# Patient Record
Sex: MALE | Race: WHITE | Employment: UNEMPLOYED | URBAN - METROPOLITAN AREA
[De-identification: names, ages, dates, MRNs, and addresses within clinical notes are randomized per-mention and may not be internally consistent; named-entity substitution may affect disease eponyms.]

---

## 2017-03-13 ENCOUNTER — OFFICE VISIT (OUTPATIENT)
Dept: INTERNAL MEDICINE CLINIC | Age: 62
End: 2017-03-13

## 2017-03-13 DIAGNOSIS — I10 BENIGN ESSENTIAL HYPERTENSION: Primary | ICD-10-CM

## 2017-03-13 DIAGNOSIS — F41.9 ANXIETY: ICD-10-CM

## 2017-03-13 RX ORDER — LORAZEPAM 0.5 MG/1
TABLET ORAL
Qty: 30 TAB | Refills: 0 | Status: SHIPPED | OUTPATIENT
Start: 2017-03-13

## 2017-03-13 NOTE — PROGRESS NOTES
HPI:   Needs DMV form completed  Hx notable for HTN, mild anxiety  w/o chest pain/abd. discomfort; no dyspnea, cough or pedal edema; denies constitutional complaints of fever, night sweats or wt loss; no evidence of GI/ hemorrhage; no polyuria/polydipsia. Activity is age appropriate. ROS is otherwise negative. Past Medical History:   Diagnosis Date    Anxiety     Benign essential hypertension     IGT (impaired glucose tolerance)        No past surgical history on file. Social History     Social History    Marital status: SINGLE     Spouse name: N/A    Number of children: N/A    Years of education: N/A     Occupational History    Not on file. Social History Main Topics    Smoking status: Former Smoker     Quit date: 1/1/1980    Smokeless tobacco: Not on file    Alcohol use Yes      Comment: heavy    Drug use: No    Sexual activity: Not on file     Other Topics Concern    Not on file     Social History Narrative       No Known Allergies    Family History   Problem Relation Age of Onset    Arthritis-osteo Father     No Known Problems Mother        Current Outpatient Prescriptions   Medication Sig Dispense Refill    atenolol-chlorthalidone (TENORETIC) 100-25 mg per tablet 1 qam 90 Tab 3    amLODIPine (NORVASC) 5 mg tablet 1 qd 90 Tab 3    LORazepam (ATIVAN) 0.5 mg tablet Take  by mouth two (2) times daily as needed. There were no vitals taken for this visit. PE  Well nourished in NAD  HEENT: PERRLA, EOMI;  OP: clear. Neck: supple w/o mass or bruits. Chest: clear. CV: RRR w/o m,r,g; pulses intact. Abd: soft, NT, w/o HSM or mass. Ext: w/o edema. Neuro: NF. Assessment and Plan    Encounter Diagnoses   Name Primary?  Benign essential hypertension Yes    Anxiety    DMV form completed  HTN - controlled (aggravated by white coat effect)  Anxiety - mild; uses ativan sparingly  I have reviewed/discussed the above normal BMI with the patient.   I have recommended the following interventions: dietary management education, guidance, and counseling . The plan is as follows: I have counseled this patient on diet and exercise regimens.  .    No change in rx  Declines lab work d/t expense  OV 6 mos or prn  I have explained plan to patient and the patient verbalizes understanding

## 2017-03-13 NOTE — PATIENT INSTRUCTIONS

## 2017-03-14 ENCOUNTER — OFFICE VISIT (OUTPATIENT)
Dept: INTERNAL MEDICINE CLINIC | Age: 62
End: 2017-03-14

## 2017-03-14 VITALS
DIASTOLIC BLOOD PRESSURE: 80 MMHG | HEART RATE: 56 BPM | HEIGHT: 69 IN | RESPIRATION RATE: 16 BRPM | WEIGHT: 182 LBS | BODY MASS INDEX: 26.96 KG/M2 | OXYGEN SATURATION: 99 % | TEMPERATURE: 98.3 F | SYSTOLIC BLOOD PRESSURE: 148 MMHG

## 2017-03-14 DIAGNOSIS — I10 BENIGN ESSENTIAL HYPERTENSION: Primary | ICD-10-CM

## 2017-03-14 DIAGNOSIS — F41.9 ANXIETY: ICD-10-CM

## 2017-03-14 NOTE — PROGRESS NOTES
HPI:   Needs DMV form completed for license renewal (no MVAs/seizures)  Hx notable for HTN, mild anxiety  w/o chest pain/abd. discomfort; no dyspnea, cough or pedal edema; denies constitutional complaints of fever, night sweats or wt loss; no evidence of GI/ hemorrhage; no polyuria/polydipsia. Activity is age appropriate. ROS is otherwise negative. Past Medical History:   Diagnosis Date    Anxiety     Benign essential hypertension     IGT (impaired glucose tolerance)        History reviewed. No pertinent surgical history. Social History     Social History    Marital status: SINGLE     Spouse name: N/A    Number of children: N/A    Years of education: N/A     Occupational History    Not on file. Social History Main Topics    Smoking status: Former Smoker     Quit date: 1/1/1980    Smokeless tobacco: Not on file    Alcohol use Yes      Comment: heavy    Drug use: No    Sexual activity: Not on file     Other Topics Concern    Not on file     Social History Narrative       No Known Allergies    Family History   Problem Relation Age of Onset    Arthritis-osteo Father     No Known Problems Mother        Current Outpatient Prescriptions   Medication Sig Dispense Refill    LORazepam (ATIVAN) 0.5 mg tablet 1 bid prn anxiety 30 Tab 0    atenolol-chlorthalidone (TENORETIC) 100-25 mg per tablet 1 qam 90 Tab 3    amLODIPine (NORVASC) 5 mg tablet 1 qd 90 Tab 3           Visit Vitals    /80 (BP 1 Location: Left arm, BP Patient Position: Sitting)    Pulse (!) 56    Temp 98.3 °F (36.8 °C) (Tympanic)    Resp 16    Ht 5' 9\" (1.753 m)    Wt 182 lb (82.6 kg)    SpO2 99%    BMI 26.88 kg/m2       PE  Well nourished in NAD  HEENT: PERRLA, EOMI;  OP: clear. Neck: supple w/o mass or bruits. Chest: clear. CV: RRR w/o m,r,g; pulses intact. Abd: soft, NT, w/o HSM or mass. Ext: w/o edema. Neuro: NF. Assessment and Plan    Encounter Diagnoses   Name Primary?     Benign essential hypertension Yes    Anxiety        HTN - controlled (aggravated by white coat effect)  Anxiety - mild - uses infrequent ativan  I have reviewed/discussed the above normal BMI with the patient. I have recommended the following interventions: dietary management education, guidance, and counseling . The plan is as follows: I have counseled this patient on diet and exercise regimens.  .    OV 6 - 12 mos or prn  Declines labs/colo d/t expense

## 2017-03-14 NOTE — PROGRESS NOTES
1. Have you been to the ER, urgent care clinic since your last visit? Hospitalized since your last visit? No    2. Have you seen or consulted any other health care providers outside of the [address] [place] [address] since your last visit? Include any pap smears or colon screening.  No

## 2017-03-14 NOTE — PATIENT INSTRUCTIONS

## 2019-12-11 ENCOUNTER — TELEPHONE (OUTPATIENT)
Dept: FAMILY MEDICINE CLINIC | Age: 64
End: 2019-12-11

## 2019-12-11 NOTE — TELEPHONE ENCOUNTER
Patient came in, previous patient of Dr. Angeles Wolf but has not been seen and only has under a week of blood pressure medication left. Attempting to establish as a new patient, has appt scheduled with Dr. Carl Jamison on 12/24 and wants to know if Angeles Wolf can prescribe enough meds (Tenoretic) to cover until that appointment.     Please advise patient [phone]

## 2019-12-13 DIAGNOSIS — R73.09 ABNORMAL GLUCOSE: ICD-10-CM

## 2019-12-13 DIAGNOSIS — I10 BENIGN ESSENTIAL HYPERTENSION: Primary | ICD-10-CM

## 2019-12-13 RX ORDER — ATENOLOL AND CHLORTHALIDONE TABLET 100; 25 MG/1; MG/1
TABLET ORAL
Qty: 90 TAB | Refills: 0 | OUTPATIENT
Start: 2019-12-13

## 2019-12-13 NOTE — PROGRESS NOTES
HPI:   Last seen 3/2017  Has been out of antihypertensives for >12 mos (has significant white coat effect)  Hx also notable for IGT/anxiety  Currently w/o chest pain/abd. discomfort; no dyspnea, cough or pedal edema; denies constitutional complaints of fever, night sweats or wt loss; no evidence of GI/ hemorrhage    ROS is otherwise negative. Past Medical History:   Diagnosis Date    Anxiety     Benign essential hypertension     IGT (impaired glucose tolerance)        No past surgical history on file.     Social History     Socioeconomic History    Marital status: SINGLE     Spouse name: Not on file    Number of children: Not on file    Years of education: Not on file    Highest education level: Not on file   Occupational History    Not on file   Social Needs    Financial resource strain: Not on file    Food insecurity:     Worry: Not on file     Inability: Not on file    Transportation needs:     Medical: Not on file     Non-medical: Not on file   Tobacco Use    Smoking status: Former Smoker     Last attempt to quit: 1980     Years since quittin.9   Substance and Sexual Activity    Alcohol use: Yes     Comment: heavy    Drug use: No    Sexual activity: Not on file   Lifestyle    Physical activity:     Days per week: Not on file     Minutes per session: Not on file    Stress: Not on file   Relationships    Social connections:     Talks on phone: Not on file     Gets together: Not on file     Attends Mu-ism service: Not on file     Active member of club or organization: Not on file     Attends meetings of clubs or organizations: Not on file     Relationship status: Not on file    Intimate partner violence:     Fear of current or ex partner: Not on file     Emotionally abused: Not on file     Physically abused: Not on file     Forced sexual activity: Not on file   Other Topics Concern    Not on file   Social History Narrative    Not on file       No Known Allergies    Family History   Problem Relation Age of Onset    Arthritis-osteo Father     No Known Problems Mother        Current Outpatient Medications   Medication Sig Dispense Refill    LORazepam (ATIVAN) 0.5 mg tablet 1 bid prn anxiety 30 Tab 0    atenolol-chlorthalidone (TENORETIC) 100-25 mg per tablet 1 qam 90 Tab 3    amLODIPine (NORVASC) 5 mg tablet 1 qd 90 Tab 3           Visit Vitals  BP (!) 165/95   Pulse 90   Temp 98.1 °F (36.7 °C)   Resp 15   Ht 5' 9\" (1.753 m)   Wt 191 lb (86.6 kg)   BMI 28.21 kg/m²       PE  Well nourished in NAD  HEENT:  OP: clear. Neck: supple w/o mass or bruits. Chest: clear. CV: RRR w/o m,r,g; pulses intact. Abd: soft, NT, w/o HSM or mass. Ext: w/o edema. Neuro: NF. Assessment and Plan    Encounter Diagnoses   Name Primary?     Benign essential hypertension Yes    Anxiety     IGT (impaired glucose tolerance)        HTN -uncontrolled d/t noncompliance; reinstitute norvasc 5 mg every day and tenoretic 100/25 qd  IGT - continue dietary/exercise efforts; labs soon to assess  Anxiety - discussed nonpharmacologic management  Declines colo; flu/pneumovax advised (he declines)  OV 3 mos or prn  I have explained plan to patient and the patient verbalizes understanding

## 2019-12-13 NOTE — TELEPHONE ENCOUNTER
I spoke with Aric Parada at [facility], [facility] and last fill was 90 tabs on 08/17/2018

## 2019-12-17 NOTE — TELEPHONE ENCOUNTER
Dr Juan Gilliam will not prescribe any refills on prescription with this patient. He will need to start fresh with Dr. Binta Temple.

## 2019-12-18 ENCOUNTER — OFFICE VISIT (OUTPATIENT)
Dept: FAMILY MEDICINE CLINIC | Age: 64
End: 2019-12-18

## 2019-12-18 VITALS
DIASTOLIC BLOOD PRESSURE: 95 MMHG | BODY MASS INDEX: 28.29 KG/M2 | RESPIRATION RATE: 15 BRPM | WEIGHT: 191 LBS | HEIGHT: 69 IN | TEMPERATURE: 98.1 F | HEART RATE: 90 BPM | SYSTOLIC BLOOD PRESSURE: 165 MMHG

## 2019-12-18 DIAGNOSIS — F41.9 ANXIETY: ICD-10-CM

## 2019-12-18 DIAGNOSIS — R73.02 IGT (IMPAIRED GLUCOSE TOLERANCE): ICD-10-CM

## 2019-12-18 DIAGNOSIS — I10 BENIGN ESSENTIAL HYPERTENSION: Primary | ICD-10-CM

## 2019-12-18 RX ORDER — AMLODIPINE BESYLATE 5 MG/1
TABLET ORAL
Qty: 90 TAB | Refills: 0 | Status: SHIPPED | OUTPATIENT
Start: 2019-12-18 | End: 2020-04-01 | Stop reason: SDUPTHER

## 2019-12-18 RX ORDER — ATENOLOL AND CHLORTHALIDONE TABLET 100; 25 MG/1; MG/1
TABLET ORAL
Qty: 90 TAB | Refills: 0 | Status: SHIPPED | OUTPATIENT
Start: 2019-12-18 | End: 2020-04-09

## 2019-12-18 NOTE — PATIENT INSTRUCTIONS
DASH Diet: Care Instructions  Your Care Instructions    The DASH diet is an eating plan that can help lower your blood pressure. DASH stands for Dietary Approaches to Stop Hypertension. Hypertension is high blood pressure. The DASH diet focuses on eating foods that are high in calcium, potassium, and magnesium. These nutrients can lower blood pressure. The foods that are highest in these nutrients are fruits, vegetables, low-fat dairy products, nuts, seeds, and legumes. But taking calcium, potassium, and magnesium supplements instead of eating foods that are high in those nutrients does not have the same effect. The DASH diet also includes whole grains, fish, and poultry. The DASH diet is one of several lifestyle changes your doctor may recommend to lower your high blood pressure. Your doctor may also want you to decrease the amount of sodium in your diet. Lowering sodium while following the DASH diet can lower blood pressure even further than just the DASH diet alone. Follow-up care is a key part of your treatment and safety. Be sure to make and go to all appointments, and call your doctor if you are having problems. It's also a good idea to know your test results and keep a list of the medicines you take. How can you care for yourself at home? Following the DASH diet  · Eat 4 to 5 servings of fruit each day. A serving is 1 medium-sized piece of fruit, ½ cup chopped or canned fruit, 1/4 cup dried fruit, or 4 ounces (½ cup) of fruit juice. Choose fruit more often than fruit juice. · Eat 4 to 5 servings of vegetables each day. A serving is 1 cup of lettuce or raw leafy vegetables, ½ cup of chopped or cooked vegetables, or 4 ounces (½ cup) of vegetable juice. Choose vegetables more often than vegetable juice. · Get 2 to 3 servings of low-fat and fat-free dairy each day. A serving is 8 ounces of milk, 1 cup of yogurt, or 1 ½ ounces of cheese. · Eat 6 to 8 servings of grains each day.  A serving is 1 slice of bread, 1 ounce of dry cereal, or ½ cup of cooked rice, pasta, or cooked cereal. Try to choose whole-grain products as much as possible. · Limit lean meat, poultry, and fish to 2 servings each day. A serving is 3 ounces, about the size of a deck of cards. · Eat 4 to 5 servings of nuts, seeds, and legumes (cooked dried beans, lentils, and split peas) each week. A serving is 1/3 cup of nuts, 2 tablespoons of seeds, or ½ cup of cooked beans or peas. · Limit fats and oils to 2 to 3 servings each day. A serving is 1 teaspoon of vegetable oil or 2 tablespoons of salad dressing. · Limit sweets and added sugars to 5 servings or less a week. A serving is 1 tablespoon jelly or jam, ½ cup sorbet, or 1 cup of lemonade. · Eat less than 2,300 milligrams (mg) of sodium a day. If you limit your sodium to 1,500 mg a day, you can lower your blood pressure even more. Tips for success  · Start small. Do not try to make dramatic changes to your diet all at once. You might feel that you are missing out on your favorite foods and then be more likely to not follow the plan. Make small changes, and stick with them. Once those changes become habit, add a few more changes. · Try some of the following:  ? Make it a goal to eat a fruit or vegetable at every meal and at snacks. This will make it easy to get the recommended amount of fruits and vegetables each day. ? Try yogurt topped with fruit and nuts for a snack or healthy dessert. ? Add lettuce, tomato, cucumber, and onion to sandwiches. ? Combine a ready-made pizza crust with low-fat mozzarella cheese and lots of vegetable toppings. Try using tomatoes, squash, spinach, broccoli, carrots, cauliflower, and onions. ? Have a variety of cut-up vegetables with a low-fat dip as an appetizer instead of chips and dip. ? Sprinkle sunflower seeds or chopped almonds over salads. Or try adding chopped walnuts or almonds to cooked vegetables.   ? Try some vegetarian meals using beans and peas. Add garbanzo or kidney beans to salads. Make burritos and tacos with mashed good beans or black beans. Where can you learn more? Go to http://katty-emily.info/. Enter D242 in the search box to learn more about \"DASH Diet: Care Instructions. \"  Current as of: April 9, 2019  Content Version: 12.2  © 0595-6612 [employer]. Care instructions adapted under license by [employer] (which disclaims liability or warranty for this information). If you have questions about a medical condition or this instruction, always ask your healthcare professional. [address] any warranty or liability for your use of this information.

## 2020-01-04 LAB
ALBUMIN SERPL-MCNC: 4.1 G/DL (ref 3.6–4.8)
ALBUMIN/GLOB SERPL: 1.2 {RATIO} (ref 1.2–2.2)
ALP SERPL-CCNC: 62 IU/L (ref 39–117)
ALT SERPL-CCNC: 16 IU/L (ref 0–44)
AST SERPL-CCNC: 17 IU/L (ref 0–40)
BASOPHILS # BLD AUTO: 0.1 X10E3/UL (ref 0–0.2)
BASOPHILS NFR BLD AUTO: 1 %
BILIRUB SERPL-MCNC: 0.9 MG/DL (ref 0–1.2)
BUN SERPL-MCNC: 9 MG/DL (ref 8–27)
BUN/CREAT SERPL: 8 (ref 10–24)
CALCIUM SERPL-MCNC: 9.3 MG/DL (ref 8.6–10.2)
CHLORIDE SERPL-SCNC: 94 MMOL/L (ref 96–106)
CHOLEST SERPL-MCNC: 153 MG/DL (ref 100–199)
CO2 SERPL-SCNC: 28 MMOL/L (ref 20–29)
CREAT SERPL-MCNC: 1.11 MG/DL (ref 0.76–1.27)
EOSINOPHIL # BLD AUTO: 0.1 X10E3/UL (ref 0–0.4)
EOSINOPHIL NFR BLD AUTO: 2 %
ERYTHROCYTE [DISTWIDTH] IN BLOOD BY AUTOMATED COUNT: 12.4 % (ref 12.3–15.4)
EST. AVERAGE GLUCOSE BLD GHB EST-MCNC: 143 MG/DL
GLOBULIN SER CALC-MCNC: 3.3 G/DL (ref 1.5–4.5)
GLUCOSE SERPL-MCNC: 149 MG/DL (ref 65–99)
HBA1C MFR BLD: 6.6 % (ref 4.8–5.6)
HCT VFR BLD AUTO: 49.6 % (ref 37.5–51)
HDLC SERPL-MCNC: 41 MG/DL
HGB BLD-MCNC: 17.8 G/DL (ref 13–17.7)
IMM GRANULOCYTES # BLD AUTO: 0 X10E3/UL (ref 0–0.1)
IMM GRANULOCYTES NFR BLD AUTO: 0 %
LDLC SERPL CALC-MCNC: 81 MG/DL (ref 0–99)
LYMPHOCYTES # BLD AUTO: 1.7 X10E3/UL (ref 0.7–3.1)
LYMPHOCYTES NFR BLD AUTO: 25 %
MCH RBC QN AUTO: 33 PG (ref 26.6–33)
MCHC RBC AUTO-ENTMCNC: 35.9 G/DL (ref 31.5–35.7)
MCV RBC AUTO: 92 FL (ref 79–97)
MONOCYTES # BLD AUTO: 1.1 X10E3/UL (ref 0.1–0.9)
MONOCYTES NFR BLD AUTO: 16 %
NEUTROPHILS # BLD AUTO: 4 X10E3/UL (ref 1.4–7)
NEUTROPHILS NFR BLD AUTO: 56 %
PLATELET # BLD AUTO: 233 X10E3/UL (ref 150–450)
POTASSIUM SERPL-SCNC: 3.3 MMOL/L (ref 3.5–5.2)
PROT SERPL-MCNC: 7.4 G/DL (ref 6–8.5)
RBC # BLD AUTO: 5.39 X10E6/UL (ref 4.14–5.8)
SODIUM SERPL-SCNC: 140 MMOL/L (ref 134–144)
TRIGL SERPL-MCNC: 157 MG/DL (ref 0–149)
VLDLC SERPL CALC-MCNC: 31 MG/DL (ref 5–40)
WBC # BLD AUTO: 7.1 X10E3/UL (ref 3.4–10.8)

## 2020-04-01 RX ORDER — AMLODIPINE BESYLATE 5 MG/1
TABLET ORAL
Qty: 90 TAB | Refills: 0 | Status: SHIPPED | OUTPATIENT
Start: 2020-04-01 | End: 2022-07-06 | Stop reason: SDUPTHER

## 2020-04-01 NOTE — TELEPHONE ENCOUNTER
Requested Prescriptions     Pending Prescriptions Disp Refills    amLODIPine (NORVASC) 5 mg tablet 90 Tab 0     Si qd

## 2020-04-09 RX ORDER — ATENOLOL AND CHLORTHALIDONE TABLET 100; 25 MG/1; MG/1
TABLET ORAL
Qty: 90 TAB | Refills: 0 | Status: SHIPPED | OUTPATIENT
Start: 2020-04-09 | End: 2020-07-24

## 2020-05-14 ENCOUNTER — TELEPHONE (OUTPATIENT)
Dept: FAMILY MEDICINE CLINIC | Age: 65
End: 2020-05-14

## 2020-05-14 NOTE — TELEPHONE ENCOUNTER
Left message for patient to call the office to schedule medicare wellness appointment with Dr. Maegan Tamayo.

## 2020-05-14 NOTE — PROGRESS NOTES
This is an Initial Medicare Annual Wellness Exam (AWV) (Performed 12 months after IPPE or effective date of Medicare Part B enrollment, Once in a lifetime)    Consent: Sascha Schaefer, who was seen by synchronous (real-time) audio only technology, and/or his healthcare decision maker, is aware that this patient-initiated, Telehealth encounter on 2020 is a billable service. While AWVs are fully covered by Medicare, any services rendered on this date that are not included in an AWV are subject to additional billing, with coverage as determined by his insurance carrier. He is aware that he may receive a bill for any such additional services and has provided verbal consent to proceed: Yes. I have reviewed the patient's medical history in detail and updated the computerized patient record. History     Patient Active Problem List   Diagnosis Code    Anxiety F41.9    Type II diabetes mellitus ([facility] [address] E11.9    Benign essential hypertension I10     Past Medical History:   Diagnosis Date    Anxiety     Benign essential hypertension     IGT (impaired glucose tolerance)       No past surgical history on file.   Current Outpatient Medications   Medication Sig Dispense Refill    atenoloL-chlorthalidone (TENORETIC) 100-25 mg per tablet take 1 tablet by mouth every morning 90 Tab 0    amLODIPine (NORVASC) 5 mg tablet 1 qd 90 Tab 0    LORazepam (ATIVAN) 0.5 mg tablet 1 bid prn anxiety 30 Tab 0     No Known Allergies    Family History   Problem Relation Age of Onset    Arthritis-osteo Father     No Known Problems Mother      Social History     Tobacco Use    Smoking status: Former Smoker     Last attempt to quit: 1980     Years since quittin.4   Substance Use Topics    Alcohol use: Yes     Comment: heavy       Depression Risk Factor Screening:     3 most recent PHQ Screens 2020   Little interest or pleasure in doing things Several days   Feeling down, depressed, irritable, or hopeless Several days Total Score PHQ 2 2       Alcohol Risk Factor Screening (MALE > 65): Do you average more 1 drink per night or more than 7 drinks a week: yes    In the past three months have you have had more than 4 drinks containing alcohol on one occasion: yes      Functional Ability and Level of Safety:   Hearing: Hearing is good. Activities of Daily Living: The home contains: no safety equipment. Patient does total self care     Ambulation: with no difficulty    Fall Risk:  Fall Risk Assessment, last 12 mths 5/20/2020   Able to walk? Yes   Fall in past 12 months? No       Abuse Screen:  Patient is not abused    Cognitive Screening   Has your family/caregiver stated any concerns about your memory: no  A&O x 3; answers questions appropriately      Patient Care Team   Patient Care Team:  Yelena Garcia MD as PCP - General (Internal Medicine)  Yelena Garcia MD as PCP - [facility] Empaneled Provider    Assessment/Plan   Education and counseling provided:  Are appropriate based on today's review and evaluation    Diagnoses and all orders for this visit:    1.  Routine general medical examination at a health care facility         Health Maintenance Due   Topic Date Due    Hepatitis C Screening  1955    Foot Exam Q1  02/16/1964    MICROALBUMIN Q1  02/16/1964    Eye Exam Retinal or Dilated  02/16/1964    DTaP/Tdap/Td series (1 - Tdap) 02/16/1975    Shingrix Vaccine Age 50> (1 of 2) 02/16/2004    FOBT Q1Y Age 50-75  02/16/2004    GLAUCOMA SCREENING Q2Y  02/16/2019    Pneumococcal 65+ years (1 of 1 - PPSV23) 02/16/2019    Medicare Yearly Exam  12/18/2019     Medicare wellness performed  Labs done 1/2020 reviewed (pt checks BSs at home and <140)  Continue dietary/exercise efforts; reduce ETOH  [place] advised  Vaccines: declined  Advance directive discussed  OV 6 mos or prn  I have explained plan to patient and the patient verbalizes understanding            Nhan Estrada is a 76 y.o. male who was evaluated by an audio only encounter for concerns as above. Patient identification was verified prior to start of the visit. A caregiver was present when appropriate. Due to this being a TeleHealth encounter (During IFM-85 public health emergency), evaluation of the following organ systems was limited: Vitals/Constitutional/EENT/Resp/CV/GI//MS/Neuro/Skin/Heme-Lymph-Imm. Pursuant to the emergency declaration under the [address] [place] [address], [facility] waiver authority and the [employer] Act, this Virtual Visit was conducted, with patient's (and/or legal guardian's) consent, to reduce the patient's risk of exposure to COVID-19 and provide necessary medical care. Services were provided through a synchronous discussion virtually to substitute for in-person clinic visit. I was at home. The patient was at home.         Delmi Nino MD

## 2020-05-20 ENCOUNTER — VIRTUAL VISIT (OUTPATIENT)
Dept: FAMILY MEDICINE CLINIC | Age: 65
End: 2020-05-20

## 2020-05-20 DIAGNOSIS — Z00.00 ROUTINE GENERAL MEDICAL EXAMINATION AT A HEALTH CARE FACILITY: Primary | ICD-10-CM

## 2020-07-24 RX ORDER — ATENOLOL AND CHLORTHALIDONE TABLET 100; 25 MG/1; MG/1
TABLET ORAL
Qty: 90 TAB | Refills: 0 | Status: SHIPPED | OUTPATIENT
Start: 2020-07-24 | End: 2020-11-08 | Stop reason: SDUPTHER

## 2020-11-08 RX ORDER — ATENOLOL AND CHLORTHALIDONE TABLET 100; 25 MG/1; MG/1
TABLET ORAL
Qty: 90 TAB | Refills: 0 | Status: SHIPPED | OUTPATIENT
Start: 2020-11-08 | End: 2021-03-15

## 2020-12-09 DIAGNOSIS — Z11.59 NEED FOR HEPATITIS C SCREENING TEST: ICD-10-CM

## 2020-12-09 DIAGNOSIS — F41.9 ANXIETY: ICD-10-CM

## 2020-12-09 DIAGNOSIS — E11.9 TYPE 2 DIABETES MELLITUS WITHOUT COMPLICATION, WITHOUT LONG-TERM CURRENT USE OF INSULIN (HCC): Primary | ICD-10-CM

## 2020-12-31 NOTE — PROGRESS NOTES
Wilber Romeo is a 76 y.o. male, evaluated via audio-only technology on 1/6/2021 for Hypertension  . Assessment & Plan:   Diagnoses and all orders for this visit:    1. Type 2 diabetes mellitus without complication, without long-term current use of insulin ([facility] [address]    2. Benign essential hypertension      HTN/T2DM - continue dietary/exercise efforts  Add KCl 20 meq every day in light of K of 3.1  Anxiety - rare use of ativan  Declines colo  Otherwise, no change in rx  OV 6 mos or prn  I have explained plan to patient and the patient verbalizes understanding    12  Subjective:   F/u visit for routine evaluation of HTN, T2DM, anxiety  A1C/chol 6.6/153 Jan 2020; recent A1C 6.1  No acute issues  Doing well w/o chest/abdominal pain, dyspnea, cough, fever, evidence of GI/ hemorrhage, constitutional complaints; physically active      Prior to Admission medications    Medication Sig Start Date End Date Taking? Authorizing Provider   atenoloL-chlorthalidone (TENORETIC) 100-25 mg per tablet take 1 tablet by mouth every morning 11/8/20   Papo Maldonado MD   amLODIPine (NORVASC) 5 mg tablet 1 qd 4/1/20   Papo Maldonado MD   LORazepam (ATIVAN) 0.5 mg tablet 1 bid prn anxiety 3/13/17   Papo Maldonado MD     Current Outpatient Medications   Medication Sig Dispense Refill    atenoloL-chlorthalidone (TENORETIC) 100-25 mg per tablet take 1 tablet by mouth every morning 90 Tab 0    amLODIPine (NORVASC) 5 mg tablet 1 qd 90 Tab 0    LORazepam (ATIVAN) 0.5 mg tablet 1 bid prn anxiety 30 Tab 0     No Known Allergies  Past Medical History:   Diagnosis Date    Anxiety     Benign essential hypertension     DM type 2 (diabetes mellitus, type 2) ([facility] [address]      No past surgical history on file. ROS per HPI    No flowsheet data found.      Wilber Romeo, who was evaluated through a patient-initiated, synchronous (real-time) audio only encounter, and/or her healthcare decision maker, is aware that it is a billable service, with coverage as determined by his insurance carrier. He provided verbal consent to proceed: Yes. He has not had a related appointment within my department in the past 7 days or scheduled within the next 24 hours.       Total Time: minutes: 11-20 minutes    Patrice Holland MD

## 2021-01-04 ENCOUNTER — APPOINTMENT (OUTPATIENT)
Dept: FAMILY MEDICINE CLINIC | Age: 66
End: 2021-01-04

## 2021-01-05 LAB
ALBUMIN SERPL-MCNC: 4 G/DL (ref 3.8–4.8)
ALBUMIN/GLOB SERPL: 1.2 {RATIO} (ref 1.2–2.2)
ALP SERPL-CCNC: 57 IU/L (ref 39–117)
ALT SERPL-CCNC: 11 IU/L (ref 0–44)
AST SERPL-CCNC: 14 IU/L (ref 0–40)
BASOPHILS # BLD AUTO: 0.1 X10E3/UL (ref 0–0.2)
BASOPHILS NFR BLD AUTO: 1 %
BILIRUB SERPL-MCNC: 1.3 MG/DL (ref 0–1.2)
BUN SERPL-MCNC: 11 MG/DL (ref 8–27)
BUN/CREAT SERPL: 9 (ref 10–24)
CALCIUM SERPL-MCNC: 9.4 MG/DL (ref 8.6–10.2)
CHLORIDE SERPL-SCNC: 96 MMOL/L (ref 96–106)
CHOLEST SERPL-MCNC: 145 MG/DL (ref 100–199)
CO2 SERPL-SCNC: 30 MMOL/L (ref 20–29)
CREAT SERPL-MCNC: 1.22 MG/DL (ref 0.76–1.27)
EOSINOPHIL # BLD AUTO: 0.3 X10E3/UL (ref 0–0.4)
EOSINOPHIL NFR BLD AUTO: 3 %
ERYTHROCYTE [DISTWIDTH] IN BLOOD BY AUTOMATED COUNT: 12.1 % (ref 11.6–15.4)
EST. AVERAGE GLUCOSE BLD GHB EST-MCNC: 128 MG/DL
GLOBULIN SER CALC-MCNC: 3.3 G/DL (ref 1.5–4.5)
GLUCOSE SERPL-MCNC: 123 MG/DL (ref 65–99)
HBA1C MFR BLD: 6.1 % (ref 4.8–5.6)
HCT VFR BLD AUTO: 47.7 % (ref 37.5–51)
HCV AB S/CO SERPL IA: <0.1 S/CO RATIO (ref 0–0.9)
HDLC SERPL-MCNC: 48 MG/DL
HGB BLD-MCNC: 17 G/DL (ref 13–17.7)
IMM GRANULOCYTES # BLD AUTO: 0 X10E3/UL (ref 0–0.1)
IMM GRANULOCYTES NFR BLD AUTO: 0 %
LDLC SERPL CALC-MCNC: 74 MG/DL (ref 0–99)
LYMPHOCYTES # BLD AUTO: 2.2 X10E3/UL (ref 0.7–3.1)
LYMPHOCYTES NFR BLD AUTO: 23 %
MCH RBC QN AUTO: 32.4 PG (ref 26.6–33)
MCHC RBC AUTO-ENTMCNC: 35.6 G/DL (ref 31.5–35.7)
MCV RBC AUTO: 91 FL (ref 79–97)
MONOCYTES # BLD AUTO: 1.2 X10E3/UL (ref 0.1–0.9)
MONOCYTES NFR BLD AUTO: 12 %
NEUTROPHILS # BLD AUTO: 5.9 X10E3/UL (ref 1.4–7)
NEUTROPHILS NFR BLD AUTO: 61 %
PLATELET # BLD AUTO: 232 X10E3/UL (ref 150–450)
POTASSIUM SERPL-SCNC: 3.1 MMOL/L (ref 3.5–5.2)
PROT SERPL-MCNC: 7.3 G/DL (ref 6–8.5)
RBC # BLD AUTO: 5.25 X10E6/UL (ref 4.14–5.8)
SODIUM SERPL-SCNC: 141 MMOL/L (ref 134–144)
TRIGL SERPL-MCNC: 130 MG/DL (ref 0–149)
TSH SERPL DL<=0.005 MIU/L-ACNC: 3.95 UIU/ML (ref 0.45–4.5)
VLDLC SERPL CALC-MCNC: 23 MG/DL (ref 5–40)
WBC # BLD AUTO: 9.7 X10E3/UL (ref 3.4–10.8)

## 2021-01-06 ENCOUNTER — VIRTUAL VISIT (OUTPATIENT)
Dept: FAMILY MEDICINE CLINIC | Age: 66
End: 2021-01-06
Payer: MEDICARE

## 2021-01-06 DIAGNOSIS — I10 BENIGN ESSENTIAL HYPERTENSION: ICD-10-CM

## 2021-01-06 DIAGNOSIS — E11.9 TYPE 2 DIABETES MELLITUS WITHOUT COMPLICATION, WITHOUT LONG-TERM CURRENT USE OF INSULIN (HCC): Primary | ICD-10-CM

## 2021-01-06 LAB
ALBUMIN/CREAT UR: 25 MG/G CREAT (ref 0–29)
CREAT UR-MCNC: 66.7 MG/DL
MICROALBUMIN UR-MCNC: 16.4 UG/ML

## 2021-01-06 PROCEDURE — 99442 PR PHYS/QHP TELEPHONE EVALUATION 11-20 MIN: CPT | Performed by: INTERNAL MEDICINE

## 2021-01-06 RX ORDER — POTASSIUM CHLORIDE 20 MEQ/1
TABLET, EXTENDED RELEASE ORAL
Qty: 90 TAB | Refills: 3 | Status: SHIPPED | OUTPATIENT
Start: 2021-01-06

## 2021-03-15 RX ORDER — ATENOLOL AND CHLORTHALIDONE TABLET 100; 25 MG/1; MG/1
TABLET ORAL
Qty: 90 TAB | Refills: 1 | Status: SHIPPED | OUTPATIENT
Start: 2021-03-15 | End: 2022-06-20 | Stop reason: SDUPTHER

## 2021-03-15 NOTE — TELEPHONE ENCOUNTER
Last Visit: 1/6/21 with MD Chris White  Next Appointment: 7/13/21 with MD Chris White  Previous Refill Encounter(s): 11/8/20 #90    Requested Prescriptions     Pending Prescriptions Disp Refills    atenoloL-chlorthalidone (TENORETIC) 100-25 mg per tablet [Pharmacy Med Name: ATENOLOL-CHLORTHALIDONE 100-25] 90 Tab 1     Sig: take 1 tablet by mouth every morning

## 2022-06-20 NOTE — TELEPHONE ENCOUNTER
[facility] rep called with pt to verify if will have rx filled, adv pt has est care appt with new provider whom will make that decision.

## 2022-06-20 NOTE — TELEPHONE ENCOUNTER
Patient  Requesting medication refill stated he only has 3 pills left I advised pt that he needs an in office visit prior to medication refill.   Upcoming visit 07/06/22 Thank you

## 2022-06-20 NOTE — TELEPHONE ENCOUNTER
Last Visit: 1/6/21 with MD Niranjan Beckwith  Next Appointment: 7/6/22 with NP Chin Vazquez  Previous Refill Encounter(s): 3/15/21 #90 with 1 refill    Requested Prescriptions     Pending Prescriptions Disp Refills    atenoloL-chlorthalidone (TENORETIC) 100-25 mg per tablet 30 Tablet 0     Sig: Take 1 Tablet by mouth daily.          For Nilton Cruz in place:    Recommendation Provided To:    Intervention Detail: New Rx: 1, reason: Patient Preference   Gap Closed?:    Intervention Accepted By:   Gen Russell Time Spent (min): 5 No

## 2022-06-21 RX ORDER — ATENOLOL AND CHLORTHALIDONE TABLET 100; 25 MG/1; MG/1
1 TABLET ORAL DAILY
Qty: 30 TABLET | Refills: 0 | Status: SHIPPED | OUTPATIENT
Start: 2022-06-21 | End: 2022-07-06 | Stop reason: ALTCHOICE

## 2022-07-06 ENCOUNTER — HOSPITAL ENCOUNTER (OUTPATIENT)
Dept: LAB | Age: 67
Discharge: HOME | End: 2022-07-06
Payer: MEDICARE

## 2022-07-06 ENCOUNTER — OFFICE VISIT (OUTPATIENT)
Dept: FAMILY MEDICINE CLINIC | Age: 67
End: 2022-07-06
Payer: MEDICARE

## 2022-07-06 VITALS
BODY MASS INDEX: 28.29 KG/M2 | HEART RATE: 58 BPM | OXYGEN SATURATION: 97 % | RESPIRATION RATE: 18 BRPM | SYSTOLIC BLOOD PRESSURE: 142 MMHG | DIASTOLIC BLOOD PRESSURE: 88 MMHG | WEIGHT: 191 LBS | HEIGHT: 69 IN | TEMPERATURE: 98 F

## 2022-07-06 DIAGNOSIS — E11.9 TYPE 2 DIABETES MELLITUS WITHOUT COMPLICATION, WITHOUT LONG-TERM CURRENT USE OF INSULIN (HCC): ICD-10-CM

## 2022-07-06 DIAGNOSIS — Z12.11 SCREEN FOR COLON CANCER: ICD-10-CM

## 2022-07-06 DIAGNOSIS — I10 BENIGN ESSENTIAL HYPERTENSION: ICD-10-CM

## 2022-07-06 DIAGNOSIS — Z00.00 MEDICARE ANNUAL WELLNESS VISIT, SUBSEQUENT: Primary | ICD-10-CM

## 2022-07-06 DIAGNOSIS — Z13.220 SCREENING FOR CHOLESTEROL LEVEL: ICD-10-CM

## 2022-07-06 LAB
ALT SERPL-CCNC: 25 U/L (ref 16–61)
ANION GAP SERPL CALC-SCNC: 10 MMOL/L (ref 3–18)
AST SERPL-CCNC: 17 U/L (ref 10–38)
BUN SERPL-MCNC: 14 MG/DL (ref 7–18)
BUN/CREAT SERPL: 10 (ref 12–20)
CALCIUM SERPL-MCNC: 9.5 MG/DL (ref 8.5–10.1)
CHLORIDE SERPL-SCNC: 91 MMOL/L (ref 100–111)
CHOLEST SERPL-MCNC: 183 MG/DL
CO2 SERPL-SCNC: 31 MMOL/L (ref 21–32)
CREAT SERPL-MCNC: 1.39 MG/DL (ref 0.6–1.3)
CREAT UR-MCNC: 84 MG/DL (ref 30–125)
EST. AVERAGE GLUCOSE BLD GHB EST-MCNC: 131 MG/DL
GLUCOSE SERPL-MCNC: 156 MG/DL (ref 74–99)
HBA1C MFR BLD: 6.2 % (ref 4.2–5.6)
HDLC SERPL-MCNC: 50 MG/DL (ref 40–60)
HDLC SERPL: 3.7 {RATIO} (ref 0–5)
LDLC SERPL CALC-MCNC: 87.2 MG/DL (ref 0–100)
LIPID PROFILE,FLP: ABNORMAL
MICROALBUMIN UR-MCNC: 5.92 MG/DL (ref 0–3)
MICROALBUMIN/CREAT UR-RTO: 70 MG/G (ref 0–30)
POTASSIUM SERPL-SCNC: 3.3 MMOL/L (ref 3.5–5.5)
SODIUM SERPL-SCNC: 132 MMOL/L (ref 136–145)
TRIGL SERPL-MCNC: 229 MG/DL (ref ?–150)
VLDLC SERPL CALC-MCNC: 45.8 MG/DL

## 2022-07-06 PROCEDURE — 3017F COLORECTAL CA SCREEN DOC REV: CPT | Performed by: NURSE PRACTITIONER

## 2022-07-06 PROCEDURE — 2022F DILAT RTA XM EVC RTNOPTHY: CPT | Performed by: NURSE PRACTITIONER

## 2022-07-06 PROCEDURE — G0439 PPPS, SUBSEQ VISIT: HCPCS | Performed by: NURSE PRACTITIONER

## 2022-07-06 PROCEDURE — 1124F ACP DISCUSS-NO DSCNMKR DOCD: CPT | Performed by: NURSE PRACTITIONER

## 2022-07-06 PROCEDURE — G8427 DOCREV CUR MEDS BY ELIG CLIN: HCPCS | Performed by: NURSE PRACTITIONER

## 2022-07-06 PROCEDURE — 1101F PT FALLS ASSESS-DOCD LE1/YR: CPT | Performed by: NURSE PRACTITIONER

## 2022-07-06 PROCEDURE — 80048 BASIC METABOLIC PNL TOTAL CA: CPT

## 2022-07-06 PROCEDURE — 36415 COLL VENOUS BLD VENIPUNCTURE: CPT

## 2022-07-06 PROCEDURE — G8432 DEP SCR NOT DOC, RNG: HCPCS | Performed by: NURSE PRACTITIONER

## 2022-07-06 PROCEDURE — 82043 UR ALBUMIN QUANTITATIVE: CPT

## 2022-07-06 PROCEDURE — G8536 NO DOC ELDER MAL SCRN: HCPCS | Performed by: NURSE PRACTITIONER

## 2022-07-06 PROCEDURE — 3046F HEMOGLOBIN A1C LEVEL >9.0%: CPT | Performed by: NURSE PRACTITIONER

## 2022-07-06 PROCEDURE — G8753 SYS BP > OR = 140: HCPCS | Performed by: NURSE PRACTITIONER

## 2022-07-06 PROCEDURE — 80061 LIPID PANEL: CPT

## 2022-07-06 PROCEDURE — 84450 TRANSFERASE (AST) (SGOT): CPT

## 2022-07-06 PROCEDURE — 84460 ALANINE AMINO (ALT) (SGPT): CPT

## 2022-07-06 PROCEDURE — 83036 HEMOGLOBIN GLYCOSYLATED A1C: CPT

## 2022-07-06 PROCEDURE — G8754 DIAS BP LESS 90: HCPCS | Performed by: NURSE PRACTITIONER

## 2022-07-06 PROCEDURE — G8417 CALC BMI ABV UP PARAM F/U: HCPCS | Performed by: NURSE PRACTITIONER

## 2022-07-06 RX ORDER — AMLODIPINE BESYLATE 10 MG/1
10 TABLET ORAL DAILY
Qty: 90 TABLET | Refills: 3 | Status: SHIPPED | OUTPATIENT
Start: 2022-07-06

## 2022-07-06 RX ORDER — LISINOPRIL 10 MG/1
10 TABLET ORAL DAILY
Qty: 30 TABLET | Refills: 0 | Status: SHIPPED | OUTPATIENT
Start: 2022-07-06 | End: 2022-08-03 | Stop reason: SDUPTHER

## 2022-07-06 RX ORDER — ATORVASTATIN CALCIUM 10 MG/1
10 TABLET, FILM COATED ORAL DAILY
Qty: 30 TABLET | Refills: 3 | Status: SHIPPED | OUTPATIENT
Start: 2022-07-06 | End: 2022-08-03 | Stop reason: SDUPTHER

## 2022-07-06 RX ORDER — ATENOLOL AND CHLORTHALIDONE TABLET 50; 25 MG/1; MG/1
1 TABLET ORAL DAILY
Qty: 90 TABLET | Refills: 3 | Status: SHIPPED | OUTPATIENT
Start: 2022-07-06

## 2022-07-06 NOTE — PROGRESS NOTES
Office Note      Assessment & Plan:     diabetes needs to follow diet more regularly, hypertension stable, needs improvement, needs to follow diet more regularly, hyperlipidemia control uncertain, needs to follow diet more regularly. Diabetic issues reviewed with him: diabetic diet discussed in detail, written exchange diet given, low cholesterol diet, weight control and daily exercise discussed, home glucose monitoring emphasized, all medications, side effects and compliance discussed carefully, foot care discussed and Podiatry visits discussed, annual eye examinations at Ophthalmology discussed, long term diabetic complications discussed and labs immediately prior to next visit. Hypertension issues reviewed with him: Decreased atenolol/chlorthalidone to 50-25 p.o. daily due to bradycardia. Increased Norvasc from 5 mg to 10 mg p.o. daily due to hypertension being uncontrolled. Patient was also started on lisinopril for protection of the kidneys as well as blood pressure regulation, and started on atorvastatin 10 mg for cardiovascular protection. Diagnoses and all orders for this visit:    1. Medicare annual wellness visit, subsequent    2. Type 2 diabetes mellitus without complication, without long-term current use of insulin ([facility])  -     HEMOGLOBIN A1C WITH EAG; Future  -     MICROALBUMIN, UR, RAND W/ MICROALB/CREAT RATIO; Future  -      DIABETES EDUCATION  -     atorvastatin (LIPITOR) 10 mg tablet; Take 1 Tablet by mouth daily.  -     ALT; Future  -     AST; Future  -     REFERRAL TO OPHTHALMOLOGY    3. Benign essential hypertension  -     METABOLIC PANEL, BASIC; Future  -     MICROALBUMIN, UR, RAND W/ MICROALB/CREAT RATIO; Future  -     atenoloL-chlorthalidone (TENORETIC) 50-25 mg per tablet; Take 1 Tablet by mouth daily. -     lisinopriL (PRINIVIL, ZESTRIL) 10 mg tablet; Take 1 Tablet by mouth daily. -     amLODIPine (NORVASC) 10 mg tablet; Take 1 Tablet by mouth daily. 1 qd    4.  Screening for cholesterol level  -     LIPID PANEL; Future    5. Screen for colon cancer  -     COLOGUARD TEST (FECAL DNA COLORECTAL CANCER SCREENING); Future           Follow-up and Dispositions    · Return in about 4 months (around 11/6/2022) for Hypertension, High Cholesterol, Diabetes, (In Office ONLY). Subjective:     Sky Rocha is a 75 y.o. WHITE/NON- male who was seen in clinic today (7/6/2022) for evaluation of No chief complaint on file. [place] Hypertension/HLD:    Diet and Lifestyle: not attempting to follow a low fat, low cholesterol diet, not attempting to follow a low sodium diet  Home BP Monitoring: is not measured at home    Cardiovascular ROS: taking medications as instructed, no medication side effects noted, no TIA's, no chest pain on exertion, no dyspnea on exertion, no swelling of ankles.      Lab Results   Component Value Date/Time    Sodium 141 01/04/2021 12:00 AM    Potassium 3.1 (L) 01/04/2021 12:00 AM    Chloride 96 01/04/2021 12:00 AM    CO2 30 (H) 01/04/2021 12:00 AM    Glucose 123 (H) 01/04/2021 12:00 AM    BUN 11 01/04/2021 12:00 AM    Creatinine 1.22 01/04/2021 12:00 AM    BUN/Creatinine ratio 9 (L) 01/04/2021 12:00 AM    GFR est AA 71 01/04/2021 12:00 AM    GFR est non-AA 61 01/04/2021 12:00 AM    Calcium 9.4 01/04/2021 12:00 AM      Lab Results   Component Value Date/Time    WBC 9.7 01/04/2021 12:00 AM    HGB 17.0 01/04/2021 12:00 AM    HCT 47.7 01/04/2021 12:00 AM    PLATELET 739 98/19/8521 12:00 AM    MCV 91 01/04/2021 12:00 AM      Lab Results   Component Value Date/Time    Hemoglobin A1c 6.1 (H) 01/04/2021 12:00 AM      Lab Results   Component Value Date/Time    Cholesterol, total 145 01/04/2021 12:00 AM    HDL Cholesterol 48 01/04/2021 12:00 AM    LDL, calculated 74 01/04/2021 12:00 AM    LDL, calculated 81 01/03/2020 08:40 AM    VLDL, calculated 23 01/04/2021 12:00 AM    VLDL, calculated 31 01/03/2020 08:40 AM    Triglyceride 130 01/04/2021 12:00 AM        Key Antihyperlipidemia Meds             atorvastatin (LIPITOR) 10 mg tablet (Taking) Take 1 Tablet by mouth daily. New concerns: Patient has been unsure why he is taking some of his medications. Some of his medications were changed during a hospital visit, as well as his insulin regimen. Patient has stopped taking some of his medication because he was unsure if he was supposed to continue after the prescription ran out. Patient was given information on his medications as well as insulin. Diabetes:    Since last visit, he  reports: no significant changes. He reports medication compliance: compliant most of the time. Medication side effects: none. Diabetic diet compliance: noncompliant some of the time   Activity level:not active    Home glucoses: Patient has not been checking his blood sugars. He still 100  Last A1c:   Lab Results   Component Value Date/Time    Hemoglobin A1c 6.1 (H) 01/04/2021 12:00 AM        Diabetic Foot and Eye Exam HM Status   Topic Date Due    Diabetic Foot Care  Never done    Eye Exam  Never done         Objective:     Visit Vitals  BP (!) 142/88 (BP 1 Location: Left upper arm, BP Patient Position: Sitting)   Pulse (!) 58   Temp 98 °F (36.7 °C) (Temporal)   Resp 18   Ht 5' 9\" (1.753 m)   Wt 191 lb (86.6 kg)   SpO2 97%   BMI 28.21 kg/m²       Appearance: alert, well appearing, and in no distress, oriented to person, place, and time and overweight. Exam: heart sounds normal rate, regular rhythm, normal S1, S2, no murmurs, rubs, clicks or gallops, chest clear, no hepatosplenomegaly, no carotid bruits  Lab review: labs are reviewed, up to date. Disclaimer:    I have discussed the diagnosis with the patient and the intended plan as seen above. The patient understands our medical plan. The risks, benefits and significant side effects of all medications have been reviewed. Anticipated time course and progression of condition reviewed. All questions have been addressed.   He received an after visit summary, with information reviewed, and questions answered. Where appropriate, he is instructed to call the clinic if he has not been notified either by phone or through [address] with the results of his tests or with an appointment plan for any referrals within 1 week(s). The patient  is to call if his condition worsens or fails to improve or if significant side effects are experienced.        Shabana Joe, NP

## 2022-07-06 NOTE — PROGRESS NOTES
This is the Subsequent Medicare Annual Wellness Exam, performed 12 months or more after the Initial AWV or the last Subsequent AWV    I have reviewed the patient's medical history in detail and updated the computerized patient record. Assessment/Plan   Education and counseling provided:  Are appropriate based on today's review and evaluation    1. Medicare annual wellness visit, subsequent  2. Type 2 diabetes mellitus without complication, without long-term current use of insulin ([facility])  -     HEMOGLOBIN A1C WITH EAG; Future  -     MICROALBUMIN, UR, RAND W/ MICROALB/CREAT RATIO; Future  -      DIABETES FOOT EXAM  -      DIABETES EDUCATION  -     atorvastatin (LIPITOR) 10 mg tablet; Take 1 Tablet by mouth daily. , Normal, Disp-30 Tablet, R-3  -     ALT; Future  -     AST; Future  -     REFERRAL TO OPHTHALMOLOGY  3. Benign essential hypertension  -     METABOLIC PANEL, BASIC; Future  -     MICROALBUMIN, UR, RAND W/ MICROALB/CREAT RATIO; Future  4. Screening for cholesterol level  -     LIPID PANEL; Future  5. Screen for colon cancer  -     COLOGUARD TEST (FECAL DNA COLORECTAL CANCER SCREENING); Future       Depression Risk Factor Screening     3 most recent PHQ Screens 7/6/2022   Little interest or pleasure in doing things Not at all   Feeling down, depressed, irritable, or hopeless Not at all   Total Score PHQ 2 0       Alcohol & Drug Abuse Risk Screen    Do you average more than 1 drink per night or more than 7 drinks a week: Yes    In the past three months have you have had more than 4 drinks containing alcohol on one occasion: Yes          Functional Ability and Level of Safety    Hearing: Hearing is good. Activities of Daily Living: The home contains: no safety equipment. Patient does total self care      Ambulation: with no difficulty     Fall Risk:  Fall Risk Assessment, last 12 mths 7/6/2022   Able to walk? Yes   Fall in past 12 months? 0   Do you feel unsteady?  0   Are you worried about falling 0      Abuse Screen:  Patient is not abused       Cognitive Screening    Has your family/caregiver stated any concerns about your memory: no     Cognitive Screening: A+OX4    Health Maintenance Due     Health Maintenance Due   Topic Date Due    COVID-19 Vaccine (1) Never done    Pneumococcal 65+ years (1 - PCV) Never done    Eye Exam Retinal or Dilated  Never done    DTaP/Tdap/Td series (1 - Tdap) Never done    Colorectal Cancer Screening Combo  Never done    Shingrix Vaccine Age 50> (1 of 2) Never done    A1C test (Diabetic or Prediabetic)  2022    Lipid Screen  2022    MICROALBUMIN Q1  2022    Depression Screen  2022       Patient Care Team   Patient Care Team:  None as PCP - General    History     Patient Active Problem List   Diagnosis Code    Anxiety F41.9    Type II diabetes mellitus ([facility] [address] E11.9    Benign essential hypertension I10     Past Medical History:   Diagnosis Date    Anxiety     Benign essential hypertension     DM type 2 (diabetes mellitus, type 2) ([facility] [address]       History reviewed. No pertinent surgical history. Current Outpatient Medications   Medication Sig Dispense Refill    atorvastatin (LIPITOR) 10 mg tablet Take 1 Tablet by mouth daily. 30 Tablet 3    atenoloL-chlorthalidone (TENORETIC) 100-25 mg per tablet Take 1 Tablet by mouth daily.  30 Tablet 0    potassium chloride (K-DUR, KLOR-CON) 20 mEq tablet 1 qd 90 Tab 3    amLODIPine (NORVASC) 5 mg tablet 1 qd 90 Tab 0    LORazepam (ATIVAN) 0.5 mg tablet 1 bid prn anxiety (Patient not taking: Reported on 2022) 30 Tab 0     No Known Allergies    Family History   Problem Relation Age of Onset    OSTEOARTHRITIS Father     No Known Problems Mother      Social History     Tobacco Use    Smoking status: Former Smoker     Quit date: 1980     Years since quittin.5    Smokeless tobacco: Never Used   Substance Use Topics    Alcohol use: Yes     Comment: heavy         [employer] [place]

## 2022-07-06 NOTE — PATIENT INSTRUCTIONS
Medicare Wellness Visit, Male    The best way to live healthy is to have a lifestyle where you eat a well-balanced diet, exercise regularly, limit alcohol use, and quit all forms of tobacco/nicotine, if applicable. Regular preventive services are another way to keep healthy. Preventive services (vaccines, screening tests, monitoring & exams) can help personalize your care plan, which helps you manage your own care. Screening tests can find health problems at the earliest stages, when they are easiest to treat. [facility] follows the current, evidence-based guidelines published by the [facility] ([facility]) when recommending preventive services for our patients. Because we follow these guidelines, sometimes recommendations change over time as research supports it. (For example, a prostate screening blood test is no longer routinely recommended for men with no symptoms). Of course, you and your doctor may decide to screen more often for some diseases, based on your risk and co-morbidities (chronic disease you are already diagnosed with). Preventive services for you include:  - Medicare offers their members a free annual wellness visit, which is time for you and your primary care provider to discuss and plan for your preventive service needs. Take advantage of this benefit every year!  -All adults over age 72 should receive the recommended pneumonia vaccines. Current USPSTF guidelines recommend a series of two vaccines for the best pneumonia protection.   -All adults should have a flu vaccine yearly and tetanus vaccine every 10 years.  -All adults age 48 and older should receive the shingles vaccines (series of two vaccines).        -All adults age 38-68 who are overweight should have a diabetes screening test once every three years.   -Other screening tests & preventive services for persons with diabetes include: an eye exam to screen for diabetic retinopathy, a kidney function test, a foot exam, and stricter control over your cholesterol.   -Cardiovascular screening for adults with routine risk involves an electrocardiogram (ECG) at intervals determined by the provider.   -Colorectal cancer screening should be done for adults age 54-65 with no increased risk factors for colorectal cancer. There are a number of acceptable methods of screening for this type of cancer. Each test has its own benefits and drawbacks. Discuss with your provider what is most appropriate for you during your annual wellness visit. The different tests include: colonoscopy (considered the best screening method), a fecal occult blood test, a fecal DNA test, and sigmoidoscopy.  -All adults born between [facility] should be screened once for Hepatitis C.  -An Abdominal Aortic Aneurysm (AAA) Screening is recommended for men age 73-68 who has ever smoked in their lifetime. Here is a list of your current Health Maintenance items (your personalized list of preventive services) with a due date:  Health Maintenance Due   Topic Date Due    COVID-19 Vaccine (1) Never done    Pneumococcal Vaccine (1 - PCV) Never done    Diabetic Foot Care  Never done    Eye Exam  Never done    DTaP/Tdap/Td  (1 - Tdap) Never done    Colorectal Screening  Never done    Shingles Vaccine (1 of 2) Never done    Hemoglobin A1C    01/04/2022    Cholesterol Test   01/04/2022    Albumin Urine Test  01/05/2022    Depresssion Screening  01/06/2022        Learning About High Blood Sugar  What is high blood sugar? Your body turns the food you eat into glucose (sugar), which it uses for energy. But if your body isn't able to use the sugar right away, it can build up in your blood and lead to high blood sugar. When the amount of sugar in your blood stays too high for too much of the time, you may have diabetes. Diabetes is a disease that can cause serious health problems.   The good news is that lifestyle changes may help you get your blood sugar back to normal and avoid or delay diabetes. What causes high blood sugar? Sugar (glucose) can build up in your blood if you:  · Have insulin resistance. · Don't take enough insulin or miss a dose of your diabetes medicine. · Take certain medicines, such as steroids. What are the symptoms? Having high blood sugar may not cause any symptoms at all. Or it may make you feel very thirsty or very hungry. You may also urinate more often than usual, have blurry vision, or lose weight without trying. How is high blood sugar treated? You can take steps to lower your blood sugar level if you understand what makes it get higher. Your doctor may want you to learn how to test your blood sugar level at home. Then you can see how illness, stress, or different kinds of food or medicine raise or lower your blood sugar level. Other tests may be needed to see if you have diabetes. How can you prevent high blood sugar? · Watch your weight. If you're overweight, losing just a small amount of weight may help. Reducing fat around your waist is most important. · Limit the amount of calories, sweets, and unhealthy fat you eat. Ask your doctor if a dietitian can help you. A registered dietitian can help you create meal plans that fit your lifestyle. · Get at least 30 minutes of exercise on most days of the week. Exercise helps control your blood sugar. It also helps you maintain a healthy weight. Walking is a good choice. You also may want to do other activities, such as running, swimming, cycling, or playing tennis or team sports. · If your doctor prescribed medicines, take them exactly as prescribed. Call your doctor if you think you are having a problem with your medicine. You will get more details on the specific medicines your doctor prescribes. Follow-up care is a key part of your treatment and safety.  Be sure to make and go to all appointments, and call your doctor if you are having problems. It's also a good idea to know your test results and keep a list of the medicines you take. Where can you learn more? Go to http://www.gray.com/  Enter O108 in the search box to learn more about \"Learning About High Blood Sugar. \"  Current as of: 2021               Content Version: 13.2   [employer]. Care instructions adapted under license by [employer] (which disclaims liability or warranty for this information). If you have questions about a medical condition or this instruction, always ask your healthcare professional. [place] [address] any warranty or liability for your use of this information. Home Blood Sugar Test: About This Test  What is it? A home blood sugar test measures the amount of sugar (glucose) in your blood, using a small device called a blood sugar meter. It's a quick way to test your blood sugar anywhere, at any time. Why is this test done? Testing your blood sugar helps you know if your levels are in your target range. It helps you know when to take action and may help you avoid blood sugar emergencies. Testing also helps you learn how things like exercise, stress, and what you eat can affect your blood sugar. What happens before the test?  The supplies you will need for testing blood sugar include:  · A blood glucose meter. · Testing strips. These are made to be used with a specific model of meter. Make sure the strips haven't . · Sugar control solutions. Some meters require a specific solution. Many new meters are made to operate without a control solution. · Short needles called lancets for pricking your skin. · A pen-sized oswald for the lancet (lancet device). It positions the lancet and controls how deeply it goes into your skin. · Clean cotton balls. These are used to stop the bleeding from the testing site.   What happens during the test?  Checking your blood sugar involves pricking your finger, palm, or forearm with a lancet to collect a drop of blood. The blood drop is placed on a test strip, which you insert into the blood glucose meter. The instructions for testing are slightly different for each blood glucose meter model. Follow the instructions that came with your meter. · Wash your hands with warm, soapy water. Dry them well with a clean towel. You may also use an alcohol wipe to clean your finger or other site. But make sure your hands are dry before the test.  · Insert a clean lancet into the lancet device. · Remove a test strip from the test strip bottle. Replace the lid right away to keep moisture away from the other strips. · Follow the instructions that came with your meter to get it ready. · Use the lancet device to stick the side of your fingertip with the lancet. Do not stick the tip of your finger. Some blood sugar meters use lancet devices that take the blood sample from other sites, such as the palm of the hand or the forearm. But the finger is usually the most accurate place to test blood sugar. · Put a drop of blood on the correct spot on the test strip. · Apply pressure with a clean cotton ball to stop the bleeding. · Follow the directions that came with the meter to get the results. · Write down the results and the time that you tested your blood. Some meters will store the results for you. How long does the test take? The blood glucose meter will show the results of the test in a minute or less. What are the possible results for the test?  The American Diabetes Association (ADA) recommends that you stay within the following blood glucose level ranges. But depending on your health, you and your doctor may set a different range for you.   For nonpregnant adults with diabetes  · 80 milligrams per deciliter (mg/dL) to 130 mg/dL before a meal  · Less than 180 mg/dL 1 to 2 hours after a meal  For women who have diabetes and are pregnant  · 95 mg/dL or less before breakfast  · 120 to 140 mg/dL (or lower) 1 to 2 hours after a meal  Where can you learn more? Go to http://www.gray.com/  Enter [id number] in the search box to learn more about \"Home Blood Sugar Test: About This Test.\"  Current as of: July 28, 2021               Content Version: 13.2  © 2006-2022 [employer]. Care instructions adapted under license by [employer] (which disclaims liability or warranty for this information). If you have questions about a medical condition or this instruction, always ask your healthcare professional. [place] [address] any warranty or liability for your use of this information. Learning About Carbohydrate (Carb) Counting and Eating Out When You Have Diabetes  Why plan your meals? Meal planning can be a key part of managing diabetes. Planning meals and snacks with the right balance of carbohydrate, protein, and fat can help you keep your blood sugar at the target level you set with your doctor. You don't have to eat special foods. You can eat what your family eats, including sweets once in a while. But you do have to pay attention to how often you eat and how much you eat of certain foods. You may want to work with a dietitian or a diabetes educator. They can give you tips and meal ideas and can answer your questions about meal planning. This health professional can also help you reach a healthy weight if that is one of your goals. What should you know about eating carbs? Managing the amount of carbohydrate (carbs) you eat is an important part of healthy meals when you have diabetes. Carbohydrate is found in many foods. · Learn which foods have carbs. And learn the amounts of carbs in different foods. ? Bread, cereal, pasta, and rice have about 15 grams of carbs in a serving.  A serving is 1 slice of bread (1 ounce), ½ cup of cooked cereal, or 1/3 cup of cooked pasta or rice.  ? Fruits have 15 grams of carbs in a serving. A serving is 1 small fresh fruit, such as an apple or orange; ½ of a banana; ½ cup of cooked or canned fruit; ½ cup of fruit juice; 1 cup of melon or raspberries; or 2 tablespoons of dried fruit. ? Milk and no-sugar-added yogurt have 15 grams of carbs in a serving. A serving is 1 cup of milk or 3/4 cup (6 oz) of no-sugar-added yogurt. ? Starchy vegetables have 15 grams of carbs in a serving. A serving is ½ cup of mashed potatoes or sweet potato; 1 cup winter squash; ½ of a small baked potato; ½ cup of cooked beans; or ½ cup cooked corn or green peas. · Learn how much carbs to eat each day and at each meal. A dietitian or certified diabetes educator can teach you how to keep track of the amount of carbs you eat. This is called carbohydrate counting. · If you are not sure how to count carbohydrate grams, use the plate method to plan meals. It is a quick way to make sure that you have a balanced meal. It also can help you manage the amount of carbohydrate you eat at meals. ? Divide your plate by types of foods. Put non-starchy vegetables on half the plate, meat or other protein food on one-quarter of the plate, and a grain or starchy vegetable in the final quarter of the plate. To this you can add a small piece of fruit and 1 cup of milk or yogurt, depending on how many carbs you are supposed to eat at a meal.  · Try to eat about the same amount of carbs at each meal. Do not \"save up\" your daily allowance of carbs to eat at one meal.  · Proteins have very little or no carbs. Examples of proteins are beef, chicken, turkey, fish, eggs, tofu, cheese, cottage cheese, and peanut butter. How can you eat out and still eat healthy? · Learn to estimate the serving sizes of foods that have carbohydrate. If you measure food at home, it will be easier to estimate the amount in a serving of restaurant food.   · If the meal you order has too much carbohydrate (such as potatoes, corn, or baked beans), ask to have a low-carbohydrate food instead. Ask for a salad or non-starchy vegetables like broccoli, cauliflower, green beans, or peppers. · If you eat more carbohydrate at a meal than you had planned, take a walk or do other exercise. This will help lower your blood sugar. What are some tips for eating healthy? · Limit saturated fat, such as the fat from meat and dairy products. This is a healthy choice because people who have diabetes are at higher risk of heart disease. So choose lean cuts of meat and nonfat or low-fat dairy products. Use olive or canola oil instead of butter or shortening when cooking. · Don't skip meals. Your blood sugar may drop too low if you skip meals and take insulin or certain medicines for diabetes. · Check with your doctor before you drink alcohol. Alcohol can cause your blood sugar to drop too low. Alcohol can also cause a bad reaction if you take certain diabetes medicines. Follow-up care is a key part of your treatment and safety. Be sure to make and go to all appointments, and call your doctor if you are having problems. It's also a good idea to know your test results and keep a list of the medicines you take. Where can you learn more? Go to http://www.Amimon.com/  Enter I147 in the search box to learn more about \"Learning About Carbohydrate (Carb) Counting and Eating Out When You Have Diabetes. \"  Current as of: September 8, 2021               Content Version: 13.2  © 2006-2022 [employer]. Care instructions adapted under license by [employer] (which disclaims liability or warranty for this information). If you have questions about a medical condition or this instruction, always ask your healthcare professional. [place] [address] any warranty or liability for your use of this information.          Counting Carbohydrates for Diabetes: Care Instructions  Overview     Managing the amount of carbohydrate (carbs) you eat is an important part of planning healthy meals when you have diabetes. Carbs raise blood sugar more than any other nutrient. Carbs are found in grains, starchy vegetables, fruits, and milk and yogurt. Carbs are also found in sugar-sweetened foods and drinks. The more carbs you eat at one time, the higher your blood sugar will rise. Counting carbs can help you keep your blood sugar within your target range. If you use insulin, counting carbs helps you match the right amount of insulin to the number of grams of carbs in a meal.  A registered dietitian or diabetes educator can help you plan meals and snacks. Follow-up care is a key part of your treatment and safety. Be sure to make and go to all appointments, and call your doctor if you are having problems. It's also a good idea to know your test results and keep a list of the medicines you take. How can you care for yourself at home? Know your daily amount of carbohydrates  Your daily amount depends on several things, such as your weight, how active you are, which diabetes medicines you take, and what your goals are for your blood sugar levels. A registered dietitian or diabetes educator can help you plan how many carbs to include in each meal and snack. For most adults, a guideline for the daily amount of carbs is:  · 45 to 60 grams at each meal. That's about the same as 3 to 4 carbohydrate servings. · 15 to 20 grams at each snack. That's about the same as 1 carbohydrate serving. Count carbs  Counting carbs lets you know how much rapid-acting insulin to take before you eat. If you use an insulin pump, you get a constant rate of insulin during the day. So the pump must be programmed at meals. This gives you extra insulin to cover the rise in blood sugar after meals. If you take insulin:  · Learn your own insulin-to-carb ratio. You and your diabetes health professional will figure out the ratio.  You can do this by testing your blood sugar after meals. For example, you may need a certain amount of insulin for every 15 grams of carbs. · Add up the carb grams in a meal. Then you can figure out how many units of insulin to take based on your insulin-to-carb ratio. · Exercise lowers blood sugar. You can use less insulin than you would if you were not doing exercise. Keep in mind that timing matters. If you exercise within 1 hour after a meal, your body may need less insulin for that meal than it would if you exercised 3 hours after the meal. Test your blood sugar to find out how exercise affects your need for insulin. If you do or don't take insulin:  · Look at labels on packaged foods. This can tell you how many carbs are in a serving. · Be aware of portions, or serving sizes. If a package has two servings and you eat the whole package, you need to double the number of grams of carbohydrate listed for one serving. · Protein, fat, and fiber do not raise blood sugar as much as carbs do. If you eat a lot of these nutrients in a meal, your blood sugar will rise more slowly than it would otherwise. Where can you learn more? Go to http://www.gray.com/  Enter G703 in the search box to learn more about \"Counting Carbohydrates for Diabetes: Care Instructions. \"  Current as of: July 28, 2021               Content Version: 13.2  © 2006-2022 [employer]. Care instructions adapted under license by [employer] (which disclaims liability or warranty for this information). If you have questions about a medical condition or this instruction, always ask your healthcare professional. [place] [address] any warranty or liability for your use of this information. Learning About Meal Planning for Diabetes  Why plan your meals? Meal planning can be a key part of managing diabetes.  Planning meals and snacks with the right balance of carbohydrate, protein, and fat can help you keep your blood sugar at the target level you set with your doctor. You don't have to eat special foods. You can eat what your family eats, including sweets once in a while. But you do have to pay attention to how often you eat and how much you eat of certain foods. You may want to work with a dietitian or a diabetes educator. They can give you tips and meal ideas and can answer your questions about meal planning. This health professional can also help you reach a healthy weight if that is one of your goals. What plan is right for you? Your dietitian or diabetes educator may suggest that you start with the plate format or carbohydrate counting. The plate format  The plate format is a simple way to help you manage how you eat. You plan meals by learning how much space each food should take on a plate. Using the plate format helps you manage the amount of carbohydrate you eat. It can make it easier to keep your blood sugar level within your target range. It also helps you see if you're eating healthy portion sizes. To use the plate format, you put non-starchy vegetables on half your plate. Add lean protein foods, such as fish, lean meats and poultry, or soy products, on one-quarter of the plate. Put a grain or starchy vegetable (such as brown rice or a potato) on the final quarter of the plate. You can add a small piece of fruit and some low-fat or fat-free milk or yogurt, depending on your carbohydrate goal for each meal.  Here are some tips for using the plate format:  · Make sure that you are not using an oversized plate. A 9-inch plate is best. Many restaurants use larger plates. · Get used to using the plate format at home. Then you can use it when you eat out. · Write down your questions about using the plate format. Talk to your doctor, a dietitian, or a diabetes educator about your concerns.   Carbohydrate counting  With carbohydrate counting, you plan meals based on the amount of carbohydrate in each food. Carbohydrate raises blood sugar higher and more quickly than any other nutrient. It is found in desserts, breads and cereals, and fruit. It's also found in starchy vegetables such as potatoes and corn, grains such as rice and pasta, and milk and yogurt. You can help keep your blood sugar levels within your target range by planning how much carbohydrate to have at meals and snacks. The amount you need depends on several things. These include your weight, how active you are, which diabetes medicines you take, and what your goals are for your blood sugar levels. A registered dietitian or diabetes educator can help you plan how much carbohydrate to include in each meal and snack. An example of a carbohydrate counting plan is:  · 45 to 60 grams at each meal. That's about the same as 3 to 4 carbohydrate servings. · 15 to 20 grams at each snack. That's about the same as 1 carbohydrate serving. The Nutrition Facts label on packaged foods tells you how much carbohydrate is in a serving of the food. First, look at the serving size on the food label. Is that the amount you eat in a serving? All of the nutrition information on a food label is based on that serving size. So if you eat more or less than that, you'll need to adjust the other numbers. Total carbohydrate is the next thing you need to look for on the label. If you count carbohydrate servings, one serving of carbohydrate is 15 grams. For foods that don't come with labels, such as fresh fruits and vegetables, you'll need a guide that lists carbohydrate in these foods. Ask your doctor, dietitian, or diabetes educator about books or other nutrition guides you can use. If you take insulin, you need to know how many grams of carbohydrate are in a meal. This lets you know how much rapid-acting insulin to take before you eat. If you use an insulin pump, you get a constant rate of insulin during the day.  So the pump must be programmed at meals to give you extra insulin to cover the rise in blood sugar after meals. When you know how much carbohydrate you will eat, you can take the right amount of insulin. Or, if you always use the same amount of insulin, you need to make sure that you eat the same amount of carbohydrate at meals. If you need more help to understand carbohydrate counting and food labels, ask your doctor, dietitian, or diabetes educator. How can you plan healthy meals? Here are some tips to get started:  · Plan your meals a week at a time. Don't forget to include snacks too. · Use cookbooks or online recipes to plan several main meals. Plan some quick meals for busy nights. You also can double some recipes that freeze well. Then you can save half for other busy nights when you don't have time to cook. · Make sure you have the ingredients you need for your recipes. If you're running low on basic items, put these items on your shopping list too. · List foods that you use to make breakfasts, lunches, and snacks. List plenty of fruits and vegetables. · Post this list on the refrigerator. Add to it as you think of more things you need. · Take the list to the store to do your weekly shopping. Follow-up care is a key part of your treatment and safety. Be sure to make and go to all appointments, and call your doctor if you are having problems. It's also a good idea to know your test results and keep a list of the medicines you take. Where can you learn more? Go to http://www.gray.com/  Enter JUICE in the search box to learn more about \"Learning About Meal Planning for Diabetes. \"  Current as of: September 8, 2021               Content Version: 13.2  © 2502-1556 [employer]. Care instructions adapted under license by [employer] (which disclaims liability or warranty for this information).  If you have questions about a medical condition or this instruction, always ask your healthcare professional. [employer] disclaims any warranty or liability for your use of this information. Diabetes Blood Sugar Emergencies: Your Action Plan  How can you prevent a blood sugar emergency? An important part of living with diabetes is keeping your blood sugar in your target range. You'll need to know what to do if it's too high or too low. Managing your blood sugar levels helps you avoid emergencies. This care sheet will teach you about the signs of high and low blood sugar. It will help you make an action plan with your doctor for when these signs occur. Low blood sugar is more likely to happen if you take certain medicines for diabetes. It can also happen if you skip a meal, drink alcohol, or exercise more than usual.  You may get high blood sugar if you eat differently than you normally do. One example is eating more carbohydrate than usual. Having a cold, the flu, or other sudden illness can also cause high blood sugar levels. Levels can also rise if you miss a dose of medicine. Any change in how you take your medicine may affect your blood sugar level. So it's important to work with your doctor before you make any changes. Track your blood sugar  Work with your doctor to fill in the blank spaces below that apply to you. Track your levels, know your target range, and write down ways you can get your blood sugar back in your target range. A log book can help you track your levels. Take the book to all of your medical appointments. · Check your blood sugar _____ times a day, at these times:________________________________________________. (For example: Before meals, at bedtime, before exercise, during exercise, other.)  · Your blood sugar target range before a meal is ___________________. Your blood sugar target range after a meal is _______________________.   · Do this--___________________________________________________--to get your blood sugar back within your safe range if your blood sugar results are _________________________________________. (For example: Less than 70 or above 250 mg/dL.)  Call your doctor when your blood sugar results are ___________________________________. (For example: Less than 70 or above 250 mg/dL.)  What are the symptoms of low and high blood sugar? Common symptoms of low blood sugar are sweating and feeling shaky, weak, hungry, or confused. Symptoms can start quickly. Common symptoms of high blood sugar are feeling very thirsty or very hungry. You may also pass urine more often than usual. You may have blurry vision and may lose weight without trying. But some people may have high or low blood sugar without having any symptoms. That's a good reason to check your blood sugar on a regular schedule. What should you do if you have symptoms? Work with your doctor to fill in the blank spaces below that apply to you. Low blood sugar and \"the rule of 15\"  If you have symptoms of low blood sugar, check your blood sugar. If it's below _____ ( for example, below 70), eat or drink a quick-sugar food that has about 15 grams of carbohydrate. Your goal is to get your level back to your safe range. Check your blood sugar again 15 minutes later. If it's still not in your target range, take another 15 grams of carbohydrate and check your blood sugar again in 15 minutes. Repeat this until you reach your target. Then go back to your regular testing schedule. Children usually need less than 15 grams of carbohydrate. Check with your doctor or diabetes educator for the amount that is right for your child. When you have low blood sugar, it's best to stop or reduce any physical activity until your blood sugar is back in your target range and is stable. If you must stay active, eat or drink 30 grams of carbohydrate. Then check your blood sugar again in 15 minutes. If it's not in your target range, take another 30 grams of carbohydrates. Check your blood sugar again in 15 minutes.  Keep doing this until you reach your target. You can then go back to your regular testing schedule. If your symptoms or blood sugar levels are getting worse or have not improved after 15 minutes, seek medical care right away. If you take insulin, always carry a glucagon emergency kit. Be sure your family, friends, and coworkers know how to give glucagon. Here are some examples of quick-sugar foods with 15 grams of carbohydrate:  · 3 or 4 glucose tablets  · 1 tablespoon (3 teaspoons) table sugar  · ½ cup to ¾ cup (4 to 6 ounces) of fruit juice or regular (not diet) soda  · Hard candy (such as 6 Life Savers)  High blood sugar  If you have symptoms of high blood sugar, check your blood sugar. Your goal is to get your level back to your target range. If it's above ______ ( for example, above 250), follow these steps:  · If you missed a dose of your diabetes medicine, take it now. Take only the amount of medicine that you have been prescribed. Do not take more or less medicine. · Give yourself insulin if your doctor has prescribed it for high blood sugar. · Test for ketones, if the doctor told you to do so. If the results of the ketone test show a moderate-to-large amount of ketones, call the doctor for advice. · Wait 30 minutes after you take the extra insulin or the missed medicine. Check your blood sugar again. If your symptoms or blood sugar levels are getting worse or have not improved after taking these steps, seek medical care right away. Follow-up care is a key part of your treatment and safety. Be sure to make and go to all appointments, and call your doctor if you are having problems. It's also a good idea to know your test results and keep a list of the medicines you take. Where can you learn more? Go to http://www.gray.com/  Enter P444 in the search box to learn more about \"Diabetes Blood Sugar Emergencies: Your Action Plan. \"  Current as of: July 28, 2021               Content Version: 13.2  © 3733-6665 [employer]. Care instructions adapted under license by [employer] (which disclaims liability or warranty for this information). If you have questions about a medical condition or this instruction, always ask your healthcare professional. [address] any warranty or liability for your use of this information. Home Blood Pressure Test: About This Test  What is it? A home blood pressure test allows you to keep track of your blood pressure at home. Blood pressure is a measure of the force of blood against the walls of your arteries. Blood pressure readings include two numbers, such as 130/80 (say \"130 over 80\"). The first number is the systolic pressure. The second number is the diastolic pressure. Why is this test done? You may do this test at home to:  · Find out if you have high blood pressure. · Track your blood pressure if you have high blood pressure. · Track how well medicine is working to reduce high blood pressure. · Check how lifestyle changes, such as weight loss and exercise, are affecting blood pressure. How do you prepare for the test?  For at least 30 minutes before you take your blood pressure, don't exercise, drink caffeine, or smoke. Empty your bladder before the test. Sit quietly with your back straight and both feet on the floor for at least 5 minutes. This helps you take your blood pressure while you feel comfortable and relaxed. How is the test done? · If your doctor recommends it, take your blood pressure twice a day. Take it in the morning and evening. · Sit with your arm slightly bent and resting on a table so that your upper arm is at the same level as your heart. · Use the same arm each time you take your blood pressure. · Place the blood pressure cuff on the bare skin of your upper arm. You may have to roll up your sleeve, remove your arm from the sleeve, or take your shirt off.   · Wrap the blood pressure cuff around your upper arm so that the lower edge of the cuff is about 1 inch above the bend of your elbow. · Do not move, talk, or text while you take your blood pressure. Follow the instructions that came with your blood pressure monitor. They might be different from the following. · Press the on/off button on the automatic monitor. Then you may need to wait until the screen says the monitor is ready. · Press the start button. The cuff will inflate and deflate by itself. · Your blood pressure numbers will appear on the screen. · Wait one minute and take your blood pressure again. · If your monitor does not automatically save your numbers, write them in your log book, along with the date and time. Follow-up care is a key part of your treatment and safety. Be sure to make and go to all appointments, and call your doctor if you are having problems. It's also a good idea to keep a list of the medicines you take. Where can you learn more? Go to http://www.mathews.com/  Enter C427 in the search box to learn more about \"Home Blood Pressure Test: About This Test.\"  Current as of: January 10, 2022               Content Version: 13.2  © 5656-6201 [employer], [employer]. Care instructions adapted under license by [employer] (which disclaims liability or warranty for this information). If you have questions about a medical condition or this instruction, always ask your healthcare professional. [place] [address] any warranty or liability for your use of this information.

## 2022-08-03 DIAGNOSIS — E11.9 TYPE 2 DIABETES MELLITUS WITHOUT COMPLICATION, WITHOUT LONG-TERM CURRENT USE OF INSULIN (HCC): ICD-10-CM

## 2022-08-03 DIAGNOSIS — I10 BENIGN ESSENTIAL HYPERTENSION: ICD-10-CM

## 2022-08-03 NOTE — TELEPHONE ENCOUNTER
Pharmacy is requesting to dispense a 90 day supply    Last Visit: 7/6/22 with ALTAGRACIA Lacy  Next Appointment: 11/7/22 with NP Prince Lacy    Requested Prescriptions     Pending Prescriptions Disp Refills    atorvastatin (LIPITOR) 10 mg tablet 90 Tablet 1     Sig: Take 1 tablet by mouth daily    lisinopriL (PRINIVIL, ZESTRIL) 10 mg tablet 90 Tablet 1     Sig: Take 1 tablet by mouth daily         For Pharmacy [address] [facility] in place:   Recommendation Provided To:    Intervention Detail: New Rx: 2, reason: Patient Preference  Gap Closed?:   Intervention Accepted By:   Time Spent (min): 5

## 2022-08-04 RX ORDER — LISINOPRIL 10 MG/1
TABLET ORAL
Qty: 90 TABLET | Refills: 1 | Status: SHIPPED | OUTPATIENT
Start: 2022-08-04

## 2022-08-04 RX ORDER — ATORVASTATIN CALCIUM 10 MG/1
TABLET, FILM COATED ORAL
Qty: 90 TABLET | Refills: 1 | Status: SHIPPED | OUTPATIENT
Start: 2022-08-04

## 2022-11-07 ENCOUNTER — OFFICE VISIT (OUTPATIENT)
Dept: FAMILY MEDICINE CLINIC | Age: 67
End: 2022-11-07
Payer: MEDICARE

## 2022-11-07 VITALS
SYSTOLIC BLOOD PRESSURE: 138 MMHG | OXYGEN SATURATION: 97 % | HEIGHT: 69 IN | RESPIRATION RATE: 18 BRPM | DIASTOLIC BLOOD PRESSURE: 76 MMHG | HEART RATE: 53 BPM | BODY MASS INDEX: 28.21 KG/M2

## 2022-11-07 DIAGNOSIS — I10 BENIGN ESSENTIAL HYPERTENSION: Primary | ICD-10-CM

## 2022-11-07 DIAGNOSIS — E11.9 TYPE 2 DIABETES MELLITUS WITHOUT COMPLICATION, WITHOUT LONG-TERM CURRENT USE OF INSULIN (HCC): ICD-10-CM

## 2022-11-07 DIAGNOSIS — I87.2 VENOUS INSUFFICIENCY, PERIPHERAL: ICD-10-CM

## 2022-11-07 DIAGNOSIS — I49.9 IRREGULAR HEART RHYTHM: ICD-10-CM

## 2022-11-07 PROCEDURE — 2022F DILAT RTA XM EVC RTNOPTHY: CPT | Performed by: NURSE PRACTITIONER

## 2022-11-07 PROCEDURE — G8417 CALC BMI ABV UP PARAM F/U: HCPCS | Performed by: NURSE PRACTITIONER

## 2022-11-07 PROCEDURE — 3075F SYST BP GE 130 - 139MM HG: CPT | Performed by: NURSE PRACTITIONER

## 2022-11-07 PROCEDURE — 99214 OFFICE O/P EST MOD 30 MIN: CPT | Performed by: NURSE PRACTITIONER

## 2022-11-07 PROCEDURE — G8754 DIAS BP LESS 90: HCPCS | Performed by: NURSE PRACTITIONER

## 2022-11-07 PROCEDURE — G8427 DOCREV CUR MEDS BY ELIG CLIN: HCPCS | Performed by: NURSE PRACTITIONER

## 2022-11-07 PROCEDURE — 1124F ACP DISCUSS-NO DSCNMKR DOCD: CPT | Performed by: NURSE PRACTITIONER

## 2022-11-07 PROCEDURE — G8432 DEP SCR NOT DOC, RNG: HCPCS | Performed by: NURSE PRACTITIONER

## 2022-11-07 PROCEDURE — 3078F DIAST BP <80 MM HG: CPT | Performed by: NURSE PRACTITIONER

## 2022-11-07 PROCEDURE — 1101F PT FALLS ASSESS-DOCD LE1/YR: CPT | Performed by: NURSE PRACTITIONER

## 2022-11-07 PROCEDURE — 3017F COLORECTAL CA SCREEN DOC REV: CPT | Performed by: NURSE PRACTITIONER

## 2022-11-07 PROCEDURE — 3044F HG A1C LEVEL LT 7.0%: CPT | Performed by: NURSE PRACTITIONER

## 2022-11-07 PROCEDURE — G8752 SYS BP LESS 140: HCPCS | Performed by: NURSE PRACTITIONER

## 2022-11-07 PROCEDURE — G8536 NO DOC ELDER MAL SCRN: HCPCS | Performed by: NURSE PRACTITIONER

## 2022-11-07 PROCEDURE — 93000 ELECTROCARDIOGRAM COMPLETE: CPT | Performed by: NURSE PRACTITIONER

## 2022-11-07 RX ORDER — ATORVASTATIN CALCIUM 10 MG/1
TABLET, FILM COATED ORAL
Qty: 90 TABLET | Refills: 3 | Status: SHIPPED | OUTPATIENT
Start: 2022-11-07

## 2022-11-07 RX ORDER — LISINOPRIL 10 MG/1
TABLET ORAL
Qty: 90 TABLET | Refills: 3 | Status: SHIPPED | OUTPATIENT
Start: 2022-11-07

## 2022-11-07 NOTE — PROGRESS NOTES
Office Note      Assessment & Plan:     diabetes control uncertain, needs further observation, needs improvement, needs to follow diet more regularly, hypertension stable, hyperlipidemia borderline controlled, needs further observation, needs improvement, needs to follow diet more regularly. Diabetic issues reviewed with him: diabetic diet discussed in detail, written exchange diet given, low cholesterol diet, weight control and daily exercise discussed, home glucose monitoring emphasized, all medications, side effects and compliance discussed carefully, diabetic Sick Day rules reviewed, handout given, foot care discussed and Podiatry visits discussed, annual eye examinations at Ophthalmology discussed, glycohemoglobin and other lab monitoring discussed, long term diabetic complications discussed, and labs immediately prior to next visit. Hypertension issues reviewed with him: Continue current therapy       Diagnoses and all orders for this visit:    1. Benign essential hypertension  -     lisinopriL (PRINIVIL, ZESTRIL) 10 mg tablet; Take 1 tablet by mouth daily    2. Type 2 diabetes mellitus without complication, without long-term current use of insulin (HCC)  -      DIABETES EDUCATION  -      DIABETES FOOT EXAM  -     atorvastatin (LIPITOR) 10 mg tablet; Take 1 tablet by mouth daily  -     HEMOGLOBIN A1C WITH EAG; Future    3. Irregular heart rhythm  -     REFERRAL TO CARDIOLOGY  -     [facility] POC EKG ROUTINE W/ 12 LEADS, INTER & REP    4. Venous insufficiency, peripheral  -     REFERRAL TO VASCULAR SURGERY         Follow-up and Dispositions    Return in about 4 months (around 3/7/2023) for HTN, HLD, Diabetes, (In Office ONLY), A1C 1 Wk Prior. Subjective:     Selena Dsouza is a 75 y.o. WHITE/NON- male who was seen in clinic today (11/7/2022) for evaluation of Hyperlipidemia, Hypertension, Diabetes, and Skin Problem  .       Hypertension/HLD:    Diet and Lifestyle: generally follows a low fat low cholesterol diet, generally follows a low sodium diet, follows a diabetic diet regularly, sedentary  Home BP Monitoring: is well controlled at home, ranging 1 teen's/70's    Cardiovascular ROS: taking medications as instructed, no medication side effects noted, no TIA's, no chest pain on exertion, no dyspnea on exertion, no swelling of ankles. Lab Results   Component Value Date/Time    Sodium 132 (L) 07/06/2022 10:33 AM    Potassium 3.3 (L) 07/06/2022 10:33 AM    Chloride 91 (L) 07/06/2022 10:33 AM    CO2 31 07/06/2022 10:33 AM    Anion gap 10 07/06/2022 10:33 AM    Glucose 156 (H) 07/06/2022 10:33 AM    BUN 14 07/06/2022 10:33 AM    Creatinine 1.39 (H) 07/06/2022 10:33 AM    BUN/Creatinine ratio 10 (L) 07/06/2022 10:33 AM    GFR est AA >60 07/06/2022 10:33 AM    GFR est non-AA 51 (L) 07/06/2022 10:33 AM    Calcium 9.5 07/06/2022 10:33 AM      Lab Results   Component Value Date/Time    WBC 9.7 01/04/2021 12:00 AM    HGB 17.0 01/04/2021 12:00 AM    HCT 47.7 01/04/2021 12:00 AM    PLATELET 986 37/88/1169 12:00 AM    MCV 91 01/04/2021 12:00 AM      Lab Results   Component Value Date/Time    Hemoglobin A1c 6.2 (H) 07/06/2022 10:33 AM      Lab Results   Component Value Date/Time    Cholesterol, total 183 07/06/2022 10:33 AM    HDL Cholesterol 50 07/06/2022 10:33 AM    LDL, calculated 87.2 07/06/2022 10:33 AM    VLDL, calculated 45.8 07/06/2022 10:33 AM    Triglyceride 229 (H) 07/06/2022 10:33 AM    CHOL/HDL Ratio 3.7 07/06/2022 10:33 AM        Key Antihyperlipidemia Meds               atorvastatin (LIPITOR) 10 mg tablet (Taking) Take 1 tablet by mouth daily             New concerns: Pt has psoriasis on both elbows. Diabetes:    Since last visit, he  reports: no significant changes. He reports medication compliance: noncompliant some of the time. Medication side effects: none.     Diabetic diet compliance: noncompliant some of the time   Activity level:not active    Home glucoses: Pt has not checked his BS since his last visit. Pt was encouraged to check 1 time per week, 2 values fasting, and 1-2 hours after a meal.      Last A1c:   Lab Results   Component Value Date/Time    Hemoglobin A1c 6.2 (H) 07/06/2022 10:33 AM        Diabetic foot exam:     Left Foot:   Visual Exam: mottled   Pulse DP: trace   Filament test: normal sensation    Vibratory sensation: normal      Right Foot:   Visual Exam: mottled   Pulse DP: trace   Filament test: normal sensation    Vibratory sensation: normal      Diabetic Foot and Eye Exam HM Status   Topic Date Due    Eye Exam  Never done    Diabetic Foot Care  11/07/2023           Objective:     Visit Vitals  /76 (BP 1 Location: Left upper arm, BP Patient Position: Sitting, BP Cuff Size: Adult)   Pulse (!) 53   Temp (P) 98.1 °F (36.7 °C) (Temporal)   Resp 18   Ht 5' 9\" (1.753 m)   Wt (P) 187 lb (84.8 kg)   SpO2 97%   BMI (P) 27.62 kg/m²       Appearance: alert, well appearing, and in no distress and oriented to person, place, and time. Exam: heart sounds bradycardia, irregularly irregular rhythm, chest clear, no carotid bruits  Lab review: labs are reviewed, up to date. Disclaimer: The patient understands our medical plan. Alternatives have been explained and offered. The risks, benefits and significant side effects of all medications have been reviewed. Anticipated time course and progression of condition reviewed. All questions have been addressed. He is encouraged to employ the information provided in the after visit summary, which was reviewed. Where applicable, he is instructed to call the clinic if he has not been notified either by phone or through [address] with the results of his tests or with an appointment plan for any referrals within 1 week(s). No news is not good news; it's no news. The patient  is to call if his condition worsens or fails to improve or if significant side effects are experienced.      Aspects of this note may have been generated using voice recognition software. Despite editing, there may be unrecognized errors.       Clara Iyer NP

## 2022-11-07 NOTE — PROGRESS NOTES
1. \"Have you been to the ER, urgent care clinic since your last visit? Hospitalized since your last visit? \" No    2. \"Have you seen or consulted any other health care providers outside of the [address] [place] [address] since your last visit? \" No     3. For patients aged 39-70: Has the patient had a colonoscopy / FIT/ Cologuard?  Yes - no Care Gap present

## 2022-11-08 ENCOUNTER — DOCUMENTATION ONLY (OUTPATIENT)
Dept: VASCULAR SURGERY | Age: 67
End: 2022-11-08

## 2022-11-08 NOTE — PROGRESS NOTES
Spoke with patient about referral we received from ALTAGRACIA houser, pt expressed that he does not want to be seen, does not think that it is concerning, will reach out if he changes his mind.  11/8/2022 VC

## 2022-11-18 ENCOUNTER — TELEPHONE (OUTPATIENT)
Dept: FAMILY MEDICINE CLINIC | Age: 67
End: 2022-11-18

## 2022-11-18 NOTE — TELEPHONE ENCOUNTER
Patient calling stating during his appointment with the provider on 11/7 NP Luigi Fregoso stated that she was going to send some cream for is psoriasis over to his pharmacy. States it is not at the pharmacy. Requesting a call back with whether or not its going to be sent in. Please advise.

## 2022-11-21 NOTE — TELEPHONE ENCOUNTER
Information sent to [facility] today. From last note patient's psoriasis was talked about, however no information on if he was going to be given medications was stated.

## 2022-12-14 DIAGNOSIS — L40.9 PSORIASIS, UNSPECIFIED: Primary | ICD-10-CM

## 2022-12-14 RX ORDER — CLOBETASOL PROPIONATE 0.5 MG/G
OINTMENT TOPICAL 2 TIMES DAILY
Qty: 15 G | Refills: 5 | Status: SHIPPED | OUTPATIENT
Start: 2022-12-14

## 2022-12-14 NOTE — TELEPHONE ENCOUNTER
Patient calling to see what was going on with the medication for his psoriasis. Placed patient on hold and spoke with his PCM.    Provider stated she will send something in

## 2023-01-01 ENCOUNTER — APPOINTMENT (OUTPATIENT)
Dept: GENERAL RADIOLOGY | Age: 68
DRG: 853 | End: 2023-01-01
Attending: GENERAL PRACTICE
Payer: MEDICARE

## 2023-01-01 ENCOUNTER — HOSPITAL ENCOUNTER (INPATIENT)
Age: 68
LOS: 10 days | Discharge: HOME HEALTH | DRG: 853 | End: 2023-01-11
Attending: EMERGENCY MEDICINE | Admitting: SURGERY
Payer: MEDICARE

## 2023-01-01 ENCOUNTER — ANESTHESIA (OUTPATIENT)
Dept: SURGERY | Age: 68
DRG: 853 | End: 2023-01-01
Payer: MEDICARE

## 2023-01-01 ENCOUNTER — APPOINTMENT (OUTPATIENT)
Dept: CT IMAGING | Age: 68
DRG: 853 | End: 2023-01-01
Attending: EMERGENCY MEDICINE
Payer: MEDICARE

## 2023-01-01 ENCOUNTER — ANESTHESIA EVENT (OUTPATIENT)
Dept: SURGERY | Age: 68
DRG: 853 | End: 2023-01-01
Payer: MEDICARE

## 2023-01-01 DIAGNOSIS — K63.1 BOWEL PERFORATION (HCC): Primary | ICD-10-CM

## 2023-01-01 DIAGNOSIS — R94.31 PROLONGED QT INTERVAL: ICD-10-CM

## 2023-01-01 DIAGNOSIS — R65.20 SEVERE SEPSIS (HCC): ICD-10-CM

## 2023-01-01 DIAGNOSIS — E87.6 HYPOKALEMIA: ICD-10-CM

## 2023-01-01 DIAGNOSIS — A41.9 SEVERE SEPSIS (HCC): ICD-10-CM

## 2023-01-01 DIAGNOSIS — I48.92 ATRIAL FLUTTER, UNSPECIFIED TYPE (HCC): ICD-10-CM

## 2023-01-01 PROBLEM — D72.829 LEUKOCYTOSIS: Status: ACTIVE | Noted: 2023-01-01

## 2023-01-01 PROBLEM — E87.20 LACTIC ACIDOSIS: Status: ACTIVE | Noted: 2023-01-01

## 2023-01-01 PROBLEM — E87.1 HYPONATREMIA: Status: ACTIVE | Noted: 2023-01-01

## 2023-01-01 PROBLEM — R73.9 HYPERGLYCEMIA: Status: ACTIVE | Noted: 2023-01-01

## 2023-01-01 LAB
ALBUMIN SERPL-MCNC: 2.7 G/DL (ref 3.4–5)
ALBUMIN/GLOB SERPL: 0.6 (ref 0.8–1.7)
ALP SERPL-CCNC: 53 U/L (ref 45–117)
ALT SERPL-CCNC: 13 U/L (ref 16–61)
ANION GAP SERPL CALC-SCNC: 16 MMOL/L (ref 3–18)
APPEARANCE UR: CLEAR
AST SERPL-CCNC: 8 U/L (ref 10–38)
ATRIAL RATE: 81 BPM
BASOPHILS # BLD: 0.1 K/UL (ref 0–0.1)
BASOPHILS NFR BLD: 1 % (ref 0–2)
BILIRUB DIRECT SERPL-MCNC: 0.8 MG/DL (ref 0–0.2)
BILIRUB SERPL-MCNC: 1.6 MG/DL (ref 0.2–1)
BILIRUB UR QL: ABNORMAL
BUN SERPL-MCNC: 26 MG/DL (ref 7–18)
BUN/CREAT SERPL: 15 (ref 12–20)
CALCIUM SERPL-MCNC: 8.6 MG/DL (ref 8.5–10.1)
CALCULATED P AXIS, ECG09: 69 DEGREES
CALCULATED R AXIS, ECG10: -17 DEGREES
CALCULATED T AXIS, ECG11: 2 DEGREES
CHLORIDE SERPL-SCNC: 82 MMOL/L (ref 100–111)
CO2 SERPL-SCNC: 29 MMOL/L (ref 21–32)
COLOR UR: ABNORMAL
COVID-19 RAPID TEST, COVR: NOT DETECTED
CREAT SERPL-MCNC: 1.68 MG/DL (ref 0.6–1.3)
DIAGNOSIS, 93000: NORMAL
DIFFERENTIAL METHOD BLD: ABNORMAL
EOSINOPHIL # BLD: 0 K/UL (ref 0–0.4)
EOSINOPHIL NFR BLD: 0 % (ref 0–5)
EPITH CASTS URNS QL MICRO: NORMAL /LPF (ref 0–5)
ERYTHROCYTE [DISTWIDTH] IN BLOOD BY AUTOMATED COUNT: 11.8 % (ref 11.6–14.5)
GLOBULIN SER CALC-MCNC: 4.3 G/DL (ref 2–4)
GLUCOSE BLD STRIP.AUTO-MCNC: 151 MG/DL (ref 70–110)
GLUCOSE SERPL-MCNC: 213 MG/DL (ref 74–99)
GLUCOSE UR STRIP.AUTO-MCNC: NEGATIVE MG/DL
HCT VFR BLD AUTO: 49.8 % (ref 36–48)
HGB BLD-MCNC: 19.2 G/DL (ref 13–16)
HGB UR QL STRIP: NEGATIVE
HYALINE CASTS URNS QL MICRO: NORMAL /LPF (ref 0–2)
IMM GRANULOCYTES # BLD AUTO: 0.1 K/UL (ref 0–0.04)
IMM GRANULOCYTES NFR BLD AUTO: 1 % (ref 0–0.5)
KETONES UR QL STRIP.AUTO: 15 MG/DL
LACTATE BLD-SCNC: 2.79 MMOL/L (ref 0.4–2)
LACTATE SERPL-SCNC: 2.8 MMOL/L (ref 0.4–2)
LEUKOCYTE ESTERASE UR QL STRIP.AUTO: ABNORMAL
LIPASE SERPL-CCNC: 75 U/L (ref 73–393)
LYMPHOCYTES # BLD: 1.1 K/UL (ref 0.9–3.6)
LYMPHOCYTES NFR BLD: 7 % (ref 21–52)
MAGNESIUM SERPL-MCNC: 1.9 MG/DL (ref 1.6–2.6)
MCH RBC QN AUTO: 32.6 PG (ref 24–34)
MCHC RBC AUTO-ENTMCNC: 38.6 G/DL (ref 31–37)
MCV RBC AUTO: 84.6 FL (ref 78–100)
MONOCYTES # BLD: 1 K/UL (ref 0.05–1.2)
MONOCYTES NFR BLD: 6 % (ref 3–10)
NEUTS SEG # BLD: 14.1 K/UL (ref 1.8–8)
NEUTS SEG NFR BLD: 86 % (ref 40–73)
NITRITE UR QL STRIP.AUTO: NEGATIVE
NRBC # BLD: 0 K/UL (ref 0–0.01)
NRBC BLD-RTO: 0 PER 100 WBC
P-R INTERVAL, ECG05: 160 MS
PH UR STRIP: 5.5 (ref 5–8)
PLATELET # BLD AUTO: 313 K/UL (ref 135–420)
PMV BLD AUTO: 9.4 FL (ref 9.2–11.8)
POTASSIUM SERPL-SCNC: 2.8 MMOL/L (ref 3.5–5.5)
PROT SERPL-MCNC: 7 G/DL (ref 6.4–8.2)
PROT UR STRIP-MCNC: 30 MG/DL
Q-T INTERVAL, ECG07: 470 MS
QRS DURATION, ECG06: 100 MS
QTC CALCULATION (BEZET), ECG08: 545 MS
RBC # BLD AUTO: 5.89 M/UL (ref 4.35–5.65)
RBC #/AREA URNS HPF: NORMAL /HPF (ref 0–5)
SODIUM SERPL-SCNC: 127 MMOL/L (ref 136–145)
SOURCE, COVRS: NORMAL
SP GR UR REFRACTOMETRY: 1.02 (ref 1–1.03)
TROPONIN-HIGH SENSITIVITY: 15 NG/L (ref 0–78)
UROBILINOGEN UR QL STRIP.AUTO: 2 EU/DL (ref 0.2–1)
VENTRICULAR RATE, ECG03: 81 BPM
WBC # BLD AUTO: 16.4 K/UL (ref 4.6–13.2)
WBC URNS QL MICRO: NORMAL /HPF (ref 0–4)

## 2023-01-01 PROCEDURE — 87635 SARS-COV-2 COVID-19 AMP PRB: CPT

## 2023-01-01 PROCEDURE — 99222 1ST HOSP IP/OBS MODERATE 55: CPT | Performed by: PHYSICIAN ASSISTANT

## 2023-01-01 PROCEDURE — 71046 X-RAY EXAM CHEST 2 VIEWS: CPT

## 2023-01-01 PROCEDURE — 96375 TX/PRO/DX INJ NEW DRUG ADDON: CPT

## 2023-01-01 PROCEDURE — 77030008683 HC TU ET CUF COVD -A: Performed by: ANESTHESIOLOGY

## 2023-01-01 PROCEDURE — 83735 ASSAY OF MAGNESIUM: CPT

## 2023-01-01 PROCEDURE — 2709999900 HC NON-CHARGEABLE SUPPLY: Performed by: SURGERY

## 2023-01-01 PROCEDURE — 77030002966 HC SUT PDS J&J -A: Performed by: SURGERY

## 2023-01-01 PROCEDURE — 85025 COMPLETE CBC W/AUTO DIFF WBC: CPT

## 2023-01-01 PROCEDURE — 74011250636 HC RX REV CODE- 250/636: Performed by: SURGERY

## 2023-01-01 PROCEDURE — 77030005515 HC CATH URETH FOL14 BARD -B: Performed by: SURGERY

## 2023-01-01 PROCEDURE — 74011250636 HC RX REV CODE- 250/636: Performed by: NURSE ANESTHETIST, CERTIFIED REGISTERED

## 2023-01-01 PROCEDURE — 74011250636 HC RX REV CODE- 250/636: Performed by: EMERGENCY MEDICINE

## 2023-01-01 PROCEDURE — 81001 URINALYSIS AUTO W/SCOPE: CPT

## 2023-01-01 PROCEDURE — 77030040830 HC CATH URETH FOL MDII -A: Performed by: SURGERY

## 2023-01-01 PROCEDURE — 77030011267 HC ELECTRD BLD COVD -A: Performed by: SURGERY

## 2023-01-01 PROCEDURE — 0DTF0ZZ RESECTION OF RIGHT LARGE INTESTINE, OPEN APPROACH: ICD-10-PCS | Performed by: SURGERY

## 2023-01-01 PROCEDURE — 36415 COLL VENOUS BLD VENIPUNCTURE: CPT

## 2023-01-01 PROCEDURE — 88305 TISSUE EXAM BY PATHOLOGIST: CPT

## 2023-01-01 PROCEDURE — 77030002996 HC SUT SLK J&J -A: Performed by: SURGERY

## 2023-01-01 PROCEDURE — 77030013079 HC BLNKT BAIR HGGR 3M -A: Performed by: ANESTHESIOLOGY

## 2023-01-01 PROCEDURE — 77030036731 HC STPLR ENDOSC J&J -F: Performed by: SURGERY

## 2023-01-01 PROCEDURE — 2709999900 HC NON-CHARGEABLE SUPPLY

## 2023-01-01 PROCEDURE — 76210000006 HC OR PH I REC 0.5 TO 1 HR: Performed by: SURGERY

## 2023-01-01 PROCEDURE — 74011250636 HC RX REV CODE- 250/636: Performed by: GENERAL PRACTICE

## 2023-01-01 PROCEDURE — 77030002933 HC SUT MCRYL J&J -A: Performed by: SURGERY

## 2023-01-01 PROCEDURE — 82962 GLUCOSE BLOOD TEST: CPT

## 2023-01-01 PROCEDURE — 77030026438 HC STYL ET INTUB CARD -A: Performed by: ANESTHESIOLOGY

## 2023-01-01 PROCEDURE — 77030020061 HC IV BLD WRMR ADMIN SET 3M -B: Performed by: ANESTHESIOLOGY

## 2023-01-01 PROCEDURE — 76060000036 HC ANESTHESIA 2.5 TO 3 HR: Performed by: SURGERY

## 2023-01-01 PROCEDURE — 74177 CT ABD & PELVIS W/CONTRAST: CPT

## 2023-01-01 PROCEDURE — 80048 BASIC METABOLIC PNL TOTAL CA: CPT

## 2023-01-01 PROCEDURE — 76010000132 HC OR TIME 2.5 TO 3 HR: Performed by: SURGERY

## 2023-01-01 PROCEDURE — 84484 ASSAY OF TROPONIN QUANT: CPT

## 2023-01-01 PROCEDURE — 83605 ASSAY OF LACTIC ACID: CPT

## 2023-01-01 PROCEDURE — 74011000636 HC RX REV CODE- 636: Performed by: EMERGENCY MEDICINE

## 2023-01-01 PROCEDURE — 77030033636: Performed by: SURGERY

## 2023-01-01 PROCEDURE — 96374 THER/PROPH/DIAG INJ IV PUSH: CPT

## 2023-01-01 PROCEDURE — 99222 1ST HOSP IP/OBS MODERATE 55: CPT | Performed by: SURGERY

## 2023-01-01 PROCEDURE — 74011000258 HC RX REV CODE- 258: Performed by: EMERGENCY MEDICINE

## 2023-01-01 PROCEDURE — 83690 ASSAY OF LIPASE: CPT

## 2023-01-01 PROCEDURE — 44160 REMOVAL OF COLON: CPT | Performed by: SURGERY

## 2023-01-01 PROCEDURE — 77030009978 HC RELD STPLR TCR J&J -B: Performed by: SURGERY

## 2023-01-01 PROCEDURE — 74011250636 HC RX REV CODE- 250/636: Performed by: PHYSICIAN ASSISTANT

## 2023-01-01 PROCEDURE — 77030002916 HC SUT ETHLN J&J -A: Performed by: SURGERY

## 2023-01-01 PROCEDURE — 99285 EMERGENCY DEPT VISIT HI MDM: CPT

## 2023-01-01 PROCEDURE — 84145 PROCALCITONIN (PCT): CPT

## 2023-01-01 PROCEDURE — 74011000250 HC RX REV CODE- 250: Performed by: NURSE ANESTHETIST, CERTIFIED REGISTERED

## 2023-01-01 PROCEDURE — 77030031139 HC SUT VCRL2 J&J -A: Performed by: SURGERY

## 2023-01-01 PROCEDURE — 65270000029 HC RM PRIVATE

## 2023-01-01 PROCEDURE — 88307 TISSUE EXAM BY PATHOLOGIST: CPT

## 2023-01-01 PROCEDURE — 80076 HEPATIC FUNCTION PANEL: CPT

## 2023-01-01 PROCEDURE — 77030011278 HC ELECTRD LIG IMPT COVD -F: Performed by: SURGERY

## 2023-01-01 PROCEDURE — 93005 ELECTROCARDIOGRAM TRACING: CPT

## 2023-01-01 RX ORDER — PROPOFOL 10 MG/ML
INJECTION, EMULSION INTRAVENOUS AS NEEDED
Status: DISCONTINUED | OUTPATIENT
Start: 2023-01-01 | End: 2023-01-01 | Stop reason: HOSPADM

## 2023-01-01 RX ORDER — INSULIN LISPRO 100 [IU]/ML
INJECTION, SOLUTION INTRAVENOUS; SUBCUTANEOUS EVERY 6 HOURS
Status: DISCONTINUED | OUTPATIENT
Start: 2023-01-02 | End: 2023-01-11 | Stop reason: HOSPADM

## 2023-01-01 RX ORDER — IBUPROFEN 200 MG
4 TABLET ORAL AS NEEDED
Status: DISCONTINUED | OUTPATIENT
Start: 2023-01-01 | End: 2023-01-11 | Stop reason: HOSPADM

## 2023-01-01 RX ORDER — NALBUPHINE HYDROCHLORIDE 10 MG/ML
5 INJECTION, SOLUTION INTRAMUSCULAR; INTRAVENOUS; SUBCUTANEOUS
Status: DISCONTINUED | OUTPATIENT
Start: 2023-01-01 | End: 2023-01-01 | Stop reason: HOSPADM

## 2023-01-01 RX ORDER — HYDROMORPHONE HYDROCHLORIDE 1 MG/ML
0.5 INJECTION, SOLUTION INTRAMUSCULAR; INTRAVENOUS; SUBCUTANEOUS AS NEEDED
Status: DISCONTINUED | OUTPATIENT
Start: 2023-01-01 | End: 2023-01-01 | Stop reason: HOSPADM

## 2023-01-01 RX ORDER — MORPHINE SULFATE 1 MG/ML
4 INJECTION, SOLUTION EPIDURAL; INTRATHECAL; INTRAVENOUS
Status: DISCONTINUED | OUTPATIENT
Start: 2023-01-01 | End: 2023-01-01

## 2023-01-01 RX ORDER — INSULIN LISPRO 100 [IU]/ML
INJECTION, SOLUTION INTRAVENOUS; SUBCUTANEOUS ONCE
Status: DISCONTINUED | OUTPATIENT
Start: 2023-01-01 | End: 2023-01-01 | Stop reason: HOSPADM

## 2023-01-01 RX ORDER — SODIUM CHLORIDE 0.9 % (FLUSH) 0.9 %
5-40 SYRINGE (ML) INJECTION EVERY 8 HOURS
Status: DISCONTINUED | OUTPATIENT
Start: 2023-01-01 | End: 2023-01-01 | Stop reason: HOSPADM

## 2023-01-01 RX ORDER — SODIUM CHLORIDE 0.9 % (FLUSH) 0.9 %
5-40 SYRINGE (ML) INJECTION AS NEEDED
Status: DISCONTINUED | OUTPATIENT
Start: 2023-01-01 | End: 2023-01-01 | Stop reason: HOSPADM

## 2023-01-01 RX ORDER — FENTANYL CITRATE 50 UG/ML
INJECTION, SOLUTION INTRAMUSCULAR; INTRAVENOUS AS NEEDED
Status: DISCONTINUED | OUTPATIENT
Start: 2023-01-01 | End: 2023-01-01 | Stop reason: HOSPADM

## 2023-01-01 RX ORDER — SODIUM CHLORIDE, SODIUM LACTATE, POTASSIUM CHLORIDE, CALCIUM CHLORIDE 600; 310; 30; 20 MG/100ML; MG/100ML; MG/100ML; MG/100ML
75 INJECTION, SOLUTION INTRAVENOUS CONTINUOUS
Status: DISCONTINUED | OUTPATIENT
Start: 2023-01-01 | End: 2023-01-01 | Stop reason: HOSPADM

## 2023-01-01 RX ORDER — MORPHINE SULFATE 2 MG/ML
2 INJECTION, SOLUTION INTRAMUSCULAR; INTRAVENOUS
Status: DISCONTINUED | OUTPATIENT
Start: 2023-01-01 | End: 2023-01-01

## 2023-01-01 RX ORDER — ONDANSETRON 2 MG/ML
4 INJECTION INTRAMUSCULAR; INTRAVENOUS
Status: DISCONTINUED | OUTPATIENT
Start: 2023-01-01 | End: 2023-01-06

## 2023-01-01 RX ORDER — ONDANSETRON 2 MG/ML
INJECTION INTRAMUSCULAR; INTRAVENOUS AS NEEDED
Status: DISCONTINUED | OUTPATIENT
Start: 2023-01-01 | End: 2023-01-01 | Stop reason: HOSPADM

## 2023-01-01 RX ORDER — MORPHINE SULFATE 4 MG/ML
4 INJECTION, SOLUTION INTRAMUSCULAR; INTRAVENOUS
Status: DISCONTINUED | OUTPATIENT
Start: 2023-01-01 | End: 2023-01-03

## 2023-01-01 RX ORDER — DEXTROSE MONOHYDRATE 100 MG/ML
0-250 INJECTION, SOLUTION INTRAVENOUS AS NEEDED
Status: DISCONTINUED | OUTPATIENT
Start: 2023-01-01 | End: 2023-01-11 | Stop reason: HOSPADM

## 2023-01-01 RX ORDER — DEXAMETHASONE 4 MG/1
4 TABLET ORAL ONCE
Status: DISCONTINUED | OUTPATIENT
Start: 2023-01-01 | End: 2023-01-01 | Stop reason: HOSPADM

## 2023-01-01 RX ORDER — ONDANSETRON 2 MG/ML
4 INJECTION INTRAMUSCULAR; INTRAVENOUS ONCE
Status: COMPLETED | OUTPATIENT
Start: 2023-01-01 | End: 2023-01-01

## 2023-01-01 RX ORDER — ROCURONIUM BROMIDE 10 MG/ML
INJECTION, SOLUTION INTRAVENOUS AS NEEDED
Status: DISCONTINUED | OUTPATIENT
Start: 2023-01-01 | End: 2023-01-01 | Stop reason: HOSPADM

## 2023-01-01 RX ORDER — SODIUM CHLORIDE, SODIUM LACTATE, POTASSIUM CHLORIDE, CALCIUM CHLORIDE 600; 310; 30; 20 MG/100ML; MG/100ML; MG/100ML; MG/100ML
INJECTION, SOLUTION INTRAVENOUS
Status: DISCONTINUED | OUTPATIENT
Start: 2023-01-01 | End: 2023-01-01 | Stop reason: HOSPADM

## 2023-01-01 RX ORDER — SODIUM CHLORIDE 0.9 % (FLUSH) 0.9 %
5-10 SYRINGE (ML) INJECTION AS NEEDED
Status: DISCONTINUED | OUTPATIENT
Start: 2023-01-01 | End: 2023-01-11 | Stop reason: HOSPADM

## 2023-01-01 RX ORDER — SODIUM CHLORIDE 9 MG/ML
125 INJECTION, SOLUTION INTRAVENOUS CONTINUOUS
Status: DISCONTINUED | OUTPATIENT
Start: 2023-01-01 | End: 2023-01-01

## 2023-01-01 RX ORDER — IBUPROFEN 200 MG
4 TABLET ORAL AS NEEDED
Status: DISCONTINUED | OUTPATIENT
Start: 2023-01-01 | End: 2023-01-01 | Stop reason: HOSPADM

## 2023-01-01 RX ORDER — POTASSIUM CHLORIDE 7.45 MG/ML
10 INJECTION INTRAVENOUS
Status: DISPENSED | OUTPATIENT
Start: 2023-01-01 | End: 2023-01-01

## 2023-01-01 RX ORDER — POTASSIUM CHLORIDE 7.45 MG/ML
10 INJECTION INTRAVENOUS
Status: DISPENSED | OUTPATIENT
Start: 2023-01-02 | End: 2023-01-02

## 2023-01-01 RX ORDER — DIPHENHYDRAMINE HYDROCHLORIDE 50 MG/ML
12.5 INJECTION, SOLUTION INTRAMUSCULAR; INTRAVENOUS
Status: DISCONTINUED | OUTPATIENT
Start: 2023-01-01 | End: 2023-01-01 | Stop reason: HOSPADM

## 2023-01-01 RX ORDER — MORPHINE SULFATE 2 MG/ML
2 INJECTION, SOLUTION INTRAMUSCULAR; INTRAVENOUS
Status: DISCONTINUED | OUTPATIENT
Start: 2023-01-01 | End: 2023-01-03

## 2023-01-01 RX ORDER — SUCCINYLCHOLINE CHLORIDE 20 MG/ML
INJECTION INTRAMUSCULAR; INTRAVENOUS AS NEEDED
Status: DISCONTINUED | OUTPATIENT
Start: 2023-01-01 | End: 2023-01-01 | Stop reason: HOSPADM

## 2023-01-01 RX ORDER — LIDOCAINE HYDROCHLORIDE 20 MG/ML
INJECTION, SOLUTION EPIDURAL; INFILTRATION; INTRACAUDAL; PERINEURAL AS NEEDED
Status: DISCONTINUED | OUTPATIENT
Start: 2023-01-01 | End: 2023-01-01 | Stop reason: HOSPADM

## 2023-01-01 RX ORDER — DEXAMETHASONE SODIUM PHOSPHATE 4 MG/ML
INJECTION, SOLUTION INTRA-ARTICULAR; INTRALESIONAL; INTRAMUSCULAR; INTRAVENOUS; SOFT TISSUE AS NEEDED
Status: DISCONTINUED | OUTPATIENT
Start: 2023-01-01 | End: 2023-01-01 | Stop reason: HOSPADM

## 2023-01-01 RX ORDER — MORPHINE SULFATE 1 MG/ML
2 INJECTION, SOLUTION EPIDURAL; INTRATHECAL; INTRAVENOUS
Status: DISCONTINUED | OUTPATIENT
Start: 2023-01-01 | End: 2023-01-01

## 2023-01-01 RX ORDER — MORPHINE SULFATE 4 MG/ML
4 INJECTION INTRAVENOUS ONCE
Status: COMPLETED | OUTPATIENT
Start: 2023-01-01 | End: 2023-01-01

## 2023-01-01 RX ORDER — DEXTROSE, SODIUM CHLORIDE, SODIUM LACTATE, POTASSIUM CHLORIDE, AND CALCIUM CHLORIDE 5; .6; .31; .03; .02 G/100ML; G/100ML; G/100ML; G/100ML; G/100ML
125 INJECTION, SOLUTION INTRAVENOUS CONTINUOUS
Status: DISCONTINUED | OUTPATIENT
Start: 2023-01-02 | End: 2023-01-03

## 2023-01-01 RX ORDER — HEPARIN SODIUM 5000 [USP'U]/ML
5000 INJECTION, SOLUTION INTRAVENOUS; SUBCUTANEOUS EVERY 8 HOURS
Status: DISCONTINUED | OUTPATIENT
Start: 2023-01-01 | End: 2023-01-03

## 2023-01-01 RX ORDER — SODIUM CHLORIDE 9 MG/ML
1000 INJECTION, SOLUTION INTRAVENOUS ONCE
Status: DISCONTINUED | OUTPATIENT
Start: 2023-01-01 | End: 2023-01-01 | Stop reason: HOSPADM

## 2023-01-01 RX ADMIN — MORPHINE SULFATE 4 MG: 4 INJECTION, SOLUTION INTRAMUSCULAR; INTRAVENOUS at 17:43

## 2023-01-01 RX ADMIN — DEXAMETHASONE SODIUM PHOSPHATE 4 MG: 4 INJECTION, SOLUTION INTRAMUSCULAR; INTRAVENOUS at 18:54

## 2023-01-01 RX ADMIN — PIPERACILLIN AND TAZOBACTAM 4.5 G: 4; .5 INJECTION, POWDER, FOR SOLUTION INTRAVENOUS at 17:35

## 2023-01-01 RX ADMIN — PROPOFOL 100 MG: 10 INJECTION, EMULSION INTRAVENOUS at 18:54

## 2023-01-01 RX ADMIN — SODIUM CHLORIDE, SODIUM LACTATE, POTASSIUM CHLORIDE, AND CALCIUM CHLORIDE: 600; 310; 30; 20 INJECTION, SOLUTION INTRAVENOUS at 21:21

## 2023-01-01 RX ADMIN — SODIUM CHLORIDE, SODIUM LACTATE, POTASSIUM CHLORIDE, AND CALCIUM CHLORIDE: 600; 310; 30; 20 INJECTION, SOLUTION INTRAVENOUS at 21:35

## 2023-01-01 RX ADMIN — SODIUM CHLORIDE, SODIUM LACTATE, POTASSIUM CHLORIDE, AND CALCIUM CHLORIDE: 600; 310; 30; 20 INJECTION, SOLUTION INTRAVENOUS at 18:47

## 2023-01-01 RX ADMIN — SODIUM CHLORIDE, POTASSIUM CHLORIDE, SODIUM LACTATE AND CALCIUM CHLORIDE 1000 ML: 600; 310; 30; 20 INJECTION, SOLUTION INTRAVENOUS at 17:28

## 2023-01-01 RX ADMIN — SODIUM CHLORIDE, SODIUM LACTATE, POTASSIUM CHLORIDE, AND CALCIUM CHLORIDE: 600; 310; 30; 20 INJECTION, SOLUTION INTRAVENOUS at 20:05

## 2023-01-01 RX ADMIN — ROCURONIUM BROMIDE 10 MG: 50 INJECTION INTRAVENOUS at 18:54

## 2023-01-01 RX ADMIN — MORPHINE SULFATE 2 MG: 2 INJECTION, SOLUTION INTRAMUSCULAR; INTRAVENOUS at 23:42

## 2023-01-01 RX ADMIN — IOPAMIDOL 70 ML: 612 INJECTION, SOLUTION INTRAVENOUS at 17:15

## 2023-01-01 RX ADMIN — POTASSIUM CHLORIDE 10 MEQ: 7.45 INJECTION INTRAVENOUS at 23:41

## 2023-01-01 RX ADMIN — FENTANYL CITRATE 50 MCG: 50 INJECTION INTRAMUSCULAR; INTRAVENOUS at 20:37

## 2023-01-01 RX ADMIN — POTASSIUM CHLORIDE 10 MEQ: 7.46 INJECTION, SOLUTION INTRAVENOUS at 20:07

## 2023-01-01 RX ADMIN — FENTANYL CITRATE 150 MCG: 50 INJECTION INTRAMUSCULAR; INTRAVENOUS at 18:54

## 2023-01-01 RX ADMIN — SODIUM CHLORIDE, SODIUM LACTATE, POTASSIUM CHLORIDE, AND CALCIUM CHLORIDE: 600; 310; 30; 20 INJECTION, SOLUTION INTRAVENOUS at 19:42

## 2023-01-01 RX ADMIN — ONDANSETRON 4 MG: 2 INJECTION INTRAMUSCULAR; INTRAVENOUS at 17:30

## 2023-01-01 RX ADMIN — ONDANSETRON 4 MG: 2 INJECTION INTRAMUSCULAR; INTRAVENOUS at 18:54

## 2023-01-01 RX ADMIN — FENTANYL CITRATE 50 MCG: 50 INJECTION INTRAMUSCULAR; INTRAVENOUS at 21:03

## 2023-01-01 RX ADMIN — POTASSIUM CHLORIDE 10 MEQ: 7.46 INJECTION, SOLUTION INTRAVENOUS at 17:43

## 2023-01-01 RX ADMIN — POTASSIUM CHLORIDE 10 MEQ: 7.46 INJECTION, SOLUTION INTRAVENOUS at 18:51

## 2023-01-01 RX ADMIN — LIDOCAINE HYDROCHLORIDE 20 MG: 20 INJECTION, SOLUTION EPIDURAL; INFILTRATION; INTRACAUDAL; PERINEURAL at 18:54

## 2023-01-01 RX ADMIN — SUCCINYLCHOLINE CHLORIDE 100 MG: 20 INJECTION, SOLUTION INTRAMUSCULAR; INTRAVENOUS at 18:54

## 2023-01-01 RX ADMIN — SODIUM CHLORIDE 125 ML/HR: 9 INJECTION, SOLUTION INTRAVENOUS at 22:42

## 2023-01-01 RX ADMIN — PIPERACILLIN SODIUM AND TAZOBACTAM SODIUM 3.38 G: 3; .375 INJECTION, POWDER, LYOPHILIZED, FOR SOLUTION INTRAVENOUS at 23:12

## 2023-01-01 NOTE — ED PROVIDER NOTES
EMERGENCY DEPARTMENT HISTORY AND PHYSICAL EXAM    4:48 PM      Date: 1/1/2023  Patient Name: Mary Medley    History of Presenting Illness     Chief Complaint   Patient presents with    Abdominal Pain     Patient ambulatory to triage with reports of abdominal pain that started weeks ago. States that he saw his PCP who did blood work and MD told him to stop drinking. States that he quit drinking about 6 weeks ago but the pain is worsening. No vomiting, has had a poor appetite with constipation and abdominal swelling. History Provided By: Patient  Location/Duration/Severity/Modifying factors   Patient is a 79-year-old male with a history of anxiety, diabetes, hypertension, alcohol use, the presents emergency department complaint of abdominal pain this been increasing for the past 3 weeks. Patient has been having some abdominal distention and abdominal pain with decreased appetite this been progressive for several weeks. The patient said he is not been wanting to eat and when he does eat the pain increases. The patient had constipation earlier in the week and used a laxative and the pain increased especially in the lower part of his abdomen. The patient says that when he tries to eat something he feels almost like a bowel tasting vomit, and so he has not been able to really eat or drink. The patient is stopped drinking alcohol over the last month because he noticed his overall health was declining. Patient was drinking upwards of 6-16 ounce beers a day. The patient has not a smoker, is retired as a heavy , and denies any drug use. The patient presents emergency department his wife who provides some of the history. The patient has not wanted to come to the hospital.  Patient thought it would get better. There are no other known aggravating or alleviating factors. Patient notes that he has increasing pain when he tries to ambulate or when he moves.       PCP: Emily Aldrich, NP    Current Facility-Administered Medications   Medication Dose Route Frequency Provider Last Rate Last Admin    sodium chloride (NS) flush 5-10 mL  5-10 mL IntraVENous PRN Lupe Wang MD        piperacillin-tazobactam (ZOSYN) 3.375 g in 0.9% sodium chloride (MBP/ADV) 100 mL MBP  3.375 g IntraVENous Q8H Jerald Judd MD        lactated ringers bolus infusion 1,000 mL  1,000 mL IntraVENous ONCE Layla Judd MD        Followed by    lactated ringers bolus infusion 313 mL  313 mL IntraVENous ONCE Layla Judd MD        potassium chloride 10 mEq in 100 ml IVPB  10 mEq IntraVENous Q1H Lupe Wang  mL/hr at 01/01/23 1743 10 mEq at 01/01/23 1743    0.9% sodium chloride infusion  125 mL/hr IntraVENous CONTINUOUS Darnell Alvarez DO        heparin (porcine) injection 5,000 Units  5,000 Units SubCUTAneous Q8H Darnell Alvarez DO         Current Outpatient Medications   Medication Sig Dispense Refill    clobetasoL (TEMOVATE) 0.05 % ointment Apply  to affected area two (2) times a day. For psoriasis 15 g 5    atorvastatin (LIPITOR) 10 mg tablet Take 1 tablet by mouth daily 90 Tablet 3    lisinopriL (PRINIVIL, ZESTRIL) 10 mg tablet Take 1 tablet by mouth daily 90 Tablet 3    atenoloL-chlorthalidone (TENORETIC) 50-25 mg per tablet Take 1 Tablet by mouth daily. 90 Tablet 3    amLODIPine (NORVASC) 10 mg tablet Take 1 Tablet by mouth daily. 1 qd 90 Tablet 3    potassium chloride (K-DUR, KLOR-CON) 20 mEq tablet 1 qd 90 Tab 3       Past History     Past Medical History:  Past Medical History:   Diagnosis Date    Anxiety     Benign essential hypertension     DM type 2 (diabetes mellitus, type 2) ([facility] [address]        Past Surgical History:  History reviewed. No pertinent surgical history.     Family History:  Family History   Problem Relation Age of Onset    OSTEOARTHRITIS Father     No Known Problems Mother        Social History:  Social History     Tobacco Use    Smoking status: Former Types: Cigarettes     Quit date: 1980     Years since quittin.0    Smokeless tobacco: Never   Vaping Use    Vaping Use: Never used   Substance Use Topics    Alcohol use: Yes     Comment: heavy    Drug use: No       Allergies:  No Known Allergies      Review of Systems       Review of Systems   Constitutional:  Positive for activity change and fatigue. Negative for fever. HENT:  Negative for congestion and rhinorrhea. Eyes:  Negative for visual disturbance. Respiratory:  Negative for shortness of breath. Cardiovascular:  Negative for chest pain and palpitations. Gastrointestinal:  Positive for abdominal pain, nausea and vomiting. Negative for diarrhea. Genitourinary:  Negative for dysuria and hematuria. Musculoskeletal:  Negative for back pain. Skin:  Negative for rash. Neurological:  Negative for dizziness, weakness and light-headedness. All other systems reviewed and are negative. Physical Exam   Visit Vitals  /85 (BP 1 Location: Left upper arm, BP Patient Position: At rest)   Pulse 81   Temp 98 °F (36.7 °C)   Resp 22   Ht 5' 9\" (1.753 m)   Wt 77.1 kg (170 lb)   SpO2 97%   BMI 25.10 kg/m²         Physical Exam  Vitals and nursing note reviewed. Constitutional:       General: He is not in acute distress. Appearance: He is well-developed. HENT:      Head: Normocephalic and atraumatic. Right Ear: External ear normal.      Left Ear: External ear normal.      Nose: Nose normal.   Eyes:      General: No scleral icterus. Conjunctiva/sclera: Conjunctivae normal.      Pupils: Pupils are equal, round, and reactive to light. Neck:      Thyroid: No thyromegaly. Vascular: No JVD. Trachea: No tracheal deviation. Cardiovascular:      Rate and Rhythm: Normal rate and regular rhythm. Heart sounds: Normal heart sounds. No murmur heard. No friction rub. No gallop.    Pulmonary:      Effort: Pulmonary effort is normal.      Breath sounds: Normal breath sounds. Chest:      Chest wall: No tenderness. Abdominal:      General: Bowel sounds are decreased. There is distension. Palpations: Abdomen is soft. Tenderness: There is generalized abdominal tenderness. There is guarding. There is no rebound. Musculoskeletal:         General: No tenderness. Normal range of motion. Cervical back: Normal range of motion and neck supple. Lymphadenopathy:      Cervical: No cervical adenopathy. Skin:     General: Skin is warm and dry. Comments: Poor skin turgor   Neurological:      Mental Status: He is alert. Cranial Nerves: No cranial nerve deficit. Coordination: Coordination normal.      Comments: Globally weak, symmetric strength, gait not observed   Psychiatric:         Behavior: Behavior normal.         Thought Content:  Thought content normal.         Judgment: Judgment normal.      Comments: Supportive wife at the bedside         Diagnostic Study Results     Labs -  Recent Results (from the past 12 hour(s))   URINALYSIS W/ RFLX MICROSCOPIC    Collection Time: 01/01/23  3:20 PM   Result Value Ref Range    Color DARK YELLOW      Appearance CLEAR      Specific gravity 1.020 1.005 - 1.030      pH (UA) 5.5 5.0 - 8.0      Protein 30 (A) NEG mg/dL    Glucose Negative NEG mg/dL    Ketone 15 (A) NEG mg/dL    Bilirubin MODERATE (A) NEG      Blood Negative NEG      Urobilinogen 2.0 (H) 0.2 - 1.0 EU/dL    Nitrites Negative NEG      Leukocyte Esterase TRACE (A) NEG     URINE MICROSCOPIC ONLY    Collection Time: 01/01/23  3:20 PM   Result Value Ref Range    WBC 0 to 3 0 - 4 /hpf    RBC 0 to 3 0 - 5 /hpf    Epithelial cells FEW 0 - 5 /lpf    Hyaline cast 0 to 3 0 - 2 /lpf   EKG, 12 LEAD, INITIAL    Collection Time: 01/01/23  3:41 PM   Result Value Ref Range    Ventricular Rate 81 BPM    Atrial Rate 81 BPM    P-R Interval 160 ms    QRS Duration 100 ms    Q-T Interval 470 ms    QTC Calculation (Bezet) 545 ms    Calculated P Axis 69 degrees    Calculated R Axis -17 degrees    Calculated T Axis 2 degrees    Diagnosis       Sinus rhythm with premature atrial complexes in a pattern of bigeminy  Cannot rule out Anterior infarct , age undetermined  Prolonged QT  Abnormal ECG  No previous ECGs available  Confirmed by Olvin Oscar MD, [facility] ([id number]) on 1/1/2023 5:24:29 PM     CBC WITH AUTOMATED DIFF    Collection Time: 01/01/23  3:59 PM   Result Value Ref Range    WBC 16.4 (H) 4.6 - 13.2 K/uL    RBC 5.89 (H) 4.35 - 5.65 M/uL    HGB 19.2 (H) 13.0 - 16.0 g/dL    HCT 49.8 (H) 36.0 - 48.0 %    MCV 84.6 78.0 - 100.0 FL    MCH 32.6 24.0 - 34.0 PG    MCHC 38.6 (H) 31.0 - 37.0 g/dL    RDW 11.8 11.6 - 14.5 %    PLATELET 051 724 - 033 K/uL    MPV 9.4 9.2 - 11.8 FL    NRBC 0.0 0  WBC    ABSOLUTE NRBC 0.00 0.00 - 0.01 K/uL    NEUTROPHILS 86 (H) 40 - 73 %    LYMPHOCYTES 7 (L) 21 - 52 %    MONOCYTES 6 3 - 10 %    EOSINOPHILS 0 0 - 5 %    BASOPHILS 1 0 - 2 %    IMMATURE GRANULOCYTES 1 (H) 0.0 - 0.5 %    ABS. NEUTROPHILS 14.1 (H) 1.8 - 8.0 K/UL    ABS. LYMPHOCYTES 1.1 0.9 - 3.6 K/UL    ABS. MONOCYTES 1.0 0.05 - 1.2 K/UL    ABS. EOSINOPHILS 0.0 0.0 - 0.4 K/UL    ABS. BASOPHILS 0.1 0.0 - 0.1 K/UL    ABS. IMM.  GRANS. 0.1 (H) 0.00 - 0.04 K/UL    DF AUTOMATED     METABOLIC PANEL, BASIC    Collection Time: 01/01/23  3:59 PM   Result Value Ref Range    Sodium 127 (L) 136 - 145 mmol/L    Potassium 2.8 (LL) 3.5 - 5.5 mmol/L    Chloride 82 (L) 100 - 111 mmol/L    CO2 29 21 - 32 mmol/L    Anion gap 16 3.0 - 18 mmol/L    Glucose 213 (H) 74 - 99 mg/dL    BUN 26 (H) 7.0 - 18 MG/DL    Creatinine 1.68 (H) 0.6 - 1.3 MG/DL    BUN/Creatinine ratio 15 12 - 20      eGFR 44 (L) >60 ml/min/1.73m2    Calcium 8.6 8.5 - 10.1 MG/DL   TROPONIN-HIGH SENSITIVITY    Collection Time: 01/01/23  3:59 PM   Result Value Ref Range    Troponin-High Sensitivity 15 0 - 78 ng/L   HEPATIC FUNCTION PANEL    Collection Time: 01/01/23  3:59 PM   Result Value Ref Range    Protein, total 7.0 6.4 - 8.2 g/dL    Albumin 2.7 (L) 3.4 - 5.0 g/dL    Globulin 4.3 (H) 2.0 - 4.0 g/dL    A-G Ratio 0.6 (L) 0.8 - 1.7      Bilirubin, total 1.6 (H) 0.2 - 1.0 MG/DL    Bilirubin, direct 0.8 (H) 0.0 - 0.2 MG/DL    Alk. phosphatase 53 45 - 117 U/L    AST (SGOT) 8 (L) 10 - 38 U/L    ALT (SGPT) 13 (L) 16 - 61 U/L   MAGNESIUM    Collection Time: 01/01/23  3:59 PM   Result Value Ref Range    Magnesium 1.9 1.6 - 2.6 mg/dL   LIPASE    Collection Time: 01/01/23  3:59 PM   Result Value Ref Range    Lipase 75 73 - 393 U/L   POC LACTIC ACID    Collection Time: 01/01/23  5:50 PM   Result Value Ref Range    Lactic Acid (POC) 2.79 (HH) 0.40 - 2.00 mmol/L       Radiologic Studies -   CT ABD PELV W CONT   Final Result   1. Bowel perforation with potential sources a decompressed descending colon   focus with potential apple core lesion versus decompressed peristalsis and   bowel, cecum with mild wall thickening and a mildly wall thickened small bowel   loop in the right pelvis. Right abdomen/pelvis is considered a more likely   source given there is asymmetric mesenteric gas on the right. Small volume free   fluid. 2.  Gallbladder is dilated, possible but not convincing cholecystitis. 3.  Incompletely visualized right lower lobe lung density. Recommend follow-up   chest CT as soon as feasible but not emergently given patient's other pathology. 4.  Small sliding hiatal hernia. 5.  Prostatomegaly. XR CHEST PA LAT   Final Result      Large volume pneumoperitoneum suspicious for bowel perforation unless there has   been recent procedure to explain. Results discussed with Dr. Rachel Lowery on 1/1/2023 at 1624 hours. Medical Decision Making   I am the first provider for this patient. I reviewed the vital signs, available nursing notes, past medical history, past surgical history, family history and social history. Vital Signs-Reviewed the patient's vital signs.       EKG: Sinus rhythm 81, prolonged QTC, no STEMI, interpreted by me    Records Reviewed: Nursing Notes, Old Medical Records, Previous Radiology Studies, and Previous Laboratory Studies (Time of Review: 4:48 PM)    ED Course: Progress Notes, Reevaluation, and Consults: The patient was seen by Dr. Fauzia Mccoy and will be going directly to the operating room. The patient will be admitted after the surgery. Sepsis reevaluation is complete. Lashawn Hunter, DO 6:13 PM      Provider Notes (Medical Decision Making):   MDM  Number of Diagnoses or Management Options  Diagnosis management comments: Patient is a 79-year-old male with a history of anxiety, hypertension, diabetes, alcohol use, the presents emergency department with several weeks of progressive abdominal pain now with distention and peritoneal signs. The patient had an x-ray done and shows that he has pneumoperitoneum and labs are suggestive of acute kidney injury, hypokalemia, and intra-abdominal infection. The patient is now n.p.o. and the patient's been updated on the findings I discussed the case with Dr. Rolanda Pappas and would like a CT scan and is coming to the hospital to see the patient. We will start the patient on IV fluids, Zosyn, potassium, and the patient will be admitted for further care. Procedures    Critical Care Time: Critical Care Time:  The services I provided to this patient were to treat and/or prevent clinically significant deterioration that could result in the failure of one or more body systems and/or organ systems due to sepsis with a bowel perforation, hypokalemia, requiring emergent surgery.     Services included the following:  -reviewing nursing notes and old charts  -vital sign assessments  -direct patient care  -medication orders and management  -interpreting and reviewing diagnostic studies/labs  -re-evaluations  -documentation time    Aggregate critical care time was 62 minutes, which includes only time during which I was engaged in work directly related to the patient's care as described above, whether I was at bedside or elsewhere in the Emergency Department. It did not include time spent performing other reported procedures or the services of residents, students, nurses, or advance practice providers. Chapincito Lovelace DO 6:12 PM        Diagnosis     Clinical Impression:   1. Bowel perforation ([facility] [address]    2. Hypokalemia    3. Severe sepsis ([facility] [address]    4. Prolonged QT interval        Disposition: Admit to OR    Follow-up Information    None          Patient's Medications   Start Taking    No medications on file   Continue Taking    AMLODIPINE (NORVASC) 10 MG TABLET    Take 1 Tablet by mouth daily. 1 qd    ATENOLOL-CHLORTHALIDONE (TENORETIC) 50-25 MG PER TABLET    Take 1 Tablet by mouth daily. ATORVASTATIN (LIPITOR) 10 MG TABLET    Take 1 tablet by mouth daily    CLOBETASOL (TEMOVATE) 0.05 % OINTMENT    Apply  to affected area two (2) times a day. For psoriasis    LISINOPRIL (PRINIVIL, ZESTRIL) 10 MG TABLET    Take 1 tablet by mouth daily    POTASSIUM CHLORIDE (K-DUR, KLOR-CON) 20 MEQ TABLET    1 qd   These Medications have changed    No medications on file   Stop Taking    No medications on file     Disclaimer: Sections of this note are dictated using utilizing voice recognition software. Minor typographical errors may be present. If questions arise, please do not hesitate to contact me or call our department.

## 2023-01-01 NOTE — H&P
History & Physical    Date: 1/1/2023    Referring Physician: ER    Assessment:    Full code    Active Problems:    Perforated bowel ([address] (1/1/2023)    Alcohol abuse  DM  HTN  anxiety      Plan:   I have discussed the above findings with Mr. Blaine Chandra and personally reviewed his CT scan of the abdomen and pelvis demonstrating likely colon possible this is from cancer or obstructing benign lesion. His cologuard however was negative in October. He is acutely ill with free air. He has received 2 L LR while in the ED and started to receive potassium replacement as well as IV antibiotics. He is NPO. Plan for emergent laparotomy, possible bowel resection, possible colostomy or ileostomy surgery as indicated. The risks and benefits of the procedure were reviewed with the patient including infection, bleeding, need for repeat procedure, injury to surrounding structures. Questions were answered and consent was obtained. We will consult medicine for medical management. History of Present Illness: He is seen today with his girlfriend of 37 plus years who is beside. Mr. Gabrielle Sandoval is a 75y.o. year old male who presented with severe abdominal pain for the last 3 weeks increasing in intensity. He does have a history of alcohol abuse approximately 6 beers a day but states he quit for the last 1.5 months completely upon the advise from his primary care doctor. He has noticed pain mostly on the right side of his abdomen and change in caliber stool this week after taking laxative. No blood. He has never undergone colonoscopy but states he did a stool test several months ago and did not hear back about results. Records reviewed from 10/2022 reports negative Cologuard. No fevers or chills. He has not had a bowel movement in the last week. He has no appetite and has a sensation of stool like vomit in his mouth. Pain increasing with any oral intake. There is no family history of colon cancer.  He has never undergone abdominal surgery. History:  Past Medical History:   Diagnosis Date    Anxiety     Benign essential hypertension     DM type 2 (diabetes mellitus, type 2) ([facility]      History reviewed. No pertinent surgical history. Family History   Problem Relation Age of Onset    OSTEOARTHRITIS Father     No Known Problems Mother      Social History     Socioeconomic History    Marital status: SINGLE     Spouse name: Not on file    Number of children: Not on file    Years of education: Not on file    Highest education level: Not on file   Occupational History    Not on file   Tobacco Use    Smoking status: Former     Types: Cigarettes     Quit date: 1980     Years since quittin.0    Smokeless tobacco: Never   Vaping Use    Vaping Use: Never used   Substance and Sexual Activity    Alcohol use: Yes     Comment: heavy    Drug use: No    Sexual activity: Not on file   Other Topics Concern    Not on file   Social History Narrative    Not on file     Social Determinants of Health     Financial Resource Strain: Low Risk     Difficulty of Paying Living Expenses: Not hard at all   Food Insecurity: No Food Insecurity    Worried About Running Out of Food in the Last Year: Never true    Ran Out of Food in the Last Year: Never true   Transportation Needs: Not on file   Physical Activity: Not on file   Stress: Not on file   Social Connections: Not on file   Intimate Partner Violence: Not on file   Housing Stability: Not on file     No Known Allergies    Medications:  No current facility-administered medications on file prior to encounter. Current Outpatient Medications on File Prior to Encounter   Medication Sig Dispense Refill    clobetasoL (TEMOVATE) 0.05 % ointment Apply  to affected area two (2) times a day.  For psoriasis 15 g 5    atorvastatin (LIPITOR) 10 mg tablet Take 1 tablet by mouth daily 90 Tablet 3    lisinopriL (PRINIVIL, ZESTRIL) 10 mg tablet Take 1 tablet by mouth daily 90 Tablet 3    atenoloL-chlorthalidone (TENORETIC) 50-25 mg per tablet Take 1 Tablet by mouth daily. 90 Tablet 3    amLODIPine (NORVASC) 10 mg tablet Take 1 Tablet by mouth daily. 1 qd 90 Tablet 3    potassium chloride (K-DUR, KLOR-CON) 20 mEq tablet 1 qd 90 Tab 3       Review of Systems:  Review of Systems   Constitutional:  Positive for activity change, appetite change and fatigue. Negative for fever and unexpected weight change. Respiratory:  Negative for chest tightness and shortness of breath. Cardiovascular:  Negative for chest pain and palpitations. Gastrointestinal:  Positive for abdominal distention, abdominal pain, constipation, nausea and vomiting. Musculoskeletal:  Negative for arthralgias. Skin:  Negative for pallor, rash and wound. Hematological:  Negative for adenopathy. Does not bruise/bleed easily. Physical Exam:  Patient Vitals for the past 24 hrs:   BP Temp Pulse Resp SpO2 Height Weight   01/01/23 1507 117/85 98 °F (36.7 °C) 81 22 97 % 5' 9\" (1.753 m) 77.1 kg (170 lb)       Physical Exam  Constitutional:       Appearance: Normal appearance. He is ill-appearing. HENT:      Head: Normocephalic and atraumatic. Eyes:      Extraocular Movements: Extraocular movements intact. Conjunctiva/sclera: Conjunctivae normal.      Pupils: Pupils are equal, round, and reactive to light. Cardiovascular:      Rate and Rhythm: Normal rate. Pulmonary:      Effort: Pulmonary effort is normal.   Abdominal:      General: There is distension. Palpations: Abdomen is soft. There is no mass. Tenderness: There is abdominal tenderness. There is guarding. There is no rebound. Hernia: No hernia is present. Comments: Diffuse tenderness   Skin:     General: Skin is warm and dry. Neurological:      General: No focal deficit present. Mental Status: He is alert and oriented to person, place, and time. Mental status is at baseline.    Psychiatric:         Mood and Affect: Mood normal.         Behavior: Behavior normal. Thought Content:  Thought content normal.         Judgment: Judgment normal.       Laboratory Data:  Recent Results (from the past 2016 hour(s))   URINALYSIS W/ RFLX MICROSCOPIC    Collection Time: 01/01/23  3:20 PM   Result Value Ref Range    Color DARK YELLOW      Appearance CLEAR      Specific gravity 1.020 1.005 - 1.030      pH (UA) 5.5 5.0 - 8.0      Protein 30 (A) NEG mg/dL    Glucose Negative NEG mg/dL    Ketone 15 (A) NEG mg/dL    Bilirubin MODERATE (A) NEG      Blood Negative NEG      Urobilinogen 2.0 (H) 0.2 - 1.0 EU/dL    Nitrites Negative NEG      Leukocyte Esterase TRACE (A) NEG     URINE MICROSCOPIC ONLY    Collection Time: 01/01/23  3:20 PM   Result Value Ref Range    WBC 0 to 3 0 - 4 /hpf    RBC 0 to 3 0 - 5 /hpf    Epithelial cells FEW 0 - 5 /lpf    Hyaline cast 0 to 3 0 - 2 /lpf   EKG, 12 LEAD, INITIAL    Collection Time: 01/01/23  3:41 PM   Result Value Ref Range    Ventricular Rate 81 BPM    Atrial Rate 81 BPM    P-R Interval 160 ms    QRS Duration 100 ms    Q-T Interval 470 ms    QTC Calculation (Bezet) 545 ms    Calculated P Axis 69 degrees    Calculated R Axis -17 degrees    Calculated T Axis 2 degrees    Diagnosis       Sinus rhythm with premature atrial complexes in a pattern of bigeminy  Cannot rule out Anterior infarct , age undetermined  Prolonged QT  Abnormal ECG  No previous ECGs available  Confirmed by Robyn Reese MD, Brittny Sneed ([id number]) on 1/1/2023 5:24:29 PM     CBC WITH AUTOMATED DIFF    Collection Time: 01/01/23  3:59 PM   Result Value Ref Range    WBC 16.4 (H) 4.6 - 13.2 K/uL    RBC 5.89 (H) 4.35 - 5.65 M/uL    HGB 19.2 (H) 13.0 - 16.0 g/dL    HCT 49.8 (H) 36.0 - 48.0 %    MCV 84.6 78.0 - 100.0 FL    MCH 32.6 24.0 - 34.0 PG    MCHC 38.6 (H) 31.0 - 37.0 g/dL    RDW 11.8 11.6 - 14.5 %    PLATELET 163 290 - 468 K/uL    MPV 9.4 9.2 - 11.8 FL    NRBC 0.0 0  WBC    ABSOLUTE NRBC 0.00 0.00 - 0.01 K/uL    NEUTROPHILS 86 (H) 40 - 73 %    LYMPHOCYTES 7 (L) 21 - 52 %    MONOCYTES 6 3 - 10 % EOSINOPHILS 0 0 - 5 %    BASOPHILS 1 0 - 2 %    IMMATURE GRANULOCYTES 1 (H) 0.0 - 0.5 %    ABS. NEUTROPHILS 14.1 (H) 1.8 - 8.0 K/UL    ABS. LYMPHOCYTES 1.1 0.9 - 3.6 K/UL    ABS. MONOCYTES 1.0 0.05 - 1.2 K/UL    ABS. EOSINOPHILS 0.0 0.0 - 0.4 K/UL    ABS. BASOPHILS 0.1 0.0 - 0.1 K/UL    ABS. IMM. GRANS. 0.1 (H) 0.00 - 0.04 K/UL    DF AUTOMATED     METABOLIC PANEL, BASIC    Collection Time: 01/01/23  3:59 PM   Result Value Ref Range    Sodium 127 (L) 136 - 145 mmol/L    Potassium 2.8 (LL) 3.5 - 5.5 mmol/L    Chloride 82 (L) 100 - 111 mmol/L    CO2 29 21 - 32 mmol/L    Anion gap 16 3.0 - 18 mmol/L    Glucose 213 (H) 74 - 99 mg/dL    BUN 26 (H) 7.0 - 18 MG/DL    Creatinine 1.68 (H) 0.6 - 1.3 MG/DL    BUN/Creatinine ratio 15 12 - 20      eGFR 44 (L) >60 ml/min/1.73m2    Calcium 8.6 8.5 - 10.1 MG/DL   TROPONIN-HIGH SENSITIVITY    Collection Time: 01/01/23  3:59 PM   Result Value Ref Range    Troponin-High Sensitivity 15 0 - 78 ng/L   HEPATIC FUNCTION PANEL    Collection Time: 01/01/23  3:59 PM   Result Value Ref Range    Protein, total 7.0 6.4 - 8.2 g/dL    Albumin 2.7 (L) 3.4 - 5.0 g/dL    Globulin 4.3 (H) 2.0 - 4.0 g/dL    A-G Ratio 0.6 (L) 0.8 - 1.7      Bilirubin, total 1.6 (H) 0.2 - 1.0 MG/DL    Bilirubin, direct 0.8 (H) 0.0 - 0.2 MG/DL    Alk. phosphatase 53 45 - 117 U/L    AST (SGOT) 8 (L) 10 - 38 U/L    ALT (SGPT) 13 (L) 16 - 61 U/L   MAGNESIUM    Collection Time: 01/01/23  3:59 PM   Result Value Ref Range    Magnesium 1.9 1.6 - 2.6 mg/dL   LIPASE    Collection Time: 01/01/23  3:59 PM   Result Value Ref Range    Lipase 75 73 - 393 U/L   POC LACTIC ACID    Collection Time: 01/01/23  5:50 PM   Result Value Ref Range    Lactic Acid (POC) 2.79 (HH) 0.40 - 2.00 mmol/L       Diagnostic Studies:  CT ABDOMEN AND PELVIS WITH ENHANCEMENT     INDICATION: Pneumoperitoneum on chest radiograph and severe epigastric pain.      TECHNIQUE: Axial images obtained of the abdomen and pelvis following the  uneventful administration of nonionic intravenous contrast.  Coronal and  sagittal reformatted images of the abdomen and pelvis were obtained. All CT scans at this facility are performed using dose optimization technique as  appropriate to a performed exam, to include automated exposure control,  adjustment of the mA and/or kV according to patient size (including appropriate  matching first site-specific examinations), or use of iterative reconstruction  technique. COMPARISON: Same day chest radiograph. ABDOMEN FINDINGS:      Liver: Unremarkable. Spleen: There is a 2.9 cm cystic structure at the posterior spleen compatible  with benign etiology. Pancreas: Atrophic. Biliary: Gallbladder is 5.3 cm transverse diameter, mildly enlarged. Bowel: Small sliding hiatal hernia. There is a caliber change at the descending  colon. Unclear if there may be an apple core lesion measuring 3 cm or if this is  peristalsing decompressed bowel (coronal 47. The bowel appears denser than the  adjacent decompressed colon (axial 50). Tarry Breath in the upstream colon. Small  bowel loops are decompressed. Mild wall thickening at the cecum. No pneumatosis. Normal appendix. There is a small bowel loop in the right anterior pelvis with  mild wall thickening (67). Peritoneum/ Retroperitoneum: Large volume pneumoperitoneum, the majority of  which is in the nondependent abdomen, though there is also retroperitoneal and  mesenteric free air, the lateral is greatest in the right abdomen. There is  small volume of free fluid. Lymph Nodes: Unremarkable. Adrenal Glands: Unremarkable. Kidneys: Unremarkable. Vessels: Unremarkable for age. PELVIS FINDINGS:      Bladder/ Pelvic Organs: Prostate is 6.2 cm in transverse diameter. Lung Base: Trace right pleural effusion. There is an incompletely visualized at  least 1.6 x 1 cm density posterior medial right lower lobe (1). Bones: Lumbar facet hypertrophy and arthropathy. Degenerative disc disease. IMPRESSION  1. Bowel perforation with potential sources a decompressed descending colon  focus with potential apple core lesion versus decompressed peristalsis and  bowel, cecum with mild wall thickening and a mildly wall thickened small bowel  loop in the right pelvis. Right abdomen/pelvis is considered a more likely  source given there is asymmetric mesenteric gas on the right. Small volume free  fluid. 2.  Gallbladder is dilated, possible but not convincing cholecystitis. 3.  Incompletely visualized right lower lobe lung density. Recommend follow-up  chest CT as soon as feasible but not emergently given patient's other pathology. 4.  Small sliding hiatal hernia. 5.  Prostatomegaly.        Radha Pierre, [address]

## 2023-01-01 NOTE — PROGRESS NOTES
Pharmacy Note     Zosyn was ordered for the treatment of intra-abdominal infection. Per [facility] Policy, Zosyn 9.683UO IV q6h over 240 min will be changed to Zosyn 4.5gm IV x 1 over 30 minutes followed by Zosyn 3.375gm IV q8h over 240 minutes. Estimated Creatinine Clearance: Estimated Creatinine Clearance: 42.1 mL/min (A) (based on SCr of 1.68 mg/dL (H)). BMI:  Body mass index is 25.1 kg/m². Rationale for Adjustment:  [facility] B-Lactam extended infusion policy    Pharmacy will continue to monitor and adjust dose as necessary. Please call with any questions. Thank you,  Bertha Powell.  Rad Solis

## 2023-01-01 NOTE — ANESTHESIA PREPROCEDURE EVALUATION
Relevant Problems   No relevant active problems       Anesthetic History   No history of anesthetic complications            Review of Systems / Medical History  Patient summary reviewed and pertinent labs reviewed    Pulmonary  Within defined limits                 Neuro/Psych   Within defined limits           Cardiovascular    Hypertension: well controlled                Comments: Dehydration  Hypokalemia     GI/Hepatic/Renal  Within defined limits              Endo/Other    Diabetes: type 2         Other Findings              Physical Exam    Airway  Mallampati: III  TM Distance: 4 - 6 cm  Neck ROM: normal range of motion   Mouth opening: Normal     Cardiovascular               Dental         Pulmonary                 Abdominal  GI exam deferred       Other Findings            Anesthetic Plan    ASA: 3, emergent  Anesthesia type: general          Induction: Intravenous  Anesthetic plan and risks discussed with: Patient

## 2023-01-02 PROBLEM — E43 SEVERE PROTEIN-CALORIE MALNUTRITION (HCC): Status: ACTIVE | Noted: 2023-01-02

## 2023-01-02 LAB
ALBUMIN SERPL-MCNC: 1.6 G/DL (ref 3.4–5)
ALBUMIN/GLOB SERPL: 0.6 (ref 0.8–1.7)
ALP SERPL-CCNC: 33 U/L (ref 45–117)
ALT SERPL-CCNC: 9 U/L (ref 16–61)
ANION GAP SERPL CALC-SCNC: 15 MMOL/L (ref 3–18)
AST SERPL-CCNC: 9 U/L (ref 10–38)
BASOPHILS # BLD: 0 K/UL (ref 0–0.1)
BASOPHILS NFR BLD: 0 % (ref 0–2)
BILIRUB SERPL-MCNC: 2.3 MG/DL (ref 0.2–1)
BUN SERPL-MCNC: 25 MG/DL (ref 7–18)
BUN/CREAT SERPL: 21 (ref 12–20)
CA-I SERPL-SCNC: 0.99 MMOL/L (ref 1.15–1.33)
CALCIUM SERPL-MCNC: 6.9 MG/DL (ref 8.5–10.1)
CHLORIDE SERPL-SCNC: 91 MMOL/L (ref 100–111)
CO2 SERPL-SCNC: 25 MMOL/L (ref 21–32)
CREAT SERPL-MCNC: 1.2 MG/DL (ref 0.6–1.3)
DIFFERENTIAL METHOD BLD: ABNORMAL
EOSINOPHIL # BLD: 0 K/UL (ref 0–0.4)
EOSINOPHIL NFR BLD: 0 % (ref 0–5)
ERYTHROCYTE [DISTWIDTH] IN BLOOD BY AUTOMATED COUNT: 11.9 % (ref 11.6–14.5)
EST. AVERAGE GLUCOSE BLD GHB EST-MCNC: 146 MG/DL
GLOBULIN SER CALC-MCNC: 2.7 G/DL (ref 2–4)
GLUCOSE BLD STRIP.AUTO-MCNC: 167 MG/DL (ref 70–110)
GLUCOSE BLD STRIP.AUTO-MCNC: 168 MG/DL (ref 70–110)
GLUCOSE BLD STRIP.AUTO-MCNC: 170 MG/DL (ref 70–110)
GLUCOSE BLD STRIP.AUTO-MCNC: 174 MG/DL (ref 70–110)
GLUCOSE SERPL-MCNC: 182 MG/DL (ref 74–99)
HBA1C MFR BLD: 6.7 % (ref 4.2–5.6)
HCT VFR BLD AUTO: 41.8 % (ref 36–48)
HGB BLD-MCNC: 15.4 G/DL (ref 13–16)
IMM GRANULOCYTES # BLD AUTO: 0 K/UL (ref 0–0.04)
IMM GRANULOCYTES NFR BLD AUTO: 0 % (ref 0–0.5)
LACTATE SERPL-SCNC: 1.8 MMOL/L (ref 0.4–2)
LYMPHOCYTES # BLD: 1.3 K/UL (ref 0.9–3.6)
LYMPHOCYTES NFR BLD: 6 % (ref 21–52)
MAGNESIUM SERPL-MCNC: 1.4 MG/DL (ref 1.6–2.6)
MCH RBC QN AUTO: 31.4 PG (ref 24–34)
MCHC RBC AUTO-ENTMCNC: 36.8 G/DL (ref 31–37)
MCV RBC AUTO: 85.3 FL (ref 78–100)
METAMYELOCYTES NFR BLD MANUAL: 4 %
MONOCYTES # BLD: 0.4 K/UL (ref 0.05–1.2)
MONOCYTES NFR BLD: 2 % (ref 3–10)
NEUTS BAND NFR BLD MANUAL: 9 %
NEUTS SEG # BLD: 19.3 K/UL (ref 1.8–8)
NEUTS SEG NFR BLD: 79 % (ref 40–73)
NRBC # BLD: 0 K/UL (ref 0–0.01)
NRBC BLD-RTO: 0 PER 100 WBC
PLATELET # BLD AUTO: 221 K/UL (ref 135–420)
PLATELET COMMENTS,PCOM: ABNORMAL
PMV BLD AUTO: 9.5 FL (ref 9.2–11.8)
POTASSIUM SERPL-SCNC: 3.1 MMOL/L (ref 3.5–5.5)
PROCALCITONIN SERPL-MCNC: 7.33 NG/ML
PROT SERPL-MCNC: 4.3 G/DL (ref 6.4–8.2)
RBC # BLD AUTO: 4.9 M/UL (ref 4.35–5.65)
RBC MORPH BLD: ABNORMAL
SODIUM SERPL-SCNC: 131 MMOL/L (ref 136–145)
WBC # BLD AUTO: 21.9 K/UL (ref 4.6–13.2)
WBC MORPH BLD: ABNORMAL

## 2023-01-02 PROCEDURE — 74011000250 HC RX REV CODE- 250: Performed by: SURGERY

## 2023-01-02 PROCEDURE — 74011250636 HC RX REV CODE- 250/636: Performed by: EMERGENCY MEDICINE

## 2023-01-02 PROCEDURE — 83735 ASSAY OF MAGNESIUM: CPT

## 2023-01-02 PROCEDURE — 65270000029 HC RM PRIVATE

## 2023-01-02 PROCEDURE — 80053 COMPREHEN METABOLIC PANEL: CPT

## 2023-01-02 PROCEDURE — 74011250636 HC RX REV CODE- 250/636: Performed by: SURGERY

## 2023-01-02 PROCEDURE — 36415 COLL VENOUS BLD VENIPUNCTURE: CPT

## 2023-01-02 PROCEDURE — 99232 SBSQ HOSP IP/OBS MODERATE 35: CPT | Performed by: INTERNAL MEDICINE

## 2023-01-02 PROCEDURE — 85025 COMPLETE CBC W/AUTO DIFF WBC: CPT

## 2023-01-02 PROCEDURE — 83605 ASSAY OF LACTIC ACID: CPT

## 2023-01-02 PROCEDURE — 87040 BLOOD CULTURE FOR BACTERIA: CPT

## 2023-01-02 PROCEDURE — 74011636637 HC RX REV CODE- 636/637: Performed by: PHYSICIAN ASSISTANT

## 2023-01-02 PROCEDURE — 74011000258 HC RX REV CODE- 258: Performed by: EMERGENCY MEDICINE

## 2023-01-02 PROCEDURE — 82330 ASSAY OF CALCIUM: CPT

## 2023-01-02 PROCEDURE — 83036 HEMOGLOBIN GLYCOSYLATED A1C: CPT

## 2023-01-02 PROCEDURE — 74011000258 HC RX REV CODE- 258: Performed by: SURGERY

## 2023-01-02 PROCEDURE — 82962 GLUCOSE BLOOD TEST: CPT

## 2023-01-02 PROCEDURE — 77010033678 HC OXYGEN DAILY

## 2023-01-02 PROCEDURE — 74011250636 HC RX REV CODE- 250/636: Performed by: INTERNAL MEDICINE

## 2023-01-02 PROCEDURE — 99024 POSTOP FOLLOW-UP VISIT: CPT | Performed by: SURGERY

## 2023-01-02 PROCEDURE — 2709999900 HC NON-CHARGEABLE SUPPLY

## 2023-01-02 PROCEDURE — P9047 ALBUMIN (HUMAN), 25%, 50ML: HCPCS | Performed by: INTERNAL MEDICINE

## 2023-01-02 PROCEDURE — 74011250636 HC RX REV CODE- 250/636: Performed by: PHYSICIAN ASSISTANT

## 2023-01-02 PROCEDURE — 74011250637 HC RX REV CODE- 250/637: Performed by: SURGERY

## 2023-01-02 RX ORDER — NALOXONE HYDROCHLORIDE 0.4 MG/ML
0.4 INJECTION, SOLUTION INTRAMUSCULAR; INTRAVENOUS; SUBCUTANEOUS
Status: DISCONTINUED | OUTPATIENT
Start: 2023-01-02 | End: 2023-01-11 | Stop reason: HOSPADM

## 2023-01-02 RX ORDER — LEVOFLOXACIN 5 MG/ML
750 INJECTION, SOLUTION INTRAVENOUS EVERY 24 HOURS
Status: DISCONTINUED | OUTPATIENT
Start: 2023-01-02 | End: 2023-01-03

## 2023-01-02 RX ORDER — POTASSIUM CHLORIDE 7.45 MG/ML
10 INJECTION INTRAVENOUS
Status: COMPLETED | OUTPATIENT
Start: 2023-01-02 | End: 2023-01-02

## 2023-01-02 RX ORDER — MAGNESIUM SULFATE HEPTAHYDRATE 40 MG/ML
2 INJECTION, SOLUTION INTRAVENOUS ONCE
Status: COMPLETED | OUTPATIENT
Start: 2023-01-02 | End: 2023-01-02

## 2023-01-02 RX ORDER — ALBUMIN HUMAN 250 G/1000ML
25 SOLUTION INTRAVENOUS EVERY 6 HOURS
Status: COMPLETED | OUTPATIENT
Start: 2023-01-02 | End: 2023-01-02

## 2023-01-02 RX ORDER — CALCIUM GLUCONATE 20 MG/ML
1 INJECTION, SOLUTION INTRAVENOUS ONCE
Status: COMPLETED | OUTPATIENT
Start: 2023-01-02 | End: 2023-01-02

## 2023-01-02 RX ORDER — MAGNESIUM SULFATE 1 G/100ML
1 INJECTION INTRAVENOUS ONCE
Status: COMPLETED | OUTPATIENT
Start: 2023-01-02 | End: 2023-01-02

## 2023-01-02 RX ORDER — CALCIUM GLUCONATE 94 MG/ML
1 INJECTION, SOLUTION INTRAVENOUS ONCE
Status: DISCONTINUED | OUTPATIENT
Start: 2023-01-02 | End: 2023-01-02 | Stop reason: CLARIF

## 2023-01-02 RX ADMIN — CALCIUM GLUCONATE 1000 MG: 20 INJECTION, SOLUTION INTRAVENOUS at 12:32

## 2023-01-02 RX ADMIN — SODIUM CHLORIDE, SODIUM LACTATE, POTASSIUM CHLORIDE, CALCIUM CHLORIDE AND DEXTROSE MONOHYDRATE 125 ML/HR: 5; 600; 310; 30; 20 INJECTION, SOLUTION INTRAVENOUS at 01:35

## 2023-01-02 RX ADMIN — MAGNESIUM SULFATE HEPTAHYDRATE 1 G: 1 INJECTION, SOLUTION INTRAVENOUS at 16:28

## 2023-01-02 RX ADMIN — PIPERACILLIN SODIUM AND TAZOBACTAM SODIUM 3.38 G: 3; .375 INJECTION, POWDER, LYOPHILIZED, FOR SOLUTION INTRAVENOUS at 23:16

## 2023-01-02 RX ADMIN — POTASSIUM CHLORIDE 10 MEQ: 7.45 INJECTION INTRAVENOUS at 03:42

## 2023-01-02 RX ADMIN — POTASSIUM CHLORIDE 10 MEQ: 7.45 INJECTION INTRAVENOUS at 14:00

## 2023-01-02 RX ADMIN — PHENOL 1 SPRAY: 1.5 LIQUID ORAL at 02:15

## 2023-01-02 RX ADMIN — MORPHINE SULFATE 2 MG: 2 INJECTION, SOLUTION INTRAMUSCULAR; INTRAVENOUS at 02:15

## 2023-01-02 RX ADMIN — HEPARIN SODIUM 5000 UNITS: 5000 INJECTION INTRAVENOUS; SUBCUTANEOUS at 12:37

## 2023-01-02 RX ADMIN — MORPHINE SULFATE 2 MG: 2 INJECTION, SOLUTION INTRAMUSCULAR; INTRAVENOUS at 12:48

## 2023-01-02 RX ADMIN — POTASSIUM CHLORIDE 10 MEQ: 7.45 INJECTION INTRAVENOUS at 06:25

## 2023-01-02 RX ADMIN — THIAMINE HYDROCHLORIDE 200 MG: 100 INJECTION, SOLUTION INTRAMUSCULAR; INTRAVENOUS at 17:00

## 2023-01-02 RX ADMIN — POTASSIUM CHLORIDE 10 MEQ: 7.45 INJECTION INTRAVENOUS at 13:00

## 2023-01-02 RX ADMIN — ALBUMIN (HUMAN) 25 G: 0.25 INJECTION, SOLUTION INTRAVENOUS at 12:40

## 2023-01-02 RX ADMIN — Medication 2 UNITS: at 14:51

## 2023-01-02 RX ADMIN — LEVOFLOXACIN 750 MG: 5 INJECTION, SOLUTION INTRAVENOUS at 13:37

## 2023-01-02 RX ADMIN — SODIUM CHLORIDE 500 ML: 9 INJECTION, SOLUTION INTRAVENOUS at 04:17

## 2023-01-02 RX ADMIN — SODIUM CHLORIDE, SODIUM LACTATE, POTASSIUM CHLORIDE, CALCIUM CHLORIDE AND DEXTROSE MONOHYDRATE 125 ML/HR: 5; 600; 310; 30; 20 INJECTION, SOLUTION INTRAVENOUS at 20:26

## 2023-01-02 RX ADMIN — HEPARIN SODIUM 5000 UNITS: 5000 INJECTION INTRAVENOUS; SUBCUTANEOUS at 03:43

## 2023-01-02 RX ADMIN — POTASSIUM CHLORIDE 10 MEQ: 7.45 INJECTION INTRAVENOUS at 12:00

## 2023-01-02 RX ADMIN — ALBUMIN (HUMAN) 25 G: 0.25 INJECTION, SOLUTION INTRAVENOUS at 17:33

## 2023-01-02 RX ADMIN — PHENOL 1 SPRAY: 1.5 LIQUID ORAL at 06:22

## 2023-01-02 RX ADMIN — POTASSIUM CHLORIDE 10 MEQ: 7.45 INJECTION INTRAVENOUS at 04:53

## 2023-01-02 RX ADMIN — PIPERACILLIN SODIUM AND TAZOBACTAM SODIUM 3.38 G: 3; .375 INJECTION, POWDER, LYOPHILIZED, FOR SOLUTION INTRAVENOUS at 17:41

## 2023-01-02 RX ADMIN — HEPARIN SODIUM 5000 UNITS: 5000 INJECTION INTRAVENOUS; SUBCUTANEOUS at 20:27

## 2023-01-02 RX ADMIN — POTASSIUM CHLORIDE 10 MEQ: 7.45 INJECTION INTRAVENOUS at 11:00

## 2023-01-02 RX ADMIN — FOLIC ACID: 5 INJECTION, SOLUTION INTRAMUSCULAR; INTRAVENOUS; SUBCUTANEOUS at 16:31

## 2023-01-02 RX ADMIN — MORPHINE SULFATE 2 MG: 2 INJECTION, SOLUTION INTRAMUSCULAR; INTRAVENOUS at 20:29

## 2023-01-02 RX ADMIN — ALBUMIN (HUMAN) 25 G: 0.25 INJECTION, SOLUTION INTRAVENOUS at 23:16

## 2023-01-02 RX ADMIN — MAGNESIUM SULFATE HEPTAHYDRATE 2 G: 40 INJECTION, SOLUTION INTRAVENOUS at 04:48

## 2023-01-02 RX ADMIN — PIPERACILLIN SODIUM AND TAZOBACTAM SODIUM 3.38 G: 3; .375 INJECTION, POWDER, LYOPHILIZED, FOR SOLUTION INTRAVENOUS at 07:15

## 2023-01-02 RX ADMIN — POTASSIUM CHLORIDE 10 MEQ: 7.45 INJECTION INTRAVENOUS at 01:04

## 2023-01-02 RX ADMIN — MAGNESIUM SULFATE HEPTAHYDRATE 2 G: 40 INJECTION, SOLUTION INTRAVENOUS at 14:46

## 2023-01-02 NOTE — PROGRESS NOTES
Problem: Diabetes Self-Management  Goal: *Disease process and treatment process  Description: Define diabetes and identify own type of diabetes; list 3 options for treating diabetes. Outcome: Progressing Towards Goal  Goal: *Incorporating nutritional management into lifestyle  Description: Describe effect of type, amount and timing of food on blood glucose; list 3 methods for planning meals. Outcome: Progressing Towards Goal  Goal: *Incorporating physical activity into lifestyle  Description: State effect of exercise on blood glucose levels. Outcome: Progressing Towards Goal  Goal: *Developing strategies to promote health/change behavior  Description: Define the ABC's of diabetes; identify appropriate screenings, schedule and personal plan for screenings. Outcome: Progressing Towards Goal  Goal: *Using medications safely  Description: State effect of diabetes medications on diabetes; name diabetes medication taking, action and side effects. Outcome: Progressing Towards Goal  Goal: *Monitoring blood glucose, interpreting and using results  Description: Identify recommended blood glucose targets  and personal targets. Outcome: Progressing Towards Goal  Goal: *Prevention, detection, treatment of acute complications  Description: List symptoms of hyper- and hypoglycemia; describe how to treat low blood sugar and actions for lowering  high blood glucose level. Outcome: Progressing Towards Goal  Goal: *Prevention, detection and treatment of chronic complications  Description: Define the natural course of diabetes and describe the relationship of blood glucose levels to long term complications of diabetes.   Outcome: Progressing Towards Goal  Goal: *Developing strategies to address psychosocial issues  Description: Describe feelings about living with diabetes; identify support needed and support network  Outcome: Progressing Towards Goal  Goal: *Insulin pump training  Outcome: Progressing Towards Goal  Goal: *Sick day guidelines  Outcome: Progressing Towards Goal  Goal: *Patient Specific Goal (EDIT GOAL, INSERT TEXT)  Outcome: Progressing Towards Goal     Problem: Patient Education: Go to Patient Education Activity  Goal: Patient/Family Education  Outcome: Progressing Towards Goal     Problem: Falls - Risk of  Goal: *Absence of Falls  Description: Document Preston Blades Fall Risk and appropriate interventions in the flowsheet.   Outcome: Progressing Towards Goal  Note: Fall Risk Interventions:            Medication Interventions: Assess postural VS orthostatic hypotension, Patient to call before getting OOB, Teach patient to arise slowly    Elimination Interventions: Call light in reach              Problem: Patient Education: Go to Patient Education Activity  Goal: Patient/Family Education  Outcome: Progressing Towards Goal     Problem: Nutrition Deficit  Goal: *Optimize nutritional status  Outcome: Progressing Towards Goal

## 2023-01-02 NOTE — PROGRESS NOTES
[facility] Hospitalist Group  Progress Note    Patient: Shlomo Cline Age: 75 y.o. : 1955 MR#: [id number] SSN: [id number]  Date/Time: 2023     C/C: Abdominal pain      Subjective:   HPI : Patient with abdominal pain from visceral perforation now s/p surgery details below hospitalist issues team is consulted for his medical issues. Review of Systems:  positive responses in bold type   Constitutional: Negative for fever, chills, diaphoresis and unexpected weight change. HENT: Negative for ear pain, congestion, sore throat, rhinorrhea, drooling, trouble swallowing, neck pain and tinnitus. Eyes: Negative for photophobia, pain, redness and visual disturbance. Respiratory: negative for shortness of breath, cough, choking, chest tightness, wheezing or stridor. Cardiovascular: Negative for chest pain, palpitations and leg swelling. Gastrointestinal: Negative for nausea, vomiting, abdominal pain, diarrhea, constipation, blood in stool, abdominal distention and anal bleeding. Genitourinary: Negative for dysuria, urgency, frequency, hematuria, flank pain and difficulty urinating. Musculoskeletal: Negative for back pain and arthralgias. Skin: Negative for color change, rash and wound. Neurological: Negative for dizziness, seizures, syncope, speech difficulty, light-headedness or headaches. Hematological: Does not bruise/bleed easily. Psychiatric/Behavioral: Negative for suicidal ideas, hallucinations, behavioral problems, self-injury or agitation     Assessment/Plan:     1.  Leucocytosis -ID consulted, for double coverage added Levaquin continue Zosyn, get culture from abdominal drainage and  2 Hypokalemia  3 Hypomagnesemia   4 Hypocalcemia - follow ionized calcium , Calcium gluconate 1 amp   5 DM2   6 S/p Exploratory laparotomy, right hemicolectomy ( 2023 ) - intra operative finding : Perforated cecum x2 with gross feculent contamination right hemipelvis with numerous intraloop small bowel adhesions and fibrinous exudate. 7 Hypotension - improved , IV albumin   8 Hypoalbuminemia -IV albumin ordered, blood pressure was also low which is slowly improving. Patient is alert awake oriented no evidence of any encephalopathy, hypotension    Objective:       General:  Alert, cooperative, no acute distress   HEENT: No facial asymmetry, BLAKE [place], External ears - WNL    Cardiovascular: S1S2 - regular , No Murmur   Pulmonary: Equal expansion , No Use of accessory muscles , No Rales No Rhonchi    GI:  +BS in all four quadrants, soft, non-tender  Extremities:  No edema; 2+ dorsalis pedis pulses bilaterally  Neuro: Alert and oriented X 2.        DVT Prophylaxis:  []Lovenox  []Hep SQ  []SCDs  []Coumadin   []On Heparin gtt    [] Eliquis [] Xarelto     Vitals:         VS: Visit Vitals  /63   Pulse 74   Temp 98.1 °F (36.7 °C)   Resp 17   Ht 5' 9\" (1.753 m)   Wt 77.1 kg (170 lb)   SpO2 97%   BMI 25.10 kg/m²      Tmax/24hrs: Temp (24hrs), Av.9 °F (36.6 °C), Min:97.5 °F (36.4 °C), Max:98.1 °F (36.7 °C)        Medications:   Current Facility-Administered Medications   Medication Dose Route Frequency    sodium chloride (NS) flush 5-10 mL  5-10 mL IntraVENous PRN    piperacillin-tazobactam (ZOSYN) 3.375 g in 0.9% sodium chloride (MBP/ADV) 100 mL MBP  3.375 g IntraVENous Q8H    heparin (porcine) injection 5,000 Units  5,000 Units SubCUTAneous Q8H    phenol throat spray (CHLORASEPTIC) 1 Spray  1 Spray Oral PRN    ondansetron (ZOFRAN) injection 4 mg  4 mg IntraVENous Q4H PRN    morphine 4 mg/mL injection 4 mg  4 mg IntraVENous Q4H PRN    dextrose 5% lactated ringers infusion  125 mL/hr IntraVENous CONTINUOUS    insulin lispro (HUMALOG) injection   SubCUTAneous Q6H    glucose chewable tablet 16 g  4 Tablet Oral PRN    glucagon (GLUCAGEN) injection 1 mg  1 mg IntraMUSCular PRN    dextrose 10% infusion 0-250 mL  0-250 mL IntraVENous PRN    morphine injection 2 mg  2 mg IntraVENous Q2H PRN       Labs:    Recent Labs     01/02/23  0205 01/01/23  1559   WBC 21.9* 16.4*   HGB 15.4 19.2*   HCT 41.8 49.8*    313     Recent Labs     01/02/23  0205 01/01/23  1559   * 127*   K 3.1* 2.8*   CL 91* 82*   CO2 25 29   * 213*   BUN 25* 26*   CREA 1.20 1.68*   CA 6.9* 8.6   MG 1.4* 1.9   ALB 1.6* 2.7*   ALT 9* 13*         Time spent on direct patient care >30 mints     Complexity : High complex - due to multiple medical issues outlined above. CODE Status : Full code    Case discussed with:  [x]Patient  [] Family  [x]Nursing  []Case Management         Disclaimer: Sections of this note are dictated utilizing voice recognition software, which may have resulted in some phonetic based errors in grammar and contents. Even though attempts were made to correct all the mistakes, some may have been missed, and remained in the body of the document. If questions arise, please contact our department.     Signed By: Angely Rosales MD     January 2, 2023

## 2023-01-02 NOTE — ANESTHESIA POSTPROCEDURE EVALUATION
Procedure(s):  EXPLORATORY LAPAROTOMY/right hemicolectomy.     general    Anesthesia Post Evaluation      Multimodal analgesia: multimodal analgesia used between 6 hours prior to anesthesia start to PACU discharge  Patient location during evaluation: bedside  Patient participation: complete - patient participated  Level of consciousness: awake  Pain score: 3  Pain management: adequate  Airway patency: patent  Anesthetic complications: no  Cardiovascular status: stable  Respiratory status: acceptable  Hydration status: acceptable  Post anesthesia nausea and vomiting:  controlled  Final Post Anesthesia Temperature Assessment:  Normothermia (36.0-37.5 degrees C)      INITIAL Post-op Vital signs:   Vitals Value Taken Time   /74 01/01/23 2135   Temp 36.7 °C (98 °F) 01/01/23 2135   Pulse 71 01/01/23 2135   Resp 14 01/01/23 2135   SpO2 97 % 01/01/23 2135

## 2023-01-02 NOTE — PROGRESS NOTES
Comprehensive Nutrition Assessment    Type and Reason for Visit: Initial, Positive nutrition screen, NPO/clear liquid    Nutrition Recommendations/Plan:   Continue NPO status per MD, advance PO diet when medically feasible. Rec order folic acid and thiamine supplementation due to hx of ETOH and risk for refeeding. Monitor readiness for PO, weight, labs and plan of care during admission. Malnutrition Assessment:  Malnutrition Status:  Severe malnutrition (01/02/23 0935)    Context:  Acute illness     Findings of the 6 clinical characteristics of malnutrition:   Energy Intake:  50% or less of est energy requirements for 5 or more days  Weight Loss:  Greater than 5% over 1 month     Body Fat Loss:  No significant body fat loss,     Muscle Mass Loss:  Mild muscle mass loss, Temples (temporalis)  Fluid Accumulation:  No significant fluid accumulation,     Strength:  Not performed     Nutrition History and Allergies: PMHx: anxiety, HTN, diabetes, alcohol use (quit 1.5 months ago). Wt hx: 191 lb (7/6/22), 187 lb (11/7/22). Pt reports weighing ~181 lb x 1 month ago, lost a lot of wt, clothes fitting differently. Thinks he weighs ~170-175 lb now. Using EMR hx, wt loss of 9% x ~2 months (significant). Pt with family at bedside, states eating maybe 1 plate of food in the last 1-1.5 weeks. NKFA. Nutrition Assessment:    Admitted for abd pain that has been increasing the past 3 weeks; perforated bowel, s/p ex lap, right hemicolectomy 1/1. Positive nutrition screen noted, MST. +NGT to suction. Denies any n/v, abd pain currently. Pt with severe malnutrition, risk for refeeding, poor PO PTA. Per surgery 1/2: ok to d/c NGT and have sips of clears. Electrolyte replacement ordered.      Refeeding Risk Level: Significant Risk: Caloric intake: <50% of estimated energy requirement for >5 days during an acute illness or injury and Abnormal K, Phos, Mg - minimally low levels or normal current levels and recent low levels necessitating minimal or single-dose supplementation    Nutrition Related Findings:    Pertinent Meds: heparin, zoysn, 2g Mg sulf, zosyn, D5 LR @ 125 ml/hr (provides 150g dex, 510 kcal)  Pertinent Labs: POC Glucose 151-174 mg/dl x 24 hrs, Na 131 L, K 3.1 L, Mg 1.4 L (trending down); Hgb A1C 6.7 H Wound Type: Surgical incision    Current Nutrition Intake & Therapies:  Average Meal Intake: NPO     No diet orders on file    Anthropometric Measures:  Height: 5' 9\" (175.3 cm)  Ideal Body Weight (IBW): 160 lbs (73 kg)  Admission Body Weight: 169 lb 15.6 oz (77.1 kg)  Current Body Wt:  77.1 kg (169 lb 15.6 oz), 106.2 % IBW. Current BMI (kg/m2): 25.1    Estimated Daily Nutrient Needs:  Energy Requirements Based On: Formula  Weight Used for Energy Requirements: Admission  Energy (kcal/day): 0506-2115 (MSJ 1.2-1.3)  Weight Used for Protein Requirements: Current  Protein (g/day):  (1.2-1.4 g/day)  Method Used for Fluid Requirements: 1 ml/kcal  Fluid (ml/day): 8485-6989    Nutrition Diagnosis:   Severe malnutrition, In context of acute illness or injury related to acute injury/trauma, altered GI function as evidenced by Criteria as identified in malnutrition assessment  Altered GI function related to altered GI structure (2/2 perforated bowel) as evidenced by NPO or clear liquid status due to medical condition (s/p ex lap)    Nutrition Interventions:   Food and/or Nutrient Delivery: Continue NPO  Nutrition Education/Counseling: No recommendations at this time  Coordination of Nutrition Care: Continue to monitor while inpatient  Plan of Care discussed with: pt    Goals:     Goals: Meet at least 75% of estimated needs, by next RD assessment       Nutrition Monitoring and Evaluation:   Behavioral-Environmental Outcomes: None identified  Food/Nutrient Intake Outcomes: Diet advancement/tolerance  Physical Signs/Symptoms Outcomes: Biochemical data, GI status, Hemodynamic status, Weight    Discharge Planning:     Too soon to determine    Clemencia Denney Franko 87, [address], [address] [place] [address] Dr  Contact: [phone]

## 2023-01-02 NOTE — CONSULTS
[facility] Hospitalist Group  Consult Note        Consult Requested By: Dr. Amairani Moore, General Surgery    Subjective     HPI: Vinay Amador is a 75 y.o. male with a PMHx of anxiety, HTN, alcohol use (quit 1.5 months ago) who we were consulted for medical management while undergoing ex lap s/p perforated bowel. The patient has had worsening right sided abd pain for the past 3 weeks. He began taking laxatives and had one small caliber stool, but otherwise he has not had a bowel movement in 2 weeks. He reports associated decreased appetite. Pain is now 4-5/10 s/p surgery. He denies cp, sob, cough, fever. He states he has not been taking potassium replacement because the pill is too big. He denies history of DM. In the ED, VS wnl. Lactic 2.79. WBC 16.4, hgb 19.2, UA with ketones, Na 127, K 2.8, Cl 82, , BUN 26, Cr 1.68. CT abd/pelv with bowel perforation, possible apple core lesion vs decompressed peristalsis and bowel. PMHx:  Past Medical History:   Diagnosis Date    Anxiety     Benign essential hypertension     DM type 2 (diabetes mellitus, type 2) ([facility] [address]        PSurgHx:  History reviewed. No pertinent surgical history.   Denies abdominal surgery    SocialHx:  Social History     Socioeconomic History    Marital status: SINGLE     Spouse name: Not on file    Number of children: Not on file    Years of education: Not on file    Highest education level: Not on file   Occupational History    Not on file   Tobacco Use    Smoking status: Former     Types: Cigarettes     Quit date: 1980     Years since quittin.0    Smokeless tobacco: Never   Vaping Use    Vaping Use: Never used   Substance and Sexual Activity    Alcohol use: Yes     Comment: heavy    Drug use: No    Sexual activity: Not on file   Other Topics Concern    Not on file   Social History Narrative    Not on file     Social Determinants of Health     Financial Resource Strain: Low Risk     Difficulty of Paying Living Expenses: Not hard at all   Food Insecurity: No Food Insecurity    Worried About Running Out of Food in the Last Year: Never true    Ran Out of Food in the Last Year: Never true   Transportation Needs: Not on file   Physical Activity: Not on file   Stress: Not on file   Social Connections: Not on file   Intimate Partner Violence: Not on file   Housing Stability: Not on file       FamilyHx:  Family History   Problem Relation Age of Onset    OSTEOARTHRITIS Father     No Known Problems Mother      Prior to Admission Medications   Prescriptions Last Dose Informant Taking? amLODIPine (NORVASC) 10 mg tablet   No   Sig: Take 1 Tablet by mouth daily. 1 qd   atenoloL-chlorthalidone (TENORETIC) 50-25 mg per tablet   No   Sig: Take 1 Tablet by mouth daily. atorvastatin (LIPITOR) 10 mg tablet   No   Sig: Take 1 tablet by mouth daily   clobetasoL (TEMOVATE) 0.05 % ointment   No   Sig: Apply  to affected area two (2) times a day. For psoriasis   potassium chloride (K-DUR, KLOR-CON) 20 mEq tablet   No   Si qd      Facility-Administered Medications: None         Review of Systems:  Constitutional:  Denies fever, chills or weight loss  Eyes: Denies vision loss or changes, denies pain  Ears, Nose, Mouth, Throat: Denies hearing loss, denies sore throat  Cardiovascular:  Denies chest pain or diaphoresis  Respiratory:  Denies coughing, wheezing, or shortness of breath. Gastrointestinal:  Denies nausea or vomitting. Denies diarrhea + abd pain, + constipation  Genitourinary:  Denies hematuria or dysuria  Musculoskeletal: Denies back pain or muscle/joint pain  Skin:  Denies rashes or moles  Neuro:  Denies dizziness, numbness, or tingling      Objective      Visit Vitals  /72 (BP 1 Location: Right upper arm, BP Patient Position: Semi fowlers; At rest)   Pulse 75   Temp 97.6 °F (36.4 °C)   Resp 19   Ht 5' 9\" (1.753 m)   Wt 77.1 kg (170 lb)   SpO2 97%   BMI 25.10 kg/m²       Physical Exam:  General Appearance: NAD, conversant  HENT: normocephalic/atraumatic, moist mucus membranes, NGT in place  Lungs: CTA with normal respiratory effort  CV: RRR, no m/r/g  Abdomen: soft, incisional TTP  Extremities: no cyanosis, no peripheral edema  Neuro: moves all extremities, no focal deficits  Psych: appropriate affect, alert and oriented to person, place and time    Laboratory Studies:  Recent Results (from the past 24 hour(s))   URINALYSIS W/ RFLX MICROSCOPIC    Collection Time: 01/01/23  3:20 PM   Result Value Ref Range    Color DARK YELLOW      Appearance CLEAR      Specific gravity 1.020 1.005 - 1.030      pH (UA) 5.5 5.0 - 8.0      Protein 30 (A) NEG mg/dL    Glucose Negative NEG mg/dL    Ketone 15 (A) NEG mg/dL    Bilirubin MODERATE (A) NEG      Blood Negative NEG      Urobilinogen 2.0 (H) 0.2 - 1.0 EU/dL    Nitrites Negative NEG      Leukocyte Esterase TRACE (A) NEG     URINE MICROSCOPIC ONLY    Collection Time: 01/01/23  3:20 PM   Result Value Ref Range    WBC 0 to 3 0 - 4 /hpf    RBC 0 to 3 0 - 5 /hpf    Epithelial cells FEW 0 - 5 /lpf    Hyaline cast 0 to 3 0 - 2 /lpf   EKG, 12 LEAD, INITIAL    Collection Time: 01/01/23  3:41 PM   Result Value Ref Range    Ventricular Rate 81 BPM    Atrial Rate 81 BPM    P-R Interval 160 ms    QRS Duration 100 ms    Q-T Interval 470 ms    QTC Calculation (Bezet) 545 ms    Calculated P Axis 69 degrees    Calculated R Axis -17 degrees    Calculated T Axis 2 degrees    Diagnosis       Sinus rhythm with premature atrial complexes in a pattern of bigeminy  Cannot rule out Anterior infarct , age undetermined  Prolonged QT  Abnormal ECG  No previous ECGs available  Confirmed by Faviola Nixon MD, Pooja Vargas ([id number]) on 1/1/2023 5:24:29 PM     CBC WITH AUTOMATED DIFF    Collection Time: 01/01/23  3:59 PM   Result Value Ref Range    WBC 16.4 (H) 4.6 - 13.2 K/uL    RBC 5.89 (H) 4.35 - 5.65 M/uL    HGB 19.2 (H) 13.0 - 16.0 g/dL    HCT 49.8 (H) 36.0 - 48.0 %    MCV 84.6 78.0 - 100.0 FL    MCH 32.6 24.0 - 34.0 PG    MCHC 38.6 (H) 31.0 - 37.0 g/dL    RDW 11.8 11.6 - 14.5 %    PLATELET 903 225 - 478 K/uL    MPV 9.4 9.2 - 11.8 FL    NRBC 0.0 0  WBC    ABSOLUTE NRBC 0.00 0.00 - 0.01 K/uL    NEUTROPHILS 86 (H) 40 - 73 %    LYMPHOCYTES 7 (L) 21 - 52 %    MONOCYTES 6 3 - 10 %    EOSINOPHILS 0 0 - 5 %    BASOPHILS 1 0 - 2 %    IMMATURE GRANULOCYTES 1 (H) 0.0 - 0.5 %    ABS. NEUTROPHILS 14.1 (H) 1.8 - 8.0 K/UL    ABS. LYMPHOCYTES 1.1 0.9 - 3.6 K/UL    ABS. MONOCYTES 1.0 0.05 - 1.2 K/UL    ABS. EOSINOPHILS 0.0 0.0 - 0.4 K/UL    ABS. BASOPHILS 0.1 0.0 - 0.1 K/UL    ABS. IMM. GRANS. 0.1 (H) 0.00 - 0.04 K/UL    DF AUTOMATED     METABOLIC PANEL, BASIC    Collection Time: 01/01/23  3:59 PM   Result Value Ref Range    Sodium 127 (L) 136 - 145 mmol/L    Potassium 2.8 (LL) 3.5 - 5.5 mmol/L    Chloride 82 (L) 100 - 111 mmol/L    CO2 29 21 - 32 mmol/L    Anion gap 16 3.0 - 18 mmol/L    Glucose 213 (H) 74 - 99 mg/dL    BUN 26 (H) 7.0 - 18 MG/DL    Creatinine 1.68 (H) 0.6 - 1.3 MG/DL    BUN/Creatinine ratio 15 12 - 20      eGFR 44 (L) >60 ml/min/1.73m2    Calcium 8.6 8.5 - 10.1 MG/DL   TROPONIN-HIGH SENSITIVITY    Collection Time: 01/01/23  3:59 PM   Result Value Ref Range    Troponin-High Sensitivity 15 0 - 78 ng/L   HEPATIC FUNCTION PANEL    Collection Time: 01/01/23  3:59 PM   Result Value Ref Range    Protein, total 7.0 6.4 - 8.2 g/dL    Albumin 2.7 (L) 3.4 - 5.0 g/dL    Globulin 4.3 (H) 2.0 - 4.0 g/dL    A-G Ratio 0.6 (L) 0.8 - 1.7      Bilirubin, total 1.6 (H) 0.2 - 1.0 MG/DL    Bilirubin, direct 0.8 (H) 0.0 - 0.2 MG/DL    Alk.  phosphatase 53 45 - 117 U/L    AST (SGOT) 8 (L) 10 - 38 U/L    ALT (SGPT) 13 (L) 16 - 61 U/L   MAGNESIUM    Collection Time: 01/01/23  3:59 PM   Result Value Ref Range    Magnesium 1.9 1.6 - 2.6 mg/dL   LIPASE    Collection Time: 01/01/23  3:59 PM   Result Value Ref Range    Lipase 75 73 - 393 U/L   POC LACTIC ACID    Collection Time: 01/01/23  5:50 PM   Result Value Ref Range    Lactic Acid (POC) 2.79 (HH) 0.40 - 2.00 mmol/L   COVID-19 RAPID TEST    Collection Time: 01/01/23  6:25 PM   Result Value Ref Range    Specimen source Nasopharyngeal      COVID-19 rapid test Not detected NOTD     GLUCOSE, POC    Collection Time: 01/01/23  9:29 PM   Result Value Ref Range    Glucose (POC) 151 (H) 70 - 110 mg/dL   LACTIC ACID    Collection Time: 01/01/23  9:39 PM   Result Value Ref Range    Lactic acid 2.8 (HH) 0.4 - 2.0 MMOL/L       Imaging Reviewed:  XR CHEST PA LAT    Result Date: 1/1/2023  TWO VIEW CHEST RADIOGRAPH CPT CODE: 39792 INDICATION: Epigastric pain, severe. COMPARISON: None FINDINGS: Cardiomediastinal silhouette and pulmonary vasculature are normal. Mild atelectasis left lung base. No focal opacities otherwise, no pleural effusion or pneumothorax. There is a large volume of pneumoperitoneum under the bilateral diaphragms. Large volume pneumoperitoneum suspicious for bowel perforation unless there has been recent procedure to explain. Results discussed with Dr. Cezar Eddy on 1/1/2023 at 1624 hours. CT ABD PELV W CONT    Result Date: 1/1/2023  CT ABDOMEN AND PELVIS WITH ENHANCEMENT INDICATION: Pneumoperitoneum on chest radiograph and severe epigastric pain. TECHNIQUE: Axial images obtained of the abdomen and pelvis following the uneventful administration of nonionic intravenous contrast.  Coronal and sagittal reformatted images of the abdomen and pelvis were obtained. All CT scans at this facility are performed using dose optimization technique as appropriate to a performed exam, to include automated exposure control, adjustment of the mA and/or kV according to patient size (including appropriate matching first site-specific examinations), or use of iterative reconstruction technique. COMPARISON: Same day chest radiograph. ABDOMEN FINDINGS: Liver: Unremarkable. Spleen: There is a 2.9 cm cystic structure at the posterior spleen compatible with benign etiology. Pancreas: Atrophic.  Biliary: Gallbladder is 5.3 cm transverse diameter, mildly enlarged. Bowel: Small sliding hiatal hernia. There is a caliber change at the descending colon. Unclear if there may be an apple core lesion measuring 3 cm or if this is peristalsing decompressed bowel (coronal 47. The bowel appears denser than the adjacent decompressed colon (axial 50). Marcille Baas in the upstream colon. Small bowel loops are decompressed. Mild wall thickening at the cecum. No pneumatosis. Normal appendix. There is a small bowel loop in the right anterior pelvis with mild wall thickening (67). Peritoneum/ Retroperitoneum: Large volume pneumoperitoneum, the majority of which is in the nondependent abdomen, though there is also retroperitoneal and mesenteric free air, the lateral is greatest in the right abdomen. There is small volume of free fluid. Lymph Nodes: Unremarkable. Adrenal Glands: Unremarkable. Kidneys: Unremarkable. Vessels: Unremarkable for age. PELVIS FINDINGS: Bladder/ Pelvic Organs: Prostate is 6.2 cm in transverse diameter. Lung Base: Trace right pleural effusion. There is an incompletely visualized at least 1.6 x 1 cm density posterior medial right lower lobe (1). Bones: Lumbar facet hypertrophy and arthropathy. Degenerative disc disease. 1.  Bowel perforation with potential sources a decompressed descending colon focus with potential apple core lesion versus decompressed peristalsis and bowel, cecum with mild wall thickening and a mildly wall thickened small bowel loop in the right pelvis. Right abdomen/pelvis is considered a more likely source given there is asymmetric mesenteric gas on the right. Small volume free fluid. 2.  Gallbladder is dilated, possible but not convincing cholecystitis. 3.  Incompletely visualized right lower lobe lung density. Recommend follow-up chest CT as soon as feasible but not emergently given patient's other pathology. 4.  Small sliding hiatal hernia. 5.  Prostatomegaly.         Assessment/Plan     Hospital Problems Date Reviewed: 1/6/2021            Codes Class Noted POA    * (Principal) Perforated bowel ([facility] [address] ICD-10-CM: K63.1  ICD-9-CM: 569.83  1/1/2023 Yes        Hypokalemia ICD-10-CM: E87.6  ICD-9-CM: 276.8  1/1/2023 Yes        Hyponatremia ICD-10-CM: E87.1  ICD-9-CM: 276.1  1/1/2023 Yes        Lactic acidosis ICD-10-CM: E87.20  ICD-9-CM: 276.2  1/1/2023 Yes        Leukocytosis ICD-10-CM: M59.103  ICD-9-CM: 288.60  1/1/2023 Yes        Hyperglycemia ICD-10-CM: R73.9  ICD-9-CM: 790.29  1/1/2023 Yes        Benign essential hypertension ICD-10-CM: I10  ICD-9-CM: 401.1  Unknown Yes         Cecal perforation: no obvious obstructing lesion seen during surgery and cologuard negative 10/2022  - mgmt, diet per GS  - PRN pain control  - IV abx    Hypokalemia: likely 2/2 potassium supplement noncompliance and diuretic use  - stat BMP now, monitor daily. - q1 hour replacement x6 and recheck AM labs    Hyponatremia, prerenal azotemia: suspect hypovolemia/dehydration as well as diuretic use  - change fluids to avoid overcorrection  - stat BMP now and monitor daily    Hyperglycemia: patient denies history of DM. Not on any home medications for DM  - ssi, monitor q6  - check a1c    Leukocytosis: possibly 2/2 intra-abdominal infection vs hemoconcentration  - add on procal  - abx per GS  - monitor CBC    HTN  - hold home medications for now as BP stable      DVT ppx: heparin subQ    I reviewed all available labs and imaging that were available prior to my encounter. Time spent reviewing records, independently interpreting results, obtaining history from patient or caregiver, performing physical exam, ordering tests and medications, communicating with specialists, documenting in the chart, and coordinating overall care is 65 minutes      We appreciate the consultation for medical management and appreciate being able to be involved with their care during hospitalization.       Jahaira Olivo PA-C  [facility]  Office:  [phone]  Pager: [phone]

## 2023-01-02 NOTE — OP NOTES
Exploratory laparotomy, right hemicolectomy     Patient Name: Helen Sinnin23     : 1955     AGE: 75 y.o. Anesthesiologist: Anesthesiologist: Allan Malave MD  CRNA: Hunter Bradford CRNA     Surgeon: Kyra Henderson DO    Anesthesia: General     Surgical assist: Circ-1: Misbah Soto RN  Circ-Relief: Anastacia Sinha RN  Scrub Tech-1: Everett Raring E  Scrub Tech-Relief: [place] Alisha  Surg Asst-1: Dean Mock    PreOp DX: Pneumoperitoneum     PostOp DX: Perforated cecum x2 with gross feculent contamination right hemipelvis with numerous intraloop small bowel adhesions and fibrinous exudate    Procedure: exploratory laparotomy, right hemicolectomy    Procedure Details: After informed consent was obtained the patient was taken to the operating room and placed in the supine position. General anesthesia was administered by the anesthetist to titrate to effect. The abdomen was prepped and draped in the usual sterile fashion and a timeout procedure was performed. Next a 10 blade scalpel was used to make a midline laparotomy incision. Bovie was used to carry this down to fascia and peritoneum was bluntly entered and a large volume of free air escaped the abdomen and it immediately desufflated. There was gross feculent contamination in the right hemipelvis noted from the anterior and medial portion of the cecum which was significantly dilated. This area was contained and covered with loops of  small bowel that were covered in fibrinous exudate. Once this bowel was carefully peeled away solange stool was seen coming from the cecal perforations noted. In order to gain control of contamination we fired a 75 linear blue ELLEN device across the right colon and then a second load at the terminal ileum. The ligasure device was used to transect the mesentery. This was sent as specimen- terminimal ileum and cecum.  We then irrigated the abdomen with several liters of saline solution until it ran clear. NGT was placed by anesthesia and positioning was checked. Liver was normal. Stomach was normal. Transverse colon, descending colon and sigmoid colon were palpable and appeared grossly normal. The liver was normal. The small bowel was run from ligament of treitz to the transected ileum and some fibrinous exudate were noted throughout the loops of small bowel and a wide neck Meckels diverticulum was noted that was palpably soft. Because there was no obvious obstructing lesion visible and cologuard was negative and the remaining bowel appeared viable we elected to re-anastomsis the terminal ileum to the colon in the usual side to side fashion with 75 linear load. There was some leakage of stool noted at the staple line so this was resected completely and sent for specimen. We then transected the remaining portion of the right colon to mid transverse colon to facilitate better anastomosis. This was done with 75 linear ELLEN device is the standard side to side fashion and oversewn with 3-0 silk. The staple line appeared in tact with no leakage. Instrument count and laparotomy count were correct. The abdomen was then irrigated with several liters of saline solution. A 10 flat GEO drain was placed over the anastomosis and a second one in the pelvis. The fascia was then closed with 0 looped PDS in the usual fashion x2 and skin was loosely closed with stapler. Sterile dressing was applied. The patient was then extubated and sent to recovery in stable condition.      Implants: * No implants in log *    Estimated Blood Loss:  50cc    Specimens:   ID Type Source Tests Collected by Time Destination   1 : TRANSVERSE COLON SUTURE FERGUSON  PROXIMAL ASPECT Preservative Colon, Transverse  Tyshawn Na, DO 1/1/2023 1949 Pathology   2 : TERMINAL ILEUM AND CECUM AND OMENTUM Preservative Colon, Terminal ilium  Tyshawn Na, DO 1/1/2023 1949 Pathology   3 : ILLEOCOLIC ANASTAMOSIS Preservative Colon Marylou Cevallos,  1/1/2023 2004 Pathology                Complications: None           Disposition: extubated, tolerated procedure well            Condition: Stable    Ruthy Gails, DO

## 2023-01-02 NOTE — PROGRESS NOTES
INTERIM UPDATE - 0411 EST on 1/02/2023    Nursing Staff calls to report a marginal Blood Pressure of 91/57 mm Hg (MAP 68). No apparent history of ESRD on HD, CHF, or Cirrhosis in Chart. Lactic Acid noted to be (-) this AM.    Plan:  Ordered  mL bolus. Instructed Nursing Staff to call back if MAP <65 after bolus finishes.

## 2023-01-02 NOTE — PROGRESS NOTES
2045 Received  SBAR from PACU nurse. Writer then received a return call  from same nurse that patient lactic was elevated and new orders was received for a bolus of NS 1 L  orders would put order in. Writer seen order but its now D/C.    0345: Writer was notified by Nan Eller in lab that patient HGB was 19.2 and drop to 15.4 since  yesterday. which is not critical will updated receiving nurse with SBAR.    04:11 Writer updated provider of patient status which included BP ranges after pain medication administered. Received new orders see MAR patient is not experience no signs of distress at this time. Will updated receiving nurse to monitor BP closely and notified provider, If MAP is lower than 65,see orders. 5530: Writer updated receiving nurse with SBAR.

## 2023-01-02 NOTE — PERIOP NOTES
Pt with point of care blood glucose 151. Reported to CRNATony.  Holding insulin coverage due to NPO status per CRNA

## 2023-01-02 NOTE — PROGRESS NOTES
POD# 1    Admit Date: 1/1/2023    Assessment    Mary Medley is a 75 y.o. male POD 1 s/p exploratory laparotomy, right hemicolectomy    Patient Active Problem List   Diagnosis Code    Anxiety F41. 9    Benign essential hypertension I10    Perforated bowel (HCC) K63.1    Hypokalemia E87.6    Hyponatremia E87.1    Lactic acidosis E87.20    Leukocytosis D72.829    Hyperglycemia R73.9       Plan  -NGT is scant and patient feeling well requesting liquids. Ok to d/c NGT and have sips of clears  -UO good, ok to d/c rosales  -continue IVF  -electrolyte replacement going, repeat labs in am  -continue zosyn for gross intraabdominal contamination  -we discussed at length this morning that I did not see any cause for the perforation intraoperatively downstream from the right colon and that although the cologuard was negative we still need to rule out distal obstruction to ensure no further surgery is needed this admission. Recommend CT scan with rectal contrast on day 3 if patient continues to do well. He agrees with this plan    Subjective    Overnight events: Overnight some low blood pressure but patient asymptomatic. Did receive bolus fluid with good urine output. He states he feels very well this morning with dramatic improvement in pain since yesterday. His only complaint is the NGT and sore throat. He also would like to drink some broth and water. Objective    Physical Exam:  Visit Vitals  /63   Pulse 74   Temp 98.1 °F (36.7 °C)   Resp 17   Ht 5' 9\" (1.753 m)   Wt 77.1 kg (170 lb)   SpO2 97%   BMI 25.10 kg/m²       Intake/Output Summary (Last 24 hours) at 1/2/2023 0930  Last data filed at 1/2/2023 7563  Gross per 24 hour   Intake 4200 ml   Output 1495 ml   Net 2705 ml     Physical Exam  Constitutional:       Appearance: Normal appearance. He is normal weight. HENT:      Head: Normocephalic and atraumatic. Cardiovascular:      Rate and Rhythm: Normal rate.    Pulmonary:      Effort: Pulmonary effort is normal. Abdominal:      General: There is no distension. Palpations: Abdomen is soft. Tenderness: There is abdominal tenderness. There is no guarding. Comments: Appropriate incisional tenderness, dressing clean, dry, intact   GEO 1 & 2 both irrigation   Neurological:      General: No focal deficit present. Mental Status: He is alert and oriented to person, place, and time. Mental status is at baseline. Psychiatric:         Mood and Affect: Mood normal.         Behavior: Behavior normal.         Thought Content:  Thought content normal.             Delmis Connell DO  Phone: [phone]

## 2023-01-02 NOTE — PERIOP NOTES
TRANSFER - OUT REPORT:    Verbal report given  on [employer]  being transferred to (unit) for routine post - op       Report consisted of patients Situation, Background, Assessment and   Recommendations(SBAR). Information from the following report(s) SBAR, Kardex, Procedure Summary, Intake/Output, and MAR was reviewed with the receiving nurse. Lines:   Peripheral IV 01/01/23 Right Antecubital (Active)   Site Assessment Clean, dry, & intact 01/01/23 2135   Phlebitis Assessment 0 01/01/23 2135   Infiltration Assessment 0 01/01/23 2135   Dressing Status Clean, dry, & intact 01/01/23 2135   Dressing Type Tape;Transparent 01/01/23 2135   Hub Color/Line Status Pink; Infusing 01/01/23 2135       Peripheral IV 01/01/23 Left Arm (Active)   Site Assessment Clean, dry, & intact 01/01/23 2135   Phlebitis Assessment 0 01/01/23 2135   Infiltration Assessment 0 01/01/23 2135   Dressing Status Clean, dry, & intact 01/01/23 2135   Dressing Type Transparent 01/01/23 2135   Hub Color/Line Status Green 01/01/23 2135        Opportunity for questions and clarification was provided.       Patient transported with:   [facility] Trans[facility]

## 2023-01-02 NOTE — PROGRESS NOTES
Consult received. Chart reviewed  Presentation c/w secondary peritonitis due to cecal perforation s/p exploratory laparotomy, right hemicolectomy on 1/1/23. Intra op findings noted  Worsening leukocytosis noted today could be due to post op inflammation   Hypotension noted overnight -unable to exclude sepsis due to transient gram negative bloodstream infection with full certainty   Recommendations  - continue zosyn. Add le vofloxacin to cover for resistant   Gram negative pathogens   -follow blood cx  -obtain abdominal fluid cx to determine nature of colonizing kai   -follow up surgery recommendations regarding repeat ct scan   -monitor cbc, temp, clinically   Full consult to follow   Please call me if any new questions or concerns. D/w Dr Alexa Gan.  Thanks

## 2023-01-03 PROBLEM — I48.92 ATRIAL FLUTTER (HCC): Status: ACTIVE | Noted: 2023-01-03

## 2023-01-03 LAB
ALBUMIN SERPL-MCNC: 2.2 G/DL (ref 3.4–5)
ALBUMIN SERPL-MCNC: 2.4 G/DL (ref 3.4–5)
ALBUMIN/GLOB SERPL: 0.7 (ref 0.8–1.7)
ALBUMIN/GLOB SERPL: 0.9 (ref 0.8–1.7)
ALP SERPL-CCNC: 39 U/L (ref 45–117)
ALP SERPL-CCNC: 49 U/L (ref 45–117)
ALT SERPL-CCNC: 10 U/L (ref 16–61)
ALT SERPL-CCNC: 13 U/L (ref 16–61)
ANION GAP SERPL CALC-SCNC: 10 MMOL/L (ref 3–18)
ANION GAP SERPL CALC-SCNC: 5 MMOL/L (ref 3–18)
ANION GAP SERPL CALC-SCNC: 5 MMOL/L (ref 3–18)
AST SERPL-CCNC: 10 U/L (ref 10–38)
AST SERPL-CCNC: 21 U/L (ref 10–38)
ATRIAL RATE: 74 BPM
ATRIAL RATE: 82 BPM
BASOPHILS # BLD: 0 K/UL (ref 0–0.1)
BASOPHILS NFR BLD: 0 % (ref 0–2)
BILIRUB SERPL-MCNC: 3.1 MG/DL (ref 0.2–1)
BILIRUB SERPL-MCNC: 3.4 MG/DL (ref 0.2–1)
BUN SERPL-MCNC: 16 MG/DL (ref 7–18)
BUN SERPL-MCNC: 16 MG/DL (ref 7–18)
BUN SERPL-MCNC: 19 MG/DL (ref 7–18)
BUN/CREAT SERPL: 18 (ref 12–20)
BUN/CREAT SERPL: 19 (ref 12–20)
BUN/CREAT SERPL: 20 (ref 12–20)
CALCIUM SERPL-MCNC: 7.7 MG/DL (ref 8.5–10.1)
CALCIUM SERPL-MCNC: 7.9 MG/DL (ref 8.5–10.1)
CALCIUM SERPL-MCNC: 8.1 MG/DL (ref 8.5–10.1)
CALCULATED P AXIS, ECG09: 28 DEGREES
CALCULATED P AXIS, ECG09: 72 DEGREES
CALCULATED R AXIS, ECG10: -2 DEGREES
CALCULATED R AXIS, ECG10: -3 DEGREES
CALCULATED T AXIS, ECG11: -17 DEGREES
CALCULATED T AXIS, ECG11: -7 DEGREES
CHLORIDE SERPL-SCNC: 92 MMOL/L (ref 100–111)
CHLORIDE SERPL-SCNC: 94 MMOL/L (ref 100–111)
CHLORIDE SERPL-SCNC: 94 MMOL/L (ref 100–111)
CO2 SERPL-SCNC: 28 MMOL/L (ref 21–32)
CO2 SERPL-SCNC: 31 MMOL/L (ref 21–32)
CO2 SERPL-SCNC: 32 MMOL/L (ref 21–32)
CREAT SERPL-MCNC: 0.86 MG/DL (ref 0.6–1.3)
CREAT SERPL-MCNC: 0.9 MG/DL (ref 0.6–1.3)
CREAT SERPL-MCNC: 0.96 MG/DL (ref 0.6–1.3)
DIAGNOSIS, 93000: NORMAL
DIAGNOSIS, 93000: NORMAL
DIFFERENTIAL METHOD BLD: ABNORMAL
EOSINOPHIL # BLD: 0 K/UL (ref 0–0.4)
EOSINOPHIL NFR BLD: 0 % (ref 0–5)
ERYTHROCYTE [DISTWIDTH] IN BLOOD BY AUTOMATED COUNT: 12 % (ref 11.6–14.5)
ERYTHROCYTE [DISTWIDTH] IN BLOOD BY AUTOMATED COUNT: 12.1 % (ref 11.6–14.5)
GLOBULIN SER CALC-MCNC: 2.8 G/DL (ref 2–4)
GLOBULIN SER CALC-MCNC: 3.2 G/DL (ref 2–4)
GLUCOSE BLD STRIP.AUTO-MCNC: 133 MG/DL (ref 70–110)
GLUCOSE BLD STRIP.AUTO-MCNC: 135 MG/DL (ref 70–110)
GLUCOSE BLD STRIP.AUTO-MCNC: 180 MG/DL (ref 70–110)
GLUCOSE SERPL-MCNC: 136 MG/DL (ref 74–99)
GLUCOSE SERPL-MCNC: 148 MG/DL (ref 74–99)
GLUCOSE SERPL-MCNC: 151 MG/DL (ref 74–99)
HCT VFR BLD AUTO: 32.2 % (ref 36–48)
HCT VFR BLD AUTO: 34.4 % (ref 36–48)
HGB BLD-MCNC: 11.4 G/DL (ref 13–16)
HGB BLD-MCNC: 13.4 G/DL (ref 13–16)
IMM GRANULOCYTES # BLD AUTO: 0.1 K/UL (ref 0–0.04)
IMM GRANULOCYTES NFR BLD AUTO: 1 % (ref 0–0.5)
LYMPHOCYTES # BLD: 0.6 K/UL (ref 0.9–3.6)
LYMPHOCYTES NFR BLD: 5 % (ref 21–52)
MAGNESIUM SERPL-MCNC: 2.2 MG/DL (ref 1.6–2.6)
MAGNESIUM SERPL-MCNC: 2.6 MG/DL (ref 1.6–2.6)
MCH RBC QN AUTO: 30.9 PG (ref 24–34)
MCH RBC QN AUTO: 33.8 PG (ref 24–34)
MCHC RBC AUTO-ENTMCNC: 35.4 G/DL (ref 31–37)
MCHC RBC AUTO-ENTMCNC: 39 G/DL (ref 31–37)
MCV RBC AUTO: 86.9 FL (ref 78–100)
MCV RBC AUTO: 87.3 FL (ref 78–100)
MONOCYTES # BLD: 0.8 K/UL (ref 0.05–1.2)
MONOCYTES NFR BLD: 6 % (ref 3–10)
NEUTS SEG # BLD: 10.5 K/UL (ref 1.8–8)
NEUTS SEG NFR BLD: 88 % (ref 40–73)
NRBC # BLD: 0 K/UL (ref 0–0.01)
NRBC # BLD: 0 K/UL (ref 0–0.01)
NRBC BLD-RTO: 0 PER 100 WBC
NRBC BLD-RTO: 0 PER 100 WBC
P-R INTERVAL, ECG05: 162 MS
P-R INTERVAL, ECG05: 168 MS
PLATELET # BLD AUTO: 153 K/UL (ref 135–420)
PLATELET # BLD AUTO: 175 K/UL (ref 135–420)
PMV BLD AUTO: 9.4 FL (ref 9.2–11.8)
PMV BLD AUTO: 9.7 FL (ref 9.2–11.8)
POTASSIUM SERPL-SCNC: 2.5 MMOL/L (ref 3.5–5.5)
POTASSIUM SERPL-SCNC: 3 MMOL/L (ref 3.5–5.5)
POTASSIUM SERPL-SCNC: 3.1 MMOL/L (ref 3.5–5.5)
PROT SERPL-MCNC: 5.2 G/DL (ref 6.4–8.2)
PROT SERPL-MCNC: 5.4 G/DL (ref 6.4–8.2)
Q-T INTERVAL, ECG07: 434 MS
Q-T INTERVAL, ECG07: 442 MS
QRS DURATION, ECG06: 96 MS
QRS DURATION, ECG06: 98 MS
QTC CALCULATION (BEZET), ECG08: 481 MS
QTC CALCULATION (BEZET), ECG08: 516 MS
RBC # BLD AUTO: 3.69 M/UL (ref 4.35–5.65)
RBC # BLD AUTO: 3.96 M/UL (ref 4.35–5.65)
SODIUM SERPL-SCNC: 130 MMOL/L (ref 136–145)
SODIUM SERPL-SCNC: 130 MMOL/L (ref 136–145)
SODIUM SERPL-SCNC: 131 MMOL/L (ref 136–145)
VENTRICULAR RATE, ECG03: 74 BPM
VENTRICULAR RATE, ECG03: 82 BPM
WBC # BLD AUTO: 11.8 K/UL (ref 4.6–13.2)
WBC # BLD AUTO: 12 K/UL (ref 4.6–13.2)

## 2023-01-03 PROCEDURE — 74011000250 HC RX REV CODE- 250: Performed by: SURGERY

## 2023-01-03 PROCEDURE — 85025 COMPLETE CBC W/AUTO DIFF WBC: CPT

## 2023-01-03 PROCEDURE — 2709999900 HC NON-CHARGEABLE SUPPLY

## 2023-01-03 PROCEDURE — 74011250637 HC RX REV CODE- 250/637: Performed by: STUDENT IN AN ORGANIZED HEALTH CARE EDUCATION/TRAINING PROGRAM

## 2023-01-03 PROCEDURE — 82962 GLUCOSE BLOOD TEST: CPT

## 2023-01-03 PROCEDURE — 99223 1ST HOSP IP/OBS HIGH 75: CPT | Performed by: INTERNAL MEDICINE

## 2023-01-03 PROCEDURE — 93005 ELECTROCARDIOGRAM TRACING: CPT

## 2023-01-03 PROCEDURE — 80053 COMPREHEN METABOLIC PANEL: CPT

## 2023-01-03 PROCEDURE — 65660000004 HC RM CVT STEPDOWN

## 2023-01-03 PROCEDURE — 99232 SBSQ HOSP IP/OBS MODERATE 35: CPT | Performed by: INTERNAL MEDICINE

## 2023-01-03 PROCEDURE — 74011250636 HC RX REV CODE- 250/636: Performed by: STUDENT IN AN ORGANIZED HEALTH CARE EDUCATION/TRAINING PROGRAM

## 2023-01-03 PROCEDURE — 74011250636 HC RX REV CODE- 250/636: Performed by: SURGERY

## 2023-01-03 PROCEDURE — 85027 COMPLETE CBC AUTOMATED: CPT

## 2023-01-03 PROCEDURE — 74011000250 HC RX REV CODE- 250: Performed by: INTERNAL MEDICINE

## 2023-01-03 PROCEDURE — 83735 ASSAY OF MAGNESIUM: CPT

## 2023-01-03 PROCEDURE — 74011000258 HC RX REV CODE- 258: Performed by: SURGERY

## 2023-01-03 PROCEDURE — 74011250636 HC RX REV CODE- 250/636: Performed by: EMERGENCY MEDICINE

## 2023-01-03 PROCEDURE — 74011000258 HC RX REV CODE- 258: Performed by: EMERGENCY MEDICINE

## 2023-01-03 PROCEDURE — 74011250636 HC RX REV CODE- 250/636: Performed by: INTERNAL MEDICINE

## 2023-01-03 PROCEDURE — 36415 COLL VENOUS BLD VENIPUNCTURE: CPT

## 2023-01-03 RX ORDER — POTASSIUM CHLORIDE 7.45 MG/ML
10 INJECTION INTRAVENOUS
Status: COMPLETED | OUTPATIENT
Start: 2023-01-03 | End: 2023-01-03

## 2023-01-03 RX ORDER — DILTIAZEM HYDROCHLORIDE 5 MG/ML
5 INJECTION INTRAVENOUS ONCE
Status: DISCONTINUED | OUTPATIENT
Start: 2023-01-03 | End: 2023-01-03

## 2023-01-03 RX ORDER — METOPROLOL TARTRATE 5 MG/5ML
1.25 INJECTION INTRAVENOUS EVERY 12 HOURS
Status: DISCONTINUED | OUTPATIENT
Start: 2023-01-03 | End: 2023-01-03

## 2023-01-03 RX ORDER — METOPROLOL TARTRATE 5 MG/5ML
2.5 INJECTION INTRAVENOUS ONCE
Status: COMPLETED | OUTPATIENT
Start: 2023-01-03 | End: 2023-01-03

## 2023-01-03 RX ORDER — ENOXAPARIN SODIUM 100 MG/ML
40 INJECTION SUBCUTANEOUS EVERY 24 HOURS
Status: DISCONTINUED | OUTPATIENT
Start: 2023-01-03 | End: 2023-01-10

## 2023-01-03 RX ADMIN — METOPROLOL TARTRATE 1.25 MG: 5 INJECTION, SOLUTION INTRAVENOUS at 13:54

## 2023-01-03 RX ADMIN — MORPHINE SULFATE 2 MG: 2 INJECTION, SOLUTION INTRAMUSCULAR; INTRAVENOUS at 17:28

## 2023-01-03 RX ADMIN — POTASSIUM CHLORIDE 10 MEQ: 7.46 INJECTION, SOLUTION INTRAVENOUS at 09:23

## 2023-01-03 RX ADMIN — POTASSIUM BICARBONATE 40 MEQ: 782 TABLET, EFFERVESCENT ORAL at 06:04

## 2023-01-03 RX ADMIN — POTASSIUM CHLORIDE 10 MEQ: 7.46 INJECTION, SOLUTION INTRAVENOUS at 07:40

## 2023-01-03 RX ADMIN — PIPERACILLIN SODIUM AND TAZOBACTAM SODIUM 3.38 G: 3; .375 INJECTION, POWDER, LYOPHILIZED, FOR SOLUTION INTRAVENOUS at 22:03

## 2023-01-03 RX ADMIN — PIPERACILLIN SODIUM AND TAZOBACTAM SODIUM 3.38 G: 3; .375 INJECTION, POWDER, LYOPHILIZED, FOR SOLUTION INTRAVENOUS at 06:17

## 2023-01-03 RX ADMIN — METOPROLOL TARTRATE 2.5 MG: 5 INJECTION, SOLUTION INTRAVENOUS at 17:23

## 2023-01-03 RX ADMIN — FOLIC ACID: 5 INJECTION, SOLUTION INTRAMUSCULAR; INTRAVENOUS; SUBCUTANEOUS at 10:56

## 2023-01-03 RX ADMIN — POTASSIUM CHLORIDE 10 MEQ: 7.46 INJECTION, SOLUTION INTRAVENOUS at 06:03

## 2023-01-03 RX ADMIN — PIPERACILLIN SODIUM AND TAZOBACTAM SODIUM 3.38 G: 3; .375 INJECTION, POWDER, LYOPHILIZED, FOR SOLUTION INTRAVENOUS at 15:41

## 2023-01-03 RX ADMIN — LEVOFLOXACIN 750 MG: 5 INJECTION, SOLUTION INTRAVENOUS at 10:56

## 2023-01-03 RX ADMIN — HEPARIN SODIUM 5000 UNITS: 5000 INJECTION INTRAVENOUS; SUBCUTANEOUS at 05:29

## 2023-01-03 RX ADMIN — MORPHINE SULFATE 2 MG: 2 INJECTION, SOLUTION INTRAMUSCULAR; INTRAVENOUS at 06:49

## 2023-01-03 RX ADMIN — POTASSIUM CHLORIDE 10 MEQ: 7.46 INJECTION, SOLUTION INTRAVENOUS at 10:52

## 2023-01-03 RX ADMIN — THIAMINE HYDROCHLORIDE 200 MG: 100 INJECTION, SOLUTION INTRAMUSCULAR; INTRAVENOUS at 08:21

## 2023-01-03 NOTE — CONSULTS
Infectious Disease Consultation Note        Reason: secondary peritonitis, cecal perforation    Current abx Prior abx   Zosyn since 1/1/23      Lines:       Assessment :   75y.o. year old male with PMH significant for HTN, type 2 DM who presented to ed on 1/1/23 with severe abdominal pain. Clinical presentation c/w secondary peritonitis due to cecal perforation s/p exploratory laparotomy, right hemicolectomy on 1/1/23. Intra op findings noted  Worsening leukocytosis noted 1/2/23 likely due to post op inflammation   Monitor for infection with resistant gram negatives    Clinically improving. Decreasing wbc. Recommendations:    - continue zosyn. D/c levofloxacin  -follow blood cx  -obtain abdominal fluid cx to determine nature of colonizing kai   -follow up surgery recommendations regarding repeat ct scan   -monitor cbc, temp, clinically       Thank you for consultation request. Above plan was discussed in details with patient, and dr Marline Martinez. Please call me if any further questions or concerns. Will continue to participate in the care of this patient. HPI:     75y.o. year old male with PMH significant for HTN, type 2 DM who presented to ed on 1/1/23 with severe abdominal pain. In the ED was noted to have leukocytosis with WBC count 16 K. CT abdomen/pelvis revealed findings suggestive of cecal perforation. Surgery consulted. Initiated on zosyn. s/p exploratory laparotomy, right hemicolectomy on 1/1/23. Postoperatively patient was noted to have worsening leukocytosis WBC count 21K  I was consulted for further recommendations. Levofloxacin was added to his regimen. Patient feels better. Tolerating liquid diet. Denies increased chest pain, shortness of breath, nausea, vomiting    Past Medical History:   Diagnosis Date    Anxiety     Benign essential hypertension     DM type 2 (diabetes mellitus, type 2) ([facility] [address]        History reviewed. No pertinent surgical history.     Current Discharge Medication List        CONTINUE these medications which have NOT CHANGED    Details   atorvastatin (LIPITOR) 10 mg tablet Take 1 tablet by mouth daily  Qty: 90 Tablet, Refills: 3    Associated Diagnoses: Type 2 diabetes mellitus without complication, without long-term current use of insulin (HCC)      atenoloL-chlorthalidone (TENORETIC) 50-25 mg per tablet Take 1 Tablet by mouth daily. Qty: 90 Tablet, Refills: 3    Associated Diagnoses: Benign essential hypertension      potassium chloride (K-DUR, KLOR-CON) 20 mEq tablet 1 qd  Qty: 90 Tab, Refills: 3      clobetasoL (TEMOVATE) 0.05 % ointment Apply  to affected area two (2) times a day. For psoriasis  Qty: 15 g, Refills: 5    Associated Diagnoses: Psoriasis, unspecified      amLODIPine (NORVASC) 10 mg tablet Take 1 Tablet by mouth daily.  1 qd  Qty: 90 Tablet, Refills: 3    Associated Diagnoses: Benign essential hypertension           STOP taking these medications       lisinopriL (PRINIVIL, ZESTRIL) 10 mg tablet Comments:   Reason for Stopping:               Current Facility-Administered Medications   Medication Dose Route Frequency    potassium chloride 10 mEq in 100 ml IVPB  10 mEq IntraVENous Q1H    levoFLOXacin (LEVAQUIN) 750 mg in D5W IVPB  750 mg IntraVENous N35K    folic acid (FOLVITE) 1 mg in 0.9% sodium chloride 50 mL ivpb   IntraVENous DAILY    thiamine (B-1) 200 mg in 0.9% sodium chloride 50 mL IVPB  200 mg IntraVENous DAILY    naloxone (NARCAN) injection 0.4 mg  0.4 mg IntraVENous EVERY 2 MINUTES AS NEEDED    sodium chloride (NS) flush 5-10 mL  5-10 mL IntraVENous PRN    piperacillin-tazobactam (ZOSYN) 3.375 g in 0.9% sodium chloride (MBP/ADV) 100 mL MBP  3.375 g IntraVENous Q8H    heparin (porcine) injection 5,000 Units  5,000 Units SubCUTAneous Q8H    phenol throat spray (CHLORASEPTIC) 1 Spray  1 Spray Oral PRN    ondansetron (ZOFRAN) injection 4 mg  4 mg IntraVENous Q4H PRN    morphine 4 mg/mL injection 4 mg  4 mg IntraVENous Q4H PRN    dextrose 5% lactated ringers infusion  125 mL/hr IntraVENous CONTINUOUS    insulin lispro (HUMALOG) injection   SubCUTAneous Q6H    glucose chewable tablet 16 g  4 Tablet Oral PRN    glucagon (GLUCAGEN) injection 1 mg  1 mg IntraMUSCular PRN    dextrose 10% infusion 0-250 mL  0-250 mL IntraVENous PRN    morphine injection 2 mg  2 mg IntraVENous Q2H PRN       Allergies: Patient has no known allergies.     Family History   Problem Relation Age of Onset    OSTEOARTHRITIS Father     No Known Problems Mother      Social History     Socioeconomic History    Marital status: SINGLE     Spouse name: Not on file    Number of children: Not on file    Years of education: Not on file    Highest education level: Not on file   Occupational History    Not on file   Tobacco Use    Smoking status: Former     Types: Cigarettes     Quit date: 1980     Years since quittin.0    Smokeless tobacco: Never   Vaping Use    Vaping Use: Never used   Substance and Sexual Activity    Alcohol use: Yes     Comment: heavy    Drug use: No    Sexual activity: Not on file   Other Topics Concern    Not on file   Social History Narrative    Not on file     Social Determinants of Health     Financial Resource Strain: Low Risk     Difficulty of Paying Living Expenses: Not hard at all   Food Insecurity: No Food Insecurity    Worried About Running Out of Food in the Last Year: Never true    Ran Out of Food in the Last Year: Never true   Transportation Needs: Not on file   Physical Activity: Not on file   Stress: Not on file   Social Connections: Not on file   Intimate Partner Violence: Not on file   Housing Stability: Not on file     Social History     Tobacco Use   Smoking Status Former    Types: Cigarettes    Quit date: 1980    Years since quittin.0   Smokeless Tobacco Never        Temp (24hrs), Av.4 °F (36.9 °C), Min:98.1 °F (36.7 °C), Max:99.1 °F (37.3 °C)    Visit Vitals  /68 (BP 1 Location: Left upper arm, BP Patient Position: At rest)   Pulse 78 Temp 98.1 °F (36.7 °C)   Resp 16   Ht 5' 9\" (1.753 m)   Wt 77.1 kg (170 lb)   SpO2 96%   BMI 25.10 kg/m²       ROS: 12 point ROS obtained in details. Pertinent positives as mentioned in HPI,   otherwise negative    Physical Exam:    General: Well developed, well nourished male laying on the bed AAOx3 in no acute distress. General:   awake alert and oriented   HEENT:  Normocephalic, atraumatic,  EOMI, no scleral icterus or pallor; no conjunctival hemmohage;  nasal and oral mucous are moist and without evidence of lesions. Neck supple, no bruits. Lymph Nodes:   no cervical, axillary or inguinal adenopathy   Lungs:   non-labored, bilaterally clear to auscultation- no crackles wheezes rales or rhonchi   Heart:  RRR, s1 and s2; no rubs or gallops, no edema, + pedal pulses   Abdomen:  soft, non-distended, active bowel sounds, no hepatomegaly, no splenomegaly. + surgical changes on abdomen. + diffuse tenderness- no guarding   Genitourinary:  deferred   Extremities:   no clubbing, cyanosis; no joint effusions or swelling; Full ROM of all large joints to the upper and lower extremities; muscle mass appropriate for age   Neurologic:  No gross focal sensory abnormalities; 5/5 muscle strength to upper and lower extremities. Speech appropriate.  Cranial nerves intact                        Skin:  No rash or ulcers noted   Back:  no spinal or paraspinal muscle tenderness or rigidity, no CVA tenderness     Psychiatric:  No suicidal or homicidal ideations, appropriate mood and affect         Labs: Results:   Chemistry Recent Labs     01/03/23  0323 01/02/23  0205 01/01/23  1559   * 182* 213*   * 131* 127*   K 2.5* 3.1* 2.8*   CL 94* 91* 82*   CO2 31 25 29   BUN 19* 25* 26*   CREA 0.96 1.20 1.68*   CA 7.7* 6.9* 8.6   AGAP 5 15 16   BUCR 20 21* 15   AP 39* 33* 53   TP 5.2* 4.3* 7.0   ALB 2.4* 1.6* 2.7*   GLOB 2.8 2.7 4.3*   AGRAT 0.9 0.6* 0.6*      CBC w/Diff Recent Labs     01/03/23  0323 01/02/23  0205 01/01/23  1559   WBC 12.0 21.9* 16.4*   RBC 3.69* 4.90 5.89*   HGB 11.4* 15.4 19.2*   HCT 32.2* 41.8 49.8*    221 313   GRANS 88* 79* 86*   LYMPH 5* 6* 7*   EOS 0 0 0      Microbiology Recent Labs     01/02/23  0205 01/02/23  0200   CULT NO GROWTH AFTER 4 HOURS NO GROWTH AFTER 4 HOURS          RADIOLOGY:    All available imaging studies/reports in [facility] for this admission were reviewed      Disclaimer: Sections of this note are dictated utilizing voice recognition software, which may have resulted in some phonetic based errors in grammar and contents. Even though attempts were made to correct all the mistakes, some may have been missed, and remained in the body of the document. If questions arise, please contact our department.     Dr. Taryn Johns, Infectious Disease Specialist  [phone]  January 3, 2023  6:40 AM

## 2023-01-03 NOTE — PROGRESS NOTES
conducted an initial consultation and Spiritual Assessment for Jocelyn Fall, who is a 75 y. o.,male. Patients Primary Language is: [place]. According to the patients EMR Rastafarian Affiliation is: Edith Noland The reason the Patient came to the hospital is:   Patient Active Problem List    Diagnosis Date Noted    Severe protein-calorie malnutrition ([facility] [address] 01/02/2023    Perforated bowel ([facility] [address] 01/01/2023    Hypokalemia 01/01/2023    Hyponatremia 01/01/2023    Lactic acidosis 01/01/2023    Leukocytosis 01/01/2023    Hyperglycemia 01/01/2023    Anxiety     Benign essential hypertension         The  provided the following Interventions:  Initiated a relationship of care and support. Listened empathically. Provided information about Spiritual Care Services. Chart reviewed. The following outcomes where achieved:  Patient is not interested at this time in completing an Advance Medical Directive.  confirmed Patient's Rastafarian Affiliation. Patient expressed gratitude for 's visit. Assessment:  Patient does not have any Mu-ism/cultural needs that will affect patients preferences in health care. There are no spiritual or Mu-ism issues which require intervention at this time. Plan:  Chaplains will continue to follow and will provide pastoral care on an as needed/requested basis.  recommends bedside caregivers page  on duty if patient shows signs of acute spiritual or emotional distress.     [address]  [phone])

## 2023-01-03 NOTE — PROGRESS NOTES
POD# 2    Admit Date: 1/1/2023    Assessment    Barrie Narayan is a 75 y.o. male POD 2 s/p exploratory laparotomy, right hemicolectomy    Patient Active Problem List   Diagnosis Code    Anxiety F41. 9    Benign essential hypertension I10    Perforated bowel (HCC) K63.1    Hypokalemia E87.6    Hyponatremia E87.1    Lactic acidosis E87.20    Leukocytosis D72.829    Hyperglycemia R73.9    Severe protein-calorie malnutrition ([address] E43       Plan  -continue with clears  -wound packing with aquacel AG- orders placed for every other day dressing  -continue antibiotics per ID recommendations  -plan for CT scan with rectal contrast tomorrow- will discuss with radiologist plan    Subjective    Overnight events: No acute events overnight. Pain is well controlled. No nausea or vomiting. Tolerating clears. No BM or flatus. Objective    Physical Exam:  Visit Vitals  /68 (BP 1 Location: Left upper arm, BP Patient Position: At rest)   Pulse 78   Temp 98.1 °F (36.7 °C)   Resp 16   Ht 5' 9\" (1.753 m)   Wt 77.1 kg (170 lb)   SpO2 96%   BMI 25.10 kg/m²       Intake/Output Summary (Last 24 hours) at 1/3/2023 0731  Last data filed at 1/2/2023 1609  Gross per 24 hour   Intake 750 ml   Output 535 ml   Net 215 ml     Physical Exam  Constitutional:       Appearance: Normal appearance. He is normal weight. Cardiovascular:      Rate and Rhythm: Normal rate. Pulmonary:      Effort: Pulmonary effort is normal.   Abdominal:      General: There is distension. Palpations: Abdomen is soft. Tenderness: There is no abdominal tenderness. There is no guarding. Comments: GEO drain 1&2 serous  Incision loosely stapled, appropriately tender with serosanguinous drainage   Neurological:      Mental Status: He is alert.              Paulette Johns DO  Phone: [phone]

## 2023-01-03 NOTE — PROGRESS NOTES
2230:Writer notified by Telemetry that patient was experience some periods of A flutter  reaching up into the 180's then would go back to NS. Writer look at monitor and patient. He HR reached about 160's  observation of patient  included him sitting upright in bed no signs of distress observed . He denied any pain, or SOB vital signs checked. See  paged on call provider. New orders received if any more abnormal HR get a EKG, patient is set for labs in morning provider stated she would add all orders. 7446: Writer notified by Lisa Rudolph in lab that patient HGB was 19.2 have dropped to 15.4 but wasn't critical.     05:15 Notified by Joselin Benjamin in lab patient had a Critical K+ which was 2.5. Writer notified provided on-call about patient status including labs see orders on MAR. Writer updated receiving nurse with RAMIROAR.

## 2023-01-03 NOTE — PROGRESS NOTES
Comprehensive Nutrition Assessment    Type and Reason for Visit: Reassess, Positive nutrition screen, NPO/clear liquid    Nutrition Recommendations/Plan:   Add oral nutrition supplement to optimize nutrition intake opportunity: Ensure Clear (each provides 240 kcal, 8g protein) BID  Add oral nutrition supplement to optimize nutrition intake opportunity: Gelatein 20 (each provides 80 kcal, 20g protein) BID    Advance diet as tolerated by patient   Monitor PO intake, compliance of oral supplement, weight, labs, and plan of care during admission. Malnutrition Assessment:  Malnutrition Status:  Severe malnutrition (01/02/23 0935)    Context:  Acute illness     Findings of the 6 clinical characteristics of malnutrition:   Energy Intake:  50% or less of est energy requirements for 5 or more days  Weight Loss:  Greater than 5% over 1 month     Body Fat Loss:  No significant body fat loss,     Muscle Mass Loss:  Mild muscle mass loss, Temples (temporalis)  Fluid Accumulation:  No significant fluid accumulation,     Strength:  Not performed     Nutrition Assessment:    Admitted for abd pain that has been increasing the past 3 weeks; perforated bowel, s/p ex lap, right hemicolectomy 1/1. Visited pt In room, laying in bed, alert and able to communicate. Pt no longer NPO status and transition to Clear Liquids-no additional oral supplements at this time. Pt reported tolerating current diet and denies d/c/n/v nor abdominal pain. Current lab shows low Na (130), Potassium (2.5), and Calcium (7.7); elevated BUN (19). Per chart review, no BM nor flatus noted. NGT removed 1/2/22. Pt receiving D5 LR infusion at 125mL/hour providing  (150 g dextrose, 510 kcal per day). Obtained current body weight from bed scale: 182 lbs; last weight recorded per patient 187 lbs (2 months ago); UBW: 180 lbs. Nutrition Related Findings:    Last BM PTA. Output: 400mL (urine).  Pertinent Medications:  potassium chloride, lovenox, magnesium sulfate, calcium gluconate, levofloacin, folic acid, thiaimine, D5 lR infusion at 125mL/hour, humalog, Wound Type: Surgical incision     Current Nutrition Intake & Therapies:  Average Meal Intake: %  Average Supplement Intake: None ordered  ADULT DIET Clear Liquid    Anthropometric Measures:  Height: 5' 9\" (175.3 cm)  Ideal Body Weight (IBW): 160 lbs (73 kg)  Admission Body Weight: 169 lb 15.6 oz (77.1 kg)  Current Body Wt:  82.6 kg (182 lb) (obtained by this writer), 113.8 % IBW. Bed scale  Current BMI (kg/m2): 26.9  BMI Category: Overweight (BMI 25.0-29. 9)    Estimated Daily Nutrient Needs:  Energy Requirements Based On: Formula  Weight Used for Energy Requirements: Admission  Energy (kcal/day): 4373-0572 (MSJ 1.2-1.3)  Weight Used for Protein Requirements: Current  Protein (g/day):  (1.2-1.4 g/day)  Method Used for Fluid Requirements: 1 ml/kcal  Fluid (ml/day): 1599-3526    Nutrition Diagnosis:   Severe malnutrition, In context of acute illness or injury related to acute injury/trauma, altered GI function as evidenced by Criteria as identified in malnutrition assessment  Altered GI function related to altered GI structure (2/2 perforated bowel) as evidenced by NPO or clear liquid status due to medical condition (s/p ex lap)    Nutrition Interventions:   Food and/or Nutrient Delivery: Continue current diet, Start oral nutrition supplement, IV fluid delivery  Nutrition Education/Counseling: No recommendations at this time, Education not indicated  Coordination of Nutrition Care: Continue to monitor while inpatient  Plan of Care discussed with: patient    Goals:  Previous Goal Met: Progressing toward goal(s)  Goals: Meet at least 75% of estimated needs, by next RD assessment       Nutrition Monitoring and Evaluation:   Behavioral-Environmental Outcomes: None identified  Food/Nutrient Intake Outcomes: Diet advancement/tolerance, Food and nutrient intake, Supplement intake, IVF intake  Physical Signs/Symptoms Outcomes: Biochemical data, GI status, Meal time behavior, Nutrition focused physical findings, Weight    Discharge Planning:     Too soon to determine    Humberto Radford, LEON, LD   Contact: [phone]

## 2023-01-03 NOTE — PROGRESS NOTES
Patient heart rate continues to convert in and out of aflutter, heart rate sustaining in the 180s at times  Metoprolol 2.5 mg given at 1723 per MD orders. Stat EKG, cbc, cmp, and magnesium ordered. Cbc ordered d/t drop in hgb  Pt is asymptomatic,no signs of distress noted, will continue to monitor.

## 2023-01-03 NOTE — PROGRESS NOTES
[facility] Hospitalist Group  Progress Note    Patient: Gabriel Bradford Age: 75 y.o. : 1955 MR#: [id number] SSN: [id number]  Date/Time: 1/3/2023     C/C: Abdominal pain      Subjective:   HPI : Patient with abdominal pain from visceral perforation now s/p surgery details below hospitalist issues team is consulted for his medical issues. Review of Systems: Patient is alert awake oriented lying in bed very comfortable, patient's wife at the bedside. Patient denies any new chest pain palpitation no shortness of breath nausea vomiting no bleeding episodes. positive responses in bold type   Constitutional: Negative for fever, chills, diaphoresis and unexpected weight change. HENT: Negative for ear pain, congestion, sore throat, rhinorrhea, drooling, trouble swallowing, neck pain and tinnitus. Eyes: Negative for photophobia, pain, redness and visual disturbance. Respiratory: negative for shortness of breath, cough, choking, chest tightness, wheezing or stridor. Cardiovascular: Negative for chest pain, palpitations and leg swelling. Gastrointestinal: Negative for nausea, vomiting, abdominal pain, diarrhea, constipation, blood in stool, abdominal distention and anal bleeding. Genitourinary: Negative for dysuria, urgency, frequency, hematuria, flank pain and difficulty urinating. Musculoskeletal: Negative for back pain and arthralgias. Skin: Negative for color change, rash and wound. Neurological: Negative for dizziness, seizures, syncope, speech difficulty, light-headedness or headaches. Hematological: Does not bruise/bleed easily. Psychiatric/Behavioral: Negative for suicidal ideas, hallucinations, behavioral problems, self-injury or agitation     Assessment/Plan:     1.  Leucocytosis -ID consulted, for double coverage added Levaquin continue Zosyn, get culture from abdominal drainage and  2 Hypokalemia  3 Hypomagnesemia   4 Hypocalcemia - follow ionized calcium , Calcium gluconate 1 amp   5 DM2   6 S/p Exploratory laparotomy, right hemicolectomy ( 2023 ) - intra operative finding : Perforated cecum x2 with gross feculent contamination right hemipelvis with numerous intraloop small bowel adhesions and fibrinous exudate. 7 Hypotension - improved , IV albumin   8 Hypoalbuminemia -IV albumin ordered, blood pressure was also low which is slowly improving. Patient is alert awake oriented no evidence of any encephalopathy, hypotension  9 development of atrial tachycardia-paroxysmal atrial fibrillation versus atrial flutter  -Patient started on low-dose metoprolol, has history of developing bradycardia on atenolol, low-dose metoprolol IV did not have any effect on his atrial tachycardia, second dose of metoprolol 2.5 given no effect. Cardiology consulted. Patient has prolonged QTc interval hence cannot use amiodarone. Rate controlled with the low-dose Cardizem and monitor patient closely patient will be transferred to stepdown unit. -I have ordered full CMP CBC magnesium labs EKG discussed with cardiology    Objective:       General:  Alert, cooperative, no acute distress   HEENT: No facial asymmetry, BLAKE Valdez, External ears - WNL    Cardiovascular: S1S2 - irregular , No Murmur   Pulmonary: Equal expansion , No Use of accessory muscles , No Rales No Rhonchi    GI:  +BS in all four quadrants, soft, non-tender  Extremities:  No edema; 2+ dorsalis pedis pulses bilaterally  Neuro: Alert and oriented X 2.        DVT Prophylaxis:  []Lovenox  []Hep SQ  []SCDs  []Coumadin   []On Heparin gtt    [] Eliquis [] Xarelto     Vitals:         VS: Visit Vitals  /75 (BP 1 Location: Left upper arm, BP Patient Position: At rest)   Pulse (!) 187   Temp 99.7 °F (37.6 °C)   Resp 18   Ht 5' 9\" (1.753 m)   Wt 77.1 kg (170 lb)   SpO2 94%   BMI 25.10 kg/m²      Tmax/24hrs: Temp (24hrs), Av.5 °F (36.9 °C), Min:98.1 °F (36.7 °C), Max:99.7 °F (37.6 °C)        Medications:   Current Facility-Administered Medications   Medication Dose Route Frequency    enoxaparin (LOVENOX) injection 40 mg  40 mg SubCUTAneous Q24H    [Held by provider] dilTIAZem (CARDIZEM) injection 5 mg  5 mg IntraVENous ONCE    [Held by provider] dilTIAZem (CARDIZEM) 100 mg in 0.9% sodium chloride (MBP/ADV) 100 mL infusion  5 mg/hr IntraVENous TITRATE    levoFLOXacin (LEVAQUIN) 750 mg in D5W IVPB  750 mg IntraVENous R92J    folic acid (FOLVITE) 1 mg in 0.9% sodium chloride 50 mL ivpb   IntraVENous DAILY    thiamine (B-1) 200 mg in 0.9% sodium chloride 50 mL IVPB  200 mg IntraVENous DAILY    naloxone (NARCAN) injection 0.4 mg  0.4 mg IntraVENous EVERY 2 MINUTES AS NEEDED    sodium chloride (NS) flush 5-10 mL  5-10 mL IntraVENous PRN    piperacillin-tazobactam (ZOSYN) 3.375 g in 0.9% sodium chloride (MBP/ADV) 100 mL MBP  3.375 g IntraVENous Q8H    phenol throat spray (CHLORASEPTIC) 1 Spray  1 Spray Oral PRN    ondansetron (ZOFRAN) injection 4 mg  4 mg IntraVENous Q4H PRN    morphine 4 mg/mL injection 4 mg  4 mg IntraVENous Q4H PRN    insulin lispro (HUMALOG) injection   SubCUTAneous Q6H    glucose chewable tablet 16 g  4 Tablet Oral PRN    glucagon (GLUCAGEN) injection 1 mg  1 mg IntraMUSCular PRN    dextrose 10% infusion 0-250 mL  0-250 mL IntraVENous PRN    morphine injection 2 mg  2 mg IntraVENous Q2H PRN       Labs:    Recent Labs     01/03/23  0323 01/02/23  0205 01/01/23  1559   WBC 12.0 21.9* 16.4*   HGB 11.4* 15.4 19.2*   HCT 32.2* 41.8 49.8*    221 313     Recent Labs     01/03/23  1142 01/03/23  0323 01/02/23  0205 01/01/23  1559   * 130* 131* 127*   K 3.1* 2.5* 3.1* 2.8*   CL 94* 94* 91* 82*   CO2 32 31 25 29   * 151* 182* 213*   BUN 16 19* 25* 26*   CREA 0.86 0.96 1.20 1.68*   CA 8.1* 7.7* 6.9* 8.6   MG  --  2.6 1.4* 1.9   ALB  --  2.4* 1.6* 2.7*   ALT  --  10* 9* 13*         Time spent on direct patient care >30 mints     Complexity : High complex - due to multiple medical issues outlined above. CODE Status : Full code    Case discussed with:  [x]Patient  [] Family  [x]Nursing  []Case Management         Disclaimer: Sections of this note are dictated utilizing voice recognition software, which may have resulted in some phonetic based errors in grammar and contents. Even though attempts were made to correct all the mistakes, some may have been missed, and remained in the body of the document. If questions arise, please contact our department.     Signed By: Flash Spears MD     January 3, 2023

## 2023-01-03 NOTE — PROGRESS NOTES
Problem: Falls - Risk of  Goal: *Absence of Falls  Description: Document Duncan Flores Fall Risk and appropriate interventions in the flowsheet.   Outcome: Progressing Towards Goal  Note: Fall Risk Interventions:            Medication Interventions: Patient to call before getting OOB, Teach patient to arise slowly    Elimination Interventions: Call light in reach, Toilet paper/wipes in reach              Problem: Patient Education: Go to Patient Education Activity  Goal: Patient/Family Education  Outcome: Progressing Towards Goal     Problem: Nutrition Deficit  Goal: *Optimize nutritional status  Outcome: Progressing Towards Goal

## 2023-01-03 NOTE — PROGRESS NOTES
Received SBAR from off going shift nurse patient have received some electrolyte replacement today. NG tube was remove today and rosales. Patient have been voiding fine. No other information reported to writer will updated receiving nurse of changes. Results ok. No action needed

## 2023-01-03 NOTE — CONSULTS
Cardiology Consult Note    Consultation request by Jose Maria Wolf DO/hospitalist, Dr. Alexa Gan for advice/opinion related to evaluating tachycardia    Date of  Admission: 1/1/2023  3:09 PM   Primary Care Physician:  Angel Blake NP       Assessment:     Hospital Problems  Date Reviewed: 1/6/2021            Codes Class Noted POA    Atrial flutter ([facility] ICD-10-CM: I48.92  ICD-9-CM: 427.32  1/3/2023 Unknown        Severe protein-calorie malnutrition ([facility] ICD-10-CM: E43  ICD-9-CM: 262  1/2/2023 Yes        * (Principal) Perforated bowel ([facility] ICD-10-CM: K63.1  ICD-9-CM: 569.83  1/1/2023 Yes        Hypokalemia ICD-10-CM: E87.6  ICD-9-CM: 276.8  1/1/2023 Yes        Hyponatremia ICD-10-CM: E87.1  ICD-9-CM: 276.1  1/1/2023 Yes        Lactic acidosis ICD-10-CM: E87.20  ICD-9-CM: 276.2  1/1/2023 Yes        Leukocytosis ICD-10-CM: C36.086  ICD-9-CM: 288.60  1/1/2023 Yes        Hyperglycemia ICD-10-CM: R73.9  ICD-9-CM: 790.29  1/1/2023 Yes        Benign essential hypertension ICD-10-CM: I10  ICD-9-CM: 401.1  Unknown Yes                Primary cardiologist none, consulted by Dr Rosanna Jefferson:     Patient is likely having atrial flutter with 2-1 AV block which is very transient and intermittent but frequent. It is not causing any symptoms or hemodynamic disturbance. EKG has a elevated QTC of 516 ms which is likely secondary to Levaquin. This was discontinued after I discussed with the surgeon Dr. Rei Antonio. Watch on telemetry and use IV Cardizem for rate control for now. As QTc reduces to less than 480 ms, consider IV amiodarone to maintain sinus rhythm. Check echo for cardiac structure and function especially given history of alcohol abuse  Postop bowel perforation. We will follow closely and make recommendations depending on the hospital course. Thank you for the referral.  Plan discussed with hospitalist, Dr. Alexa Gan and the surgeon Dr. Marlon Armando discussed with patient and wife in the room.      History of Present Illness: This is a 75 y.o. male admitted for [facility]forated bowel ([facility] [K63.1]. Patient complains of: No significant symptoms at this time from rapid heartbeat but telemetry showed frequent short episodes of narrow complex SVT at 182 bpm which could also be atrial flutter with 2-1 AV block. Patient does give a history of intermittent palpitations in the past which he ignored as he heard heartbeats in his ear at times. He does have a history of significant bradycardia on atenolol 100 mg which was discontinued by PCP. Denies any chest pain dyspnea edema. He came to medical attention as his abdomen started swelling. He quit drinking alcohol 2 to 3 weeks ago but prior to that was heavy alcohol on a daily basis. He had presented with perforated bowel and has gone through emergency surgery for the same. Cardiac risk factors: hypertension      Review of Symptoms:  Except as stated above include:  Constitutional:  negative  Respiratory:  negative  Cardiovascular:  negative  Gastrointestinal: negative  Genitourinary:  negative  Musculoskeletal:  Negative  Neurological:  Negative  Dermatological:  Negative  Endocrinological: Negative  Psychological:  Negative    A comprehensive review of systems was negative except for that written in the HPI.      Past Medical History:     Past Medical History:   Diagnosis Date    Anxiety     Benign essential hypertension     DM type 2 (diabetes mellitus, type 2) ([facility])          Social History:     Social History     Socioeconomic History    Marital status: SINGLE   Tobacco Use    Smoking status: Former     Types: Cigarettes     Quit date: 1980     Years since quittin.0    Smokeless tobacco: Never   Vaping Use    Vaping Use: Never used   Substance and Sexual Activity    Alcohol use: Yes     Comment: heavy    Drug use: No     Social Determinants of Health     Financial Resource Strain: Low Risk     Difficulty of Paying Living Expenses: Not hard at all   Food Insecurity: No Food Insecurity    Worried About Running Out of Food in the Last Year: Never true    Ran Out of Food in the Last Year: Never true        Family History:     Family History   Problem Relation Age of Onset    OSTEOARTHRITIS Father     No Known Problems Mother         Medications:   No Known Allergies     Current Facility-Administered Medications   Medication Dose Route Frequency    enoxaparin (LOVENOX) injection 40 mg  40 mg SubCUTAneous Q24H    [Held by provider] dilTIAZem (CARDIZEM) injection 5 mg  5 mg IntraVENous ONCE    [Held by provider] dilTIAZem (CARDIZEM) 100 mg in 0.9% sodium chloride (MBP/ADV) 100 mL infusion  5 mg/hr IntraVENous TITRATE    levoFLOXacin (LEVAQUIN) 750 mg in D5W IVPB  750 mg IntraVENous I67F    folic acid (FOLVITE) 1 mg in 0.9% sodium chloride 50 mL ivpb   IntraVENous DAILY    thiamine (B-1) 200 mg in 0.9% sodium chloride 50 mL IVPB  200 mg IntraVENous DAILY    naloxone (NARCAN) injection 0.4 mg  0.4 mg IntraVENous EVERY 2 MINUTES AS NEEDED    sodium chloride (NS) flush 5-10 mL  5-10 mL IntraVENous PRN    piperacillin-tazobactam (ZOSYN) 3.375 g in 0.9% sodium chloride (MBP/ADV) 100 mL MBP  3.375 g IntraVENous Q8H    phenol throat spray (CHLORASEPTIC) 1 Spray  1 Spray Oral PRN    ondansetron (ZOFRAN) injection 4 mg  4 mg IntraVENous Q4H PRN    morphine 4 mg/mL injection 4 mg  4 mg IntraVENous Q4H PRN    insulin lispro (HUMALOG) injection   SubCUTAneous Q6H    glucose chewable tablet 16 g  4 Tablet Oral PRN    glucagon (GLUCAGEN) injection 1 mg  1 mg IntraMUSCular PRN    dextrose 10% infusion 0-250 mL  0-250 mL IntraVENous PRN    morphine injection 2 mg  2 mg IntraVENous Q2H PRN         Physical Exam:   Visit Vitals  /75 (BP 1 Location: Left upper arm, BP Patient Position: At rest)   Pulse 96   Temp 99.7 °F (37.6 °C)   Resp 18   Ht 5' 9\" (1.753 m)   Wt 77.1 kg (170 lb)   SpO2 94%   BMI 25.10 kg/m²       TELE: normal sinus rhythm, interspersed with tachycardia as above    BP Readings from Last 3 Encounters:   01/03/23 119/75   11/07/22 138/76   07/06/22 (!) 142/88     Pulse Readings from Last 3 Encounters:   01/03/23 96   11/07/22 (!) 53   07/06/22 (!) 58     Wt Readings from Last 3 Encounters:   01/01/23 77.1 kg (170 lb)   11/07/22 (P) 84.8 kg (187 lb)   07/06/22 86.6 kg (191 lb)       General:  alert, cooperative, no distress, appears stated age  Neck:  no carotid bruit, no JVD  Lungs:  clear to auscultation bilaterally  Heart:  regular rate and rhythm, S1, S2 normal, no murmur, click, rub or gallop  Abdomen: Not examined by me as has bandages on most of the belly and is distended in appearance from recent surgery  Extremities:  extremities normal, atraumatic, no cyanosis or edema  Skin: Warm and dry.  no hyperpigmentation, vitiligo, or suspicious lesions  Neuro: alert, oriented x3, affect appropriate, no focal neurological deficits, moves all extremities well, no involuntary movements  Psych: non focal     Data Review:     Recent Labs     01/03/23  0323 01/02/23  0205 01/01/23  1559   WBC 12.0 21.9* 16.4*   HGB 11.4* 15.4 19.2*   HCT 32.2* 41.8 49.8*    221 313     Recent Labs     01/03/23  1142 01/03/23  0323 01/02/23  0205 01/01/23  1559   * 130* 131* 127*   K 3.1* 2.5* 3.1* 2.8*   CL 94* 94* 91* 82*   CO2 32 31 25 29   * 151* 182* 213*   BUN 16 19* 25* 26*   CREA 0.86 0.96 1.20 1.68*   CA 8.1* 7.7* 6.9* 8.6   MG  --  2.6 1.4* 1.9   ALB  --  2.4* 1.6* 2.7*   ALT  --  10* 9* 13*       Results for orders placed or performed during the hospital encounter of 01/01/23   EKG, 12 LEAD, INITIAL   Result Value Ref Range    Ventricular Rate 81 BPM    Atrial Rate 81 BPM    P-R Interval 160 ms    QRS Duration 100 ms    Q-T Interval 470 ms    QTC Calculation (Bezet) 545 ms    Calculated P Axis 69 degrees    Calculated R Axis -17 degrees    Calculated T Axis 2 degrees    Diagnosis       Sinus rhythm with premature atrial complexes in a pattern of bigeminy  Cannot rule out Anterior infarct , age undetermined  Prolonged QT  Abnormal ECG  No previous ECGs available  Confirmed by Viet Carey MD, Estela Deleon ([id number]) on 1/1/2023 5:24:29 PM         All Cardiac Markers in the last 24 hours:  No results found for: CPK, CK, CKMMB, CKMB, RCK3, CKMBT, CKNDX, CKND1, AMY, TROPT, TROIQ, NITHIN, TROPT, TNIPOC, BNP, BNPP    Last Lipid:    Lab Results   Component Value Date/Time    Cholesterol, total 183 07/06/2022 10:33 AM    HDL Cholesterol 50 07/06/2022 10:33 AM    LDL, calculated 87.2 07/06/2022 10:33 AM    Triglyceride 229 (H) 07/06/2022 10:33 AM    CHOL/HDL Ratio 3.7 07/06/2022 10:33 AM       Cardiographics:     EKG Results       Procedure 720 Value Units Date/Time    EKG, 12 LEAD, SUBSEQUENT [[id number]]     Order Status: Sent     EKG, 12 LEAD, SUBSEQUENT [[id number]] Collected: 01/03/23 1404    Order Status: Completed Updated: 01/03/23 1541     Ventricular Rate 82 BPM      Atrial Rate 82 BPM      P-R Interval 162 ms      QRS Duration 98 ms      Q-T Interval 442 ms      QTC Calculation (Bezet) 516 ms      Calculated P Axis 28 degrees      Calculated R Axis -2 degrees      Calculated T Axis -7 degrees      Diagnosis --     Sinus rhythm with premature atrial complexes with aberrant conduction  Cannot rule out Anterior infarct (cited on or before 01-JAN-2023)  Prolonged QT  Abnormal ECG  When compared with ECG of 03-JAN-2023 09:31,  No significant change was found  Confirmed by Viet Carey MD, Alexander ([id number]) on 1/3/2023 3:40:04 PM      EKG, 12 LEAD, SUBSEQUENT [[id number]] Collected: 01/03/23 0931    Order Status: Completed Updated: 01/03/23 1519     Ventricular Rate 74 BPM      Atrial Rate 74 BPM      P-R Interval 168 ms      QRS Duration 96 ms      Q-T Interval 434 ms      QTC Calculation (Bezet) 481 ms      Calculated P Axis 72 degrees      Calculated R Axis -3 degrees      Calculated T Axis -17 degrees      Diagnosis --     Sinus rhythm with premature atrial complexes  Cannot rule out Anterior infarct (cited on or before 01-JAN-2023)  Abnormal ECG  When compared with ECG of 01-JAN-2023 15:41,  QT has shortened  Confirmed by Elyse Guzman MD, Shell Knowles ([id number]) on 1/3/2023 3:18:58 PM      EKG, 12 LEAD, INITIAL [[id number]] Collected: 01/01/23 1541    Order Status: Completed Updated: 01/01/23 1724     Ventricular Rate 81 BPM      Atrial Rate 81 BPM      P-R Interval 160 ms      QRS Duration 100 ms      Q-T Interval 470 ms      QTC Calculation (Bezet) 545 ms      Calculated P Axis 69 degrees      Calculated R Axis -17 degrees      Calculated T Axis 2 degrees      Diagnosis --     Sinus rhythm with premature atrial complexes in a pattern of bigeminy  Cannot rule out Anterior infarct , age undetermined  Prolonged QT  Abnormal ECG  No previous ECGs available  Confirmed by Elyse Guzman MD, Shell Knowles ([id number]) on 1/1/2023 5:24:29 PM                      XR Results (most recent):  Results from Hospital Encounter encounter on 01/01/23    XR CHEST PA LAT    Narrative  TWO VIEW CHEST RADIOGRAPH    CPT CODE: 06186    INDICATION: Epigastric pain, severe. COMPARISON: None    FINDINGS:    Cardiomediastinal silhouette and pulmonary vasculature are normal. Mild  atelectasis left lung base. No focal opacities otherwise, no pleural effusion or  pneumothorax. There is a large volume of pneumoperitoneum under the bilateral diaphragms. Impression  Large volume pneumoperitoneum suspicious for bowel perforation unless there has  been recent procedure to explain. Results discussed with Dr. Tyshawn Salcedo on 1/1/2023 at 1624 hours.         Signed By: Darrick Chao MD     January 3, 2023

## 2023-01-03 NOTE — ACP (ADVANCE CARE PLANNING)
Advance Care Planning   Advance Care Planning Inpatient Note  [address] [place] [address]   Spiritual Care Department    Today's Date: 1/3/2023  Unit: [facility] 2S TELEMETRY    Received request from admission screening. Upon review of chart and communication with care team, patient's decision making abilities are not in question. Patient was/were present in the room during visit. Health Care Decision Makers:    No healthcare decision makers have been documented. Click here to complete [address] including selection of the Healthcare Decision Maker Relationship (ie \"Primary\")    Summary:  Documented Next of Kin, per patient report  Lilliam Coats, spouse, is the legal next of kin.     Advance Care Planning Documents (Patient Wishes) on file:  None     Interventions:  Deferred conversation as patient not interested in completing an advance directive at this time    Care Preferences Communicated:  [facility] on 1/3/2023 at 3:42 PM

## 2023-01-03 NOTE — PROGRESS NOTES
Patient with elevated HR, a flutter, 170s-180s   1259; heart rate 180 bpm  1337: heart rate 161 bpm  1338 a flutter   1357: PVCs  1400: heart rate 139 bpm    MD made aware, lopressor ordered IV push 1/25 mg  Pt had potassium of 2.5 at 0323 this monring, it was treated, potassium is now 3.1  Ekg also ordered, no signs of distress noted, pt denies chest pain, chest pressure, heart palpitations.  Will continue to monitor,    1446: heart rate 181    Pt continues to jump into a flutter, he is asymptomatic, MD paged again, awaiting a call back, no distress noted,will continue to monitor

## 2023-01-03 NOTE — PROGRESS NOTES
Physician Progress Note      PATIENTTaj Jaramillo  CSN #:                  [id number]  :                       1955  ADMIT DATE:       2023 3:09 PM  [address] DATE:  RESPONDING  PROVIDER #:        ANNIA WILEY DO          QUERY TEXT:    Pt admitted with perforated bowel. Pt noted to have WBc  16.4 on admit and  maxed at  21.9. If possible, please document in the progress notes and discharge summary if you are evaluating and /or treating any of the following: The medical record reflects the following:  Risk Factors: severe malnutrition  Clinical Indicators: WBc  16.4  up  to  21.9 ; lactic acid   2.79  ;  ID  consult- Presentation c/w secondary peritonitis due to cecal perforation  Treatment: hemicolectomy;   IV  zosyn ; Thank you,   Damon Abdalla RN   CCDS  Options provided:  -- Sepsis, present on admission  -- perforated bowel   without sepsis  -- sepsis  due to peritonitis  -- Sepsis was ruled out  -- Other - I will add my own diagnosis  -- Disagree - Not applicable / Not valid  -- Disagree - Clinically unable to determine / Unknown  -- Refer to Clinical Documentation Reviewer    PROVIDER RESPONSE TEXT:    This patient has sepsis which was present on admission.     Query created by: Vamsi Feliciano on 1/3/2023 9:53 AM      Electronically signed by:  Olimpia Remy DO 1/3/2023 10:02 AM

## 2023-01-04 ENCOUNTER — APPOINTMENT (OUTPATIENT)
Dept: NON INVASIVE DIAGNOSTICS | Age: 68
DRG: 853 | End: 2023-01-04
Attending: INTERNAL MEDICINE
Payer: MEDICARE

## 2023-01-04 ENCOUNTER — APPOINTMENT (OUTPATIENT)
Dept: CT IMAGING | Age: 68
DRG: 853 | End: 2023-01-04
Attending: SURGERY
Payer: MEDICARE

## 2023-01-04 LAB
ALBUMIN SERPL-MCNC: 2.1 G/DL (ref 3.4–5)
ALBUMIN/GLOB SERPL: 0.6 (ref 0.8–1.7)
ALP SERPL-CCNC: 52 U/L (ref 45–117)
ALT SERPL-CCNC: 12 U/L (ref 16–61)
ANION GAP SERPL CALC-SCNC: 9 MMOL/L (ref 3–18)
AST SERPL-CCNC: 16 U/L (ref 10–38)
ATRIAL RATE: 170 BPM
ATRIAL RATE: 78 BPM
BASOPHILS # BLD: 0 K/UL (ref 0–0.1)
BASOPHILS NFR BLD: 0 % (ref 0–2)
BILIRUB SERPL-MCNC: 3.3 MG/DL (ref 0.2–1)
BUN SERPL-MCNC: 16 MG/DL (ref 7–18)
BUN/CREAT SERPL: 20 (ref 12–20)
CALCIUM SERPL-MCNC: 8 MG/DL (ref 8.5–10.1)
CALCULATED P AXIS, ECG09: 59 DEGREES
CALCULATED R AXIS, ECG10: -6 DEGREES
CALCULATED R AXIS, ECG10: 37 DEGREES
CALCULATED T AXIS, ECG11: -100 DEGREES
CALCULATED T AXIS, ECG11: -5 DEGREES
CHLORIDE SERPL-SCNC: 92 MMOL/L (ref 100–111)
CO2 SERPL-SCNC: 30 MMOL/L (ref 21–32)
CREAT SERPL-MCNC: 0.82 MG/DL (ref 0.6–1.3)
DIAGNOSIS, 93000: NORMAL
DIAGNOSIS, 93000: NORMAL
DIFFERENTIAL METHOD BLD: ABNORMAL
ECHO AO ROOT DIAM: 3 CM
ECHO AO ROOT INDEX: 1.55 CM/M2
ECHO AV AREA PEAK VELOCITY: 2.6 CM2
ECHO AV AREA VTI: 2.8 CM2
ECHO AV AREA/BSA PEAK VELOCITY: 1.3 CM2/M2
ECHO AV AREA/BSA VTI: 1.5 CM2/M2
ECHO AV MEAN GRADIENT: 3 MMHG
ECHO AV MEAN VELOCITY: 0.9 M/S
ECHO AV PEAK GRADIENT: 6 MMHG
ECHO AV PEAK VELOCITY: 1.2 M/S
ECHO AV VELOCITY RATIO: 0.92
ECHO AV VTI: 24.7 CM
ECHO EST RA PRESSURE: 3 MMHG
ECHO LA VOL 2C: 47 ML (ref 18–58)
ECHO LA VOL 4C: 53 ML (ref 18–58)
ECHO LA VOLUME AREA LENGTH: 53 ML
ECHO LA VOLUME INDEX A2C: 24 ML/M2 (ref 16–34)
ECHO LA VOLUME INDEX A4C: 27 ML/M2 (ref 16–34)
ECHO LA VOLUME INDEX AREA LENGTH: 27 ML/M2 (ref 16–34)
ECHO LV E' LATERAL VELOCITY: 10 CM/S
ECHO LV E' SEPTAL VELOCITY: 7 CM/S
ECHO LV FRACTIONAL SHORTENING: 20 % (ref 28–44)
ECHO LV INTERNAL DIMENSION DIASTOLE INDEX: 2.64 CM/M2
ECHO LV INTERNAL DIMENSION DIASTOLIC: 5.1 CM (ref 4.2–5.9)
ECHO LV INTERNAL DIMENSION SYSTOLIC INDEX: 2.12 CM/M2
ECHO LV INTERNAL DIMENSION SYSTOLIC: 4.1 CM
ECHO LV IVSD: 0.7 CM (ref 0.6–1)
ECHO LV MASS 2D: 151.8 G (ref 88–224)
ECHO LV MASS INDEX 2D: 78.7 G/M2 (ref 49–115)
ECHO LV POSTERIOR WALL DIASTOLIC: 1 CM (ref 0.6–1)
ECHO LV RELATIVE WALL THICKNESS RATIO: 0.39
ECHO LVOT AREA: 2.8 CM2
ECHO LVOT AV VTI INDEX: 1
ECHO LVOT DIAM: 1.9 CM
ECHO LVOT MEAN GRADIENT: 3 MMHG
ECHO LVOT PEAK GRADIENT: 5 MMHG
ECHO LVOT PEAK VELOCITY: 1.1 M/S
ECHO LVOT STROKE VOLUME INDEX: 36.4 ML/M2
ECHO LVOT SV: 70.3 ML
ECHO LVOT VTI: 24.8 CM
ECHO MV A VELOCITY: 0.54 M/S
ECHO MV E DECELERATION TIME (DT): 318.3 MS
ECHO MV E VELOCITY: 0.89 M/S
ECHO MV E/A RATIO: 1.65
ECHO MV E/E' LATERAL: 8.9
ECHO MV E/E' RATIO (AVERAGED): 10.81
ECHO MV E/E' SEPTAL: 12.71
ECHO PV MAX VELOCITY: 0.9 M/S
ECHO PV PEAK GRADIENT: 3 MMHG
ECHO RIGHT VENTRICULAR SYSTOLIC PRESSURE (RVSP): 39 MMHG
ECHO RV FREE WALL PEAK S': 24 CM/S
ECHO RV LONGITUDINAL DIMENSION: 4.3 CM
ECHO RV TAPSE: 3.1 CM (ref 1.7–?)
ECHO RVOT PEAK GRADIENT: 2 MMHG
ECHO RVOT PEAK VELOCITY: 0.7 M/S
ECHO TV REGURGITANT MAX VELOCITY: 2.99 M/S
ECHO TV REGURGITANT PEAK GRADIENT: 36 MMHG
EOSINOPHIL # BLD: 0.2 K/UL (ref 0–0.4)
EOSINOPHIL NFR BLD: 2 % (ref 0–5)
ERYTHROCYTE [DISTWIDTH] IN BLOOD BY AUTOMATED COUNT: 12.2 % (ref 11.6–14.5)
GLOBULIN SER CALC-MCNC: 3.3 G/DL (ref 2–4)
GLUCOSE BLD STRIP.AUTO-MCNC: 129 MG/DL (ref 70–110)
GLUCOSE BLD STRIP.AUTO-MCNC: 157 MG/DL (ref 70–110)
GLUCOSE BLD STRIP.AUTO-MCNC: 160 MG/DL (ref 70–110)
GLUCOSE SERPL-MCNC: 137 MG/DL (ref 74–99)
HCT VFR BLD AUTO: 32.8 % (ref 36–48)
HGB BLD-MCNC: 11.6 G/DL (ref 13–16)
IMM GRANULOCYTES # BLD AUTO: 0 K/UL
IMM GRANULOCYTES NFR BLD AUTO: 0 %
LYMPHOCYTES # BLD: 0.8 K/UL (ref 0.9–3.6)
LYMPHOCYTES NFR BLD: 8 % (ref 21–52)
MAGNESIUM SERPL-MCNC: 2.3 MG/DL (ref 1.6–2.6)
MCH RBC QN AUTO: 31.1 PG (ref 24–34)
MCHC RBC AUTO-ENTMCNC: 35.4 G/DL (ref 31–37)
MCV RBC AUTO: 87.9 FL (ref 78–100)
MONOCYTES # BLD: 0.6 K/UL (ref 0.05–1.2)
MONOCYTES NFR BLD: 6 % (ref 3–10)
NEUTS SEG # BLD: 8 K/UL (ref 1.8–8)
NEUTS SEG NFR BLD: 84 % (ref 40–73)
NRBC # BLD: 0 K/UL (ref 0–0.01)
NRBC BLD-RTO: 0 PER 100 WBC
P-R INTERVAL, ECG05: 162 MS
PLATELET # BLD AUTO: 162 K/UL (ref 135–420)
PLATELET COMMENTS,PCOM: ABNORMAL
PMV BLD AUTO: 9.6 FL (ref 9.2–11.8)
POTASSIUM SERPL-SCNC: 2.9 MMOL/L (ref 3.5–5.5)
PROT SERPL-MCNC: 5.4 G/DL (ref 6.4–8.2)
Q-T INTERVAL, ECG07: 282 MS
Q-T INTERVAL, ECG07: 422 MS
QRS DURATION, ECG06: 104 MS
QRS DURATION, ECG06: 86 MS
QTC CALCULATION (BEZET), ECG08: 481 MS
QTC CALCULATION (BEZET), ECG08: 490 MS
RBC # BLD AUTO: 3.73 M/UL (ref 4.35–5.65)
RBC MORPH BLD: ABNORMAL
SODIUM SERPL-SCNC: 131 MMOL/L (ref 136–145)
TSH SERPL DL<=0.05 MIU/L-ACNC: 3.17 UIU/ML (ref 0.36–3.74)
VENTRICULAR RATE, ECG03: 182 BPM
VENTRICULAR RATE, ECG03: 78 BPM
WBC # BLD AUTO: 9.6 K/UL (ref 4.6–13.2)

## 2023-01-04 PROCEDURE — 83735 ASSAY OF MAGNESIUM: CPT

## 2023-01-04 PROCEDURE — 74011000636 HC RX REV CODE- 636: Performed by: SURGERY

## 2023-01-04 PROCEDURE — 80053 COMPREHEN METABOLIC PANEL: CPT

## 2023-01-04 PROCEDURE — 74177 CT ABD & PELVIS W/CONTRAST: CPT

## 2023-01-04 PROCEDURE — 74011000258 HC RX REV CODE- 258: Performed by: SURGERY

## 2023-01-04 PROCEDURE — 74011250636 HC RX REV CODE- 250/636: Performed by: STUDENT IN AN ORGANIZED HEALTH CARE EDUCATION/TRAINING PROGRAM

## 2023-01-04 PROCEDURE — 74011250637 HC RX REV CODE- 250/637: Performed by: SURGERY

## 2023-01-04 PROCEDURE — 99232 SBSQ HOSP IP/OBS MODERATE 35: CPT | Performed by: INTERNAL MEDICINE

## 2023-01-04 PROCEDURE — 82962 GLUCOSE BLOOD TEST: CPT

## 2023-01-04 PROCEDURE — 93306 TTE W/DOPPLER COMPLETE: CPT | Performed by: INTERNAL MEDICINE

## 2023-01-04 PROCEDURE — 85025 COMPLETE CBC W/AUTO DIFF WBC: CPT

## 2023-01-04 PROCEDURE — 93306 TTE W/DOPPLER COMPLETE: CPT

## 2023-01-04 PROCEDURE — 93005 ELECTROCARDIOGRAM TRACING: CPT

## 2023-01-04 PROCEDURE — 74011000250 HC RX REV CODE- 250: Performed by: EMERGENCY MEDICINE

## 2023-01-04 PROCEDURE — 74011250636 HC RX REV CODE- 250/636: Performed by: EMERGENCY MEDICINE

## 2023-01-04 PROCEDURE — 74011636637 HC RX REV CODE- 636/637: Performed by: PHYSICIAN ASSISTANT

## 2023-01-04 PROCEDURE — 74011250636 HC RX REV CODE- 250/636: Performed by: SURGERY

## 2023-01-04 PROCEDURE — 84443 ASSAY THYROID STIM HORMONE: CPT

## 2023-01-04 PROCEDURE — 65660000004 HC RM CVT STEPDOWN

## 2023-01-04 PROCEDURE — 74011000250 HC RX REV CODE- 250: Performed by: SURGERY

## 2023-01-04 PROCEDURE — APPNB30 APP NON BILLABLE TIME 0-30 MINS: Performed by: PHYSICIAN ASSISTANT

## 2023-01-04 PROCEDURE — 74011000258 HC RX REV CODE- 258: Performed by: EMERGENCY MEDICINE

## 2023-01-04 PROCEDURE — 74011250637 HC RX REV CODE- 250/637: Performed by: STUDENT IN AN ORGANIZED HEALTH CARE EDUCATION/TRAINING PROGRAM

## 2023-01-04 PROCEDURE — 36415 COLL VENOUS BLD VENIPUNCTURE: CPT

## 2023-01-04 RX ORDER — POTASSIUM CHLORIDE 20 MEQ/1
40 TABLET, EXTENDED RELEASE ORAL 2 TIMES DAILY
Status: DISCONTINUED | OUTPATIENT
Start: 2023-01-04 | End: 2023-01-04 | Stop reason: DRUGHIGH

## 2023-01-04 RX ORDER — THERA TABS 400 MCG
1 TAB ORAL DAILY
Status: DISCONTINUED | OUTPATIENT
Start: 2023-01-04 | End: 2023-01-06

## 2023-01-04 RX ORDER — POTASSIUM CHLORIDE 7.45 MG/ML
10 INJECTION INTRAVENOUS
Status: COMPLETED | OUTPATIENT
Start: 2023-01-04 | End: 2023-01-04

## 2023-01-04 RX ADMIN — DIATRIZOATE MEGLUMINE AND DIATRIZOATE SODIUM 30 ML: 660; 100 LIQUID ORAL; RECTAL at 14:11

## 2023-01-04 RX ADMIN — PIPERACILLIN SODIUM AND TAZOBACTAM SODIUM 3.38 G: 3; .375 INJECTION, POWDER, LYOPHILIZED, FOR SOLUTION INTRAVENOUS at 16:10

## 2023-01-04 RX ADMIN — POTASSIUM CHLORIDE 40 MEQ: 1500 TABLET, EXTENDED RELEASE ORAL at 08:58

## 2023-01-04 RX ADMIN — POTASSIUM CHLORIDE 10 MEQ: 7.46 INJECTION, SOLUTION INTRAVENOUS at 08:58

## 2023-01-04 RX ADMIN — PIPERACILLIN SODIUM AND TAZOBACTAM SODIUM 3.38 G: 3; .375 INJECTION, POWDER, LYOPHILIZED, FOR SOLUTION INTRAVENOUS at 07:07

## 2023-01-04 RX ADMIN — POTASSIUM CHLORIDE 10 MEQ: 7.46 INJECTION, SOLUTION INTRAVENOUS at 09:00

## 2023-01-04 RX ADMIN — POTASSIUM CHLORIDE 10 MEQ: 7.46 INJECTION, SOLUTION INTRAVENOUS at 10:00

## 2023-01-04 RX ADMIN — Medication 2 UNITS: at 23:38

## 2023-01-04 RX ADMIN — SODIUM CHLORIDE, PRESERVATIVE FREE 10 ML: 5 INJECTION INTRAVENOUS at 07:08

## 2023-01-04 RX ADMIN — PIPERACILLIN SODIUM AND TAZOBACTAM SODIUM 3.38 G: 3; .375 INJECTION, POWDER, LYOPHILIZED, FOR SOLUTION INTRAVENOUS at 23:39

## 2023-01-04 RX ADMIN — ENOXAPARIN SODIUM 40 MG: 100 INJECTION SUBCUTANEOUS at 07:07

## 2023-01-04 RX ADMIN — IOPAMIDOL 100 ML: 612 INJECTION, SOLUTION INTRAVENOUS at 20:15

## 2023-01-04 RX ADMIN — DIATRIZOATE MEGLUMINE AND DIATRIZOATE SODIUM 30 ML: 660; 100 LIQUID ORAL; RECTAL at 15:20

## 2023-01-04 RX ADMIN — FOLIC ACID: 5 INJECTION, SOLUTION INTRAMUSCULAR; INTRAVENOUS; SUBCUTANEOUS at 10:38

## 2023-01-04 RX ADMIN — Medication 2 UNITS: at 12:17

## 2023-01-04 RX ADMIN — ONDANSETRON 4 MG: 2 INJECTION INTRAMUSCULAR; INTRAVENOUS at 23:48

## 2023-01-04 RX ADMIN — DIATRIZOATE MEGLUMINE AND DIATRIZOATE SODIUM 30 ML: 660; 100 LIQUID ORAL; RECTAL at 20:15

## 2023-01-04 RX ADMIN — THIAMINE HYDROCHLORIDE 200 MG: 100 INJECTION, SOLUTION INTRAMUSCULAR; INTRAVENOUS at 10:38

## 2023-01-04 RX ADMIN — POTASSIUM BICARBONATE 40 MEQ: 782 TABLET, EFFERVESCENT ORAL at 18:24

## 2023-01-04 RX ADMIN — THERA TABS 1 TABLET: TAB at 10:00

## 2023-01-04 NOTE — PROGRESS NOTES
1622: Patient unable to drink contrast for procedure. Dr. Vera Mancia informed. An order was given for NG-Tube placement patient is now refusing. Per patient \"my stomach is full and nothing will go down, they should've told me that I had to drink this in the morning. \" Patient was educated on the importance of drinking contrast to provide better viewing of any blockages. 1639: Dr. Martín Mena made aware suggested that CT perform procedure with rectal contrast. CT contacted and relayed information. Patient agreed to going along with rectal contrast. CT also made aware. 1850: second call to CT regarding patient's stat CT scan. Per CT tech, \" all overnight orders are stat and ER takes precedence. \" Relayed to oncoming RN.

## 2023-01-04 NOTE — PROGRESS NOTES
TRANSFER - IN REPORT:    Verbal report received from [place], RN(name) on Memo Grossman  being received from (unit) for routine progression of care      Report consisted of patients Situation, Background, Assessment and   Recommendations(SBAR). Information from the following report(s) SBAR, Kardex, Recent Results, and Cardiac Rhythm A fib  was reviewed with the receiving nurse. Opportunity for questions and clarification was provided. Assessment completed upon patients arrival to unit and care assumed. Wound Prevention Checklist    Patient: Memo Grossman (34 y.o. male)  Date: 1/4/2023  Diagnosis: Perforated bowel ([address] [K63.1] Perforated bowel ([address]    Precautions:         []  Heel prevention boots placed on patient    []  Patient turned q2h during shift    []  Lift team ordered    [x]  Patient on [facility] bed/Specialty bed    []  Each Wound is documented during shift (Stage, Color, drainage, odor, measurements, and dressings)    [x]  Dual skin check done with BAIRON Barber RN      0700 received critical lab 2.9 k+. Called into Dr. Quan Owens and new orders received. Bedside and Verbal shift change report given to Jennifer Larson (oncoming nurse) by Shanika Mullins RN (offgoing nurse). Report included the following information SBAR, Kardex, Procedure Summary, Recent Results, and Cardiac Rhythm afib .

## 2023-01-04 NOTE — PROGRESS NOTES
Comprehensive Nutrition Assessment    Type and Reason for Visit: Reassess, NPO/clear liquid    Nutrition Recommendations/Plan:   Continue clear liquid diet as tolerated, advance when medically feasible per MD.   Continue oral supplements as ordered. Order multivitamin daily due to malnutrition. Monitor PO intake, compliance of oral supplements, weight, labs and plan of care during admission. Malnutrition Assessment:  Malnutrition Status:  Severe malnutrition (01/02/23 0935)    Context:  Acute illness     Findings of the 6 clinical characteristics of malnutrition:   Energy Intake:  50% or less of est energy requirements for 5 or more days  Weight Loss:  Greater than 5% over 1 month     Body Fat Loss:  No significant body fat loss,     Muscle Mass Loss:  Mild muscle mass loss, Temples (temporalis)  Fluid Accumulation:  No significant fluid accumulation,     Strength:  Not performed     Nutrition Assessment:    Admitted for abd pain that has been increasing the past 3 weeks; perforated bowel, s/p ex lap, right hemicolectomy 1/1. Per surgery 1/4: continue with clears, plan for CT scan with oral and rectal contrast today to ensure no distal obstruction, has not passed flatus or had a BM. Visited pt, pt reports tolerating clear liquids, trying to consume more, taking it slow and it \"goes down pretty well\". Reports consuming 1 ensure clear yesterday and tried a few bites of the Gelatein. Denies n/v, abd pain. Pt has been NPO/CLD since admit x 3 days with poor PO PTA. Nutritional needs are not being met.     Nutrition Related Findings:    Pertinent Meds: folic acid, lovenox, zosyn, KCl, thiamine  Pertinent Labs: Na 131 L, K 2.9 L (trending down) Wound Type: Surgical incision    Current Nutrition Intake & Therapies:  Average Meal Intake: %  Average Supplement Intake: None ordered  ADULT DIET Clear Liquid  ADULT ORAL NUTRITION SUPPLEMENT Breakfast, Dinner; Clear Liquid  ADULT ORAL NUTRITION SUPPLEMENT Lunch, Dinner; Protein Modular    Anthropometric Measures:  Height: 5' 9\" (175.3 cm)  Ideal Body Weight (IBW): 160 lbs (73 kg)  Admission Body Weight: 169 lb 15.6 oz (77.1 kg)  Current Body Wt:  82.6 kg (182 lb) (obtained by this writer), 113.8 % IBW. Bed scale  Current BMI (kg/m2): 26.9  Usual Body Weight: 81.6 kg (180 lb)  % Weight Change (Calculated): 1.1  Weight Adjustment: No adjustment  BMI Category: Overweight (BMI 25.0-29. 9)    Estimated Daily Nutrient Needs:  Energy Requirements Based On: Formula  Weight Used for Energy Requirements: Admission  Energy (kcal/day): 2393-9150 (MSJ 1.2-1.3)  Weight Used for Protein Requirements: Current  Protein (g/day):  (1.2-1.4 g/day)  Method Used for Fluid Requirements: 1 ml/kcal  Fluid (ml/day): 4066-6945    Nutrition Diagnosis:   Severe malnutrition, In context of acute illness or injury related to acute injury/trauma, altered GI function as evidenced by Criteria as identified in malnutrition assessment  Altered GI function related to altered GI structure (2/2 perforated bowel) as evidenced by NPO or clear liquid status due to medical condition (s/p ex lap)    Nutrition Interventions:   Food and/or Nutrient Delivery: Continue current diet, Continue oral nutrition supplement  Nutrition Education/Counseling: No recommendations at this time  Coordination of Nutrition Care: Continue to monitor while inpatient  Plan of Care discussed with: pt    Goals:  Previous Goal Met: No progress toward goal(s)  Goals: Meet at least 75% of estimated needs, by next RD assessment       Nutrition Monitoring and Evaluation:   Behavioral-Environmental Outcomes: None identified  Food/Nutrient Intake Outcomes: Food and nutrient intake, Supplement intake, Diet advancement/tolerance  Physical Signs/Symptoms Outcomes: Biochemical data, GI status, Meal time behavior, Weight, Fluid status or edema, Nausea/vomiting    Discharge Planning:     Too soon to determine    Deliamary Mchugh MS, RD, [address]   Contact: [phone]

## 2023-01-04 NOTE — PROGRESS NOTES
POD# 3    Admit Date: 1/1/2023    Assessment    Teresita Mireles is a 75 y.o. male POD 3 s/p exploratory laparotomy, right hemicolectomy    Patient Active Problem List   Diagnosis Code    Anxiety F41. 9    Benign essential hypertension I10    Perforated bowel (HCC) K63.1    Hypokalemia E87.6    Hyponatremia E87.1    Lactic acidosis E87.20    Leukocytosis D72.829    Hyperglycemia R73.9    Severe protein-calorie malnutrition (HCC) E43    Atrial flutter ([address] I48.92       Plan  -continue with clears, potassium replacement this am, repeat labs tomorrow  -plan for CT scan with oral and rectal contrast today to ensure no distal obstruction  -Levaquin stopped due to cardiac events from yesterday, echo pending  -continue with zosyn    Subjective    Overnight events: Yesterday evening patient developed SVT vs aflutter and evaluated by cardiology. He reports no chest pain, shortness of breath. He has minimal abdominal discomfort. He has no nausea or vomiting. He has not passed flatus or had a bowel movement    Objective    Physical Exam:  Visit Vitals  BP (!) 108/58   Pulse (!) 55   Temp 98.2 °F (36.8 °C)   Resp 25   Ht 5' 9\" (1.753 m)   Wt 77.1 kg (170 lb)   SpO2 97%   BMI 25.10 kg/m²       Intake/Output Summary (Last 24 hours) at 1/4/2023 0748  Last data filed at 1/4/2023 0433  Gross per 24 hour   Intake --   Output 805 ml   Net -805 ml     Physical Exam  Cardiovascular:      Rate and Rhythm: Bradycardia present. Pulmonary:      Effort: Pulmonary effort is normal.   Abdominal:      General: Abdomen is flat. There is distension. Palpations: Abdomen is soft. Tenderness: There is no abdominal tenderness. Comments: Softly distended, mild incisional tenderness, dressing clean, dry, intact GEO 1&2 serous   Neurological:      Mental Status: He is alert.              Star Pedraza DO  Phone: [phone]

## 2023-01-04 NOTE — PROGRESS NOTES
Pt's abdomen noted to be more distended compared to this morning, Dr emjia made aware, no new orders given, pt is to have CT of abdomen 1/4/2023, no active signs of bleeding, no distress noted, pt denies pain at this time. GEO drains patent and draining clear liquids, transfer orders noted for patient to transfer to step down, pt will be transported once bed is available. will continue to monitor.

## 2023-01-04 NOTE — PROGRESS NOTES
Assessment/ Transition Planning       Reason for Admission:  Perforated bowel ([facility] [address] [K63.1]                 RUR Score:    9            Plan for utilizing home health:    No                      Likelihood of Readmission:   LOW                         Transition of Care Plan:              Initial assessment completed with patient. Cognitive status of patient: oriented to time, place, person and situation. Face sheet information confirmed:  yes. The patient designates Lyndsay colón (spouse) to participate in his discharge plan and to receive any needed information. This patient lives in a single family home with spouse. Patient is able to navigate steps as needed. Prior to hospitalization, patient was considered to be independent with ADLs/IADLS : yes     Patient has a current ACP document on file: no      Healthcare Decision Maker:     Click here to complete [address] including selection of the Healthcare Decision Maker Relationship (ie \"Primary\")    The spouse Ren Lujan will be available to transport patient home upon discharge. The patient already has [place], Rollator, W/C, Shower chair, BSC, and Blood Pressure Monitor medical equipment available in the home. Patient is not currently active with home health. Patient has not stayed in a skilled nursing facility or rehab. Was  stay within last 60 days : no. This patient is on dialysis :no    List of available Home Health agencies were provided and reviewed with the patient prior to discharge. Freedom of choice verbalized: yes, for [facility] if needed. Currently, the discharge plan is Home with [address] [place] [address] Isai Nelson. The patient states that he can obtain his medications from the pharmacy, and take his medications as directed. Patient's current insurance is [employer] [facility] MEDICAID       Care Management Interventions  PCP Verified by CM:  Yes  Palliative Care Criteria Met (RRAT>21 & CHF Dx)?: No  Mode of Transport at Discharge:  Other (see comment) (Family)  Transition of Care Consult (CM Consult): Discharge Planning  MyChart Signup: No  Discharge Durable Medical Equipment: No  Health Maintenance Reviewed: Yes  Physical Therapy Consult: No  Occupational Therapy Consult: No  Speech Therapy Consult: No  Support Systems: Spouse/Significant Other  Confirm Follow Up Transport: Family  The Patient and/or Patient Representative was Provided with a Choice of Provider and Agrees with the Discharge Plan?: Yes  Freedom of Choice List was Provided with Basic Dialogue that Supports the Patient's Individualized Plan of Care/Goals, Treatment Preferences and Shares the Quality Data Associated with the Providers?: Yes (Patient selected [address])  [address] Provided?: No  Discharge Location  Patient Expects to be Discharged to[de-identified] Home with home health        KRYS Mayorga    [facility]

## 2023-01-04 NOTE — PROGRESS NOTES
Infectious Disease progress Note        Reason: secondary peritonitis, cecal perforation    Current abx Prior abx   Zosyn since 1/1/23      Lines:       Assessment :   75y.o. year old male with PMH significant for HTN, type 2 DM who presented to ed on 1/1/23 with severe abdominal pain. Clinical presentation c/w secondary peritonitis due to cecal perforation s/p exploratory laparotomy, right hemicolectomy on 1/1/23. Intra op findings noted  Worsening leukocytosis noted 1/2/23 likely due to post op inflammation   Monitor for infection with resistant gram negatives    Clinically improving. Decreasing wbc. Recommendations:    - continue zosyn.   -follow blood cx  --follow up surgery recommendations regarding repeat ct scan   -Advance diet as tolerated   -monitor cbc, temp, clinically   -We will switch to oral antibiotics if able to tolerate p.o. diet, no new abscess identified on CT scan      Above plan was discussed in details with patient, and dr Kamini Noyola. Please call me if any further questions or concerns. Will continue to participate in the care of this patient. HPI:       Patient feels better. Tolerating liquid diet. Denies increased chest pain, shortness of breath, nausea, vomiting    Past Medical History:   Diagnosis Date    Anxiety     Benign essential hypertension     DM type 2 (diabetes mellitus, type 2) ([facility] [address]        History reviewed. No pertinent surgical history. Current Discharge Medication List        CONTINUE these medications which have NOT CHANGED    Details   atorvastatin (LIPITOR) 10 mg tablet Take 1 tablet by mouth daily  Qty: 90 Tablet, Refills: 3    Associated Diagnoses: Type 2 diabetes mellitus without complication, without long-term current use of insulin (HCC)      atenoloL-chlorthalidone (TENORETIC) 50-25 mg per tablet Take 1 Tablet by mouth daily.   Qty: 90 Tablet, Refills: 3    Associated Diagnoses: Benign essential hypertension      potassium chloride (K-DUR, KLOR-CON) 20 mEq tablet 1 qd  Qty: 90 Tab, Refills: 3      clobetasoL (TEMOVATE) 0.05 % ointment Apply  to affected area two (2) times a day. For psoriasis  Qty: 15 g, Refills: 5    Associated Diagnoses: Psoriasis, unspecified      amLODIPine (NORVASC) 10 mg tablet Take 1 Tablet by mouth daily. 1 qd  Qty: 90 Tablet, Refills: 3    Associated Diagnoses: Benign essential hypertension           STOP taking these medications       lisinopriL (PRINIVIL, ZESTRIL) 10 mg tablet Comments:   Reason for Stopping:               Current Facility-Administered Medications   Medication Dose Route Frequency    potassium chloride (K-DUR, KLOR-CON M20) SR tablet 40 mEq  40 mEq Oral BID    potassium chloride 10 mEq in 100 ml IVPB  10 mEq IntraVENous Q1H    enoxaparin (LOVENOX) injection 40 mg  40 mg SubCUTAneous Q24H    [Held by provider] dilTIAZem (CARDIZEM) 100 mg in 0.9% sodium chloride (MBP/ADV) 100 mL infusion  2.5 mg/hr IntraVENous TITRATE    folic acid (FOLVITE) 1 mg in 0.9% sodium chloride 50 mL ivpb   IntraVENous DAILY    thiamine (B-1) 200 mg in 0.9% sodium chloride 50 mL IVPB  200 mg IntraVENous DAILY    naloxone (NARCAN) injection 0.4 mg  0.4 mg IntraVENous EVERY 2 MINUTES AS NEEDED    sodium chloride (NS) flush 5-10 mL  5-10 mL IntraVENous PRN    piperacillin-tazobactam (ZOSYN) 3.375 g in 0.9% sodium chloride (MBP/ADV) 100 mL MBP  3.375 g IntraVENous Q8H    phenol throat spray (CHLORASEPTIC) 1 Spray  1 Spray Oral PRN    ondansetron (ZOFRAN) injection 4 mg  4 mg IntraVENous Q4H PRN    insulin lispro (HUMALOG) injection   SubCUTAneous Q6H    glucose chewable tablet 16 g  4 Tablet Oral PRN    glucagon (GLUCAGEN) injection 1 mg  1 mg IntraMUSCular PRN    dextrose 10% infusion 0-250 mL  0-250 mL IntraVENous PRN       Allergies: Patient has no known allergies.     Family History   Problem Relation Age of Onset    OSTEOARTHRITIS Father     No Known Problems Mother      Social History     Socioeconomic History    Marital status: SINGLE     Spouse name: Not on file    Number of children: Not on file    Years of education: Not on file    Highest education level: Not on file   Occupational History    Not on file   Tobacco Use    Smoking status: Former     Types: Cigarettes     Quit date: 1980     Years since quittin.0    Smokeless tobacco: Never   Vaping Use    Vaping Use: Never used   Substance and Sexual Activity    Alcohol use: Yes     Comment: heavy    Drug use: No    Sexual activity: Not on file   Other Topics Concern    Not on file   Social History Narrative    Not on file     Social Determinants of Health     Financial Resource Strain: Low Risk     Difficulty of Paying Living Expenses: Not hard at all   Food Insecurity: No Food Insecurity    Worried About Running Out of Food in the Last Year: Never true    Ran Out of Food in the Last Year: Never true   Transportation Needs: Not on file   Physical Activity: Not on file   Stress: Not on file   Social Connections: Not on file   Intimate Partner Violence: Not on file   Housing Stability: Not on file     Social History     Tobacco Use   Smoking Status Former    Types: Cigarettes    Quit date: 1980    Years since quittin.0   Smokeless Tobacco Never        Temp (24hrs), Av.6 °F (37 °C), Min:97.9 °F (36.6 °C), Max:99.7 °F (37.6 °C)    Visit Vitals  /68   Pulse (!) 57   Temp 97.9 °F (36.6 °C)   Resp 18   Ht 5' 9\" (1.753 m)   Wt 77.1 kg (170 lb)   SpO2 97%   BMI 25.10 kg/m²       ROS: 12 point ROS obtained in details. Pertinent positives as mentioned in HPI,   otherwise negative    Physical Exam:    General: Well developed, well nourished male laying on the bed AAOx3 in no acute distress. General:   awake alert and oriented   HEENT:  Normocephalic, atraumatic,  EOMI, no scleral icterus or pallor; no conjunctival hemmohage;  nasal and oral mucous are moist and without evidence of lesions. Neck supple, no bruits.    Lymph Nodes:   no cervical, axillary or inguinal adenopathy   Lungs: non-labored, bilaterally clear to auscultation- no crackles wheezes rales or rhonchi   Heart:  RRR, s1 and s2; no rubs or gallops, no edema, + pedal pulses   Abdomen:  soft, non-distended, active bowel sounds, no hepatomegaly, no splenomegaly. + surgical changes on abdomen. + diffuse tenderness- no guarding   Genitourinary:  deferred   Extremities:   no clubbing, cyanosis; no joint effusions or swelling; Full ROM of all large joints to the upper and lower extremities; muscle mass appropriate for age   Neurologic:  No gross focal sensory abnormalities; 5/5 muscle strength to upper and lower extremities. Speech appropriate.  Cranial nerves intact                        Skin:  No rash or ulcers noted   Back:  no spinal or paraspinal muscle tenderness or rigidity, no CVA tenderness     Psychiatric:  No suicidal or homicidal ideations, appropriate mood and affect         Labs: Results:   Chemistry Recent Labs     01/04/23 0453 01/03/23 1832 01/03/23  1142 01/03/23  0323   * 136* 148* 151*   * 130* 131* 130*   K 2.9* 3.0* 3.1* 2.5*   CL 92* 92* 94* 94*   CO2 30 28 32 31   BUN 16 16 16 19*   CREA 0.82 0.90 0.86 0.96   CA 8.0* 7.9* 8.1* 7.7*   AGAP 9 10 5 5   BUCR 20 18 19 20   AP 52 49  --  39*   TP 5.4* 5.4*  --  5.2*   ALB 2.1* 2.2*  --  2.4*   GLOB 3.3 3.2  --  2.8   AGRAT 0.6* 0.7*  --  0.9        CBC w/Diff Recent Labs     01/04/23 0453 01/03/23  1832 01/03/23  0323 01/02/23  0205   WBC 9.6 11.8 12.0 21.9*   RBC 3.73* 3.96* 3.69* 4.90   HGB 11.6* 13.4 11.4* 15.4   HCT 32.8* 34.4* 32.2* 41.8    175 153 221   GRANS 84*  --  88* 79*   LYMPH 8*  --  5* 6*   EOS 2  --  0 0        Microbiology Recent Labs     01/02/23  0205 01/02/23  0200   CULT NO GROWTH 2 DAYS NO GROWTH 2 DAYS            RADIOLOGY:    All available imaging studies/reports in [facility] for this admission were reviewed      Disclaimer: Sections of this note are dictated utilizing voice recognition software, which may have resulted in some phonetic based errors in grammar and contents. Even though attempts were made to correct all the mistakes, some may have been missed, and remained in the body of the document. If questions arise, please contact our department.     Dr. Melanie Roberts, Infectious Disease Specialist  [phone]  January 4, 2023  6:40 AM

## 2023-01-04 NOTE — PROGRESS NOTES
Cardiology Progress Note    Admit Date: 1/1/2023  Attending Cardiologist: Dr. Eloy Rockwell:     -Peritonitis due to cecal perforation s/p exploratory laparotomy, right hemicolectomy  -Paroxysmal Atrial flutter with 2-1 block, new dx this admission, in setting of above. Converted to SR.   -Prolonged QTc of 516 ms which is likely secondary to Levaquin. Now discontinued. Improved. -H/o bradycardia on Atenolol 100  mg BID.   -H/o etoh abuse. Primary Cardiologist is Dr. Shawna Andres. Plan:       I saw, evaluated, interviewed and examined the patient personally. Patient denies any cardiac complaint. No chest pain or chest tightness  No evidence of fluid overload on exam  Replace electrolyte to keep potassium around 4 and magnesium around 2  Check TSH  Echo with normal EF  Currently back in sinus rhythm. Likely arrhythmia because of physiologic stress, peritonitis. Agree with observation. Can consider aspirin for stroke prophylaxis for now. If recurrent episode, will need to consider anticoagulation      Nano Zuniga MD       Currently in SR with rates in the 46s. Given h/o bradycardia with BBs, will only use PRN rate controlling agents as needed for sustained rates. If recurrent, may need to consider chronic anticoagulation if able from a sx standpoint. Echo to assess LVEF pending. Replace K, recommend maintaining at 4.0 from a CV standpoint. Currently receiving po replacement. Will check TSH for completeness. Further recommendations pending hospital course/test results. Subjective:     No new complaints.      Objective:      Patient Vitals for the past 8 hrs:   Temp Pulse Resp BP SpO2   01/04/23 0802 97.9 °F (36.6 °C) (!) 57 18 137/68 97 %   01/04/23 0712 -- -- -- (!) 108/58 --   01/04/23 0433 98.2 °F (36.8 °C) (!) 55 25 (!) 126/53 97 %         Patient Vitals for the past 96 hrs:   Weight   01/04/23 0712 77.1 kg (170 lb)   01/01/23 1507 77.1 kg (170 lb)       TELE: SR/PAF Current Facility-Administered Medications   Medication Dose Route Frequency Last Admin    potassium chloride (K-DUR, KLOR-CON M20) SR tablet 40 mEq  40 mEq Oral BID 40 mEq at 01/04/23 0858    potassium chloride 10 mEq in 100 ml IVPB  10 mEq IntraVENous Q1H 10 mEq at 01/04/23 0900    therapeutic multivitamin (THERAGRAN) tablet 1 Tablet  1 Tablet Oral DAILY 1 Tablet at 01/04/23 1000    enoxaparin (LOVENOX) injection 40 mg  40 mg SubCUTAneous Q24H 40 mg at 01/04/23 0707    [Held by provider] dilTIAZem (CARDIZEM) 100 mg in 0.9% sodium chloride (MBP/ADV) 100 mL infusion  2.5 mg/hr IntraVENous TITRATE      folic acid (FOLVITE) 1 mg in 0.9% sodium chloride 50 mL ivpb   IntraVENous DAILY New Bag at 01/04/23 1038    thiamine (B-1) 200 mg in 0.9% sodium chloride 50 mL IVPB  200 mg IntraVENous DAILY 200 mg at 01/04/23 1038    naloxone (NARCAN) injection 0.4 mg  0.4 mg IntraVENous EVERY 2 MINUTES AS NEEDED      sodium chloride (NS) flush 5-10 mL  5-10 mL IntraVENous PRN 10 mL at 01/04/23 0708    piperacillin-tazobactam (ZOSYN) 3.375 g in 0.9% sodium chloride (MBP/ADV) 100 mL MBP  3.375 g IntraVENous Q8H 3.375 g at 01/04/23 0707    phenol throat spray (CHLORASEPTIC) 1 Spray  1 Spray Oral PRN 1 Spray at 01/02/23 0622    ondansetron (ZOFRAN) injection 4 mg  4 mg IntraVENous Q4H PRN      insulin lispro (HUMALOG) injection   SubCUTAneous Q6H 2 Units at 01/02/23 1451    glucose chewable tablet 16 g  4 Tablet Oral PRN      glucagon (GLUCAGEN) injection 1 mg  1 mg IntraMUSCular PRN      dextrose 10% infusion 0-250 mL  0-250 mL IntraVENous PRN           Intake/Output Summary (Last 24 hours) at 1/4/2023 1113  Last data filed at 1/4/2023 0806  Gross per 24 hour   Intake --   Output 1155 ml   Net -1155 ml       Physical Exam:  General:  alert, cooperative, no distress, appears stated age  Neck:  no JVD  Lungs:  clear to auscultation bilaterally  Heart:  RRR  Abdomen:  soft, bandage covering incision   Extremities:  extremities normal, atraumatic, no cyanosis or edema    Visit Vitals  /68   Pulse (!) 57   Temp 97.9 °F (36.6 °C)   Resp 18   Ht 5' 9\" (1.753 m)   Wt 77.1 kg (170 lb)   SpO2 97%   BMI 25.10 kg/m²       Data Review:     Labs: Results:       Chemistry Recent Labs     01/04/23 0453 01/03/23  1832 01/03/23  1142 01/03/23  0323   * 136* 148* 151*   * 130* 131* 130*   K 2.9* 3.0* 3.1* 2.5*   CL 92* 92* 94* 94*   CO2 30 28 32 31   BUN 16 16 16 19*   CREA 0.82 0.90 0.86 0.96   CA 8.0* 7.9* 8.1* 7.7*   MG 2.3 2.2  --  2.6   AGAP 9 10 5 5   BUCR 20 18 19 20   AP 52 49  --  39*   TP 5.4* 5.4*  --  5.2*   ALB 2.1* 2.2*  --  2.4*   GLOB 3.3 3.2  --  2.8   AGRAT 0.6* 0.7*  --  0.9      CBC w/Diff Recent Labs     01/04/23 0453 01/03/23 1832 01/03/23  0323 01/02/23  0205   WBC 9.6 11.8 12.0 21.9*   RBC 3.73* 3.96* 3.69* 4.90   HGB 11.6* 13.4 11.4* 15.4   HCT 32.8* 34.4* 32.2* 41.8    175 153 221   GRANS 84*  --  88* 79*   LYMPH 8*  --  5* 6*   EOS 2  --  0 0      Cardiac Enzymes No results found for: CPK, CK, CKMMB, CKMB, RCK3, CKMBT, CKNDX, CKND1, AMY, TROPT, TROIQ, NITHIN, TROPT, TNIPOC, BNP, BNPP   Coagulation No results for input(s): PTP, INR, APTT, INREXT in the last 72 hours.     Lipid Panel Lab Results   Component Value Date/Time    Cholesterol, total 183 07/06/2022 10:33 AM    HDL Cholesterol 50 07/06/2022 10:33 AM    LDL, calculated 87.2 07/06/2022 10:33 AM    VLDL, calculated 45.8 07/06/2022 10:33 AM    Triglyceride 229 (H) 07/06/2022 10:33 AM    CHOL/HDL Ratio 3.7 07/06/2022 10:33 AM      BNP No results found for: BNP, BNPP, XBNPT   Liver Enzymes Recent Labs     01/04/23  0453   TP 5.4*   ALB 2.1*   AP 52      Thyroid Studies Lab Results   Component Value Date/Time    TSH 3.950 01/04/2021 12:00 AM          Signed By: Ashly Ray PA-C     January 4, 2023

## 2023-01-05 ENCOUNTER — ANESTHESIA EVENT (OUTPATIENT)
Dept: SURGERY | Age: 68
DRG: 853 | End: 2023-01-05
Payer: MEDICARE

## 2023-01-05 ENCOUNTER — ANESTHESIA (OUTPATIENT)
Dept: SURGERY | Age: 68
DRG: 853 | End: 2023-01-05
Payer: MEDICARE

## 2023-01-05 LAB
ALBUMIN SERPL-MCNC: 2.1 G/DL (ref 3.4–5)
ALBUMIN/GLOB SERPL: 0.6 (ref 0.8–1.7)
ALP SERPL-CCNC: 75 U/L (ref 45–117)
ALT SERPL-CCNC: 18 U/L (ref 16–61)
ANION GAP SERPL CALC-SCNC: 7 MMOL/L (ref 3–18)
AST SERPL-CCNC: 18 U/L (ref 10–38)
BASOPHILS # BLD: 0 K/UL (ref 0–0.1)
BASOPHILS NFR BLD: 0 % (ref 0–2)
BILIRUB SERPL-MCNC: 2.7 MG/DL (ref 0.2–1)
BUN SERPL-MCNC: 15 MG/DL (ref 7–18)
BUN/CREAT SERPL: 18 (ref 12–20)
CALCIUM SERPL-MCNC: 8.1 MG/DL (ref 8.5–10.1)
CEA SERPL-MCNC: 3.1 NG/ML
CHLORIDE SERPL-SCNC: 88 MMOL/L (ref 100–111)
CO2 SERPL-SCNC: 35 MMOL/L (ref 21–32)
CREAT SERPL-MCNC: 0.82 MG/DL (ref 0.6–1.3)
DIFFERENTIAL METHOD BLD: ABNORMAL
EOSINOPHIL # BLD: 0 K/UL (ref 0–0.4)
EOSINOPHIL NFR BLD: 0 % (ref 0–5)
ERYTHROCYTE [DISTWIDTH] IN BLOOD BY AUTOMATED COUNT: 12.1 % (ref 11.6–14.5)
GLOBULIN SER CALC-MCNC: 3.7 G/DL (ref 2–4)
GLUCOSE BLD STRIP.AUTO-MCNC: 158 MG/DL (ref 70–110)
GLUCOSE BLD STRIP.AUTO-MCNC: 175 MG/DL (ref 70–110)
GLUCOSE BLD STRIP.AUTO-MCNC: 207 MG/DL (ref 70–110)
GLUCOSE BLD STRIP.AUTO-MCNC: 209 MG/DL (ref 70–110)
GLUCOSE SERPL-MCNC: 156 MG/DL (ref 74–99)
HCT VFR BLD AUTO: 37.3 % (ref 36–48)
HGB BLD-MCNC: 13.2 G/DL (ref 13–16)
IMM GRANULOCYTES # BLD AUTO: 0.1 K/UL (ref 0–0.04)
IMM GRANULOCYTES NFR BLD AUTO: 1 % (ref 0–0.5)
LYMPHOCYTES # BLD: 0.9 K/UL (ref 0.9–3.6)
LYMPHOCYTES NFR BLD: 7 % (ref 21–52)
MAGNESIUM SERPL-MCNC: 2.2 MG/DL (ref 1.6–2.6)
MCH RBC QN AUTO: 31.4 PG (ref 24–34)
MCHC RBC AUTO-ENTMCNC: 35.4 G/DL (ref 31–37)
MCV RBC AUTO: 88.8 FL (ref 78–100)
MONOCYTES # BLD: 1.3 K/UL (ref 0.05–1.2)
MONOCYTES NFR BLD: 11 % (ref 3–10)
NEUTS SEG # BLD: 10.1 K/UL (ref 1.8–8)
NEUTS SEG NFR BLD: 81 % (ref 40–73)
NRBC # BLD: 0 K/UL (ref 0–0.01)
NRBC BLD-RTO: 0 PER 100 WBC
PHOSPHATE SERPL-MCNC: 1.5 MG/DL (ref 2.5–4.9)
PLATELET # BLD AUTO: 191 K/UL (ref 135–420)
PMV BLD AUTO: 9.4 FL (ref 9.2–11.8)
POTASSIUM SERPL-SCNC: 3.3 MMOL/L (ref 3.5–5.5)
PROT SERPL-MCNC: 5.8 G/DL (ref 6.4–8.2)
RBC # BLD AUTO: 4.2 M/UL (ref 4.35–5.65)
SODIUM SERPL-SCNC: 130 MMOL/L (ref 136–145)
WBC # BLD AUTO: 12.3 K/UL (ref 4.6–13.2)

## 2023-01-05 PROCEDURE — 82962 GLUCOSE BLOOD TEST: CPT

## 2023-01-05 PROCEDURE — 83735 ASSAY OF MAGNESIUM: CPT

## 2023-01-05 PROCEDURE — C1765 ADHESION BARRIER: HCPCS | Performed by: SURGERY

## 2023-01-05 PROCEDURE — 76060000034 HC ANESTHESIA 1.5 TO 2 HR: Performed by: SURGERY

## 2023-01-05 PROCEDURE — 99232 SBSQ HOSP IP/OBS MODERATE 35: CPT | Performed by: INTERNAL MEDICINE

## 2023-01-05 PROCEDURE — 0D1B0Z4 BYPASS ILEUM TO CUTANEOUS, OPEN APPROACH: ICD-10-PCS | Performed by: SURGERY

## 2023-01-05 PROCEDURE — 2709999900 HC NON-CHARGEABLE SUPPLY: Performed by: SURGERY

## 2023-01-05 PROCEDURE — 87075 CULTR BACTERIA EXCEPT BLOOD: CPT

## 2023-01-05 PROCEDURE — 74011250636 HC RX REV CODE- 250/636: Performed by: EMERGENCY MEDICINE

## 2023-01-05 PROCEDURE — 77030019908 HC STETH ESOPH SIMS -A: Performed by: STUDENT IN AN ORGANIZED HEALTH CARE EDUCATION/TRAINING PROGRAM

## 2023-01-05 PROCEDURE — 74011250637 HC RX REV CODE- 250/637: Performed by: SURGERY

## 2023-01-05 PROCEDURE — 74011636637 HC RX REV CODE- 636/637: Performed by: PHYSICIAN ASSISTANT

## 2023-01-05 PROCEDURE — 77030040361 HC SLV COMPR DVT MDII -B: Performed by: SURGERY

## 2023-01-05 PROCEDURE — 74011000258 HC RX REV CODE- 258: Performed by: ANESTHESIOLOGY

## 2023-01-05 PROCEDURE — 77030012048 HC BG OST DRN W/F BMS -A: Performed by: SURGERY

## 2023-01-05 PROCEDURE — 77030026438 HC STYL ET INTUB CARD -A: Performed by: STUDENT IN AN ORGANIZED HEALTH CARE EDUCATION/TRAINING PROGRAM

## 2023-01-05 PROCEDURE — 74011250636 HC RX REV CODE- 250/636: Performed by: ANESTHESIOLOGY

## 2023-01-05 PROCEDURE — 77030010282 HC STPLR ENDOSC J&J -C: Performed by: SURGERY

## 2023-01-05 PROCEDURE — 76210000016 HC OR PH I REC 1 TO 1.5 HR: Performed by: SURGERY

## 2023-01-05 PROCEDURE — 74011250636 HC RX REV CODE- 250/636: Performed by: SURGERY

## 2023-01-05 PROCEDURE — 77030010541: Performed by: SURGERY

## 2023-01-05 PROCEDURE — 74011000250 HC RX REV CODE- 250: Performed by: SURGERY

## 2023-01-05 PROCEDURE — 76010000153 HC OR TIME 1.5 TO 2 HR: Performed by: SURGERY

## 2023-01-05 PROCEDURE — 77030009978 HC RELD STPLR TCR J&J -B: Performed by: SURGERY

## 2023-01-05 PROCEDURE — 74011250636 HC RX REV CODE- 250/636: Performed by: NURSE ANESTHETIST, CERTIFIED REGISTERED

## 2023-01-05 PROCEDURE — 44620 REPAIR BOWEL OPENING: CPT | Performed by: SURGERY

## 2023-01-05 PROCEDURE — 87205 SMEAR GRAM STAIN: CPT

## 2023-01-05 PROCEDURE — 74011000258 HC RX REV CODE- 258: Performed by: EMERGENCY MEDICINE

## 2023-01-05 PROCEDURE — 77030002996 HC SUT SLK J&J -A: Performed by: SURGERY

## 2023-01-05 PROCEDURE — 82378 CARCINOEMBRYONIC ANTIGEN: CPT

## 2023-01-05 PROCEDURE — 77030012407 HC DRN WND BARD -B: Performed by: SURGERY

## 2023-01-05 PROCEDURE — 77030011264 HC ELECTRD BLD EXT COVD -A: Performed by: SURGERY

## 2023-01-05 PROCEDURE — 77030031139 HC SUT VCRL2 J&J -A: Performed by: SURGERY

## 2023-01-05 PROCEDURE — 65660000004 HC RM CVT STEPDOWN

## 2023-01-05 PROCEDURE — 84100 ASSAY OF PHOSPHORUS: CPT

## 2023-01-05 PROCEDURE — 77030008462 HC STPLR SKN PROX J&J -A: Performed by: SURGERY

## 2023-01-05 PROCEDURE — 36415 COLL VENOUS BLD VENIPUNCTURE: CPT

## 2023-01-05 PROCEDURE — 74011000258 HC RX REV CODE- 258: Performed by: SURGERY

## 2023-01-05 PROCEDURE — 77030040830 HC CATH URETH FOL MDII -A: Performed by: SURGERY

## 2023-01-05 PROCEDURE — C1751 CATH, INF, PER/CENT/MIDLINE: HCPCS | Performed by: SURGERY

## 2023-01-05 PROCEDURE — 77030002986 HC SUT PROL J&J -A: Performed by: SURGERY

## 2023-01-05 PROCEDURE — 77030040922 HC BLNKT HYPOTHRM STRY -A: Performed by: SURGERY

## 2023-01-05 PROCEDURE — 80053 COMPREHEN METABOLIC PANEL: CPT

## 2023-01-05 PROCEDURE — 77030008683 HC TU ET CUF COVD -A: Performed by: STUDENT IN AN ORGANIZED HEALTH CARE EDUCATION/TRAINING PROGRAM

## 2023-01-05 PROCEDURE — 85025 COMPLETE CBC W/AUTO DIFF WBC: CPT

## 2023-01-05 PROCEDURE — 77030003028 HC SUT VCRL J&J -A: Performed by: SURGERY

## 2023-01-05 RX ORDER — LIDOCAINE HYDROCHLORIDE 10 MG/ML
5 INJECTION, SOLUTION EPIDURAL; INFILTRATION; INTRACAUDAL; PERINEURAL ONCE
Status: ACTIVE | OUTPATIENT
Start: 2023-01-05 | End: 2023-01-05

## 2023-01-05 RX ORDER — ONDANSETRON 2 MG/ML
INJECTION INTRAMUSCULAR; INTRAVENOUS AS NEEDED
Status: DISCONTINUED | OUTPATIENT
Start: 2023-01-05 | End: 2023-01-05 | Stop reason: HOSPADM

## 2023-01-05 RX ORDER — MORPHINE SULFATE 2 MG/ML
2 INJECTION, SOLUTION INTRAMUSCULAR; INTRAVENOUS
Status: DISCONTINUED | OUTPATIENT
Start: 2023-01-05 | End: 2023-01-09

## 2023-01-05 RX ORDER — PROPOFOL 10 MG/ML
INJECTION, EMULSION INTRAVENOUS AS NEEDED
Status: DISCONTINUED | OUTPATIENT
Start: 2023-01-05 | End: 2023-01-05 | Stop reason: HOSPADM

## 2023-01-05 RX ORDER — NEOSTIGMINE METHYLSULFATE 1 MG/ML
INJECTION, SOLUTION INTRAVENOUS AS NEEDED
Status: DISCONTINUED | OUTPATIENT
Start: 2023-01-05 | End: 2023-01-05 | Stop reason: HOSPADM

## 2023-01-05 RX ORDER — FENTANYL CITRATE 50 UG/ML
50 INJECTION, SOLUTION INTRAMUSCULAR; INTRAVENOUS
Status: DISCONTINUED | OUTPATIENT
Start: 2023-01-05 | End: 2023-01-05 | Stop reason: HOSPADM

## 2023-01-05 RX ORDER — MORPHINE SULFATE 1 MG/ML
4 INJECTION, SOLUTION EPIDURAL; INTRATHECAL; INTRAVENOUS
Status: DISCONTINUED | OUTPATIENT
Start: 2023-01-05 | End: 2023-01-05

## 2023-01-05 RX ORDER — FENTANYL CITRATE 50 UG/ML
INJECTION, SOLUTION INTRAMUSCULAR; INTRAVENOUS AS NEEDED
Status: DISCONTINUED | OUTPATIENT
Start: 2023-01-05 | End: 2023-01-05 | Stop reason: HOSPADM

## 2023-01-05 RX ORDER — ROCURONIUM BROMIDE 10 MG/ML
INJECTION, SOLUTION INTRAVENOUS AS NEEDED
Status: DISCONTINUED | OUTPATIENT
Start: 2023-01-05 | End: 2023-01-05 | Stop reason: HOSPADM

## 2023-01-05 RX ORDER — ONDANSETRON 2 MG/ML
4 INJECTION INTRAMUSCULAR; INTRAVENOUS ONCE
Status: COMPLETED | OUTPATIENT
Start: 2023-01-05 | End: 2023-01-05

## 2023-01-05 RX ORDER — MORPHINE SULFATE 1 MG/ML
2 INJECTION, SOLUTION EPIDURAL; INTRATHECAL; INTRAVENOUS
Status: DISCONTINUED | OUTPATIENT
Start: 2023-01-05 | End: 2023-01-05

## 2023-01-05 RX ORDER — LIDOCAINE HYDROCHLORIDE 20 MG/ML
INJECTION, SOLUTION EPIDURAL; INFILTRATION; INTRACAUDAL; PERINEURAL AS NEEDED
Status: DISCONTINUED | OUTPATIENT
Start: 2023-01-05 | End: 2023-01-05 | Stop reason: HOSPADM

## 2023-01-05 RX ORDER — SODIUM CHLORIDE 0.9 % (FLUSH) 0.9 %
5-40 SYRINGE (ML) INJECTION EVERY 8 HOURS
Status: DISCONTINUED | OUTPATIENT
Start: 2023-01-05 | End: 2023-01-11 | Stop reason: HOSPADM

## 2023-01-05 RX ORDER — GLYCOPYRROLATE 0.2 MG/ML
INJECTION INTRAMUSCULAR; INTRAVENOUS AS NEEDED
Status: DISCONTINUED | OUTPATIENT
Start: 2023-01-05 | End: 2023-01-05 | Stop reason: HOSPADM

## 2023-01-05 RX ORDER — SODIUM CHLORIDE, SODIUM LACTATE, POTASSIUM CHLORIDE, CALCIUM CHLORIDE 600; 310; 30; 20 MG/100ML; MG/100ML; MG/100ML; MG/100ML
INJECTION, SOLUTION INTRAVENOUS
Status: DISCONTINUED | OUTPATIENT
Start: 2023-01-05 | End: 2023-01-05 | Stop reason: HOSPADM

## 2023-01-05 RX ORDER — SUCCINYLCHOLINE CHLORIDE 20 MG/ML
INJECTION INTRAMUSCULAR; INTRAVENOUS AS NEEDED
Status: DISCONTINUED | OUTPATIENT
Start: 2023-01-05 | End: 2023-01-05 | Stop reason: HOSPADM

## 2023-01-05 RX ORDER — ONDANSETRON 2 MG/ML
4 INJECTION INTRAMUSCULAR; INTRAVENOUS
Status: DISCONTINUED | OUTPATIENT
Start: 2023-01-05 | End: 2023-01-05 | Stop reason: HOSPADM

## 2023-01-05 RX ORDER — SODIUM CHLORIDE, SODIUM LACTATE, POTASSIUM CHLORIDE, CALCIUM CHLORIDE 600; 310; 30; 20 MG/100ML; MG/100ML; MG/100ML; MG/100ML
75 INJECTION, SOLUTION INTRAVENOUS CONTINUOUS
Status: DISCONTINUED | OUTPATIENT
Start: 2023-01-05 | End: 2023-01-05 | Stop reason: HOSPADM

## 2023-01-05 RX ORDER — MORPHINE SULFATE 4 MG/ML
4 INJECTION, SOLUTION INTRAMUSCULAR; INTRAVENOUS
Status: DISCONTINUED | OUTPATIENT
Start: 2023-01-05 | End: 2023-01-09

## 2023-01-05 RX ORDER — SODIUM CHLORIDE 0.9 % (FLUSH) 0.9 %
5-40 SYRINGE (ML) INJECTION AS NEEDED
Status: DISCONTINUED | OUTPATIENT
Start: 2023-01-05 | End: 2023-01-11 | Stop reason: HOSPADM

## 2023-01-05 RX ORDER — MIDAZOLAM HYDROCHLORIDE 1 MG/ML
INJECTION, SOLUTION INTRAMUSCULAR; INTRAVENOUS AS NEEDED
Status: DISCONTINUED | OUTPATIENT
Start: 2023-01-05 | End: 2023-01-05 | Stop reason: HOSPADM

## 2023-01-05 RX ADMIN — ROCURONIUM BROMIDE 30 MG: 10 INJECTION, SOLUTION INTRAVENOUS at 15:39

## 2023-01-05 RX ADMIN — POTASSIUM BICARBONATE 40 MEQ: 782 TABLET, EFFERVESCENT ORAL at 08:34

## 2023-01-05 RX ADMIN — Medication 2 UNITS: at 18:00

## 2023-01-05 RX ADMIN — Medication 3 UNITS: at 12:39

## 2023-01-05 RX ADMIN — SODIUM CHLORIDE, SODIUM LACTATE, POTASSIUM CHLORIDE, CALCIUM CHLORIDE: 600; 310; 30; 20 INJECTION, SOLUTION INTRAVENOUS at 15:25

## 2023-01-05 RX ADMIN — PIPERACILLIN SODIUM AND TAZOBACTAM SODIUM 3.38 G: 3; .375 INJECTION, POWDER, LYOPHILIZED, FOR SOLUTION INTRAVENOUS at 06:23

## 2023-01-05 RX ADMIN — FENTANYL CITRATE 50 MCG: 50 INJECTION, SOLUTION INTRAMUSCULAR; INTRAVENOUS at 15:33

## 2023-01-05 RX ADMIN — MIDAZOLAM HYDROCHLORIDE 2 MG: 1 INJECTION, SOLUTION INTRAMUSCULAR; INTRAVENOUS at 15:25

## 2023-01-05 RX ADMIN — ONDANSETRON 4 MG: 2 INJECTION INTRAMUSCULAR; INTRAVENOUS at 12:21

## 2023-01-05 RX ADMIN — Medication 2 UNITS: at 06:23

## 2023-01-05 RX ADMIN — SUCCINYLCHOLINE CHLORIDE 80 MG: 20 INJECTION INTRAMUSCULAR; INTRAVENOUS at 15:32

## 2023-01-05 RX ADMIN — POTASSIUM CHLORIDE: 2 INJECTION, SOLUTION, CONCENTRATE INTRAVENOUS at 11:19

## 2023-01-05 RX ADMIN — PIPERACILLIN SODIUM AND TAZOBACTAM SODIUM 3.38 G: 3; .375 INJECTION, POWDER, LYOPHILIZED, FOR SOLUTION INTRAVENOUS at 22:02

## 2023-01-05 RX ADMIN — DEXTROSE, SOYBEAN OIL, ELECTROLYTES, LYSINE, PHENYLALANINE, LEUCINE, VALINE, THREONINE, METHIONINE, ISOLEUCINE, TRYPTOPHAN, ALANINE, ARGININE, GLYCINE, PROLINE, HISTIDINE, GLUTAMIC ACID, SERINE, ASPARTIC ACID AND TYROSINE
6.8; 3.5; 170; 174; 105; 68; 20; 187; 164; 164; 152; 116; 116; 116; 40; 333; 235; 164; 141; 141; 116; 94; 71; 4.8 INJECTION, EMULSION INTRAVENOUS at 23:49

## 2023-01-05 RX ADMIN — FENTANYL CITRATE 50 MCG: 50 INJECTION, SOLUTION INTRAMUSCULAR; INTRAVENOUS at 15:25

## 2023-01-05 RX ADMIN — MORPHINE SULFATE 2 MG: 2 INJECTION, SOLUTION INTRAMUSCULAR; INTRAVENOUS at 20:00

## 2023-01-05 RX ADMIN — FENTANYL CITRATE 50 MCG: 50 INJECTION, SOLUTION INTRAMUSCULAR; INTRAVENOUS at 17:41

## 2023-01-05 RX ADMIN — GLYCOPYRROLATE 0.4 MG: 0.2 INJECTION INTRAMUSCULAR; INTRAVENOUS at 16:55

## 2023-01-05 RX ADMIN — ONDANSETRON 4 MG: 2 INJECTION INTRAMUSCULAR; INTRAVENOUS at 22:00

## 2023-01-05 RX ADMIN — SODIUM CHLORIDE, PRESERVATIVE FREE 10 ML: 5 INJECTION INTRAVENOUS at 22:09

## 2023-01-05 RX ADMIN — FOLIC ACID: 5 INJECTION, SOLUTION INTRAMUSCULAR; INTRAVENOUS; SUBCUTANEOUS at 08:58

## 2023-01-05 RX ADMIN — THIAMINE HYDROCHLORIDE 200 MG: 100 INJECTION, SOLUTION INTRAMUSCULAR; INTRAVENOUS at 08:58

## 2023-01-05 RX ADMIN — SODIUM CHLORIDE, PRESERVATIVE FREE 10 ML: 5 INJECTION INTRAVENOUS at 08:00

## 2023-01-05 RX ADMIN — LIDOCAINE HYDROCHLORIDE 50 MG: 20 INJECTION, SOLUTION EPIDURAL; INFILTRATION; INTRACAUDAL; PERINEURAL at 15:32

## 2023-01-05 RX ADMIN — ONDANSETRON 4 MG: 2 INJECTION INTRAMUSCULAR; INTRAVENOUS at 16:13

## 2023-01-05 RX ADMIN — NEOSTIGMINE METHYLSULFATE 3 MG: 1 INJECTION, SOLUTION INTRAVENOUS at 16:58

## 2023-01-05 RX ADMIN — PROPOFOL 120 MG: 10 INJECTION, EMULSION INTRAVENOUS at 15:32

## 2023-01-05 NOTE — ANESTHESIA POSTPROCEDURE EVALUATION
Procedure(s):  LEFT COLECTOMY WITH END ILEOSTOMY. general    Anesthesia Post Evaluation      Multimodal analgesia: multimodal analgesia used between 6 hours prior to anesthesia start to PACU discharge  Patient location during evaluation: PACU  Patient participation: complete - patient participated  Level of consciousness: sleepy but conscious  Pain management: adequate  Airway patency: patent  Anesthetic complications: no  Cardiovascular status: acceptable  Respiratory status: acceptable  Hydration status: acceptable  Post anesthesia nausea and vomiting:  controlled  Final Post Anesthesia Temperature Assessment:  Normothermia (36.0-37.5 degrees C)      INITIAL Post-op Vital signs:   Vitals Value Taken Time   /59 01/05/23 1749   Temp 37.3 °C (99.2 °F) 01/05/23 1709   Pulse 62 01/05/23 1757   Resp 16 01/05/23 1757   SpO2 96 % 01/05/23 1757   Vitals shown include unvalidated device data.

## 2023-01-05 NOTE — ROUTINE PROCESS
TRANSFER - IN REPORT:    Verbal report received from Arnold Leventhal RN(name) on [employer]  being received from cvt stepdown(unit) for ordered procedure      Report consisted of patients Situation, Background, Assessment and   Recommendations(SBAR). Information from the following report(s) SBAR and Kardex was reviewed with the receiving nurse. Opportunity for questions and clarification was provided. Assessment completed upon patients arrival to unit and care assumed.

## 2023-01-05 NOTE — PROGRESS NOTES
Cardiology Progress Note    Admit Date: 1/1/2023  Attending Cardiologist: Dr. Jennifer Falk:     -Peritonitis due to cecal perforation s/p exploratory laparotomy, right hemicolectomy  -Paroxysmal Atrial flutter with 2-1 block, new dx this admission, in setting of above. Converted to SR.   -Prolonged QTc of 516 ms which is likely secondary to Levaquin. Now discontinued. Improved. -HFpEF. LVEF normal by Echo this admission. Euvolemic.   -H/o bradycardia on Atenolol 100  mg BID.   -H/o etoh abuse. Primary Cardiologist is Dr. Nieves Yeh. Plan:       I saw, evaluated, interviewed and examined the patient personally. Patient states that he is undergoing surgery again today. He denies any cardiac symptoms. He denies any chest pain or chest tightness. No palpitation. Hemodynamically stable  On exam no fluid overload. Remain in sinus rhythm with occasional blocked PAC  Avoid BB for now with normal/low HR    Recommendation:  Start aspirin for stroke prophylaxis  If patient has recurrent A. fib episode, will need to consider oral anticoagulation. Place event monitor 30-day upon discharge (call extension [phone] to arrange)    We will be available as needed. Please call with question  No further cardiac work up planned at this time unless clinical status changes. Call us back if needed. Will be available as needed. Will need to follow up in cardiology clinic in 2-3 weeks after discharge. Thank you. Todd Alex MD       Remains in SR. Given h/o bradycardia with BBs, will only use PRN rate controlling agents as needed for sustained rates. No plans for starting [address] at this point, unless recurrent. Will plan for event monitor placement day of discharge, if recurrent AF on event monitor, will consider starting anticoagulation at that time. Please call [phone] once patient is ready for discharge for monitor placement. Subjective:     No new complaints.      Objective:      Patient Vitals for the past 8 hrs:    Temp Pulse Resp BP SpO2   01/05/23 0802 98.1 °F (36.7 °C) 71 15 139/88 94 %   01/05/23 0400 98.3 °F (36.8 °C) 81 17 (!) 151/80 96 %           Patient Vitals for the past 96 hrs:   Weight   01/05/23 0730 80.1 kg (176 lb 9.6 oz)   01/04/23 0712 77.1 kg (170 lb)   01/01/23 1507 77.1 kg (170 lb)         TELE: SR/PAF                Current Facility-Administered Medications   Medication Dose Route Frequency Last Admin    lidocaine (PF) (XYLOCAINE) 10 mg/mL (1 %) injection 5 mL  5 mL IntraDERMal ONCE      sodium chloride (NS) flush 5-40 mL  5-40 mL IntraVENous Q8H 10 mL at 01/05/23 0800    sodium chloride (NS) flush 5-40 mL  5-40 mL IntraVENous PRN      dextrose 5% lactated ringers 1,000 mL with potassium chloride 20 mEq infusion   IntraVENous CONTINUOUS      therapeutic multivitamin (THERAGRAN) tablet 1 Tablet  1 Tablet Oral DAILY 1 Tablet at 01/04/23 1000    potassium bicarb-citric acid (EFFER-K) tablet 40 mEq  40 mEq Oral BID 40 mEq at 01/05/23 0834    enoxaparin (LOVENOX) injection 40 mg  40 mg SubCUTAneous Q24H 40 mg at 01/04/23 0707    [Held by provider] dilTIAZem (CARDIZEM) 100 mg in 0.9% sodium chloride (MBP/ADV) 100 mL infusion  2.5 mg/hr IntraVENous TITRATE      folic acid (FOLVITE) 1 mg in 0.9% sodium chloride 50 mL ivpb   IntraVENous DAILY New Bag at 01/05/23 0858    thiamine (B-1) 200 mg in 0.9% sodium chloride 50 mL IVPB  200 mg IntraVENous DAILY 200 mg at 01/05/23 0858    naloxone (NARCAN) injection 0.4 mg  0.4 mg IntraVENous EVERY 2 MINUTES AS NEEDED      sodium chloride (NS) flush 5-10 mL  5-10 mL IntraVENous PRN 10 mL at 01/04/23 0708    piperacillin-tazobactam (ZOSYN) 3.375 g in 0.9% sodium chloride (MBP/ADV) 100 mL MBP  3.375 g IntraVENous Q8H 3.375 g at 01/05/23 0623    phenol throat spray (CHLORASEPTIC) 1 Spray  1 Spray Oral PRN 1 Spray at 01/02/23 0622    ondansetron (ZOFRAN) injection 4 mg  4 mg IntraVENous Q4H PRN 4 mg at 01/04/23 3033    insulin lispro (HUMALOG) injection   SubCUTAneous Q6H 2 Units at 01/05/23 0623    glucose chewable tablet 16 g  4 Tablet Oral PRN      glucagon (GLUCAGEN) injection 1 mg  1 mg IntraMUSCular PRN      dextrose 10% infusion 0-250 mL  0-250 mL IntraVENous PRN           Intake/Output Summary (Last 24 hours) at 1/5/2023 1055  Last data filed at 1/5/2023 0730  Gross per 24 hour   Intake 220 ml   Output 730 ml   Net -510 ml         Physical Exam:  General:  alert, cooperative, no distress, appears stated age  Neck:  no JVD  Lungs:  clear to auscultation bilaterally  Heart:  RRR  Abdomen:  soft, bandage covering incision   Extremities:  extremities normal, atraumatic, no cyanosis or edema    Visit Vitals  /88   Pulse 71   Temp 98.1 °F (36.7 °C)   Resp 15   Ht 5' 9\" (1.753 m)   Wt 80.1 kg (176 lb 9.6 oz)   SpO2 94%   BMI 26.08 kg/m²       Data Review:     Labs: Results:       Chemistry Recent Labs     01/05/23 0455 01/04/23 0453 01/03/23 1832   * 137* 136*   * 131* 130*   K 3.3* 2.9* 3.0*   CL 88* 92* 92*   CO2 35* 30 28   BUN 15 16 16   CREA 0.82 0.82 0.90   CA 8.1* 8.0* 7.9*   MG 2.2 2.3 2.2   PHOS 1.5*  --   --    AGAP 7 9 10   BUCR 18 20 18   AP 75 52 49   TP 5.8* 5.4* 5.4*   ALB 2.1* 2.1* 2.2*   GLOB 3.7 3.3 3.2   AGRAT 0.6* 0.6* 0.7*        CBC w/Diff Recent Labs     01/05/23 0455 01/04/23 0453 01/03/23  1832 01/03/23  0323   WBC 12.3 9.6 11.8 12.0   RBC 4.20* 3.73* 3.96* 3.69*   HGB 13.2 11.6* 13.4 11.4*   HCT 37.3 32.8* 34.4* 32.2*    162 175 153   GRANS 81* 84*  --  88*   LYMPH 7* 8*  --  5*   EOS 0 2  --  0        Cardiac Enzymes No results found for: CPK, CK, CKMMB, CKMB, RCK3, CKMBT, CKNDX, CKND1, AMY, TROPT, TROIQ, NITHIN, TROPT, TNIPOC, BNP, BNPP   Coagulation No results for input(s): PTP, INR, APTT, INREXT, INREXT in the last 72 hours.     Lipid Panel Lab Results   Component Value Date/Time    Cholesterol, total 183 07/06/2022 10:33 AM    HDL Cholesterol 50 07/06/2022 10:33 AM    LDL, calculated 87.2 07/06/2022 10:33 AM    VLDL, calculated 45.8 07/06/2022 10:33 AM    Triglyceride 229 (H) 07/06/2022 10:33 AM    CHOL/HDL Ratio 3.7 07/06/2022 10:33 AM      BNP No results found for: BNP, BNPP, XBNPT   Liver Enzymes Recent Labs     01/05/23  0455   TP 5.8*   ALB 2.1*   AP 75        Thyroid Studies Lab Results   Component Value Date/Time    TSH 3.17 01/04/2023 04:53 AM          Signed By: Barbra Bland PA-C     January 5, 2023

## 2023-01-05 NOTE — PROGRESS NOTES
Infectious Disease progress Note        Reason: secondary peritonitis, cecal perforation    Current abx Prior abx   Zosyn since 1/1/23      Lines:       Assessment :   75y.o. year old male with PMH significant for HTN, type 2 DM who presented to ed on 1/1/23 with severe abdominal pain. Clinical presentation c/w secondary peritonitis due to cecal perforation s/p exploratory laparotomy, right hemicolectomy on 1/1/23. Intra op findings noted  Worsening leukocytosis noted 1/2/23 likely due to post op inflammation     Surgery follow-up appreciated. CT scan 1/4 with lesion in proximal descending colon. Plans for colectomy today with ileostomy, possible primary anastomosis    Clinically stable from infectious disease standpoint    Recommendations:    - continue zosyn. --follow up surgery recommendations regarding repeat surgery  -Advance diet as tolerated   -monitor cbc, temp, clinically   -Will make further decision about antibiotics based on intraoperative findings, clinical course      Above plan was discussed in details with patient, and primary team. Please call me if any further questions or concerns. Will continue to participate in the care of this patient. HPI:        Denies increased chest pain, shortness of breath, nausea, vomiting    Past Medical History:   Diagnosis Date    Anxiety     Benign essential hypertension     DM type 2 (diabetes mellitus, type 2) ([facility] [address]        History reviewed. No pertinent surgical history. Current Discharge Medication List        CONTINUE these medications which have NOT CHANGED    Details   atorvastatin (LIPITOR) 10 mg tablet Take 1 tablet by mouth daily  Qty: 90 Tablet, Refills: 3    Associated Diagnoses: Type 2 diabetes mellitus without complication, without long-term current use of insulin (HCC)      atenoloL-chlorthalidone (TENORETIC) 50-25 mg per tablet Take 1 Tablet by mouth daily.   Qty: 90 Tablet, Refills: 3    Associated Diagnoses: Benign essential hypertension      potassium chloride (K-DUR, KLOR-CON) 20 mEq tablet 1 qd  Qty: 90 Tab, Refills: 3      clobetasoL (TEMOVATE) 0.05 % ointment Apply  to affected area two (2) times a day. For psoriasis  Qty: 15 g, Refills: 5    Associated Diagnoses: Psoriasis, unspecified      amLODIPine (NORVASC) 10 mg tablet Take 1 Tablet by mouth daily.  1 qd  Qty: 90 Tablet, Refills: 3    Associated Diagnoses: Benign essential hypertension           STOP taking these medications       lisinopriL (PRINIVIL, ZESTRIL) 10 mg tablet Comments:   Reason for Stopping:               Current Facility-Administered Medications   Medication Dose Route Frequency    lidocaine (PF) (XYLOCAINE) 10 mg/mL (1 %) injection 5 mL  5 mL IntraDERMal ONCE    sodium chloride (NS) flush 5-40 mL  5-40 mL IntraVENous Q8H    sodium chloride (NS) flush 5-40 mL  5-40 mL IntraVENous PRN    dextrose 5% lactated ringers 1,000 mL with potassium chloride 20 mEq infusion   IntraVENous CONTINUOUS    therapeutic multivitamin (THERAGRAN) tablet 1 Tablet  1 Tablet Oral DAILY    potassium bicarb-citric acid (EFFER-K) tablet 40 mEq  40 mEq Oral BID    enoxaparin (LOVENOX) injection 40 mg  40 mg SubCUTAneous Q24H    [Held by provider] dilTIAZem (CARDIZEM) 100 mg in 0.9% sodium chloride (MBP/ADV) 100 mL infusion  2.5 mg/hr IntraVENous TITRATE    folic acid (FOLVITE) 1 mg in 0.9% sodium chloride 50 mL ivpb   IntraVENous DAILY    thiamine (B-1) 200 mg in 0.9% sodium chloride 50 mL IVPB  200 mg IntraVENous DAILY    naloxone (NARCAN) injection 0.4 mg  0.4 mg IntraVENous EVERY 2 MINUTES AS NEEDED    sodium chloride (NS) flush 5-10 mL  5-10 mL IntraVENous PRN    piperacillin-tazobactam (ZOSYN) 3.375 g in 0.9% sodium chloride (MBP/ADV) 100 mL MBP  3.375 g IntraVENous Q8H    phenol throat spray (CHLORASEPTIC) 1 Spray  1 Spray Oral PRN    ondansetron (ZOFRAN) injection 4 mg  4 mg IntraVENous Q4H PRN    insulin lispro (HUMALOG) injection   SubCUTAneous Q6H    glucose chewable tablet 16 g  4 Tablet Oral PRN    glucagon (GLUCAGEN) injection 1 mg  1 mg IntraMUSCular PRN    dextrose 10% infusion 0-250 mL  0-250 mL IntraVENous PRN       Allergies: Patient has no known allergies. Family History   Problem Relation Age of Onset    OSTEOARTHRITIS Father     No Known Problems Mother      Social History     Socioeconomic History    Marital status: SINGLE     Spouse name: Not on file    Number of children: Not on file    Years of education: Not on file    Highest education level: Not on file   Occupational History    Not on file   Tobacco Use    Smoking status: Former     Types: Cigarettes     Quit date: 1980     Years since quittin.0    Smokeless tobacco: Never   Vaping Use    Vaping Use: Never used   Substance and Sexual Activity    Alcohol use: Yes     Comment: heavy    Drug use: No    Sexual activity: Not on file   Other Topics Concern    Not on file   Social History Narrative    Not on file     Social Determinants of Health     Financial Resource Strain: Low Risk     Difficulty of Paying Living Expenses: Not hard at all   Food Insecurity: No Food Insecurity    Worried About Running Out of Food in the Last Year: Never true    Ran Out of Food in the Last Year: Never true   Transportation Needs: Not on file   Physical Activity: Not on file   Stress: Not on file   Social Connections: Not on file   Intimate Partner Violence: Not on file   Housing Stability: Not on file     Social History     Tobacco Use   Smoking Status Former    Types: Cigarettes    Quit date: 1980    Years since quittin.0   Smokeless Tobacco Never        Temp (24hrs), Av.2 °F (36.8 °C), Min:97.8 °F (36.6 °C), Max:98.4 °F (36.9 °C)    Visit Vitals  /88   Pulse 71   Temp 98.1 °F (36.7 °C)   Resp 15   Ht 5' 9\" (1.753 m)   Wt 77.1 kg (170 lb)   SpO2 94%   BMI 25.10 kg/m²       ROS: 12 point ROS obtained in details.  Pertinent positives as mentioned in HPI,   otherwise negative    Physical Exam:    General: Well developed, well nourished male laying on the bed AAOx3 in no acute distress. General:   awake alert and oriented   HEENT:  Normocephalic, atraumatic,  EOMI, no scleral icterus or pallor; no conjunctival hemmohage;  nasal and oral mucous are moist and without evidence of lesions. Neck supple, no bruits. Lymph Nodes:   no cervical, axillary or inguinal adenopathy   Lungs:   non-labored, bilaterally clear to auscultation- no crackles wheezes rales or rhonchi   Heart:  RRR, s1 and s2; no rubs or gallops, no edema, + pedal pulses   Abdomen:  soft, non-distended, active bowel sounds, no hepatomegaly, no splenomegaly. + surgical changes on abdomen. + diffuse tenderness- no guarding   Genitourinary:  deferred   Extremities:   no clubbing, cyanosis; no joint effusions or swelling; Full ROM of all large joints to the upper and lower extremities; muscle mass appropriate for age   Neurologic:  No gross focal sensory abnormalities; 5/5 muscle strength to upper and lower extremities. Speech appropriate.  Cranial nerves intact                        Skin:  No rash or ulcers noted   Back:  no spinal or paraspinal muscle tenderness or rigidity, no CVA tenderness     Psychiatric:  No suicidal or homicidal ideations, appropriate mood and affect         Labs: Results:   Chemistry Recent Labs     01/05/23 0455 01/04/23 0453 01/03/23 1832   * 137* 136*   * 131* 130*   K 3.3* 2.9* 3.0*   CL 88* 92* 92*   CO2 35* 30 28   BUN 15 16 16   CREA 0.82 0.82 0.90   CA 8.1* 8.0* 7.9*   AGAP 7 9 10   BUCR 18 20 18   AP 75 52 49   TP 5.8* 5.4* 5.4*   ALB 2.1* 2.1* 2.2*   GLOB 3.7 3.3 3.2   AGRAT 0.6* 0.6* 0.7*        CBC w/Diff Recent Labs     01/05/23 0455 01/04/23 0453 01/03/23  1832 01/03/23  0323   WBC 12.3 9.6 11.8 12.0   RBC 4.20* 3.73* 3.96* 3.69*   HGB 13.2 11.6* 13.4 11.4*   HCT 37.3 32.8* 34.4* 32.2*    162 175 153   GRANS 81* 84*  --  88*   LYMPH 7* 8*  --  5*   EOS 0 2  --  0        Microbiology No results for input(s): CULT in the last 72 hours. RADIOLOGY:    All available imaging studies/reports in [facility] for this admission were reviewed      Disclaimer: Sections of this note are dictated utilizing voice recognition software, which may have resulted in some phonetic based errors in grammar and contents. Even though attempts were made to correct all the mistakes, some may have been missed, and remained in the body of the document. If questions arise, please contact our department.     Dr. Maia Beavers, Infectious Disease Specialist  [phone]  January 5, 2023  6:40 AM

## 2023-01-05 NOTE — ROUTINE PROCESS
Wound Prevention Checklist    Patient: Peri Benjamin (52 y.o. male)  Date: 1/5/2023  Diagnosis: Perforated bowel ([facility] [address] [K63.1] Perforated bowel ([address]    Precautions:         []  Heel prevention boots placed on patient    []  Patient turned q2h during shift    []  Lift team ordered    [x]  Patient on [facility] bed/Specialty bed    [x]  Each Wound is documented during shift (Stage, Color, drainage, odor, measurements, and dressings)  Patient has no noted pressure injuries. Patient has an incision on the abdomen with 2 GEO drains. Dressing is clean, dry and intact with no drainage present.      [x]  Dual skin check done with BAIRON Wilson RN

## 2023-01-05 NOTE — PROGRESS NOTES
POD# 4    Admit Date: 1/1/2023    Assessment    Ky White is a 75 y.o. male POD 4 s/p exploratory laparotomy, right hemicolectomy    Patient Active Problem List   Diagnosis Code    Anxiety F41. 9    Benign essential hypertension I10    Perforated bowel (HCC) K63.1    Hypokalemia E87.6    Hyponatremia E87.1    Lactic acidosis E87.20    Leukocytosis D72.829    Hyperglycemia R73.9    Severe protein-calorie malnutrition ([address] E43    Atrial flutter (HCC) I48.92       Plan  -reviewed CT scan with patient demonstrating apple core lesion in proximal descending colon, anastomosis intact. Discussed plan for colectomy today with ileostomy, possible primary anastomosis pending intraoperative findings  -will check CEA  - send GEO fluid for cultures, continue with current antibiotics per ID recs  -plan for PICC line and initiate TPN today  -surgery later today when OR availabile. Patient agrees with this plan    Subjective    Overnight events: No acute events overnight. Some crampy abdominal pain since CT scan yesterday. No nausea or vomiting    Objective    Physical Exam:   Visit Vitals  BP (!) 151/80   Pulse 81   Temp 98.3 °F (36.8 °C)   Resp 17   Ht 5' 9\" (1.753 m)   Wt 77.1 kg (170 lb)   SpO2 96%   BMI 25.10 kg/m²       Intake/Output Summary (Last 24 hours) at 1/5/2023 0636  Last data filed at 1/4/2023 2339  Gross per 24 hour   Intake 220 ml   Output 1050 ml   Net -830 ml     Physical Exam  Constitutional:       Appearance: Normal appearance. Abdominal:      General: There is distension. Palpations: Abdomen is soft. Tenderness: There is no abdominal tenderness. Comments: Incision clean, dry, intact   GEO drain 1&2 serous   Neurological:      Mental Status: He is alert.              Jamel Lubin DO  Phone: [phone]

## 2023-01-05 NOTE — PROGRESS NOTES
Bedside and Verbal shift change report given to LUIS ALBERTO Stratton RN (oncoming nurse) by Esther Sullivan RN (offgoing nurse). Report included the following information SBAR, Kardex, Intake/Output, Recent Results, and Cardiac Rhythm Afib . Wound Prevention Checklist    Patient: Dion Bird (14 y.o. male)  Date: 1/4/2023  Diagnosis: Perforated bowel ([address] [K63.1] Perforated bowel ([address]    Precautions:         []  Heel prevention boots placed on patient    []  Patient turned q2h during shift    []  Lift team ordered    [x]  Patient on [facility] bed/Specialty bed    [x]  Each Wound is documented during shift (Stage, Color, drainage, odor, measurements, and dressings)    [x]  Dual skin check done with Esther Sullivan, [address] [facility], [address] [facility] patient left the floor to have CT scan done. 2200 patient arrives back to floor from having CT scan. Bedside and Verbal shift change report given to Torsten Medina RN (oncoming nurse) by [employer], RN (offgoing nurse). Report included the following information SBAR, Kardex, Intake/Output, Recent Results, and Cardiac Rhythm Afib .

## 2023-01-05 NOTE — ROUTINE PROCESS
SBAR report given by Chelsea Bird RN offgoing nurse to Ruben Thomas RN oncoming nurse. 0730: Shift report conducted at patient bedside. Oncoming nurse introduced to patient. Nurses answered al questions presented by patient. Skin assessment performed. Patient skin  is intact. Patient has two GEO drains. Abdominal incision present. Dressing is in place which is clean, dry and intact. Site also lacks drainage. 0805: Patient assisted to use the restroom. 4951: Dr. Roberto Howard at patient bedside. 1120: Report provided to Azul Motta RN in preop for procedure. 1127: Dr. Roberto Howard infectious disease at patient bedside. 1142: Patient off of unit to procedure. 1850: Patient returned to unit. SBAR report given by Ruben Thomas RN offgoing nurse to BAIRON Hogan oncoming nurse.

## 2023-01-05 NOTE — PROGRESS NOTES
[facility] Hospitalist Group  Progress Note    Patient: Mason Christensen Age: 75 y.o. : 1955 MR#: [id number]: [id number]  Date/Time: 2023     C/C: Abdominal pain      Subjective:   HPI : Patient with abdominal pain from visceral perforation now s/p surgery details below hospitalist issues team is consulted for his medical issues. Review of Systems: Saw patient just before he was going for his second surgery  Does not offer any acute complaints        positive responses in bold type   Constitutional: Negative for fever, chills, diaphoresis and unexpected weight change. HENT: Negative for ear pain, congestion, sore throat, rhinorrhea, drooling, trouble swallowing, neck pain and tinnitus. Eyes: Negative for photophobia, pain, redness and visual disturbance. Respiratory: negative for shortness of breath, cough, choking, chest tightness, wheezing or stridor. Cardiovascular: Negative for chest pain, palpitations and leg swelling. Gastrointestinal: Negative for nausea, vomiting, abdominal pain, diarrhea, constipation, blood in stool, abdominal distention and anal bleeding. Genitourinary: Negative for dysuria, urgency, frequency, hematuria, flank pain and difficulty urinating. Musculoskeletal: Negative for back pain and arthralgias. Skin: Negative for color change, rash and wound. Neurological: Negative for dizziness, seizures, syncope, speech difficulty, light-headedness or headaches. Hematological: Does not bruise/bleed easily. Psychiatric/Behavioral: Negative for suicidal ideas, hallucinations, behavioral problems, self-injury or agitation     Assessment/Plan:     1.  Leucocytosis -ID consulted, for double coverage added Levaquin continue Zosyn, get culture from abdominal drainage and  2 Hypokalemia  3 Hypomagnesemia   4 Hypocalcemia - follow ionized calcium , Calcium gluconate 1 amp   5 DM2   6 S/p Exploratory laparotomy, right hemicolectomy ( 2023 ) - intra operative finding : Perforated cecum x2 with gross feculent contamination right hemipelvis with numerous intraloop small bowel adhesions and fibrinous exudate. 7 Hypotension - improved , IV albumin   8 Hypoalbuminemia -IV albumin ordered, blood pressure was also low which is slowly improving. Patient is alert awake oriented no evidence of any encephalopathy, hypotension  9 development of atrial tachycardia-paroxysmal atrial fibrillation versus atrial flutter    Plan  -Patient is going for second surgery as further investigation found patient has apple core lesion    -Will follow patient postop        Objective:       General:  Alert, cooperative, no acute distress   HEENT: No facial asymmetry, BLAKE Valdez, External ears - WNL    Cardiovascular: S1S2 - irregular , No Murmur   Pulmonary: Equal expansion , No Use of accessory muscles , No Rales No Rhonchi    GI:  +BS in all four quadrants, soft, non-tender  Extremities:  No edema; 2+ dorsalis pedis pulses bilaterally  Neuro: Alert and oriented X 2.        DVT Prophylaxis:  []Lovenox  []Hep SQ  []SCDs  []Coumadin   []On Heparin gtt    [] Eliquis [] Xarelto     Vitals:         VS: Visit Vitals  BP (!) 118/59   Pulse (!) 55   Temp 99.2 °F (37.3 °C)   Resp 15   Ht 5' 9\" (1.753 m)   Wt 80.1 kg (176 lb 9.6 oz)   SpO2 97%   BMI 26.08 kg/m²      Tmax/24hrs: Temp (24hrs), Av.1 °F (36.7 °C), Min:97.2 °F (36.2 °C), Max:99.2 °F (37.3 °C)        Medications:   Current Facility-Administered Medications   Medication Dose Route Frequency    lidocaine (PF) (XYLOCAINE) 10 mg/mL (1 %) injection 5 mL  5 mL IntraDERMal ONCE    sodium chloride (NS) flush 5-40 mL  5-40 mL IntraVENous Q8H    sodium chloride (NS) flush 5-40 mL  5-40 mL IntraVENous PRN    dextrose 5% lactated ringers 1,000 mL with potassium chloride 20 mEq infusion   IntraVENous CONTINUOUS    TPN ADULT KABIVEN W/ ADDITIVES - CENTRAL   IntraVENous CONTINUOUS    lactated Ringers infusion  75 mL/hr IntraVENous CONTINUOUS    ondansetron (ZOFRAN) injection 4 mg  4 mg IntraVENous ONCE PRN    fentaNYL citrate (PF) injection 50 mcg  50 mcg IntraVENous Q5MIN PRN    morphine (PF) 1 mg/mL injection 2 mg  2 mg IntraVENous Q2H PRN    morphine (PF) 1 mg/mL injection 4 mg  4 mg IntraVENous Q4H PRN    phenol throat spray (CHLORASEPTIC) 1 Spray  1 Spray Oral PRN    therapeutic multivitamin (THERAGRAN) tablet 1 Tablet  1 Tablet Oral DAILY    potassium bicarb-citric acid (EFFER-K) tablet 40 mEq  40 mEq Oral BID    enoxaparin (LOVENOX) injection 40 mg  40 mg SubCUTAneous Q24H    [Held by provider] dilTIAZem (CARDIZEM) 100 mg in 0.9% sodium chloride (MBP/ADV) 100 mL infusion  2.5 mg/hr IntraVENous TITRATE    folic acid (FOLVITE) 1 mg in 0.9% sodium chloride 50 mL ivpb   IntraVENous DAILY    thiamine (B-1) 200 mg in 0.9% sodium chloride 50 mL IVPB  200 mg IntraVENous DAILY    naloxone (NARCAN) injection 0.4 mg  0.4 mg IntraVENous EVERY 2 MINUTES AS NEEDED    sodium chloride (NS) flush 5-10 mL  5-10 mL IntraVENous PRN    piperacillin-tazobactam (ZOSYN) 3.375 g in 0.9% sodium chloride (MBP/ADV) 100 mL MBP  3.375 g IntraVENous Q8H    phenol throat spray (CHLORASEPTIC) 1 Spray  1 Spray Oral PRN    ondansetron (ZOFRAN) injection 4 mg  4 mg IntraVENous Q4H PRN    insulin lispro (HUMALOG) injection   SubCUTAneous Q6H    glucose chewable tablet 16 g  4 Tablet Oral PRN    glucagon (GLUCAGEN) injection 1 mg  1 mg IntraMUSCular PRN    dextrose 10% infusion 0-250 mL  0-250 mL IntraVENous PRN       Labs:    Recent Labs     01/05/23  0455 01/04/23  0453 01/03/23  1832   WBC 12.3 9.6 11.8   HGB 13.2 11.6* 13.4   HCT 37.3 32.8* 34.4*    162 175     Recent Labs     01/05/23  0455 01/04/23  0453 01/03/23  1832   * 131* 130*   K 3.3* 2.9* 3.0*   CL 88* 92* 92*   CO2 35* 30 28   * 137* 136*   BUN 15 16 16   CREA 0.82 0.82 0.90   CA 8.1* 8.0* 7.9*   MG 2.2 2.3 2.2   PHOS 1.5*  --   --    ALB 2.1* 2.1* 2.2*   ALT 18 12* 13*         Time spent on direct patient care >30 mints     Complexity : High complex - due to multiple medical issues outlined above. CODE Status : Full code    Case discussed with:  [x]Patient  [] Family  [x]Nursing  []Case Management         Disclaimer: Sections of this note are dictated utilizing voice recognition software, which may have resulted in some phonetic based errors in grammar and contents. Even though attempts were made to correct all the mistakes, some may have been missed, and remained in the body of the document. If questions arise, please contact our department.     Signed By: Arina Gordon MD     January 5, 2023

## 2023-01-05 NOTE — ROUTINE PROCESS
TRANSFER - IN REPORT:    Verbal report received from BAIRON Molina  on [employer]  being received from [facility] for ordered procedure      Report consisted of patients Situation, Background, Assessment and   Recommendations(SBAR). Information from the following report(s) SBAR was reviewed with the receiving nurse. Opportunity for questions and clarification was provided. Assessment completed upon patients arrival to unit and care assumed.

## 2023-01-05 NOTE — ANESTHESIA PREPROCEDURE EVALUATION
Relevant Problems   CARDIOVASCULAR   (+) Atrial flutter (HCC)   (+) Benign essential hypertension       Anesthetic History   No history of anesthetic complications            Review of Systems / Medical History  Patient summary reviewed and pertinent labs reviewed    Pulmonary  Within defined limits                 Neuro/Psych   Within defined limits           Cardiovascular    Hypertension: well controlled        Dysrhythmias : atrial flutter           GI/Hepatic/Renal  Within defined limits              Endo/Other    Diabetes: type 2         Other Findings              Physical Exam    Airway  Mallampati: III  TM Distance: 4 - 6 cm  Neck ROM: normal range of motion   Mouth opening: Normal     Cardiovascular  Regular rate and rhythm,  S1 and S2 normal,  no murmur, click, rub, or gallop             Dental  No notable dental hx       Pulmonary  Breath sounds clear to auscultation               Abdominal  GI exam deferred       Other Findings            Anesthetic Plan    ASA: 3  Anesthesia type: general          Induction: Intravenous  Anesthetic plan and risks discussed with: Patient

## 2023-01-05 NOTE — BRIEF OP NOTE
Brief Postoperative Note    Patient: Jocelyn Fall  YOB: 1955  MRN: [id number]    Date of Procedure: 1/5/2023     Pre-Op Diagnosis: Large bowel obstruction    Post-Op Diagnosis: same    Procedure(s):  Diverting loop ileostomy    Surgeon(s):  Tyshawn Stephens DO    Surgical Assistant: Surg Asst-1: Thalia Ferrer    Anesthesia: General     Estimated Blood Loss (mL): less than 50     Complications: None    Specimens:   ID Type Source Tests Collected by Time Destination   1 : Abdominal Fluid Body Fluid Abdominal Fluid CULTURE, ANAEROBIC, CULTURE, BODY FLUID Tyshawn Stephens DO 1/5/2023 1531 Microbiology        Implants: * No implants in log *    Drains:   Artur-Ramos Drain 01/05/23 Right;Upper Abdomen (Active)       Artur-Ramos Drain 01/05/23 Left;Upper Abdomen (Active)       [REMOVED] DxUpClose #1 01/01/23 Left; Anterior; Outer;Mid Abdomen (Removed)   Site Assessment Clean, dry, & intact 01/05/23 0730   Dressing Status Clean, dry, & intact 01/05/23 0730   Drainage Description Serous 01/05/23 0730   Marcelo Drain Airleak No 01/05/23 0730   Status Patent;Draining 01/05/23 0730   Y Connector Used No 01/05/23 0730   Output (ml) 10 ml 01/05/23 0730       [REMOVED] AlIssuu #2 01/01/23 Left; Anterior; Lower; Outer Abdomen (Removed)   Site Assessment Clean, dry, & intact 01/05/23 0730   Dressing Status Clean, dry, & intact 01/05/23 0730   Drainage Description Serous 01/05/23 0730   Marcelo Drain Airleak No 01/05/23 0730   Status Patent;Draining 01/05/23 0730   Y Connector Used Yes 01/04/23 1030   Output (ml) 30 ml 01/05/23 0730       Findings: severely edematous small bowel, anastomosis intact, intraloop small bowel exudate    Electronically Signed by Justin Pichardo DO on 1/5/2023 at 5:14 PM

## 2023-01-05 NOTE — PROGRESS NOTES
CT scan reviewed demonstrating large bowel obstruction. Patient made NPO. Will discuss surgical plan with patient in morning .    Nano Marley

## 2023-01-05 NOTE — ANESTHESIA PREPROCEDURE EVALUATION
Relevant Problems   CARDIOVASCULAR   (+) Atrial flutter (HCC)   (+) Benign essential hypertension     Relevant Problems   CARDIOVASCULAR   (+) Atrial flutter (HCC)   (+) Benign essential hypertension       Anesthetic History   No history of anesthetic complications            Review of Systems / Medical History  Patient summary reviewed and pertinent labs reviewed    Pulmonary  Within defined limits                 Neuro/Psych   Within defined limits           Cardiovascular    Hypertension: well controlled                Comments: Dehydration  Hypokalemia     GI/Hepatic/Renal  Within defined limits              Endo/Other    Diabetes: type 2         Other Findings              Physical Exam    Airway  Mallampati: III  TM Distance: 4 - 6 cm  Neck ROM: normal range of motion   Mouth opening: Normal     Cardiovascular               Dental         Pulmonary                 Abdominal  GI exam deferred       Other Findings            Anesthetic Plan    ASA: 3    Anesthesia type: general          Induction: Intravenous  Anesthetic plan and risks discussed with: Patient              Anesthetic History   No history of anesthetic complications            Review of Systems / Medical History  Patient summary reviewed and pertinent labs reviewed    Pulmonary  Within defined limits                 Neuro/Psych   Within defined limits           Cardiovascular    Hypertension: well controlled        Dysrhythmias : atrial flutter      Exercise tolerance: >4 METS  Comments: Dehydration  Hypokalemia     GI/Hepatic/Renal  Within defined limits              Endo/Other    Diabetes: type 2         Other Findings            Physical Exam    Airway  Mallampati: III  TM Distance: 4 - 6 cm  Neck ROM: normal range of motion   Mouth opening: Normal     Cardiovascular  Regular rate and rhythm,  S1 and S2 normal,  no murmur, click, rub, or gallop  Rhythm: regular  Rate: normal         Dental  No notable dental hx       Pulmonary  Breath sounds clear to auscultation               Abdominal  GI exam deferred       Other Findings            Anesthetic Plan    ASA: 3  Anesthesia type: general          Induction: Intravenous  Anesthetic plan and risks discussed with: Patient

## 2023-01-05 NOTE — PROGRESS NOTES
[facility] Hospitalist Group  Progress Note    Patient: Fuentes Wise Age: 75 y.o. : 1955 MR#: [id number] SSN: [id number]  Date/Time: 2023     C/C: Abdominal pain      Subjective:   HPI : Patient with abdominal pain from visceral perforation now s/p surgery details below hospitalist issues team is consulted for his medical issues. Review of Systems: Patient is alert awake oriented lying in bed very comfortable, patient's wife at the bedside. Patient denies any new chest pain palpitation no shortness of breath nausea vomiting no bleeding episodes. positive responses in bold type   Constitutional: Negative for fever, chills, diaphoresis and unexpected weight change. HENT: Negative for ear pain, congestion, sore throat, rhinorrhea, drooling, trouble swallowing, neck pain and tinnitus. Eyes: Negative for photophobia, pain, redness and visual disturbance. Respiratory: negative for shortness of breath, cough, choking, chest tightness, wheezing or stridor. Cardiovascular: Negative for chest pain, palpitations and leg swelling. Gastrointestinal: Negative for nausea, vomiting, abdominal pain, diarrhea, constipation, blood in stool, abdominal distention and anal bleeding. Genitourinary: Negative for dysuria, urgency, frequency, hematuria, flank pain and difficulty urinating. Musculoskeletal: Negative for back pain and arthralgias. Skin: Negative for color change, rash and wound. Neurological: Negative for dizziness, seizures, syncope, speech difficulty, light-headedness or headaches. Hematological: Does not bruise/bleed easily. Psychiatric/Behavioral: Negative for suicidal ideas, hallucinations, behavioral problems, self-injury or agitation     Assessment/Plan:     1.  Leucocytosis -ID consulted, for double coverage added Levaquin continue Zosyn, get culture from abdominal drainage and  2 Hypokalemia  3 Hypomagnesemia   4 Hypocalcemia - follow ionized calcium , Calcium gluconate 1 amp   5 DM2   6 S/p Exploratory laparotomy, right hemicolectomy ( 2023 ) - intra operative finding : Perforated cecum x2 with gross feculent contamination right hemipelvis with numerous intraloop small bowel adhesions and fibrinous exudate. 7 Hypotension - improved , IV albumin   8 Hypoalbuminemia -IV albumin ordered, blood pressure was also low which is slowly improving. Patient is alert awake oriented no evidence of any encephalopathy, hypotension  9 development of atrial tachycardia-paroxysmal atrial fibrillation versus atrial flutter  - heart rate is much better now still off and on in A. fib and sinus rhythm no bradycardia / arrhythmia noted now   --Potassium replaced, follow-up magnesium and potassium in the morning    -Antibiotic selection changed to Zosyn or Levaquin due to prolongation of QT interval    -Initially patient was given a beta-blocker with profound effect on heart rate creating severe bradycardia arrhythmia, also Cardizem was discontinued currently does not have any rate limiting medications but heart rate is controlled    Objective:       General:  Alert, cooperative, no acute distress   HEENT: No facial asymmetry, BLAKE Valdez, External ears - WNL    Cardiovascular: S1S2 - irregular , No Murmur   Pulmonary: Equal expansion , No Use of accessory muscles , No Rales No Rhonchi    GI:  +BS in all four quadrants, soft, non-tender  Extremities:  No edema; 2+ dorsalis pedis pulses bilaterally  Neuro: Alert and oriented X 2.        DVT Prophylaxis:  []Lovenox  []Hep SQ  []SCDs  []Coumadin   []On Heparin gtt    [] Eliquis [] Xarelto     Vitals:         VS: Visit Vitals  /71   Pulse 85   Temp 98.4 °F (36.9 °C)   Resp 19   Ht 5' 9\" (1.753 m)   Wt 77.1 kg (170 lb)   SpO2 97%   BMI 25.10 kg/m²      Tmax/24hrs: Temp (24hrs), Av.4 °F (36.9 °C), Min:97.9 °F (36.6 °C), Max:99.1 °F (37.3 °C)        Medications:   Current Facility-Administered Medications   Medication Dose Route Frequency    therapeutic multivitamin (THERAGRAN) tablet 1 Tablet  1 Tablet Oral DAILY    potassium bicarb-citric acid (EFFER-K) tablet 40 mEq  40 mEq Oral BID    iopamidoL (ISOVUE 300) 300 mg iodine /mL (61 %) contrast injection  mL   mL IntraVENous RAD ONCE    iopamidoL (ISOVUE 300) 300 mg iodine /mL (61 %) contrast injection  mL   mL IntraVENous RAD ONCE    diatrizoate kalpana-diatrizoat sod (MD-GASTROVIEW,GASTROGRAFIN) 66-10 % contrast solution 30 mL  30 mL Rectal RAD ONCE    enoxaparin (LOVENOX) injection 40 mg  40 mg SubCUTAneous Q24H    [Held by provider] dilTIAZem (CARDIZEM) 100 mg in 0.9% sodium chloride (MBP/ADV) 100 mL infusion  2.5 mg/hr IntraVENous TITRATE    folic acid (FOLVITE) 1 mg in 0.9% sodium chloride 50 mL ivpb   IntraVENous DAILY    thiamine (B-1) 200 mg in 0.9% sodium chloride 50 mL IVPB  200 mg IntraVENous DAILY    naloxone (NARCAN) injection 0.4 mg  0.4 mg IntraVENous EVERY 2 MINUTES AS NEEDED    sodium chloride (NS) flush 5-10 mL  5-10 mL IntraVENous PRN    piperacillin-tazobactam (ZOSYN) 3.375 g in 0.9% sodium chloride (MBP/ADV) 100 mL MBP  3.375 g IntraVENous Q8H    phenol throat spray (CHLORASEPTIC) 1 Spray  1 Spray Oral PRN    ondansetron (ZOFRAN) injection 4 mg  4 mg IntraVENous Q4H PRN    insulin lispro (HUMALOG) injection   SubCUTAneous Q6H    glucose chewable tablet 16 g  4 Tablet Oral PRN    glucagon (GLUCAGEN) injection 1 mg  1 mg IntraMUSCular PRN    dextrose 10% infusion 0-250 mL  0-250 mL IntraVENous PRN       Labs:    Recent Labs     01/04/23  0453 01/03/23  1832 01/03/23  0323   WBC 9.6 11.8 12.0   HGB 11.6* 13.4 11.4*   HCT 32.8* 34.4* 32.2*    175 153     Recent Labs     01/04/23  0453 01/03/23  1832 01/03/23  1142 01/03/23  0323   * 130* 131* 130*   K 2.9* 3.0* 3.1* 2.5*   CL 92* 92* 94* 94*   CO2 30 28 32 31   * 136* 148* 151*   BUN 16 16 16 19*   CREA 0.82 0.90 0.86 0.96   CA 8.0* 7.9* 8.1* 7.7*   MG 2.3 2.2  --  2.6 ALB 2.1* 2.2*  --  2.4*   ALT 12* 13*  --  10*         Time spent on direct patient care >30 mints     Complexity : High complex - due to multiple medical issues outlined above. CODE Status : Full code    Case discussed with:  [x]Patient  [] Family  [x]Nursing  []Case Management         Disclaimer: Sections of this note are dictated utilizing voice recognition software, which may have resulted in some phonetic based errors in grammar and contents. Even though attempts were made to correct all the mistakes, some may have been missed, and remained in the body of the document. If questions arise, please contact our department.     Signed By: Darryle Foreman, MD     January 4, 2023

## 2023-01-05 NOTE — OP NOTES
Laparotomy, diverting loop ileostomy    Patient Name: Horacio Voss     SURGERY DATE: 23     : 1955     AGE: 75 y.o. Anesthesiologist: Anesthesiologist: Salomón Lynne MD  CRNA: Humera Nair CRNA     Surgeon: Nano Marley DO    Anesthesia: General     Surgical assist: Circ-1: Armaan Bradley RN  Circ-2: Falkner Sick  Scrub Tech-1: Rosangela Span  Surg Asst-1: Christiano Lopez    PreOp DX: Large bowel obstruction     PostOp DX: same    Procedure: laparotomy, diverting loop ileostomy    Procedure Details: After informed consent was obtained the patient was taken to the operating room and placed in the supine position. General anesthesia was administered by the anesthetist to titrate to effect. The abdomen was prepped and draped in the usual sterile fashion and a timeout procedure was performed. Next the previous midline laparotomy incision was opened. The bowel was diffusely edematous and difficult to manipulate. NG tube positioning was checked and it was in the body of the stomach with 800 cc of gastric contents removed. Clear peritoneal fluid was noted throughout the abdomen and there were several loops of small bowel with intra loop adhesions with exudate noted. There was no gross contamination noted in the abdomen. The ileocolonic anastomosis appeared intact with no evidence of leaking. We were able to palpate a mass in the very proximal descending colon that was firm. There were no other palpable abnormalities. We did not perform any additional resection of bowel as there was significant edema throughout and patient needed further resuscitation with recent cardiac issues. New GEO drains were placed, 1 over the anastomosis and another in the left pelvic region. They were secured to the skin with a nylon suture.   We then selected a portion of the ileum proximal to the anastomosis that easily reached to the abdominal wall despite the significant amount of edema encountered. A circular incision was then made on the skin and Bovie was used to core the subcutaneous fat down to fascia which was incised. The opening was dilated and the loop of bowel was grasped with a [place] and brought up to the level of the skin. There was no twisting of the bowel. Instrument count and laparotomy count were correct. We then proceeded to close the fascia very carefully with looped PDS in the usual fashion. The fascia did have some areas of necrosis that were gently debrided and we were able to achieve appropriate fascial closure. The wound was left open and packed with saline soaked gauze. Then matured the loop ileostomy with 3-0 silk sutures in the usual fashion. Both limbs were checked digitally to ensure this was not restricted. Stoma appliance was then placed. The patient was subsequently extubated and sent to recovery in stable condition.     Implants: * No implants in log *    Estimated Blood Loss:  20cc    Specimens:   ID Type Source Tests Collected by Time Destination   1 : Abdominal Fluid Body Fluid Abdominal Fluid CULTURE, ANAEROBIC, CULTURE, BODY FLUID Jarquin Levels, DO 1/5/2023 1531 Microbiology                Complications: None           Disposition: extubated, tolerated procedure well            Condition: Stable    Shashank Dennison,

## 2023-01-05 NOTE — CONSULTS
Comprehensive Nutrition Assessment    Type and Reason for Visit: Initial, Positive nutrition screen, Consult, NPO/clear liquid    Nutrition Recommendations/Plan:    Provide parenteral nutrition using standard premixed product until patient is able to tolerate adequate intake of oral diet. Start PN at 20:00 tonight. See below for order contents/details. Discontinue IVF at 19:50 when PN starts tonight discussed with Dr Li Miller oral supplements d/t NPO status   Replace Phos within TPN-discussed with Dr Laila Manzano   Monitor tolerance of TPN at current rate, readiness to initiate PO intake, weight, labs and plan of care during admission. Parenteral Nutrition Order:    New PN:       Malnutrition Assessment:  Malnutrition Status:  Severe malnutrition (01/02/23 0935)    Context:  Acute illness     Findings of the 6 clinical characteristics of malnutrition:   Energy Intake:  50% or less of est energy requirements for 5 or more days  Weight Loss:  Greater than 5% over 1 month     Body Fat Loss:  No significant body fat loss,     Muscle Mass Loss:  Mild muscle mass loss, Temples (temporalis)  Fluid Accumulation:  No significant fluid accumulation,     Strength:  Not performed     Nutrition Assessment:    Admitted for abd pain that has been increasing the past 3 weeks; perforated bowel, s/p ex lap, right hemicolectomy 1/1. NPO/CL x > 5 days. Consult to initiate TPN. Per surgery, CT scan with patient demonstrating apple core lesion in proximal descending colon, anastomosis intact; NPO status. Pending colectomy today with ileostomy, possible primary anastomosis per MD. Current lab shows low Na (130), Potassium (3.3)-replaced and Phos (1.5)-discussed with MD regarding replacement, MD agreed. Will initiate TPN at 64mL/hour to provide 1310 kcal, 150 g of dextrose and 51 g of AA, 65% RDIs.  Pt receiving D5 LR infusion at 100mL/hour providing (120 gm dextrose, 408 kcal per day); MD agreed to discontinue once TPN initiated at 8pm.     Addendum at 7:47a: This writer was contacted by Pharmacy at 8:30p on 1/5 and notified that this patient did not receive scheduled PICC placement. MD aware. Discussed changing to PPN instead; updated bag will provide 970 kcal, 97 g of dextrose and 94 g of AA; infused began at 2100 and will continue for 24 hours. Phos can not be replaced in this PPN as previously planned in TPN bag. Will adjust PPN bag today as needed. Nutrition Related Findings:    Last BM 8/4. Output: 400mL (urine). Pertinent Medications:  lovenox, folic acid, zofran, humalog, MVI (refused), potassium bicarb, D5 LR at 100mL/hour. Wound Type: Surgical incision     Current Nutrition Intake & Therapies:  Average Meal Intake: NPO  Average Supplement Intake: NPO  ADULT ORAL NUTRITION SUPPLEMENT Breakfast, Dinner; Clear Liquid  ADULT ORAL NUTRITION SUPPLEMENT Lunch, Dinner; Protein Modular  DIET NPO Sips of Water with Meds    Anthropometric Measures:  Height: 5' 9\" (175.3 cm)  Ideal Body Weight (IBW): 160 lbs (73 kg)  Admission Body Weight: 169 lb 15.6 oz (77.1 kg)  Current Body Wt:  82.6 kg (182 lb) (obtained by this writer), 113.8 % IBW. Bed scale  Current BMI (kg/m2): 26.9  Usual Body Weight: 81.6 kg (180 lb)  % Weight Change (Calculated): 1.1  Weight Adjustment: No adjustment  BMI Category: Overweight (BMI 25.0-29. 9)    Estimated Daily Nutrient Needs:  Energy Requirements Based On: Formula  Weight Used for Energy Requirements: Admission  Energy (kcal/day): 5928-7047 (MSJ 1.2-1.3)  Weight Used for Protein Requirements: Current  Protein (g/day):  (1.2-1.4 g/day)  Method Used for Fluid Requirements: 1 ml/kcal  Fluid (ml/day): 5537-0592    Nutrition Diagnosis:   Severe malnutrition, In context of acute illness or injury related to acute injury/trauma, altered GI function as evidenced by Criteria as identified in malnutrition assessment  Altered GI function related to altered GI structure (2/2 perforated bowel) as evidenced by NPO or clear liquid status due to medical condition (s/p ex lap)    Nutrition Interventions:   Food and/or Nutrient Delivery: Continue NPO, Start parenteral nutrition  Nutrition Education/Counseling: Education not indicated, No recommendations at this time  Coordination of Nutrition Care: Continue to monitor while inpatient  Plan of Care discussed with: Dr Le Trimble    Goals:  Previous Goal Met: No progress toward goal(s)  Goals: Meet at least 75% of estimated needs, by next RD assessment       Nutrition Monitoring and Evaluation:   Behavioral-Environmental Outcomes: None identified  Food/Nutrient Intake Outcomes: Parenteral nutrition intake/tolerance  Physical Signs/Symptoms Outcomes: Biochemical data, Meal time behavior, Nutrition focused physical findings, Weight, GI status    Discharge Planning:     Too soon to determine    Humberto Radford, LEON, LD   Contact: [phone]

## 2023-01-06 ENCOUNTER — APPOINTMENT (OUTPATIENT)
Dept: GENERAL RADIOLOGY | Age: 68
DRG: 853 | End: 2023-01-06
Attending: STUDENT IN AN ORGANIZED HEALTH CARE EDUCATION/TRAINING PROGRAM
Payer: MEDICARE

## 2023-01-06 LAB
ABO + RH BLD: NORMAL
ALBUMIN SERPL-MCNC: 1.8 G/DL (ref 3.4–5)
ALBUMIN/GLOB SERPL: 0.5 (ref 0.8–1.7)
ALP SERPL-CCNC: 58 U/L (ref 45–117)
ALT SERPL-CCNC: 15 U/L (ref 16–61)
ANION GAP SERPL CALC-SCNC: 5 MMOL/L (ref 3–18)
ANION GAP SERPL CALC-SCNC: 7 MMOL/L (ref 3–18)
APPEARANCE UR: CLEAR
AST SERPL-CCNC: 12 U/L (ref 10–38)
ATRIAL RATE: 208 BPM
ATRIAL RATE: 234 BPM
ATRIAL RATE: 58 BPM
ATRIAL RATE: 89 BPM
BACTERIA URNS QL MICRO: ABNORMAL /HPF
BASOPHILS # BLD: 0 K/UL (ref 0–0.1)
BASOPHILS # BLD: 0 K/UL (ref 0–0.1)
BASOPHILS NFR BLD: 0 % (ref 0–2)
BASOPHILS NFR BLD: 0 % (ref 0–2)
BILIRUB SERPL-MCNC: 1.7 MG/DL (ref 0.2–1)
BILIRUB UR QL: ABNORMAL
BLOOD GROUP ANTIBODIES SERPL: NORMAL
BUN SERPL-MCNC: 15 MG/DL (ref 7–18)
BUN SERPL-MCNC: 16 MG/DL (ref 7–18)
BUN/CREAT SERPL: 17 (ref 12–20)
BUN/CREAT SERPL: 21 (ref 12–20)
CALCIUM SERPL-MCNC: 7.5 MG/DL (ref 8.5–10.1)
CALCIUM SERPL-MCNC: 7.8 MG/DL (ref 8.5–10.1)
CALCULATED P AXIS, ECG09: 43 DEGREES
CALCULATED P AXIS, ECG09: 51 DEGREES
CALCULATED R AXIS, ECG10: -15 DEGREES
CALCULATED R AXIS, ECG10: -3 DEGREES
CALCULATED R AXIS, ECG10: 1 DEGREES
CALCULATED R AXIS, ECG10: 151 DEGREES
CALCULATED T AXIS, ECG11: -10 DEGREES
CALCULATED T AXIS, ECG11: -2 DEGREES
CALCULATED T AXIS, ECG11: -63 DEGREES
CALCULATED T AXIS, ECG11: -71 DEGREES
CHLORIDE SERPL-SCNC: 94 MMOL/L (ref 100–111)
CHLORIDE SERPL-SCNC: 96 MMOL/L (ref 100–111)
CO2 SERPL-SCNC: 32 MMOL/L (ref 21–32)
CO2 SERPL-SCNC: 33 MMOL/L (ref 21–32)
COLOR UR: ABNORMAL
CREAT SERPL-MCNC: 0.77 MG/DL (ref 0.6–1.3)
CREAT SERPL-MCNC: 0.86 MG/DL (ref 0.6–1.3)
DIAGNOSIS, 93000: NORMAL
DIFFERENTIAL METHOD BLD: ABNORMAL
DIFFERENTIAL METHOD BLD: ABNORMAL
EOSINOPHIL # BLD: 0 K/UL (ref 0–0.4)
EOSINOPHIL # BLD: 0 K/UL (ref 0–0.4)
EOSINOPHIL NFR BLD: 0 % (ref 0–5)
EOSINOPHIL NFR BLD: 0 % (ref 0–5)
EPITH CASTS URNS QL MICRO: ABNORMAL /LPF (ref 0–5)
ERYTHROCYTE [DISTWIDTH] IN BLOOD BY AUTOMATED COUNT: 12.3 % (ref 11.6–14.5)
ERYTHROCYTE [DISTWIDTH] IN BLOOD BY AUTOMATED COUNT: 12.4 % (ref 11.6–14.5)
GLOBULIN SER CALC-MCNC: 3.5 G/DL (ref 2–4)
GLUCOSE BLD STRIP.AUTO-MCNC: 190 MG/DL (ref 70–110)
GLUCOSE BLD STRIP.AUTO-MCNC: 192 MG/DL (ref 70–110)
GLUCOSE BLD STRIP.AUTO-MCNC: 208 MG/DL (ref 70–110)
GLUCOSE BLD STRIP.AUTO-MCNC: 227 MG/DL (ref 70–110)
GLUCOSE SERPL-MCNC: 224 MG/DL (ref 74–99)
GLUCOSE SERPL-MCNC: 228 MG/DL (ref 74–99)
GLUCOSE UR STRIP.AUTO-MCNC: 250 MG/DL
HCT VFR BLD AUTO: 38.8 % (ref 36–48)
HCT VFR BLD AUTO: 43 % (ref 36–48)
HGB BLD-MCNC: 13.7 G/DL (ref 13–16)
HGB BLD-MCNC: 15.6 G/DL (ref 13–16)
HGB UR QL STRIP: NEGATIVE
IMM GRANULOCYTES # BLD AUTO: 0 K/UL (ref 0–0.04)
IMM GRANULOCYTES # BLD AUTO: 0 K/UL (ref 0–0.04)
IMM GRANULOCYTES NFR BLD AUTO: 0 % (ref 0–0.5)
IMM GRANULOCYTES NFR BLD AUTO: 0 % (ref 0–0.5)
KETONES UR QL STRIP.AUTO: ABNORMAL MG/DL
LACTATE SERPL-SCNC: 1.6 MMOL/L (ref 0.4–2)
LACTATE SERPL-SCNC: 1.9 MMOL/L (ref 0.4–2)
LEUKOCYTE ESTERASE UR QL STRIP.AUTO: ABNORMAL
LYMPHOCYTES # BLD: 0.2 K/UL (ref 0.9–3.6)
LYMPHOCYTES # BLD: 0.5 K/UL (ref 0.9–3.6)
LYMPHOCYTES NFR BLD: 1 % (ref 21–52)
LYMPHOCYTES NFR BLD: 3 % (ref 21–52)
MAGNESIUM SERPL-MCNC: 2 MG/DL (ref 1.6–2.6)
MCH RBC QN AUTO: 31.8 PG (ref 24–34)
MCH RBC QN AUTO: 32 PG (ref 24–34)
MCHC RBC AUTO-ENTMCNC: 35.3 G/DL (ref 31–37)
MCHC RBC AUTO-ENTMCNC: 36.3 G/DL (ref 31–37)
MCV RBC AUTO: 87.8 FL (ref 78–100)
MCV RBC AUTO: 90.7 FL (ref 78–100)
MONOCYTES # BLD: 0.9 K/UL (ref 0.05–1.2)
MONOCYTES # BLD: 1.2 K/UL (ref 0.05–1.2)
MONOCYTES NFR BLD: 4 % (ref 3–10)
MONOCYTES NFR BLD: 8 % (ref 3–10)
NEUTS SEG # BLD: 13.8 K/UL (ref 1.8–8)
NEUTS SEG # BLD: 20.6 K/UL (ref 1.8–8)
NEUTS SEG NFR BLD: 89 % (ref 40–73)
NEUTS SEG NFR BLD: 95 % (ref 40–73)
NITRITE UR QL STRIP.AUTO: POSITIVE
NRBC # BLD: 0 K/UL (ref 0–0.01)
NRBC # BLD: 0 K/UL (ref 0–0.01)
NRBC BLD-RTO: 0 PER 100 WBC
NRBC BLD-RTO: 0 PER 100 WBC
P-R INTERVAL, ECG05: 160 MS
P-R INTERVAL, ECG05: 170 MS
PH UR STRIP: 7.5 (ref 5–8)
PHOSPHATE SERPL-MCNC: 2 MG/DL (ref 2.5–4.9)
PHOSPHATE SERPL-MCNC: 2.3 MG/DL (ref 2.5–4.9)
PLATELET # BLD AUTO: 188 K/UL (ref 135–420)
PLATELET # BLD AUTO: 270 K/UL (ref 135–420)
PLATELET COMMENTS,PCOM: ABNORMAL
PLATELET COMMENTS,PCOM: ABNORMAL
PMV BLD AUTO: 9.5 FL (ref 9.2–11.8)
PMV BLD AUTO: 9.5 FL (ref 9.2–11.8)
POTASSIUM SERPL-SCNC: 3.4 MMOL/L (ref 3.5–5.5)
POTASSIUM SERPL-SCNC: 3.9 MMOL/L (ref 3.5–5.5)
PROCALCITONIN SERPL-MCNC: 0.71 NG/ML
PROT SERPL-MCNC: 5.3 G/DL (ref 6.4–8.2)
PROT UR STRIP-MCNC: 30 MG/DL
Q-T INTERVAL, ECG07: 238 MS
Q-T INTERVAL, ECG07: 268 MS
Q-T INTERVAL, ECG07: 336 MS
Q-T INTERVAL, ECG07: 464 MS
QRS DURATION, ECG06: 128 MS
QRS DURATION, ECG06: 74 MS
QRS DURATION, ECG06: 84 MS
QRS DURATION, ECG06: 86 MS
QTC CALCULATION (BEZET), ECG08: 408 MS
QTC CALCULATION (BEZET), ECG08: 442 MS
QTC CALCULATION (BEZET), ECG08: 446 MS
QTC CALCULATION (BEZET), ECG08: 455 MS
RBC # BLD AUTO: 4.28 M/UL (ref 4.35–5.65)
RBC # BLD AUTO: 4.9 M/UL (ref 4.35–5.65)
RBC #/AREA URNS HPF: NEGATIVE /HPF (ref 0–5)
RBC MORPH BLD: ABNORMAL
RBC MORPH BLD: ABNORMAL
SODIUM SERPL-SCNC: 133 MMOL/L (ref 136–145)
SODIUM SERPL-SCNC: 134 MMOL/L (ref 136–145)
SP GR UR REFRACTOMETRY: >1.03 (ref 1–1.03)
SPECIMEN EXP DATE BLD: NORMAL
TROPONIN-HIGH SENSITIVITY: 38 NG/L (ref 0–78)
UROBILINOGEN UR QL STRIP.AUTO: 4 EU/DL (ref 0.2–1)
VENTRICULAR RATE, ECG03: 164 BPM
VENTRICULAR RATE, ECG03: 211 BPM
VENTRICULAR RATE, ECG03: 58 BPM
VENTRICULAR RATE, ECG03: 89 BPM
WBC # BLD AUTO: 15.5 K/UL (ref 4.6–13.2)
WBC # BLD AUTO: 21.7 K/UL (ref 4.6–13.2)
WBC URNS QL MICRO: ABNORMAL /HPF (ref 0–4)

## 2023-01-06 PROCEDURE — 74011636637 HC RX REV CODE- 636/637: Performed by: PHYSICIAN ASSISTANT

## 2023-01-06 PROCEDURE — 65270000046 HC RM TELEMETRY

## 2023-01-06 PROCEDURE — 77010033678 HC OXYGEN DAILY

## 2023-01-06 PROCEDURE — 81001 URINALYSIS AUTO W/SCOPE: CPT

## 2023-01-06 PROCEDURE — 84145 PROCALCITONIN (PCT): CPT

## 2023-01-06 PROCEDURE — 74011250636 HC RX REV CODE- 250/636: Performed by: SURGERY

## 2023-01-06 PROCEDURE — 74011000258 HC RX REV CODE- 258: Performed by: STUDENT IN AN ORGANIZED HEALTH CARE EDUCATION/TRAINING PROGRAM

## 2023-01-06 PROCEDURE — 82962 GLUCOSE BLOOD TEST: CPT

## 2023-01-06 PROCEDURE — 74011636637 HC RX REV CODE- 636/637: Performed by: SURGERY

## 2023-01-06 PROCEDURE — 93005 ELECTROCARDIOGRAM TRACING: CPT

## 2023-01-06 PROCEDURE — 84484 ASSAY OF TROPONIN QUANT: CPT

## 2023-01-06 PROCEDURE — 74011000258 HC RX REV CODE- 258: Performed by: EMERGENCY MEDICINE

## 2023-01-06 PROCEDURE — 74011000258 HC RX REV CODE- 258: Performed by: SURGERY

## 2023-01-06 PROCEDURE — 83605 ASSAY OF LACTIC ACID: CPT

## 2023-01-06 PROCEDURE — 94762 N-INVAS EAR/PLS OXIMTRY CONT: CPT

## 2023-01-06 PROCEDURE — 84100 ASSAY OF PHOSPHORUS: CPT

## 2023-01-06 PROCEDURE — P9047 ALBUMIN (HUMAN), 25%, 50ML: HCPCS | Performed by: STUDENT IN AN ORGANIZED HEALTH CARE EDUCATION/TRAINING PROGRAM

## 2023-01-06 PROCEDURE — 87040 BLOOD CULTURE FOR BACTERIA: CPT

## 2023-01-06 PROCEDURE — 99232 SBSQ HOSP IP/OBS MODERATE 35: CPT | Performed by: INTERNAL MEDICINE

## 2023-01-06 PROCEDURE — 36415 COLL VENOUS BLD VENIPUNCTURE: CPT

## 2023-01-06 PROCEDURE — 86900 BLOOD TYPING SEROLOGIC ABO: CPT

## 2023-01-06 PROCEDURE — 74011250636 HC RX REV CODE- 250/636: Performed by: STUDENT IN AN ORGANIZED HEALTH CARE EDUCATION/TRAINING PROGRAM

## 2023-01-06 PROCEDURE — 83735 ASSAY OF MAGNESIUM: CPT

## 2023-01-06 PROCEDURE — 80053 COMPREHEN METABOLIC PANEL: CPT

## 2023-01-06 PROCEDURE — 74011000250 HC RX REV CODE- 250

## 2023-01-06 PROCEDURE — 85025 COMPLETE CBC W/AUTO DIFF WBC: CPT

## 2023-01-06 PROCEDURE — 74018 RADEX ABDOMEN 1 VIEW: CPT

## 2023-01-06 PROCEDURE — 74011250636 HC RX REV CODE- 250/636: Performed by: EMERGENCY MEDICINE

## 2023-01-06 PROCEDURE — 74011250636 HC RX REV CODE- 250/636

## 2023-01-06 PROCEDURE — 74011000250 HC RX REV CODE- 250: Performed by: SURGERY

## 2023-01-06 RX ORDER — POTASSIUM CHLORIDE 29.8 MG/ML
INJECTION INTRAVENOUS
Status: COMPLETED
Start: 2023-01-06 | End: 2023-01-06

## 2023-01-06 RX ORDER — POTASSIUM CHLORIDE 29.8 MG/ML
INJECTION INTRAVENOUS
Status: DISPENSED
Start: 2023-01-06 | End: 2023-01-06

## 2023-01-06 RX ORDER — SODIUM CHLORIDE 9 MG/ML
1000 INJECTION, SOLUTION INTRAVENOUS ONCE
Status: COMPLETED | OUTPATIENT
Start: 2023-01-06 | End: 2023-01-06

## 2023-01-06 RX ORDER — POTASSIUM CHLORIDE 29.8 MG/ML
20 INJECTION INTRAVENOUS ONCE
Status: COMPLETED | OUTPATIENT
Start: 2023-01-06 | End: 2023-01-06

## 2023-01-06 RX ORDER — METOPROLOL TARTRATE 5 MG/5ML
INJECTION INTRAVENOUS
Status: COMPLETED
Start: 2023-01-06 | End: 2023-01-06

## 2023-01-06 RX ORDER — DILTIAZEM HYDROCHLORIDE 5 MG/ML
INJECTION INTRAVENOUS
Status: DISCONTINUED
Start: 2023-01-06 | End: 2023-01-06 | Stop reason: WASHOUT

## 2023-01-06 RX ORDER — ALBUMIN HUMAN 250 G/1000ML
25 SOLUTION INTRAVENOUS ONCE
Status: COMPLETED | OUTPATIENT
Start: 2023-01-06 | End: 2023-01-06

## 2023-01-06 RX ORDER — ADENOSINE 3 MG/ML
INJECTION, SOLUTION INTRAVENOUS
Status: DISCONTINUED
Start: 2023-01-06 | End: 2023-01-06

## 2023-01-06 RX ORDER — INSULIN LISPRO 100 [IU]/ML
2 INJECTION, SOLUTION INTRAVENOUS; SUBCUTANEOUS ONCE
Status: COMPLETED | OUTPATIENT
Start: 2023-01-06 | End: 2023-01-06

## 2023-01-06 RX ADMIN — DEXTROSE, SOYBEAN OIL, ELECTROLYTES, LYSINE, PHENYLALANINE, LEUCINE, VALINE, THREONINE, METHIONINE, ISOLEUCINE, TRYPTOPHAN, ALANINE, ARGININE, GLYCINE, PROLINE, HISTIDINE, GLUTAMIC ACID, SERINE, ASPARTIC ACID AND TYROSINE
6.8; 3.5; 170; 174; 105; 68; 20; 187; 164; 164; 152; 116; 116; 116; 40; 333; 235; 164; 141; 141; 116; 94; 71; 4.8 INJECTION, EMULSION INTRAVENOUS at 19:54

## 2023-01-06 RX ADMIN — SODIUM CHLORIDE 500 ML: 9 INJECTION, SOLUTION INTRAVENOUS at 03:45

## 2023-01-06 RX ADMIN — SODIUM CHLORIDE 5 MG/HR: 900 INJECTION, SOLUTION INTRAVENOUS at 19:50

## 2023-01-06 RX ADMIN — Medication 2 UNITS: at 17:00

## 2023-01-06 RX ADMIN — SODIUM CHLORIDE, PRESERVATIVE FREE 10 ML: 5 INJECTION INTRAVENOUS at 16:30

## 2023-01-06 RX ADMIN — METOPROLOL TARTRATE 5 MG: 5 INJECTION, SOLUTION INTRAVENOUS at 02:54

## 2023-01-06 RX ADMIN — POTASSIUM CHLORIDE 20 MEQ/ML: 29.8 INJECTION INTRAVENOUS at 07:00

## 2023-01-06 RX ADMIN — PIPERACILLIN SODIUM AND TAZOBACTAM SODIUM 3.38 G: 3; .375 INJECTION, POWDER, LYOPHILIZED, FOR SOLUTION INTRAVENOUS at 21:13

## 2023-01-06 RX ADMIN — POTASSIUM PHOSPHATE, MONOBASIC POTASSIUM PHOSPHATE, DIBASIC: 224; 236 INJECTION, SOLUTION, CONCENTRATE INTRAVENOUS at 11:54

## 2023-01-06 RX ADMIN — THIAMINE HYDROCHLORIDE 200 MG: 100 INJECTION, SOLUTION INTRAMUSCULAR; INTRAVENOUS at 11:55

## 2023-01-06 RX ADMIN — ALBUMIN (HUMAN) 25 G: 0.25 INJECTION, SOLUTION INTRAVENOUS at 04:23

## 2023-01-06 RX ADMIN — FOLIC ACID: 5 INJECTION, SOLUTION INTRAMUSCULAR; INTRAVENOUS; SUBCUTANEOUS at 11:54

## 2023-01-06 RX ADMIN — Medication 4 UNITS: at 10:30

## 2023-01-06 RX ADMIN — ENOXAPARIN SODIUM 40 MG: 100 INJECTION SUBCUTANEOUS at 10:20

## 2023-01-06 RX ADMIN — SODIUM CHLORIDE 5 MG/HR: 900 INJECTION, SOLUTION INTRAVENOUS at 03:54

## 2023-01-06 RX ADMIN — PIPERACILLIN SODIUM AND TAZOBACTAM SODIUM 3.38 G: 3; .375 INJECTION, POWDER, LYOPHILIZED, FOR SOLUTION INTRAVENOUS at 13:22

## 2023-01-06 RX ADMIN — SODIUM CHLORIDE 5 MG/HR: 900 INJECTION, SOLUTION INTRAVENOUS at 03:43

## 2023-01-06 RX ADMIN — Medication 4 UNITS: at 00:00

## 2023-01-06 RX ADMIN — SODIUM CHLORIDE, PRESERVATIVE FREE 10 ML: 5 INJECTION INTRAVENOUS at 22:00

## 2023-01-06 RX ADMIN — Medication 4 UNITS: at 23:21

## 2023-01-06 RX ADMIN — POTASSIUM CHLORIDE 20 MEQ: 29.8 INJECTION, SOLUTION INTRAVENOUS at 05:26

## 2023-01-06 NOTE — PROGRESS NOTES
1430 spoke with Dr. Arthur Miller about care of patient and if patient is SDU worthy. Upon conversation we spoke about patients status, marginal ends of stoma appearing dark, almost black, but still pink in the center and draining contents. I informed her of what we had out of the colostomy at that time as well.

## 2023-01-06 NOTE — PROGRESS NOTES
Problem: Diabetes Self-Management  Goal: *Disease process and treatment process  Description: Define diabetes and identify own type of diabetes; list 3 options for treating diabetes. Outcome: Progressing Towards Goal  Goal: *Incorporating nutritional management into lifestyle  Description: Describe effect of type, amount and timing of food on blood glucose; list 3 methods for planning meals. Outcome: Not Progressing Towards Goal  Note:  Npo with NGT in place  Goal: *Incorporating physical activity into lifestyle  Description: State effect of exercise on blood glucose levels. Outcome: Progressing Towards Goal  Goal: *Developing strategies to promote health/change behavior  Description: Define the ABC's of diabetes; identify appropriate screenings, schedule and personal plan for screenings. Outcome: Progressing Towards Goal  Goal: *Using medications safely  Description: State effect of diabetes medications on diabetes; name diabetes medication taking, action and side effects. Outcome: Progressing Towards Goal     Problem: Falls - Risk of  Goal: *Absence of Falls  Description: Document Thapakristin Robison Fall Risk and appropriate interventions in the flowsheet.   Outcome: Progressing Towards Goal  Note: Fall Risk Interventions:  Mobility Interventions: Bed/chair exit alarm         Medication Interventions: Assess postural VS orthostatic hypotension    Elimination Interventions: Bed/chair exit alarm, Call light in reach, Patient to call for help with toileting needs              Problem: Patient Education: Go to Patient Education Activity  Goal: Patient/Family Education  Outcome: Progressing Towards Goal     Problem: Nutrition Deficit  Goal: *Optimize nutritional status  Outcome: Progressing Towards Goal     Problem: Pain  Goal: *Control of Pain  Outcome: Progressing Towards Goal  Note: Pt stated pain free, \"no pain medicine needed\"     Problem: Patient Education: Go to Patient Education Activity  Goal: Patient/Family Education  Outcome: Progressing Towards Goal     Problem: Patient Education: Go to Patient Education Activity  Goal: Patient/Family Education  Outcome: Progressing Towards Goal     Problem: Patient Education: Go to Patient Education Activity  Goal: Patient/Family Education  Outcome: Progressing Towards Goal     Problem: Major Small and Large Bowel Procedure: Day of Surgery (Initiate SCIP Measures for Post-Op Care)  Goal: Activity/Safety  Outcome: Progressing Towards Goal  Goal: Consults, if ordered  Outcome: Progressing Towards Goal  Goal: Nutrition/Diet  Outcome: Not Progressing Towards Goal  Note: Npo, ngt in place  Goal: Medications  Outcome: Progressing Towards Goal  Goal: Respiratory  Outcome: Progressing Towards Goal  Goal: Treatments/Interventions/Procedures  Outcome: Progressing Towards Goal  Goal: Psychosocial  Outcome: Progressing Towards Goal  Goal: *Optimal pain control at patient's stated goal  Outcome: Progressing Towards Goal  Goal: *Adequate urinary output (equal to or greater than 30 milliliters/hour)  Outcome: Progressing Towards Goal  Goal: *Hemodynamically stable  Outcome: Progressing Towards Goal  Goal: *Tolerating diet  Outcome: Not Progressing Towards Goal  Note: Npo, ngt in place  Goal: *Demonstrates progressive activity  Outcome: Progressing Towards Goal     Problem: Major Small and Large Bowel Procedure: Post-Op Day 1  Goal: Activity/Safety  Outcome: Progressing Towards Goal  Goal: Nutrition/Diet  Outcome: Not Progressing Towards Goal  Note: Npo, ngt in place

## 2023-01-06 NOTE — PROGRESS NOTES
[facility] Medicine  Rapid/Medical Response Team Note      Patient:    Alexandro Clark 75 y.o. male, : 1955  Patient MRN: [id number]    Admission Date: 2023   Admission Diagnosis: Perforated bowel ([facility] [address] [K63.1]      RAPID RESPONSE  Rapid response called for: sustained tachycardia    Rapid response called around 2:40 AM for sustained wide-complex tachycardia noted on monito. Upon arrival to the room, patient was resting comfortably in bed and denied chest pain, palpitations, shortness of breath, headache or dizziness, weakness, or any sensory disturbances. Wide-complex tachycardia with rates over 200 noted with stable blood pressure and O2. Of note, patient is status post hemicolectomy 23. Was initially admitted for peritonitis after perforated cecum on 2023. He was recently treated with IV levofloxacin and was noted to have an episode of atrial fibrillation and associated QT prolongation which resolved with Cardizem drip on 1/3. He is not currently on anticoagulants or antiarrhythmics. Carotid massage initiated, physical maneuvers could not be performed due to recent abdominal surgery. Rhythm converted to narrow complex supraventricular tachycardia. 5 mg IV metoprolol ministered, after which blood pressure decreased to MAP of 55. IV normal saline 500 mL bolus was administered, after which she had some improvement in his blood pressure with MAP over 65. Rate remained markedly tachycardic, and during times of pause it was clear rhythm was A. fib with RVR. Blood pressure stabilized and cardiology was consulted. Recommended completion of 500 mL bolus and initiation of Cardizem drip 5 mg/h without loading dose. Rhythm and rate remain uncontrolled with A. fib RVR. Blood pressure began to drop. Patient remained asymptomatic. Hospitalist consulted and recommended to begin IV albumin 5% 25 g, as midodrine cannot be administered due to NG and n.p.o. status.   Recommended to continue gentle bolusing MAP remained less than 65. Patient was eventually transferred to CVT ICU for higher level of care. BP eventually stabilized after completion of 1L NS (total) w/ albumin. Pt stable, rapid ended. Hospitalist notified. OBJECTIVE    RRT/MRT start time:  2:44 AM             RRT/MRT end time: 5:30 AM  Visit Vitals  BP 93/60 (BP 1 Location: Left upper arm, BP Patient Position: At rest)   Pulse 74   Temp 97.4 °F (36.3 °C)   Resp 15   Ht 5' 9\" (1.753 m)   Wt 80.1 kg (176 lb 9.6 oz)   SpO2 97%   BMI 26.08 kg/m²        Physical Exam:  Gen: NAD, comfortable, awake, alert  HEENT: normocephalic, atraumatic, dry mucous membranes  CV: Irregular rate and rhythm, variable with HR between , no murmurs, +2 radial pulses, bounding carotid pulses  Pulm: CTAB, no wheezes, no crackles  Abd: Distended abdomen, ostomy in place (right abdomen), bandage c/d/I midline, tenderness only to firm palpation  MSK: no clubbing, no edema  Skin: warm, dry, intact, no rash  Neuro: CN II-XII grossly intact, no focal deficits appreciated   Psych: appropriate, alert, oriented      ASSESSMENT/PLAN AND DISPOSITION  Sanjeev Blancas is a 75y.o. year old male admitted for Perforated bowel ([facility] [address] [K63.1], now s/p left hemicolectomy with ileostomy (1/5/2022). Rapid Response Team/ Medical Response Team Called For: sustained tachycardia    In setting of stable tachycardia, though alternating with wide complex to narrow complex quickly, completed the below:    Medications Administered During Rapid:  Metoprolol 5 mg IV x1  1000 cc IV NS (total, administered in four 250 cc boluses)  Cardizem gtt 5 mL/hr  Albumin 25 g  Potassium repletion     EKG: initially wide complex tachycardia with rate >200, became narrow complex tachycardia w/ rate >200 after carotid massage.  EKGs repeated several times after both brief improvement with metoprolol 500 mg IV x 1 and  after spontaneous conversion, subsequent EKGs with atrial fibrillation, PVCs, PACs; overall rate appeared to be most consistent w/ A-fib RVR    Labs: CBC, BMP, lactic acid, troponin    Imaging:       > Abdominal x-ray: pending official read    Other interventions: carotid massage (beginning of rapid)    ASSESSMENT & PLAN:    Atrial fibrillation and Hypotension:   - Intial rhythm of wide-complex tachycardia, converted to narrow complex after several minutes of b/l carotid massage   - Patient was initially given metoprolol 5 mg IV x 1 with brief improvement (<1 minute) to irregular rhythm with rate in the 70s-80s   - Later HR returned to the 200s, though radpidly changed several times to the 80s for brief periods of time   - After initial metoprolol dose, BP decreased to 70s/50s though MAP >65;  - Abdominal plain films ordered to evaluate for potential perforation or new obstruction, pending read. Given lack of significant abd pain, unlikely that pain or peritonitis significantly contributing to this arrhythmia.   - Cardiology was called; noted that patient had previously been on a Cardizem gtt several days ago though is not currently on any medications for rate control or therapeutic anticoagulation   - Per Cardiologist on call, recommended stop at 500 cc IV fluids, started cardizem gtt at fixed rate of 5    - HR remained elevated with atrial fibrillation continuing to reach a rate of 200 bpm, BP soft with continued decreased MAPs. Given an addition 500 cc NS and 25g albumin after discussing w/ Dr. Lamar Parada and observing worsening of hypotension.   - BP did improve to map 70, with HR mainly in the 80s, HR is variable between 70s and 170s   - Made NPO (with NG tube set to low continuous suction per surgery order), discontinued TPN until further notice as pt required numerous IVs for medical mgmt during rapid.    - Clinical picture potentially concerning for PE given recent surgery vs sick sinus syndrome, though patient was comfortable without chest pain, shortness of breath, or tachypnea  -  Patient remained asymptomatic, awake and alert, and continued to have SpO2 in the upper 90s without difficulty breathing. RR ended w/ resolution of hypotension, although HR and rhythm remain irregular. Plan:  - Recommend Cardiology to resume following  - Continue cardizem gtt, can consider titrating upward if BP stable and tachycardia persists  - Atropine per Dr. Sanam Stark as PRN for bradycardia  - Avoid QT prolonging agents -- discontinued ondansetron. (Documentation that Levaquin may have contributed to his arrhythmia on 1/3). Disposition: Transferred to CVT ICU. Patient condition: stable    Attending, Dr. Sanam Stark, and Cardiologist on-call notified of rapid response and is in agreement with plan. Primary team resuming care. [facility] Senior resident Dr. Jyoti Vazquez present during Rapid Response.       Ricardo Wade DO, PGY-1  [facility]  January 6, 2023 4:01 AM

## 2023-01-06 NOTE — CONSULTS
Comprehensive Nutrition Assessment    Type and Reason for Visit: Reassess, Consult, NPO/clear liquid    Nutrition Recommendations/Plan:   Provide parenteral nutrition using standard premixed product until patient is able to tolerate adequate intake of oral diet. Continue current PN order. See below for order contents/details. Continue thiamine supplementation. Order BMP, Phos, Mg daily, triglyceride and prealbumin for tomorrow. D/C multivitamin oral while on PPN. Continue to monitor tolerance of PN, weight, labs, and plan of care during admission. Parenteral Nutrition Order:   Next PN:       Malnutrition Assessment:  Malnutrition Status:  Severe malnutrition (01/02/23 0935)    Context:  Acute illness     Findings of the 6 clinical characteristics of malnutrition:   Energy Intake:  50% or less of est energy requirements for 5 or more days  Weight Loss:  Greater than 5% over 1 month     Body Fat Loss:  No significant body fat loss,     Muscle Mass Loss:  Mild muscle mass loss, Temples (temporalis)  Fluid Accumulation:  No significant fluid accumulation,     Strength:  Not performed     Nutrition Assessment:    Admitted for abd pain that has been increasing the past 3 weeks; perforated bowel, s/p ex lap, right hemicolectomy 1/1. NPO/CL x > 5 days. Pt s/p lap, diverting loop ileostomy. +NGT. Noted re-consult for TPN. Per chart review, PICC was not placed yesterday, switched to PPN, held due to RRT. Infusing at time of visit. Per BAIRON Chen, PICC will not be placed today. Discussed care with Dr. Kimberly Lenz, K/phos low, order 9 mmol Kphos for replacement, to continue PPN for tonight. Plan to order PPN at 60 ml/hr and labs needed. Will d/c oral MVI due to NPO status.     Nutrition Related Findings:    Pertinent Meds: folic acid, thiamine, 9 mmol Kphos  Pertinent Labs: Na 134 L, K 3.9 wnl (trending up), Phos 2.3 L (trending up) Wound Type: Surgical incision    Current Nutrition Intake & Therapies:  Average Meal Intake: NPO  Average Supplement Intake: NPO  TPN ADULT PERIKABIVEN W/ ADDITIVES  DIET NPO Ice Chips    Anthropometric Measures:  Height: 5' 9\" (175.3 cm)  Ideal Body Weight (IBW): 160 lbs (73 kg)  Admission Body Weight: 169 lb 15.6 oz (77.1 kg)  Current Body Wt:  82.6 kg (182 lb) (obtained by this writer), 113.8 % IBW. Bed scale  Current BMI (kg/m2): 26.9  Usual Body Weight: 81.6 kg (180 lb)  % Weight Change (Calculated): 1.1  Weight Adjustment: No adjustment  BMI Category: Overweight (BMI 25.0-29. 9)    Estimated Daily Nutrient Needs:  Energy Requirements Based On: Formula  Weight Used for Energy Requirements: Admission  Energy (kcal/day): 7250-4473 (MSJ 1.2-1.3)  Weight Used for Protein Requirements: Current  Protein (g/day):  (1.2-1.4 g/day)  Method Used for Fluid Requirements: 1 ml/kcal  Fluid (ml/day): 1282-9822    Nutrition Diagnosis:   Severe malnutrition, In context of acute illness or injury related to acute injury/trauma, altered GI function as evidenced by Criteria as identified in malnutrition assessment  Altered GI function related to altered GI structure (2/2 perforated bowel) as evidenced by NPO or clear liquid status due to medical condition (s/p ex lap)    Nutrition Interventions:   Food and/or Nutrient Delivery: Continue NPO, Continue parenteral nutrition  Nutrition Education/Counseling: No recommendations at this time  Coordination of Nutrition Care: Continue to monitor while inpatient  Plan of Care discussed with: Dr. Jose Woodson, RN April    Goals:  Previous Goal Met: Progressing toward goal(s)  Goals: Meet at least 75% of estimated needs, by next RD assessment       Nutrition Monitoring and Evaluation:   Behavioral-Environmental Outcomes: None identified  Food/Nutrient Intake Outcomes: Parenteral nutrition intake/tolerance  Physical Signs/Symptoms Outcomes: Biochemical data, Hemodynamic status, GI status, Weight, Fluid status or edema    Discharge Planning:     Too soon to determine    Clemencia Story [address], [address] [place] [address], [address] [place] [address]   Contact: [phone]

## 2023-01-06 NOTE — PROGRESS NOTES
Infectious Disease progress Note        Reason: secondary peritonitis, cecal perforation    Current abx Prior abx   Zosyn since 1/1/23      Lines:       Assessment :   75y.o. year old male with PMH significant for HTN, type 2 DM who presented to ed on 1/1/23 with severe abdominal pain. Clinical presentation c/w secondary peritonitis due to cecal perforation s/p exploratory laparotomy, right hemicolectomy on 1/1/23. Intra op findings noted  Worsening leukocytosis noted 1/2/23 likely due to post op inflammation     Surgery follow-up appreciated. CT scan 1/4 with lesion in proximal descending colon. S/p laparotomy, diverting loop ileostomy on 1/5/23. Intra op cx 1/5- negative    Atrial fib with rvr/paroxysmal atrial flutter noted overnight- transferred to ICU  Worsening leukocytosis-likely SIRS response to intra-abdominal inflammation. Will monitor for new infection-rule out bloodstream infection, cystitis      Recommendations:    - continue zosyn.   -Obtain blood cultures, urine analysis. Follow-up abdominal fluid cultures  -Okay to place PICC line tomorrow for TPN if blood cultures remain negative at 24 hours  --follow up surgery recommendations regarding TPN/enteral nutrition  -Remove Reavse in 1 to 2 days   monitor cbc, temp, clinically   -Will make further decision about antibiotics based on  clinical course      Above plan was discussed in details with patient, and primary team, RN. Please call me if any further questions or concerns. Will continue to participate in the care of this patient. HPI:        Denies increased chest pain, shortness of breath, nausea, vomiting. Feels better today compared to yesterday. Past Medical History:   Diagnosis Date    Anxiety     Benign essential hypertension     DM type 2 (diabetes mellitus, type 2) ([facility] [address]        History reviewed. No pertinent surgical history.     Current Discharge Medication List        CONTINUE these medications which have NOT CHANGED Details   atorvastatin (LIPITOR) 10 mg tablet Take 1 tablet by mouth daily  Qty: 90 Tablet, Refills: 3    Associated Diagnoses: Type 2 diabetes mellitus without complication, without long-term current use of insulin (HCC)      atenoloL-chlorthalidone (TENORETIC) 50-25 mg per tablet Take 1 Tablet by mouth daily. Qty: 90 Tablet, Refills: 3    Associated Diagnoses: Benign essential hypertension      potassium chloride (K-DUR, KLOR-CON) 20 mEq tablet 1 qd  Qty: 90 Tab, Refills: 3      clobetasoL (TEMOVATE) 0.05 % ointment Apply  to affected area two (2) times a day. For psoriasis  Qty: 15 g, Refills: 5    Associated Diagnoses: Psoriasis, unspecified      amLODIPine (NORVASC) 10 mg tablet Take 1 Tablet by mouth daily.  1 qd  Qty: 90 Tablet, Refills: 3    Associated Diagnoses: Benign essential hypertension           STOP taking these medications       lisinopriL (PRINIVIL, ZESTRIL) 10 mg tablet Comments:   Reason for Stopping:               Current Facility-Administered Medications   Medication Dose Route Frequency    dilTIAZem (CARDIZEM) 100 mg in 0.9% sodium chloride (MBP/ADV) 100 mL infusion  5 mg/hr IntraVENous CONTINUOUS    potassium chloride in water 20 mEq/50 mL IVPB premix pgbk        potassium chloride in water 20 mEq/50 mL IVPB premix pgbk        sodium chloride (NS) flush 5-40 mL  5-40 mL IntraVENous Q8H    sodium chloride (NS) flush 5-40 mL  5-40 mL IntraVENous PRN    phenol throat spray (CHLORASEPTIC) 1 Spray  1 Spray Oral PRN    TPN ADULT PERIKABIVEN W/ ADDITIVES   IntraVENous CONTINUOUS    morphine 4 mg/mL injection 4 mg  4 mg IntraVENous Q4H PRN    morphine 2 mg/mL injection 2 mg  2 mg IntraVENous Q2H PRN    therapeutic multivitamin (THERAGRAN) tablet 1 Tablet  1 Tablet Oral DAILY    potassium bicarb-citric acid (EFFER-K) tablet 40 mEq  40 mEq Oral BID    enoxaparin (LOVENOX) injection 40 mg  40 mg SubCUTAneous [id number]    folic acid (FOLVITE) 1 mg in 0.9% sodium chloride 50 mL ivpb   IntraVENous DAILY thiamine (B-1) 200 mg in 0.9% sodium chloride 50 mL IVPB  200 mg IntraVENous DAILY    naloxone (NARCAN) injection 0.4 mg  0.4 mg IntraVENous EVERY 2 MINUTES AS NEEDED    sodium chloride (NS) flush 5-10 mL  5-10 mL IntraVENous PRN    piperacillin-tazobactam (ZOSYN) 3.375 g in 0.9% sodium chloride (MBP/ADV) 100 mL MBP  3.375 g IntraVENous Q8H    phenol throat spray (CHLORASEPTIC) 1 Spray  1 Spray Oral PRN    insulin lispro (HUMALOG) injection   SubCUTAneous Q6H    glucose chewable tablet 16 g  4 Tablet Oral PRN    glucagon (GLUCAGEN) injection 1 mg  1 mg IntraMUSCular PRN    dextrose 10% infusion 0-250 mL  0-250 mL IntraVENous PRN       Allergies: Patient has no known allergies.     Family History   Problem Relation Age of Onset    OSTEOARTHRITIS Father     No Known Problems Mother      Social History     Socioeconomic History    Marital status: SINGLE     Spouse name: Not on file    Number of children: Not on file    Years of education: Not on file    Highest education level: Not on file   Occupational History    Not on file   Tobacco Use    Smoking status: Former     Types: Cigarettes     Quit date: 1980     Years since quittin.0    Smokeless tobacco: Never   Vaping Use    Vaping Use: Never used   Substance and Sexual Activity    Alcohol use: Yes     Comment: heavy    Drug use: No    Sexual activity: Not on file   Other Topics Concern    Not on file   Social History Narrative    Not on file     Social Determinants of Health     Financial Resource Strain: Low Risk     Difficulty of Paying Living Expenses: Not hard at all   Food Insecurity: No Food Insecurity    Worried About Running Out of Food in the Last Year: Never true    Ran Out of Food in the Last Year: Never true   Transportation Needs: Not on file   Physical Activity: Not on file   Stress: Not on file   Social Connections: Not on file   Intimate Partner Violence: Not on file   Housing Stability: Not on file     Social History     Tobacco Use   Smoking Status Former    Types: Cigarettes    Quit date: 1980    Years since quittin.0   Smokeless Tobacco Never        Temp (24hrs), Av.3 °F (36.8 °C), Min:97.2 °F (36.2 °C), Max:99.2 °F (37.3 °C)    Visit Vitals  BP (!) 96/46   Pulse 64   Temp 97.4 °F (36.3 °C)   Resp 19   Ht 5' 9\" (1.753 m)   Wt 80.1 kg (176 lb 9.6 oz)   SpO2 97%   BMI 26.08 kg/m²       ROS: 12 point ROS obtained in details. Pertinent positives as mentioned in HPI,   otherwise negative    Physical Exam:    General: Well developed, well nourished male laying on the bed AAOx3 in no acute distress. General:   awake alert and oriented   HEENT:  Normocephalic, atraumatic,  EOMI, no scleral icterus or pallor; no conjunctival hemmohage;  nasal and oral mucous are moist and without evidence of lesions. Neck supple, no bruits. Lymph Nodes:   no cervical, axillary or inguinal adenopathy   Lungs:   non-labored, bilaterally clear to auscultation- no crackles wheezes rales or rhonchi   Heart:  RRR, s1 and s2; no rubs or gallops, no edema, + pedal pulses   Abdomen:  soft, non-distended, active bowel sounds, no hepatomegaly, no splenomegaly. + surgical changes on abdomen. + diffuse tenderness- no guarding   Genitourinary:  deferred   Extremities:   no clubbing, cyanosis; no joint effusions or swelling; Full ROM of all large joints to the upper and lower extremities; muscle mass appropriate for age   Neurologic:  No gross focal sensory abnormalities; 5/5 muscle strength to upper and lower extremities. Speech appropriate.  Cranial nerves intact                        Skin:  No rash or ulcers noted   Back:  no spinal or paraspinal muscle tenderness or rigidity, no CVA tenderness     Psychiatric:  No suicidal or homicidal ideations, appropriate mood and affect         Labs: Results:   Chemistry Recent Labs     23  0258 23  0455 23  0453   * 156* 137*   * 130* 131*   K 3.4* 3.3* 2.9*   CL 94* 88* 92*   CO2 32 35* 30   BUN 15 15 16   CREA 0.86 0.82 0.82   CA 7.8* 8.1* 8.0*   AGAP 7 7 9   BUCR 17 18 20   AP 58 75 52   TP 5.3* 5.8* 5.4*   ALB 1.8* 2.1* 2.1*   GLOB 3.5 3.7 3.3   AGRAT 0.5* 0.6* 0.6*        CBC w/Diff Recent Labs     01/06/23  0258 01/05/23  0455 01/04/23  0453   WBC 21.7* 12.3 9.6   RBC 4.90 4.20* 3.73*   HGB 15.6 13.2 11.6*   HCT 43.0 37.3 32.8*    191 162   GRANS 95* 81* 84*   LYMPH 1* 7* 8*   EOS 0 0 2        Microbiology Recent Labs     01/05/23  1531   CULT PENDING            RADIOLOGY:    All available imaging studies/reports in [facility] for this admission were reviewed    Total time spent >35 minutes. High complexity decision making was performed during the evaluation of this patient at high risk for decompensation with multiple organ involvement     Above mentioned total time spent on reviewing the case/medical record/data/notes/EMR/patient examination/documentation/coordinating care with nurse/consultants, exclusive of procedures with complex decision making performed and > 50% time spent in face to face evaluation. Disclaimer: Sections of this note are dictated utilizing voice recognition software, which may have resulted in some phonetic based errors in grammar and contents. Even though attempts were made to correct all the mistakes, some may have been missed, and remained in the body of the document. If questions arise, please contact our department.     Dr. Mary Alice Severino, Infectious Disease Specialist  [phone]  January 6, 2023  6:40 AM

## 2023-01-06 NOTE — PROGRESS NOTES
INTERIM UPDATE - 0407 EST on 1/06/2023    RRT was called for Wide-Complex Supraventricular Tachycardia that reportedly converted (spontaneously versus with IV Metoprolol 5 mg administration) to Narrow-Complex Supraventricular Tachycardia. Patient was determined to be in Atrial Fibrillation with RVR. Patient was started on IV Diltiazem Fixed-Rate Infusion at 5 mg/min. However, Patient's Blood Pressure then decreased to MAP 55 and Patient was ordered IV NS bolus 500 mL. Patient is not requiring oxygen at this time. Notably, Patient is S/P Hemicolectomy 2°/2 Perforated Cecum on IV Zosyn and IV Levofloxacin. Patient reportedly converted into Atrial Fibrillation (thought due to IV Levofloxacin) and IV Levofloxacin was subsequently discontinued and Atrial Fibrillation resolved. Patient was not subsequently place on rate-controlling agents. Patient is reportedly still NPO after Hemicolectomy (eliminating Midodrine as an option at this time). Patient has Albumin 1.8 g/dL, Potassium 3.4 mmol/L, and Magnesium 2.0 mg/dL. Hgb is 15.6 g/dL this AM, so this is not due to a bleed. Patient may benefit from IV Albumin and Potassium replacement. Plan:  Recommend IV Albumin Human 5% 25 grams. If Blood Pressures is still MAP <65, recommend administering another NS 1 L bolus over 30-60 minutes. RRT was advised that should Patient fail to improve after these interventions, call back Physician On-Call (as Intensivist services may need to be contacted). INTERIM UPDATE - 9238 EST on 1/06/2023    Patient noted to have alternately SVT and Atrial Fibrillation with RVR now in apparently in AV Node Second Degree Type 1. EKG was obtained and is in Chart.

## 2023-01-06 NOTE — PROGRESS NOTES
Problem: Falls - Risk of  Goal: *Absence of Falls  Description: Document Vernia Colder Fall Risk and appropriate interventions in the flowsheet.   Outcome: Progressing Towards Goal  Note: Fall Risk Interventions:  Mobility Interventions: Bed/chair exit alarm, OT consult for ADLs, Utilize walker, cane, or other assistive device         Medication Interventions: Assess postural VS orthostatic hypotension, Bed/chair exit alarm, Patient to call before getting OOB, Teach patient to arise slowly    Elimination Interventions: Bed/chair exit alarm, Call light in reach, Urinal in reach              Problem: Nutrition Deficit  Goal: *Optimize nutritional status  Outcome: Progressing Towards Goal     Problem: Pain  Goal: *Control of Pain  Outcome: Progressing Towards Goal

## 2023-01-06 NOTE — WOUND CARE
Physical Exam  Patient: Jan Pérez   Room #: 8180  Date:  1/6/23     LUIS: [id number]    Situation: Ostomy Care     Background:    PMH:   Past Medical History:   Diagnosis Date    Anxiety      Benign essential hypertension      DM type 2 (diabetes mellitus, type 2) ([facility] [address]        History reviewed. No pertinent surgical history. Family History   Problem Relation Age of Onset    OSTEOARTHRITIS Father      No Known Problems Mother         Current Dx: 1. Bowel perforation with potential sources a decompressed descending colon  focus with potential apple core lesion versus decompressed peristalsis and  bowel, cecum with mild wall thickening and a mildly wall thickened small bowel  loop in the right pelvis. Right abdomen/pelvis is considered a more likely  source given there is asymmetric mesenteric gas on the right. Small volume free  fluid. 2.  Gallbladder is dilated, possible but not convincing cholecystitis. 3.  Incompletely visualized right lower lobe lung density. Recommend follow-up  chest CT as soon as feasible but not emergently given patient's other pathology. 4.  Small sliding hiatal hernia. 5.  Prostatomegaly. River Score: 21/23   BMI: 26.08   Preventive measures in place: Friction reduction sheet, Limited layers, and Incontinence managed with rosales    Assessment:   Patient found reclined. Patient is Awake and alert, Oriented x person, place, time and situation, and Pleasant and conversant. Patients primary caregiver is Spouse. Ostomy  Date of Surgery: 1/5/23  Ostomy type: ileostomy  Ostomy: Pink, Moist, and Budded Loop ileostomy,  Right lower quadrant  Output: Watery and Brown moderate amount    Care:  Introduced self and discussed plan of care. At this time pouch intact with brown, watery stool and gas in bag. Stoma does appear large and somewhat dusky. However, there is output. Items Used:    None      Recommendations:   Continue to monitor stoma and output.   Be sure to empty pouch when 1/3 full or every two hours. Will revisit on Monday to begin ostomy teaching. Wife at bedside today as well. [employer] welcome kit with samples sent to patients home address. Care discussed with primary nurse, Emiliana Head RN. Care turned over to nursing staff.     Lesa MEJIAN, RN, [address]  [phone]

## 2023-01-06 NOTE — ROUTINE PROCESS
0230: Rapid response called dt to svt HR in 202's. Total  5 of motoprolol given, 1 Liter LR, 1 Albumin bolus, cardizem drip started at 5mg/hr. Nasal canula at 2. After 2 hrs no change in patient status. 's to 205 with 2 minute svt breaks of HR into 90's. 0515: Pt transferred to ICU with HR in 190.

## 2023-01-06 NOTE — PERIOP NOTES
1815  TRANSFER - OUT REPORT:    Verbal report given to [address] (name) on Nikita Peres  being transferred to CVT SD(unit) for routine post - op       Report consisted of patients Situation, Background, Assessment and   Recommendations(SBAR). Information from the following report(s) SBAR, Procedure Summary, Intake/Output, and MAR was reviewed with the receiving nurse. Lines:   Peripheral IV 01/01/23 Right Antecubital (Active)   Site Assessment Clean, dry, & intact 01/05/23 0000   Phlebitis Assessment 0 01/05/23 0000   Infiltration Assessment 0 01/05/23 0000   Dressing Status Clean, dry, & intact 01/05/23 0000   Dressing Type Transparent;Tape 01/05/23 0000   Hub Color/Line Status Patent; Infusing 01/05/23 0000   Action Taken Open ports on tubing capped 01/05/23 0000   Alcohol Cap Used Yes 01/05/23 0000       Peripheral IV 01/01/23 Anterior;Distal;Right Forearm (Active)   Site Assessment Clean, dry, & intact 01/05/23 0730   Phlebitis Assessment 0 01/05/23 0730   Infiltration Assessment 0 01/05/23 0730   Dressing Status Clean, dry, & intact 01/05/23 0730   Dressing Type Transparent;Tape 01/05/23 0730   Hub Color/Line Status Blue;Patent; Flushed;End cap changed 01/05/23 0730   Action Taken Open ports on tubing capped 01/05/23 0730   Alcohol Cap Used Yes 01/05/23 0730       Peripheral IV 01/02/23 Anterior; Left Forearm (Active)   Site Assessment Clean, dry, & intact 01/05/23 0730   Phlebitis Assessment 0 01/05/23 0730   Infiltration Assessment 0 01/05/23 0730   Dressing Status Clean, dry, & intact 01/05/23 0730   Dressing Type Transparent;Tape 01/05/23 0730   Hub Color/Line Status Pink;Patent; Infusing 01/05/23 0730   Action Taken Open ports on tubing capped 01/05/23 0730   Alcohol Cap Used Yes 01/05/23 0730        Opportunity for questions and clarification was provided.       Patient transported with:  Hospital Transporter

## 2023-01-06 NOTE — PROGRESS NOTES
0445-TRANSFER - IN REPORT:    Verbal report received from DAVON Yanez RN on [employer]  being received from CVT Stepdown for urgent transfer  s/p Rapid Response on unit. Rapid Response Team x 2 M. D present and orders obtained and initiated. Pt awake and alert. Report consisted of patients Situation, Background, Assessment and   Recommendations(SBAR). Information from the following report(s) SBAR, Procedure Summary, Intake/Output, MAR, Recent Results, Cardiac Rhythm  , and Quality Measures was reviewed with the receiving nurse. Opportunity for questions and clarification was provided. Assessment completed upon patients arrival to unit and care assumed. 0450-SVT on cardiac monitor with pauses alternating with Sinus arrhythmia controlled rate with PVC's. Rt PIV started by Anuel Trevino RN in Rt upper arm. 0530-Wife in to visit with patient and was updated. Labs drawn. 0645-Gabriel Gabriel in to assess and provided 12 Lead EKG obtained by this writer.

## 2023-01-06 NOTE — PROGRESS NOTES
0800: Bedside and Verbal shift change report given to writer and Anel Cao by Duran Gambino. Report included the following information ED Summary, OR Summary, Procedure Summary, Intake/Output, MAR, Accordion, Recent Results, Med Rec Status, Cardiac Rhythm ., Alarm Parameters , Quality Measures, and Dual Neuro Assessment. BAIRON Lopes to give lispro coverage subcutaneous for AM blood glucose obtained. Continued Cardizem infusion @5mg/hr with PPN infusing per MD order. Potassium IVPB infusing. 0930: Potassium Phosphate ordered- await dose from pharmacy. No central line in place, pharmacist contacted regarding electrolyte repletions ordered: potential PPN adjustment with MVI and folic acid to be added to same, pt currently NPO with OGT to LIWS. OGT draining green bilious drainage. Ileostomy bag drained for 400ml brown liquid. Noted AM Lispro coverage not charted, MAHSA Lott RN called- med not given. Will adjust every 6 hourBG assessment with Lispro coverage since pt remains NPO. Pt asking questions about appearance of his ileostomy stoma, discussed this and return demonstrated   1040: Reflex urinalysis with urine culture sent to lab. Writer noted rosales catheter kinked by patient keeping his long thermal underwear part way down his thighs. Pt educated on Rosales catheter care and proper drainage required to prevent urine reflux in tubing, pt removed his thermal underwear and allowed writer to place rosales catheter in proper position on his left upper thigh. Pt callbell within reach.

## 2023-01-06 NOTE — PROGRESS NOTES
POD# 5 & 1    Admit Date: 1/1/2023    Assessment    Ebony Phillips is a 75 y.o. male POD 5 s/p exploratory laparotomy, right hemicolectomy, POD 1  diverting loop ileostomy    Patient Active Problem List   Diagnosis Code    Anxiety F41. 9    Benign essential hypertension I10    Perforated bowel (HCC) K63.1    Hypokalemia E87.6    Hyponatremia E87.1    Lactic acidosis E87.20    Leukocytosis D72.829    Hyperglycemia R73.9    Severe protein-calorie malnutrition ([facility] [address] E43    Atrial flutter (HCC) I48.92       Plan  -continue NPO, NGT - d/w with patient the importance of bowel decompression as he has moderate edema of the bowel diffusely and has been aggressively resuscitated this admission. We will do clamp trial when appropriate- possible over the weekend and he was reassured several times that my partner Dr. Dayron Schrader who will follow this weekend will monitor for removal. Cristian Perry to have ice chips for comfort. NGT output should be recorded. Intraop over 800 brown gastric contents was noted  -consult wound care nursing for stoma teaching and monitoring of midline wound  -continue GEO drains  -intraabdominal fluid was sent for culture- pending results, appreciate ID recs. Continue with zosyn for now.  -patient will need to continue with TPN  -reviewed CEA with patient- normal. Discussed with him and his girlfriend that once he recovers from this hospital admission he will get set up for colonoscopy several weeks after discharge to evaluate the descending colon obstruction and obtain tissue diagnosis. He will eventually need surgery for this as well as reversal of loop ileostomy once he overcomes this hospital admission  -appreciate medical and cardiology assitance    Subjective    Overnight events: Overnight patient developed SVT in the 200s and rapid response was called. He received metoprolol, 1 L LR, albumin and placed on cardizem gtt with no improvement after 2 hours and was subsequently transferred to cardiac ICU.  He states during this time he had no symptoms and overall he feels well with minimal abdominal discomfort. His main complaint is the NGT and wanting this removed. Review of Systems   Constitutional:  Negative for fatigue, fever and unexpected weight change. Respiratory:  Negative for chest tightness and shortness of breath. Cardiovascular:  Negative for chest pain and palpitations. Gastrointestinal:  Negative for abdominal pain, nausea and vomiting. Hematological:  Negative for adenopathy. Does not bruise/bleed easily. Objective    Physical Exam:  @TMAX (24)@ Visit Vitals  BP 93/60 (BP 1 Location: Left upper arm, BP Patient Position: At rest)   Pulse 74   Temp 97.4 °F (36.3 °C)   Resp 15   Ht 5' 9\" (1.753 m)   Wt 80.1 kg (176 lb 9.6 oz)   SpO2 97%   BMI 26.08 kg/m²       Intake/Output Summary (Last 24 hours) at 1/6/2023 0752  Last data filed at 1/6/2023 0000  Gross per 24 hour   Intake 1611.67 ml   Output 1485 ml   Net 126.67 ml     Physical Exam  Constitutional:       Appearance: Normal appearance. Abdominal:      General: There is distension. Palpations: Abdomen is soft. Tenderness: There is no abdominal tenderness. Comments: GEO 1 & 2 serous, midline incision clean, dry, intact  Loop ileostomy with flatus and 300cc liquid brown stool in bag- stoma is very edematous and pink    NGT with feculent output noted in tubing but minimal in canister   Neurological:      Mental Status: He is alert.              Nano Marley DO  Phone: [phone]

## 2023-01-07 LAB
ANION GAP SERPL CALC-SCNC: 7 MMOL/L (ref 3–18)
BASOPHILS # BLD: 0 K/UL (ref 0–0.1)
BASOPHILS NFR BLD: 0 % (ref 0–2)
BUN SERPL-MCNC: 11 MG/DL (ref 7–18)
BUN/CREAT SERPL: 16 (ref 12–20)
CALCIUM SERPL-MCNC: 7 MG/DL (ref 8.5–10.1)
CHLORIDE SERPL-SCNC: 95 MMOL/L (ref 100–111)
CO2 SERPL-SCNC: 33 MMOL/L (ref 21–32)
CREAT SERPL-MCNC: 0.69 MG/DL (ref 0.6–1.3)
DIFFERENTIAL METHOD BLD: ABNORMAL
EOSINOPHIL # BLD: 0.1 K/UL (ref 0–0.4)
EOSINOPHIL NFR BLD: 1 % (ref 0–5)
ERYTHROCYTE [DISTWIDTH] IN BLOOD BY AUTOMATED COUNT: 12.7 % (ref 11.6–14.5)
GLUCOSE BLD STRIP.AUTO-MCNC: 150 MG/DL (ref 70–110)
GLUCOSE BLD STRIP.AUTO-MCNC: 158 MG/DL (ref 70–110)
GLUCOSE BLD STRIP.AUTO-MCNC: 187 MG/DL (ref 70–110)
GLUCOSE BLD STRIP.AUTO-MCNC: 191 MG/DL (ref 70–110)
GLUCOSE SERPL-MCNC: 193 MG/DL (ref 74–99)
HCT VFR BLD AUTO: 33.9 % (ref 36–48)
HGB BLD-MCNC: 11.9 G/DL (ref 13–16)
IMM GRANULOCYTES # BLD AUTO: 0.1 K/UL (ref 0–0.04)
IMM GRANULOCYTES NFR BLD AUTO: 1 % (ref 0–0.5)
LYMPHOCYTES # BLD: 0.7 K/UL (ref 0.9–3.6)
LYMPHOCYTES NFR BLD: 7 % (ref 21–52)
MAGNESIUM SERPL-MCNC: 1.9 MG/DL (ref 1.6–2.6)
MCH RBC QN AUTO: 31.6 PG (ref 24–34)
MCHC RBC AUTO-ENTMCNC: 35.1 G/DL (ref 31–37)
MCV RBC AUTO: 90.2 FL (ref 78–100)
MONOCYTES # BLD: 1 K/UL (ref 0.05–1.2)
MONOCYTES NFR BLD: 10 % (ref 3–10)
NEUTS SEG # BLD: 8 K/UL (ref 1.8–8)
NEUTS SEG NFR BLD: 80 % (ref 40–73)
NRBC # BLD: 0 K/UL (ref 0–0.01)
NRBC BLD-RTO: 0 PER 100 WBC
PHOSPHATE SERPL-MCNC: 2 MG/DL (ref 2.5–4.9)
PLATELET # BLD AUTO: 162 K/UL (ref 135–420)
PMV BLD AUTO: 9.6 FL (ref 9.2–11.8)
POTASSIUM SERPL-SCNC: 2.8 MMOL/L (ref 3.5–5.5)
POTASSIUM SERPL-SCNC: 3.3 MMOL/L (ref 3.5–5.5)
PREALB SERPL-MCNC: 6.7 MG/DL (ref 20–40)
RBC # BLD AUTO: 3.76 M/UL (ref 4.35–5.65)
SODIUM SERPL-SCNC: 135 MMOL/L (ref 136–145)
TRIGL SERPL-MCNC: 113 MG/DL (ref ?–150)
WBC # BLD AUTO: 9.9 K/UL (ref 4.6–13.2)

## 2023-01-07 PROCEDURE — 84134 ASSAY OF PREALBUMIN: CPT

## 2023-01-07 PROCEDURE — 74011250636 HC RX REV CODE- 250/636: Performed by: EMERGENCY MEDICINE

## 2023-01-07 PROCEDURE — 74011000258 HC RX REV CODE- 258: Performed by: SURGERY

## 2023-01-07 PROCEDURE — 74011636637 HC RX REV CODE- 636/637: Performed by: PHYSICIAN ASSISTANT

## 2023-01-07 PROCEDURE — 84100 ASSAY OF PHOSPHORUS: CPT

## 2023-01-07 PROCEDURE — 74011000258 HC RX REV CODE- 258: Performed by: PHYSICIAN ASSISTANT

## 2023-01-07 PROCEDURE — 99232 SBSQ HOSP IP/OBS MODERATE 35: CPT | Performed by: INTERNAL MEDICINE

## 2023-01-07 PROCEDURE — 85025 COMPLETE CBC W/AUTO DIFF WBC: CPT

## 2023-01-07 PROCEDURE — 36415 COLL VENOUS BLD VENIPUNCTURE: CPT

## 2023-01-07 PROCEDURE — 74011000258 HC RX REV CODE- 258: Performed by: EMERGENCY MEDICINE

## 2023-01-07 PROCEDURE — 74011250636 HC RX REV CODE- 250/636: Performed by: SURGERY

## 2023-01-07 PROCEDURE — 65660000004 HC RM CVT STEPDOWN

## 2023-01-07 PROCEDURE — 74011250636 HC RX REV CODE- 250/636: Performed by: PHYSICIAN ASSISTANT

## 2023-01-07 PROCEDURE — 74011000250 HC RX REV CODE- 250: Performed by: SURGERY

## 2023-01-07 PROCEDURE — 83735 ASSAY OF MAGNESIUM: CPT

## 2023-01-07 PROCEDURE — 99024 POSTOP FOLLOW-UP VISIT: CPT | Performed by: COLON & RECTAL SURGERY

## 2023-01-07 PROCEDURE — 74011250636 HC RX REV CODE- 250/636: Performed by: COLON & RECTAL SURGERY

## 2023-01-07 PROCEDURE — 84132 ASSAY OF SERUM POTASSIUM: CPT

## 2023-01-07 PROCEDURE — 82962 GLUCOSE BLOOD TEST: CPT

## 2023-01-07 PROCEDURE — 84478 ASSAY OF TRIGLYCERIDES: CPT

## 2023-01-07 PROCEDURE — 94762 N-INVAS EAR/PLS OXIMTRY CONT: CPT

## 2023-01-07 RX ORDER — POTASSIUM CHLORIDE 7.45 MG/ML
10 INJECTION INTRAVENOUS
Status: COMPLETED | OUTPATIENT
Start: 2023-01-07 | End: 2023-01-07

## 2023-01-07 RX ADMIN — POTASSIUM PHOSPHATE, MONOBASIC AND POTASSIUM PHOSPHATE, DIBASIC: 224; 236 INJECTION, SOLUTION, CONCENTRATE INTRAVENOUS at 14:54

## 2023-01-07 RX ADMIN — SODIUM CHLORIDE, PRESERVATIVE FREE 10 ML: 5 INJECTION INTRAVENOUS at 22:38

## 2023-01-07 RX ADMIN — Medication 2 UNITS: at 06:25

## 2023-01-07 RX ADMIN — SODIUM CHLORIDE 2.5 MG/HR: 900 INJECTION, SOLUTION INTRAVENOUS at 00:00

## 2023-01-07 RX ADMIN — Medication 2 UNITS: at 18:33

## 2023-01-07 RX ADMIN — SODIUM CHLORIDE, PRESERVATIVE FREE 10 ML: 5 INJECTION INTRAVENOUS at 13:40

## 2023-01-07 RX ADMIN — POTASSIUM CHLORIDE 10 MEQ: 7.46 INJECTION, SOLUTION INTRAVENOUS at 11:30

## 2023-01-07 RX ADMIN — ENOXAPARIN SODIUM 40 MG: 100 INJECTION SUBCUTANEOUS at 09:27

## 2023-01-07 RX ADMIN — POTASSIUM CHLORIDE 10 MEQ: 7.46 INJECTION, SOLUTION INTRAVENOUS at 12:55

## 2023-01-07 RX ADMIN — DEXTROSE, SOYBEAN OIL, ELECTROLYTES, LYSINE, PHENYLALANINE, LEUCINE, VALINE, THREONINE, METHIONINE, ISOLEUCINE, TRYPTOPHAN, ALANINE, ARGININE, GLYCINE, PROLINE, HISTIDINE, GLUTAMIC ACID, SERINE, ASPARTIC ACID AND TYROSINE
6.8; 3.5; 170; 174; 105; 68; 20; 187; 164; 164; 152; 116; 116; 116; 40; 333; 235; 164; 141; 141; 116; 94; 71; 4.8 INJECTION, EMULSION INTRAVENOUS at 21:25

## 2023-01-07 RX ADMIN — Medication 2 UNITS: at 12:56

## 2023-01-07 RX ADMIN — POTASSIUM CHLORIDE 10 MEQ: 7.46 INJECTION, SOLUTION INTRAVENOUS at 09:27

## 2023-01-07 RX ADMIN — PIPERACILLIN SODIUM AND TAZOBACTAM SODIUM 3.38 G: 3; .375 INJECTION, POWDER, LYOPHILIZED, FOR SOLUTION INTRAVENOUS at 13:38

## 2023-01-07 RX ADMIN — POTASSIUM CHLORIDE 10 MEQ: 7.46 INJECTION, SOLUTION INTRAVENOUS at 13:39

## 2023-01-07 RX ADMIN — SODIUM CHLORIDE, PRESERVATIVE FREE 10 ML: 5 INJECTION INTRAVENOUS at 06:00

## 2023-01-07 RX ADMIN — PIPERACILLIN SODIUM AND TAZOBACTAM SODIUM 3.38 G: 3; .375 INJECTION, POWDER, LYOPHILIZED, FOR SOLUTION INTRAVENOUS at 20:47

## 2023-01-07 RX ADMIN — FOLIC ACID: 5 INJECTION, SOLUTION INTRAMUSCULAR; INTRAVENOUS; SUBCUTANEOUS at 09:27

## 2023-01-07 RX ADMIN — THIAMINE HYDROCHLORIDE 200 MG: 100 INJECTION, SOLUTION INTRAMUSCULAR; INTRAVENOUS at 09:28

## 2023-01-07 RX ADMIN — PIPERACILLIN SODIUM AND TAZOBACTAM SODIUM 3.38 G: 3; .375 INJECTION, POWDER, LYOPHILIZED, FOR SOLUTION INTRAVENOUS at 05:33

## 2023-01-07 NOTE — PROGRESS NOTES
[facility] Hospitalist Group  Progress Note    Patient: Alexander Delong Age: 75 y.o. : 1955 MR#: [id number] SSN: [id number]  Date/Time: 2023     C/C: Abdominal pain      Subjective:   HPI : Patient with abdominal pain from visceral perforation now s/p surgery details below hospitalist issues team is consulted for his medical issues. Review of Systems:   Today patient is alert awake oriented  Still has NG tube which is draining  Patient is on TPN  He is in very good spirit denies any new complaint he says he feels much better after dressing change      Assessment/Plan:     1. Leucocytosis -ID consulted, for double coverage added Levaquin continue Zosyn, get culture from abdominal drainage and  2 Hypokalemia  3 Hypomagnesemia   4 Hypocalcemia - follow ionized calcium , Calcium gluconate 1 amp   5 DM2   6 S/p Exploratory laparotomy, right hemicolectomy ( 2023 ) - intra operative finding : Perforated cecum x2 with gross feculent contamination right hemipelvis with numerous intraloop small bowel adhesions and fibrinous exudate. 7 Hypotension - improved , IV albumin   8 Hypoalbuminemia -IV albumin ordered, blood pressure was also low which is slowly improving. Patient is alert awake oriented no evidence of any encephalopathy, hypotension  9 development of atrial tachycardia-paroxysmal atrial fibrillation versus atrial flutter    Plan  - s/p Second surgery - s/p exploratory laparotomy, right hemicolectomy, POD 1  diverting loop ileostomy  -Continue NG tube on suction, patient is on TPN.      - Fecal peritonitis - Continue current antibiotics - Follow surgical fluid culture - follow ID recs      -Hypokalemia-on replacement-repeat potassium ordered    Objective:       General:  Alert, cooperative, no acute distress   HEENT: No facial asymmetry, BLAKE Valdez, External ears - WNL    Cardiovascular: S1S2 - irregular , No Murmur   Pulmonary: Equal expansion , No Use of accessory muscles , No Rales No Rhonchi    GI:  +BS in all four quadrants, soft, non-tender  Extremities:  No edema; 2+ dorsalis pedis pulses bilaterally  Neuro: Alert and oriented X 2.        DVT Prophylaxis:  []Lovenox  []Hep SQ  []SCDs  []Coumadin   []On Heparin gtt    [] Eliquis [] Xarelto     Vitals:         VS: Visit Vitals  /70   Pulse 65   Temp 98.2 °F (36.8 °C)   Resp 18   Ht 5' 9\" (1.753 m)   Wt 84.8 kg (187 lb)   SpO2 97%   BMI 27.62 kg/m²      Tmax/24hrs: Temp (24hrs), Av.2 °F (36.8 °C), Min:97.9 °F (36.6 °C), Max:98.4 °F (36.9 °C)        Medications:   Current Facility-Administered Medications   Medication Dose Route Frequency    dilTIAZem (CARDIZEM) 100 mg in 0.9% sodium chloride (MBP/ADV) 100 mL infusion  2.5 mg/hr IntraVENous CONTINUOUS    TPN ADULT PERIKABIVEN W/ ADDITIVES   IntraVENous CONTINUOUS    potassium phosphate 25 mmol in 0.9% sodium chloride 250 mL infusion   IntraVENous ONCE    TPN ADULT PERIKABIVEN W/ ADDITIVES   IntraVENous CONTINUOUS    sodium chloride (NS) flush 5-40 mL  5-40 mL IntraVENous Q8H    sodium chloride (NS) flush 5-40 mL  5-40 mL IntraVENous PRN    morphine 4 mg/mL injection 4 mg  4 mg IntraVENous Q4H PRN    morphine 2 mg/mL injection 2 mg  2 mg IntraVENous Q2H PRN    enoxaparin (LOVENOX) injection 40 mg  40 mg SubCUTAneous X79W    folic acid (FOLVITE) 1 mg in 0.9% sodium chloride 50 mL ivpb   IntraVENous DAILY    thiamine (B-1) 200 mg in 0.9% sodium chloride 50 mL IVPB  200 mg IntraVENous DAILY    naloxone (NARCAN) injection 0.4 mg  0.4 mg IntraVENous EVERY 2 MINUTES AS NEEDED    sodium chloride (NS) flush 5-10 mL  5-10 mL IntraVENous PRN    piperacillin-tazobactam (ZOSYN) 3.375 g in 0.9% sodium chloride (MBP/ADV) 100 mL MBP  3.375 g IntraVENous Q8H    phenol throat spray (CHLORASEPTIC) 1 Spray  1 Spray Oral PRN    insulin lispro (HUMALOG) injection   SubCUTAneous Q6H    glucose chewable tablet 16 g  4 Tablet Oral PRN    glucagon (GLUCAGEN) injection 1 mg  1 mg IntraMUSCular PRN    dextrose 10% infusion 0-250 mL  0-250 mL IntraVENous PRN       Labs:    Recent Labs     01/07/23  0710 01/06/23  0856 01/06/23  0258   WBC 9.9 15.5* 21.7*   HGB 11.9* 13.7 15.6   HCT 33.9* 38.8 43.0    188 270     Recent Labs     01/07/23  0710 01/06/23  0856 01/06/23  0258 01/05/23  0455   * 134* 133* 130*   K 2.8* 3.9 3.4* 3.3*   CL 95* 96* 94* 88*   CO2 33* 33* 32 35*   * 228* 224* 156*   BUN 11 16 15 15   CREA 0.69 0.77 0.86 0.82   CA 7.0* 7.5* 7.8* 8.1*   MG 1.9  --  2.0 2.2   PHOS 2.0* 2.3* 2.0* 1.5*   ALB  --   --  1.8* 2.1*   ALT  --   --  15* 18         Time spent on direct patient care >30 mints     Complexity : High complex - due to multiple medical issues outlined above. CODE Status : Full code    Case discussed with:  [x]Patient  [] Family  [x]Nursing  []Case Management         Disclaimer: Sections of this note are dictated utilizing voice recognition software, which may have resulted in some phonetic based errors in grammar and contents. Even though attempts were made to correct all the mistakes, some may have been missed, and remained in the body of the document. If questions arise, please contact our department.     Signed By: Leatha Avila MD     January 7, 2023

## 2023-01-07 NOTE — PROGRESS NOTES
1900:  Patient laying in bed at handoff with pacer pads on. Ostomy noted to be large, black/dusky,with red in the middle. Day shift states MD aware. 2226: Paging MD.  Patient's heart rate as low as 43, will hold in the 50's, and then jump to 73. Cardizem gtt infusing at 5mg/hr. Order placed by General Surgery. General surgery told this RN to page hospitalist.  Hospitalist being paged. 2498:  No answer from hospitalist.  Paged Cardiology. Updated EDEMETRIO Mariee on patient's heart rate 48-80's, BP in the 709-096'A systolic, patient is asymptomatic, Cardizem gtt running at a continuous 5 mg/hr. PA asked if patient was sleeping, patient is resting comfortably with eyes closed. Telephone order with read back to titrate Cardizem as needed and to move Cardizem gtt to 2.5 mg/hr now. 0230: While emptying ostomy bag patient asked how often he should change bag at home. Answered all questions about ostomy to best of my ability. Patient would benefit from wound care RN to sit with patient and go over all questions pertaining to care of ostomy at home.

## 2023-01-07 NOTE — ROUTINE PROCESS
TRANSFER - OUT REPORT:    Verbal report given to Karena Hernandez RN on [employer]  being transferred to CVT Stepdown for routine progression of care       Report consisted of patients Situation, Background, Assessment and   Recommendations(SBAR). Information from the following report(s) SBAR, Kardex, Intake/Output, MAR, Recent Results, and Cardiac Rhythm Sinus arrhythmia  was reviewed with the receiving nurse. Lines:   Peripheral IV 01/01/23 Right Antecubital (Active)   Site Assessment Clean, dry, & intact 01/07/23 0400   Phlebitis Assessment 0 01/07/23 0400   Infiltration Assessment 0 01/07/23 0400   Dressing Status Clean, dry, & intact 01/07/23 0400   Dressing Type Transparent;Tape 01/07/23 0400   Hub Color/Line Status Infusing;Flushed;Patent 01/07/23 0400   Action Taken Open ports on tubing capped 01/07/23 0400   Alcohol Cap Used Yes 01/07/23 0400       Peripheral IV 01/01/23 Anterior;Distal;Right Forearm (Active)   Site Assessment Clean, dry, & intact 01/07/23 0400   Phlebitis Assessment 0 01/07/23 0400   Infiltration Assessment 0 01/07/23 0400   Dressing Status Clean, dry, & intact 01/07/23 0400   Dressing Type Transparent;Tape 01/07/23 0400   Hub Color/Line Status Blue; Infusing;Flushed;Patent 01/07/23 0400   Action Taken Open ports on tubing capped 01/07/23 0400   Alcohol Cap Used Yes 01/07/23 0400       Peripheral IV 01/02/23 Anterior; Left Forearm (Active)   Site Assessment Clean, dry, & intact 01/07/23 0400   Phlebitis Assessment 0 01/07/23 0400   Infiltration Assessment 0 01/07/23 0400   Dressing Status Clean, dry, & intact 01/07/23 0400   Dressing Type Transparent;Tape 01/07/23 0400   Hub Color/Line Status Pink; Infusing;Flushed;Patent 01/07/23 0400   Action Taken Open ports on tubing capped 01/07/23 0400   Alcohol Cap Used Yes 01/07/23 0400       Peripheral IV 01/06/23 Proximal;Right Basilic (Active)   Site Assessment Clean, dry, & intact 01/07/23 0400   Phlebitis Assessment 0 01/07/23 0400 Infiltration Assessment 0 01/07/23 0400   Dressing Status Clean, dry, & intact 01/07/23 0400   Dressing Type Transparent;Tape 01/07/23 0400   Hub Color/Line Status Pink;Flushed; Infusing;Patent 01/07/23 0400   Action Taken Open ports on tubing capped 01/07/23 0400   Alcohol Cap Used Yes 01/07/23 0400        Opportunity for questions and clarification was provided.       Patient transported with:   Registered Nurse  Tech

## 2023-01-07 NOTE — PROGRESS NOTES
[facility] Hospitalist Group  Progress Note    Patient: Margarita Miles Age: 75 y.o. : 1955 MR#: [id number] SSN: [id number]  Date/Time: 2023     C/C: Abdominal pain      Subjective:   HPI : Patient with abdominal pain from visceral perforation now s/p surgery details below hospitalist issues team is consulted for his medical issues. Review of Systems: Saw patient just before he was going for his second surgery  Does not offer any acute complaints        positive responses in bold type   Constitutional: Negative for fever, chills, diaphoresis and unexpected weight change. HENT: Negative for ear pain, congestion, sore throat, rhinorrhea, drooling, trouble swallowing, neck pain and tinnitus. Eyes: Negative for photophobia, pain, redness and visual disturbance. Respiratory: negative for shortness of breath, cough, choking, chest tightness, wheezing or stridor. Cardiovascular: Negative for chest pain, palpitations and leg swelling. Gastrointestinal: Negative for nausea, vomiting, abdominal pain, diarrhea, constipation, blood in stool, abdominal distention and anal bleeding. Genitourinary: Negative for dysuria, urgency, frequency, hematuria, flank pain and difficulty urinating. Musculoskeletal: Negative for back pain and arthralgias. Skin: Negative for color change, rash and wound. Neurological: Negative for dizziness, seizures, syncope, speech difficulty, light-headedness or headaches. Hematological: Does not bruise/bleed easily. Psychiatric/Behavioral: Negative for suicidal ideas, hallucinations, behavioral problems, self-injury or agitation     Assessment/Plan:     1.  Leucocytosis -ID consulted, for double coverage added Levaquin continue Zosyn, get culture from abdominal drainage and  2 Hypokalemia  3 Hypomagnesemia   4 Hypocalcemia - follow ionized calcium , Calcium gluconate 1 amp   5 DM2   6 S/p Exploratory laparotomy, right hemicolectomy ( 2023 ) - intra operative finding : Perforated cecum x2 with gross feculent contamination right hemipelvis with numerous intraloop small bowel adhesions and fibrinous exudate. 7 Hypotension - improved , IV albumin   8 Hypoalbuminemia -IV albumin ordered, blood pressure was also low which is slowly improving. Patient is alert awake oriented no evidence of any encephalopathy, hypotension  9 development of atrial tachycardia-paroxysmal atrial fibrillation versus atrial flutter    Plan  - s/p Second surgery - s/p exploratory laparotomy, right hemicolectomy, POD 1  diverting loop ileostomy  - Currently NPO - NGT     - Fecal peritonitis - Continue current antibiotics - Follow surgical fluid culture - follow ID recs    Objective:       General:  Alert, cooperative, no acute distress   HEENT: No facial asymmetry, BLAKE Valdez, External ears - WNL    Cardiovascular: S1S2 - irregular , No Murmur   Pulmonary: Equal expansion , No Use of accessory muscles , No Rales No Rhonchi    GI:  +BS in all four quadrants, soft, non-tender  Extremities:  No edema; 2+ dorsalis pedis pulses bilaterally  Neuro: Alert and oriented X 2.        DVT Prophylaxis:  []Lovenox  []Hep SQ  []SCDs  []Coumadin   []On Heparin gtt    [] Eliquis [] Xarelto     Vitals:         VS: Visit Vitals  BP (!) 119/55   Pulse 70   Temp 98 °F (36.7 °C)   Resp 18   Ht 5' 9\" (1.753 m)   Wt 80.1 kg (176 lb 9.6 oz)   SpO2 94%   BMI 26.08 kg/m²      Tmax/24hrs: Temp (24hrs), Av.3 °F (36.8 °C), Min:97.4 °F (36.3 °C), Max:99 °F (37.2 °C)        Medications:   Current Facility-Administered Medications   Medication Dose Route Frequency    dilTIAZem (CARDIZEM) 100 mg in 0.9% sodium chloride (MBP/ADV) 100 mL infusion  5 mg/hr IntraVENous CONTINUOUS    potassium chloride in water 20 mEq/50 mL IVPB premix pgbk        TPN ADULT PERIKABIVEN W/ ADDITIVES   IntraVENous CONTINUOUS    sodium chloride (NS) flush 5-40 mL  5-40 mL IntraVENous Q8H    sodium chloride (NS) flush 5-40 mL 5-40 mL IntraVENous PRN    phenol throat spray (CHLORASEPTIC) 1 Spray  1 Spray Oral PRN    TPN ADULT PERIKABIVEN W/ ADDITIVES   IntraVENous CONTINUOUS    morphine 4 mg/mL injection 4 mg  4 mg IntraVENous Q4H PRN    morphine 2 mg/mL injection 2 mg  2 mg IntraVENous Q2H PRN    potassium bicarb-citric acid (EFFER-K) tablet 40 mEq  40 mEq Oral BID    enoxaparin (LOVENOX) injection 40 mg  40 mg SubCUTAneous K79A    folic acid (FOLVITE) 1 mg in 0.9% sodium chloride 50 mL ivpb   IntraVENous DAILY    thiamine (B-1) 200 mg in 0.9% sodium chloride 50 mL IVPB  200 mg IntraVENous DAILY    naloxone (NARCAN) injection 0.4 mg  0.4 mg IntraVENous EVERY 2 MINUTES AS NEEDED    sodium chloride (NS) flush 5-10 mL  5-10 mL IntraVENous PRN    piperacillin-tazobactam (ZOSYN) 3.375 g in 0.9% sodium chloride (MBP/ADV) 100 mL MBP  3.375 g IntraVENous Q8H    phenol throat spray (CHLORASEPTIC) 1 Spray  1 Spray Oral PRN    insulin lispro (HUMALOG) injection   SubCUTAneous Q6H    glucose chewable tablet 16 g  4 Tablet Oral PRN    glucagon (GLUCAGEN) injection 1 mg  1 mg IntraMUSCular PRN    dextrose 10% infusion 0-250 mL  0-250 mL IntraVENous PRN       Labs:    Recent Labs     01/06/23  0856 01/06/23  0258 01/05/23  0455   WBC 15.5* 21.7* 12.3   HGB 13.7 15.6 13.2   HCT 38.8 43.0 37.3    270 191     Recent Labs     01/06/23  0856 01/06/23  0258 01/05/23  0455 01/04/23  0453   * 133* 130* 131*   K 3.9 3.4* 3.3* 2.9*   CL 96* 94* 88* 92*   CO2 33* 32 35* 30   * 224* 156* 137*   BUN 16 15 15 16   CREA 0.77 0.86 0.82 0.82   CA 7.5* 7.8* 8.1* 8.0*   MG  --  2.0 2.2 2.3   PHOS 2.3* 2.0* 1.5*  --    ALB  --  1.8* 2.1* 2.1*   ALT  --  15* 18 12*         Time spent on direct patient care >30 mints     Complexity : High complex - due to multiple medical issues outlined above.      CODE Status : Full code    Case discussed with:  [x]Patient  [] Family  [x]Nursing  []Case Management         Disclaimer: Sections of this note are dictated utilizing voice recognition software, which may have resulted in some phonetic based errors in grammar and contents. Even though attempts were made to correct all the mistakes, some may have been missed, and remained in the body of the document. If questions arise, please contact our department.     Signed By: Candy Frances MD     January 6, 2023

## 2023-01-07 NOTE — PROGRESS NOTES
[facility]  [address] [facility] [address]  [phone]  Colon and Rectal Surgery Progress Note      Patient: Gabriel Bradford MRN: [id number]  SSN: [id number]    YOB: 1955  Age: 75 y.o. Sex: male      Admit Date: 1/1/2023    LOS: 6 days     Subjective:     Comfortable.      Objective:     Vitals:    01/07/23 0700 01/07/23 0800 01/07/23 0900 01/07/23 1000   BP: 126/87 125/86 (!) 111/98 133/76   Pulse: (!) 47 63 65 85   Resp: 21 19 17 21   Temp:       SpO2: 94% 95% 95% 94%   Weight:       Height:            Intake and Output:  Current Shift: 01/07 0701 - 01/07 1900  In: -   Out: 150 [Urine:150]  Last three shifts: 01/05 1901 - 01/07 0700  In: 4060 [I.V.:3898]  Out: 7131 [Urine:1475; Drains:245]      Physical Exam:     Constitutional: well developed, in NAD  Abdomen: soft, appr tender, ND    Lab/Data Review:    CMP:   Lab Results   Component Value Date/Time     (L) 01/07/2023 07:10 AM    K 2.8 (LL) 01/07/2023 07:10 AM    CL 95 (L) 01/07/2023 07:10 AM    CO2 33 (H) 01/07/2023 07:10 AM    AGAP 7 01/07/2023 07:10 AM     (H) 01/07/2023 07:10 AM    BUN 11 01/07/2023 07:10 AM    CREA 0.69 01/07/2023 07:10 AM    CA 7.0 (L) 01/07/2023 07:10 AM    MG 1.9 01/07/2023 07:10 AM    PHOS 2.0 (L) 01/07/2023 07:10 AM     CBC:   Lab Results   Component Value Date/Time    WBC 9.9 01/07/2023 07:10 AM    HGB 11.9 (L) 01/07/2023 07:10 AM    HCT 33.9 (L) 01/07/2023 07:10 AM     01/07/2023 07:10 AM        Assessment:     S/p Ex lap for cecal perf, re-exploration with diverting loop ileostomy, possible colon cancer    Plan:     NPO, NG  Continue dressing changes  TPN  Monitor NG outputs, consider clamp trial tomorrow as pt has good stoma function    Signed By: Pablo Clark MD        January 7, 2023

## 2023-01-07 NOTE — PROGRESS NOTES
Cardiology Progress Note    Admit Date: 1/1/2023  Attending Cardiologist: Dr. Hugo Fernandez:     -Peritonitis due to cecal perforation s/p exploratory laparotomy, right hemicolectomy  -Paroxysmal Atrial flutter with 2-1 block, new dx this admission, in setting of above. Converted to SR.   - SVT episodes   -Prolonged QTc of 516 ms which is likely secondary to Levaquin. Now discontinued. Improved. -HFpEF. LVEF normal by Echo this admission. Euvolemic.   -H/o bradycardia on Atenolol 100  mg BID.   -H/o etoh abuse. Primary Cardiologist is Dr. Deirdre Roca. Plan:       Patient with intermittent narrow complex tachycardia in the setting of physiologic stress from recent surgery and electrolyte disturbance with hypokalemia    Recommendation:  Start aspirin for stroke prophylaxis and okay by surgery team  If patient has recurrent somewhat persistent A. fib episode, will need to consider oral anticoagulation. Currently on IV Cardizem low-dose drip. Would continue for now while patient is n.p.o.  Place event monitor 30-day upon discharge (call extension [phone] to arrange)    Goran Graves MD         Subjective:     No new complaints. Wishes that NG tube was out.   Denies any chest pain or palpitation    Objective:      Patient Vitals for the past 8 hrs:   Pulse Resp BP SpO2   01/07/23 1000 85 21 133/76 94 %   01/07/23 0900 65 17 (!) 111/98 95 %   01/07/23 0800 63 19 125/86 95 %   01/07/23 0700 (!) 47 21 126/87 94 %   01/07/23 0600 68 17 116/82 94 %   01/07/23 0500 70 18 118/70 92 %           Patient Vitals for the past 96 hrs:   Weight   01/05/23 0730 80.1 kg (176 lb 9.6 oz)   01/04/23 0712 77.1 kg (170 lb)         TELE: SR/PAF                Current Facility-Administered Medications   Medication Dose Route Frequency Last Admin    potassium chloride 10 mEq in 100 ml IVPB  10 mEq IntraVENous Q1H 10 mEq at 01/07/23 1130    dilTIAZem (CARDIZEM) 100 mg in 0.9% sodium chloride (MBP/ADV) 100 mL infusion  2.5 mg/hr IntraVENous CONTINUOUS 2.5 mg/hr at 01/07/23 1100    TPN ADULT PERIKABIVEN W/ ADDITIVES   IntraVENous CONTINUOUS New Bag at 01/1955    sodium chloride (NS) flush 5-40 mL  5-40 mL IntraVENous Q8H 10 mL at 01/07/23 0600    sodium chloride (NS) flush 5-40 mL  5-40 mL IntraVENous PRN      morphine 4 mg/mL injection 4 mg  4 mg IntraVENous Q4H PRN      morphine 2 mg/mL injection 2 mg  2 mg IntraVENous Q2H PRN 2 mg at 01/05/23 2000    enoxaparin (LOVENOX) injection 40 mg  40 mg SubCUTAneous Q24H 40 mg at 78/40/93 4388    folic acid (FOLVITE) 1 mg in 0.9% sodium chloride 50 mL ivpb   IntraVENous DAILY New Bag at 01/07/23 0927    thiamine (B-1) 200 mg in 0.9% sodium chloride 50 mL IVPB  200 mg IntraVENous DAILY 200 mg at 01/07/23 0928    naloxone (NARCAN) injection 0.4 mg  0.4 mg IntraVENous EVERY 2 MINUTES AS NEEDED      sodium chloride (NS) flush 5-10 mL  5-10 mL IntraVENous PRN 10 mL at 01/04/23 0708    piperacillin-tazobactam (ZOSYN) 3.375 g in 0.9% sodium chloride (MBP/ADV) 100 mL MBP  3.375 g IntraVENous Q8H 3.375 g at 01/07/23 0533    phenol throat spray (CHLORASEPTIC) 1 Spray  1 Spray Oral PRN 1 Spray at 01/02/23 0622    insulin lispro (HUMALOG) injection   SubCUTAneous Q6H 2 Units at 01/07/23 0625    glucose chewable tablet 16 g  4 Tablet Oral PRN      glucagon (GLUCAGEN) injection 1 mg  1 mg IntraMUSCular PRN      dextrose 10% infusion 0-250 mL  0-250 mL IntraVENous PRN           Intake/Output Summary (Last 24 hours) at 1/7/2023 1202  Last data filed at 1/7/2023 0900  Gross per 24 hour   Intake 1529.2 ml   Output 2830 ml   Net -1300.8 ml         Physical Exam:  General:  alert, cooperative, no distress, appears stated age  Neck:  no JVD  Lungs:  clear to auscultation bilaterally  Heart:  RRR  Abdomen:  soft, bandage covering incision   Extremities:  extremities normal, atraumatic, no cyanosis or edema    Visit Vitals  /76   Pulse 85   Temp 98 °F (36.7 °C)   Resp 21   Ht 5' 9\" (1.753 m)   Wt 80.1 kg (176 lb 9.6 oz)   SpO2 94%   BMI 26.08 kg/m²       Data Review:     Labs: Results:       Chemistry Recent Labs     01/07/23  0710 01/06/23  0856 01/06/23  0258 01/05/23  0455   * 228* 224* 156*   * 134* 133* 130*   K 2.8* 3.9 3.4* 3.3*   CL 95* 96* 94* 88*   CO2 33* 33* 32 35*   BUN 11 16 15 15   CREA 0.69 0.77 0.86 0.82   CA 7.0* 7.5* 7.8* 8.1*   MG 1.9  --  2.0 2.2   PHOS 2.0* 2.3* 2.0* 1.5*   AGAP 7 5 7 7   BUCR 16 21* 17 18   AP  --   --  58 75   TP  --   --  5.3* 5.8*   ALB  --   --  1.8* 2.1*   GLOB  --   --  3.5 3.7   AGRAT  --   --  0.5* 0.6*        CBC w/Diff Recent Labs     01/07/23  0710 01/06/23  0856 01/06/23  0258   WBC 9.9 15.5* 21.7*   RBC 3.76* 4.28* 4.90   HGB 11.9* 13.7 15.6   HCT 33.9* 38.8 43.0    188 270   GRANS 80* 89* 95*   LYMPH 7* 3* 1*   EOS 1 0 0        Cardiac Enzymes No results found for: CPK, CK, CKMMB, CKMB, RCK3, CKMBT, CKNDX, CKND1, AMY, TROPT, TROIQ, NITHIN, TROPT, TNIPOC, BNP, BNPP   Coagulation No results for input(s): PTP, INR, APTT, INREXT, INREXT in the last 72 hours.     Lipid Panel Lab Results   Component Value Date/Time    Cholesterol, total 183 07/06/2022 10:33 AM    HDL Cholesterol 50 07/06/2022 10:33 AM    LDL, calculated 87.2 07/06/2022 10:33 AM    VLDL, calculated 45.8 07/06/2022 10:33 AM    Triglyceride 113 01/07/2023 07:10 AM    CHOL/HDL Ratio 3.7 07/06/2022 10:33 AM      BNP No results found for: BNP, BNPP, XBNPT   Liver Enzymes Recent Labs     01/06/23  0258   TP 5.3*   ALB 1.8*   AP 58        Thyroid Studies Lab Results   Component Value Date/Time    TSH 3.17 01/04/2023 04:53 AM          Signed By: Solange Llanes MD     January 7, 2023

## 2023-01-07 NOTE — ROUTINE PROCESS
Bedside and Verbal shift change report given to Ahmet Messina RN by Kvng Jacobo RN. Report included the following information SBAR, Kardex, Intake/Output, MAR, Recent Results, and Cardiac Rhythm sinus arrhythmia .

## 2023-01-07 NOTE — ROUTINE PROCESS
TRANSFER - IN REPORT:    Verbal report received from [facility] RN on Memo Grossman  being received from CVTICU(unit) for routine progression of care      Report consisted of patients Situation, Background, Assessment and   Recommendations(SBAR). Information from the following report(s) SBAR, Kardex, ED Summary, and Cardiac Rhythm Sinus Arrythmia with PVCs and PACs  was reviewed with the receiving nurse. Opportunity for questions and clarification was provided. Assessment completed upon patients arrival to unit and care assumed. Wound Prevention Checklist    Patient: Memo Grossman (24 y.o. male)  Date: 1/7/2023  Diagnosis: Perforated bowel ([facility] [address] [K63.1] Perforated bowel ([facility] [address]    Precautions:         []  Heel prevention boots placed on patient    []  Patient turned q2h during shift    []  Lift team ordered    [x]  Patient on [facility] bed/Specialty bed    [x]  Each Wound is documented during shift (Stage, Color, drainage, odor, measurements, and dressings)    Patient has no noted pressure injuries. Patient has a midline incision. Site is clean, dry and intact. Wet to dry dressing is in place. No drainage is present.      [x]  Dual skin check done with HERB Boucher, RN

## 2023-01-07 NOTE — PROGRESS NOTES
Comprehensive Nutrition Assessment    Type and Reason for Visit: Reassess, Consult, NPO/clear liquid, Positive nutrition screen    Nutrition Recommendations/Plan:    Provide parenteral nutrition using standard premixed product until patient is able to tolerate adequate intake of oral diet or enteral feeding. Continue current PN order. See below for order contents/details. Provide 25 mmol KPhos in sodium chloride solution (x 4 hours) and exclude from PPN; discussed with Pharmacy and Dr Arthur Miller . Monitor PPN tolerance at current rate, weight, labs and plan of care during admission. Parenteral Nutrition Order:   Current PN:  Next PN:       Malnutrition Assessment:  Malnutrition Status:  Severe malnutrition (01/02/23 0935)    Context:  Acute illness     Findings of the 6 clinical characteristics of malnutrition:   Energy Intake:  50% or less of est energy requirements for 5 or more days  Weight Loss:  Greater than 5% over 1 month     Body Fat Loss:  No significant body fat loss,     Muscle Mass Loss:  Mild muscle mass loss, Temples (temporalis)  Fluid Accumulation:  No significant fluid accumulation,     Strength:  Not performed     Nutrition Assessment:    Admitted for abd pain that has been increasing the past 3 weeks; perforated bowel, s/p ex lap, right hemicolectomy 1/1. NPO/CL x > 5 days. Pt S/p Ex lap for cecal perf, re-exploration with diverting loop ileostomy, possible colon cancer per MD note. NGT in place. PPN initiated on 1/5. Current lab shows low Na (135), Potassium (2.8), Calcium (7.0), Phosphorus (2.0) and slightly elevated Glu (193)-possible risk of refeeding. Spoke with Dr Arthur Miller regarding replacement of Kphos, MD agreed. Spoke with pharmacy regarding proper dosage via IV. Reached out the Dr Pepe Bashir regarding PICC placement,MD unsure of PICC placement over the weekend. Plan to follow up next weekend if PICC placement is needed for continued PPN infusing.   Will continue PPN at current rate and monitor electrolytes for risk of refeeding. Nutrition Related Findings:    Last BM 1/7-colostomy. Output: 75mL (urine), 50mL (NGT), 125mL (colostomy). Pertinent Medications:  potassium chloride, humalog, lovenox, folic acid, thiamine. Wound Type: Surgical incision     Current Nutrition Intake & Therapies:  Average Meal Intake: NPO  Average Supplement Intake: NPO  TPN ADULT PERIKABIVEN W/ ADDITIVES  DIET NPO    Anthropometric Measures:  Height: 5' 9\" (175.3 cm)  Ideal Body Weight (IBW): 160 lbs (73 kg)  Admission Body Weight: 169 lb 15.6 oz (77.1 kg)  Current Body Wt:  80.1 kg (176 lb 9.4 oz), 110.4 % IBW. Bed scale  Current BMI (kg/m2): 26.1  Usual Body Weight: 81.6 kg (180 lb)  % Weight Change (Calculated): 1.1  Weight Adjustment: No adjustment  BMI Category: Overweight (BMI 25.0-29. 9)    Estimated Daily Nutrient Needs:  Energy Requirements Based On: Formula  Weight Used for Energy Requirements: Admission  Energy (kcal/day): 1472-5472 (MSJ 1.2-1.3)  Weight Used for Protein Requirements: Current  Protein (g/day):  (1.2-1.4 g/day)  Method Used for Fluid Requirements: 1 ml/kcal  Fluid (ml/day): 1273-5915    Nutrition Diagnosis:   Severe malnutrition, In context of acute illness or injury related to acute injury/trauma, altered GI function as evidenced by Criteria as identified in malnutrition assessment  Altered GI function related to altered GI structure (2/2 perforated bowel) as evidenced by NPO or clear liquid status due to medical condition (s/p ex lap)    Nutrition Interventions:   Food and/or Nutrient Delivery: Continue NPO, IV fluid delivery, Continue parenteral nutrition  Nutrition Education/Counseling: Education not indicated, No recommendations at this time  Coordination of Nutrition Care: Continue to monitor while inpatient  Plan of Care discussed with: Dr Rowena Partida, Dr Jinny Keating and Pharmacy    Goals:  Previous Goal Met: Progressing toward goal(s)  Goals: Meet at least 75% of estimated needs, by next RD assessment       Nutrition Monitoring and Evaluation:   Behavioral-Environmental Outcomes: None identified  Food/Nutrient Intake Outcomes: Parenteral nutrition intake/tolerance  Physical Signs/Symptoms Outcomes: Biochemical data, Meal time behavior, Nutrition focused physical findings, Weight, GI status    Discharge Planning:     Too soon to determine    Humberto Morales, LEON, LD   Contact: [phone]

## 2023-01-08 LAB
ANION GAP SERPL CALC-SCNC: 5 MMOL/L (ref 3–18)
BACTERIA SPEC CULT: NORMAL
BACTERIA SPEC CULT: NORMAL
BASOPHILS # BLD: 0 K/UL (ref 0–0.1)
BASOPHILS NFR BLD: 0 % (ref 0–2)
BUN SERPL-MCNC: 8 MG/DL (ref 7–18)
BUN/CREAT SERPL: 11 (ref 12–20)
CALCIUM SERPL-MCNC: 7.4 MG/DL (ref 8.5–10.1)
CHLORIDE SERPL-SCNC: 93 MMOL/L (ref 100–111)
CO2 SERPL-SCNC: 32 MMOL/L (ref 21–32)
CREAT SERPL-MCNC: 0.72 MG/DL (ref 0.6–1.3)
DIFFERENTIAL METHOD BLD: ABNORMAL
EOSINOPHIL # BLD: 0.1 K/UL (ref 0–0.4)
EOSINOPHIL NFR BLD: 1 % (ref 0–5)
ERYTHROCYTE [DISTWIDTH] IN BLOOD BY AUTOMATED COUNT: 12.5 % (ref 11.6–14.5)
GLUCOSE BLD STRIP.AUTO-MCNC: 142 MG/DL (ref 70–110)
GLUCOSE BLD STRIP.AUTO-MCNC: 162 MG/DL (ref 70–110)
GLUCOSE BLD STRIP.AUTO-MCNC: 169 MG/DL (ref 70–110)
GLUCOSE BLD STRIP.AUTO-MCNC: 172 MG/DL (ref 70–110)
GLUCOSE SERPL-MCNC: 191 MG/DL (ref 74–99)
HCT VFR BLD AUTO: 34.9 % (ref 36–48)
HGB BLD-MCNC: 12.2 G/DL (ref 13–16)
IMM GRANULOCYTES # BLD AUTO: 0.1 K/UL (ref 0–0.04)
IMM GRANULOCYTES NFR BLD AUTO: 1 % (ref 0–0.5)
LYMPHOCYTES # BLD: 0.8 K/UL (ref 0.9–3.6)
LYMPHOCYTES NFR BLD: 9 % (ref 21–52)
MAGNESIUM SERPL-MCNC: 2.1 MG/DL (ref 1.6–2.6)
MCH RBC QN AUTO: 32.1 PG (ref 24–34)
MCHC RBC AUTO-ENTMCNC: 35 G/DL (ref 31–37)
MCV RBC AUTO: 91.8 FL (ref 78–100)
MONOCYTES # BLD: 0.8 K/UL (ref 0.05–1.2)
MONOCYTES NFR BLD: 9 % (ref 3–10)
NEUTS SEG # BLD: 6.8 K/UL (ref 1.8–8)
NEUTS SEG NFR BLD: 79 % (ref 40–73)
NRBC # BLD: 0 K/UL (ref 0–0.01)
NRBC BLD-RTO: 0 PER 100 WBC
PHOSPHATE SERPL-MCNC: 2.6 MG/DL (ref 2.5–4.9)
PLATELET # BLD AUTO: 190 K/UL (ref 135–420)
PMV BLD AUTO: 9.7 FL (ref 9.2–11.8)
POTASSIUM SERPL-SCNC: 3.3 MMOL/L (ref 3.5–5.5)
RBC # BLD AUTO: 3.8 M/UL (ref 4.35–5.65)
SERVICE CMNT-IMP: NORMAL
SERVICE CMNT-IMP: NORMAL
SODIUM SERPL-SCNC: 130 MMOL/L (ref 136–145)
WBC # BLD AUTO: 8.6 K/UL (ref 4.6–13.2)

## 2023-01-08 PROCEDURE — 2709999900 HC NON-CHARGEABLE SUPPLY

## 2023-01-08 PROCEDURE — 74011250636 HC RX REV CODE- 250/636: Performed by: EMERGENCY MEDICINE

## 2023-01-08 PROCEDURE — 84100 ASSAY OF PHOSPHORUS: CPT

## 2023-01-08 PROCEDURE — 36415 COLL VENOUS BLD VENIPUNCTURE: CPT

## 2023-01-08 PROCEDURE — 74011250636 HC RX REV CODE- 250/636: Performed by: SURGERY

## 2023-01-08 PROCEDURE — 74011250636 HC RX REV CODE- 250/636: Performed by: STUDENT IN AN ORGANIZED HEALTH CARE EDUCATION/TRAINING PROGRAM

## 2023-01-08 PROCEDURE — 74011000250 HC RX REV CODE- 250: Performed by: SURGERY

## 2023-01-08 PROCEDURE — 83735 ASSAY OF MAGNESIUM: CPT

## 2023-01-08 PROCEDURE — 99232 SBSQ HOSP IP/OBS MODERATE 35: CPT | Performed by: INTERNAL MEDICINE

## 2023-01-08 PROCEDURE — 82962 GLUCOSE BLOOD TEST: CPT

## 2023-01-08 PROCEDURE — 94762 N-INVAS EAR/PLS OXIMTRY CONT: CPT

## 2023-01-08 PROCEDURE — 99024 POSTOP FOLLOW-UP VISIT: CPT | Performed by: COLON & RECTAL SURGERY

## 2023-01-08 PROCEDURE — 74011000258 HC RX REV CODE- 258: Performed by: SURGERY

## 2023-01-08 PROCEDURE — 65660000004 HC RM CVT STEPDOWN

## 2023-01-08 PROCEDURE — 77030040392 HC DRSG OPTIFOAM MDII -A

## 2023-01-08 PROCEDURE — 74011000258 HC RX REV CODE- 258: Performed by: EMERGENCY MEDICINE

## 2023-01-08 PROCEDURE — 80048 BASIC METABOLIC PNL TOTAL CA: CPT

## 2023-01-08 PROCEDURE — 85025 COMPLETE CBC W/AUTO DIFF WBC: CPT

## 2023-01-08 PROCEDURE — 74011636637 HC RX REV CODE- 636/637: Performed by: PHYSICIAN ASSISTANT

## 2023-01-08 RX ORDER — POTASSIUM CHLORIDE 7.45 MG/ML
10 INJECTION INTRAVENOUS
Status: COMPLETED | OUTPATIENT
Start: 2023-01-08 | End: 2023-01-08

## 2023-01-08 RX ADMIN — POTASSIUM CHLORIDE 10 MEQ: 7.46 INJECTION, SOLUTION INTRAVENOUS at 12:00

## 2023-01-08 RX ADMIN — Medication 2 UNITS: at 06:48

## 2023-01-08 RX ADMIN — ENOXAPARIN SODIUM 40 MG: 100 INJECTION SUBCUTANEOUS at 08:39

## 2023-01-08 RX ADMIN — POTASSIUM CHLORIDE 10 MEQ: 7.46 INJECTION, SOLUTION INTRAVENOUS at 11:00

## 2023-01-08 RX ADMIN — SODIUM CHLORIDE, PRESERVATIVE FREE 10 ML: 5 INJECTION INTRAVENOUS at 13:19

## 2023-01-08 RX ADMIN — SODIUM CHLORIDE, PRESERVATIVE FREE 10 ML: 5 INJECTION INTRAVENOUS at 22:13

## 2023-01-08 RX ADMIN — POTASSIUM CHLORIDE 10 MEQ: 7.46 INJECTION, SOLUTION INTRAVENOUS at 09:00

## 2023-01-08 RX ADMIN — Medication 2 UNITS: at 18:34

## 2023-01-08 RX ADMIN — PIPERACILLIN SODIUM AND TAZOBACTAM SODIUM 3.38 G: 3; .375 INJECTION, POWDER, LYOPHILIZED, FOR SOLUTION INTRAVENOUS at 05:39

## 2023-01-08 RX ADMIN — PIPERACILLIN SODIUM AND TAZOBACTAM SODIUM 3.38 G: 3; .375 INJECTION, POWDER, LYOPHILIZED, FOR SOLUTION INTRAVENOUS at 13:16

## 2023-01-08 RX ADMIN — POTASSIUM CHLORIDE 10 MEQ: 7.46 INJECTION, SOLUTION INTRAVENOUS at 08:39

## 2023-01-08 RX ADMIN — DEXTROSE, SOYBEAN OIL, ELECTROLYTES, LYSINE, PHENYLALANINE, LEUCINE, VALINE, THREONINE, METHIONINE, ISOLEUCINE, TRYPTOPHAN, ALANINE, ARGININE, GLYCINE, PROLINE, HISTIDINE, GLUTAMIC ACID, SERINE, ASPARTIC ACID AND TYROSINE
6.8; 3.5; 170; 174; 105; 68; 20; 187; 164; 164; 152; 116; 116; 116; 40; 333; 235; 164; 141; 141; 116; 94; 71; 4.8 INJECTION, EMULSION INTRAVENOUS at 19:24

## 2023-01-08 RX ADMIN — THIAMINE HYDROCHLORIDE 200 MG: 100 INJECTION, SOLUTION INTRAMUSCULAR; INTRAVENOUS at 08:54

## 2023-01-08 RX ADMIN — FOLIC ACID: 5 INJECTION, SOLUTION INTRAMUSCULAR; INTRAVENOUS; SUBCUTANEOUS at 08:54

## 2023-01-08 RX ADMIN — POTASSIUM CHLORIDE 10 MEQ: 7.46 INJECTION, SOLUTION INTRAVENOUS at 10:00

## 2023-01-08 RX ADMIN — PIPERACILLIN SODIUM AND TAZOBACTAM SODIUM 3.38 G: 3; .375 INJECTION, POWDER, LYOPHILIZED, FOR SOLUTION INTRAVENOUS at 20:12

## 2023-01-08 RX ADMIN — SODIUM CHLORIDE 2.5 MG/HR: 900 INJECTION, SOLUTION INTRAVENOUS at 06:39

## 2023-01-08 RX ADMIN — Medication 2 UNITS: at 00:15

## 2023-01-08 NOTE — PROGRESS NOTES
Bedside shift change report given to BAIRON Eng (oncoming nurse) by Adela Jules (offgoing nurse). Report included the following information SBAR, Kardex, Intake/Output, MAR, Recent Results, and Cardiac Rhythm Sinus Arrhythmia/A-fib . Wound Prevention Checklist    Patient: Vinay Amador (89 y.o. male)  Date: 2023  Diagnosis: Perforated bowel ([address] [K63.1] Perforated bowel ([address]    Precautions:         []  Heel prevention boots placed on patient    []  Patient turned q2h during shift    []  Lift team ordered    []  Patient on [place] bed/Specialty bed    []  Each Wound is documented during shift (Stage, Color, drainage, odor, measurements, and dressings)    [x]  Dual skin check done with Ant Valverde RN     : Shift assessment done. V/S obtained. Pt resting quietly in bed. A/Ox4. No c/o pain or SOB. TPN running; rate verified. Cardizem gtt running; rate verified. Abdominal incision site assessed; noted clean,dry,and intact. GEO drains, draining and patent. Reaves noted draining and patent. Ileostomy clean, dry, and intact; dusky noted around stoma, per dayshift nurse MD aware. No other needs expressed. Call light within reach. : Scheduled med given. No complaints. : New Bag of TPN given. No complaints. 0: Pt sleeping. No distress noted at [facility] time. 2345: No changes from previous assessment. Pt sleeping comfortably. No c/o pain or SOB. TPN and cardizem gtt running. Call light within reach. 0015: B; Insulin coverage given per protocol. 0200: Pt sleeping comfortably. No distress noted. 0330: Pt HR went up to 167 briefly. Pt asymptomatic and states \"I'm doing okay, there's nothing wrong with my heart. \" MD notified. 6311: No changes from previous assessment. Pt sleeping comfortably. No c/o pain or SOB. TPN and cardizem gtt running. Call light within reach. 0540: Scheduled med given. 0600: CHG bath done.  Abd incision site dressing changed. Ileostomy care done; site care done. Changed ileostomy bag and wafer. Changed gown, bed pad, linen, drawsheet and pillowcase. Emptied GEO drain; output noted. No complaints. 200: Schedued med given. NO complaints. B; Insulin coverage given per protocol. 0715: Bedside shift change report given to Erlin Menjivar (oncoming nurse) by Linda Caldwell (offgoing nurse). Report included the following information SBAR, Kardex, Intake/Output, MAR, Recent Results, and Cardiac Rhythm Sinus Arrhythmia .

## 2023-01-08 NOTE — PROGRESS NOTES
Cardiology Progress Note    Admit Date: 1/1/2023  Attending Cardiologist: Dr. Bee Robertson:     -Peritonitis due to cecal perforation s/p exploratory laparotomy, right hemicolectomy  -Paroxysmal Atrial flutter with 2-1 block, new dx this admission, in setting of above. Converted to SR.   - SVT episodes   -Prolonged QTc of 516 ms which is likely secondary to Levaquin. Now discontinued. Improved. -HFpEF. LVEF normal by Echo this admission. Euvolemic.   -H/o bradycardia on Atenolol 100  mg BID.   -H/o etoh abuse. Primary Cardiologist is Dr. Rosalba Jimenez. Plan:       Patient with intermittent narrow complex tachycardia in the setting of physiologic stress from recent surgery and electrolyte disturbance with hypokalemia. Patient has frequent PAC however patient also has significant hypokalemia. Recommendation:  Start aspirin for stroke prophylaxis and okay by surgery team  If patient has recurrent somewhat persistent A. fib episode, will need to consider oral anticoagulation. Currently on IV Cardizem low-dose drip. Would continue for now while patient is n.p.o. NG tube removed today. Once patient able to take p.o. medication, will change to oral AV brandon blocking agent tomorrow if no significant bradycardia arrhythmia  Place event monitor 30-day upon discharge       Annabell Kussmaul, MD       Subjective:     No new complaints.    Denies any chest pain or palpitation    Objective:      Patient Vitals for the past 8 hrs:   Temp Pulse Resp BP SpO2   01/08/23 0723 98.6 °F (37 °C) 67 18 (!) 154/63 96 %   01/08/23 0410 97.3 °F (36.3 °C) 61 18 (!) 148/78 95 %           Patient Vitals for the past 96 hrs:   Weight   01/07/23 1430 84.8 kg (187 lb)   01/07/23 0800 82.8 kg (182 lb 8.7 oz)   01/05/23 0730 80.1 kg (176 lb 9.6 oz)         TELE: SR/PAF                Current Facility-Administered Medications   Medication Dose Route Frequency Last Admin    potassium chloride 10 mEq in 100 ml IVPB  10 mEq IntraVENous Q1H 10 mEq at 01/08/23 1029    dilTIAZem (CARDIZEM) 100 mg in 0.9% sodium chloride (MBP/ADV) 100 mL infusion  2.5 mg/hr IntraVENous CONTINUOUS 2.5 mg/hr at 01/08/23 0722    TPN ADULT PERIKABIVEN W/ ADDITIVES   IntraVENous CONTINUOUS New Bag at 01/07/23 2125    sodium chloride (NS) flush 5-40 mL  5-40 mL IntraVENous Q8H 10 mL at 01/07/23 2238    sodium chloride (NS) flush 5-40 mL  5-40 mL IntraVENous PRN      morphine 4 mg/mL injection 4 mg  4 mg IntraVENous Q4H PRN      morphine 2 mg/mL injection 2 mg  2 mg IntraVENous Q2H PRN 2 mg at 01/05/23 2000    enoxaparin (LOVENOX) injection 40 mg  40 mg SubCUTAneous Q24H 40 mg at 78/47/80 7273    folic acid (FOLVITE) 1 mg in 0.9% sodium chloride 50 mL ivpb   IntraVENous DAILY New Bag at 01/08/23 0854    thiamine (B-1) 200 mg in 0.9% sodium chloride 50 mL IVPB  200 mg IntraVENous DAILY 200 mg at 01/08/23 0854    naloxone (NARCAN) injection 0.4 mg  0.4 mg IntraVENous EVERY 2 MINUTES AS NEEDED      sodium chloride (NS) flush 5-10 mL  5-10 mL IntraVENous PRN 10 mL at 01/04/23 0708    piperacillin-tazobactam (ZOSYN) 3.375 g in 0.9% sodium chloride (MBP/ADV) 100 mL MBP  3.375 g IntraVENous Q8H 3.375 g at 01/08/23 0539    phenol throat spray (CHLORASEPTIC) 1 Spray  1 Spray Oral PRN 1 Spray at 01/02/23 0622    insulin lispro (HUMALOG) injection   SubCUTAneous Q6H 2 Units at 01/08/23 0648    glucose chewable tablet 16 g  4 Tablet Oral PRN      glucagon (GLUCAGEN) injection 1 mg  1 mg IntraMUSCular PRN      dextrose 10% infusion 0-250 mL  0-250 mL IntraVENous PRN           Intake/Output Summary (Last 24 hours) at 1/8/2023 1039  Last data filed at 1/8/2023 0725  Gross per 24 hour   Intake 0 ml   Output 2765 ml   Net -2765 ml         Physical Exam:  General:  alert, cooperative, no distress, appears stated age  Neck:  no JVD  Lungs:  clear to auscultation bilaterally  Heart:  RRR  Abdomen:  soft, bandage covering incision   Extremities:  extremities normal, atraumatic, no cyanosis or edema    Visit Vitals  BP (!) 154/63   Pulse 67   Temp 98.6 °F (37 °C)   Resp 18   Ht 5' 9\" (1.753 m)   Wt 84.8 kg (187 lb)   SpO2 96%   BMI 27.62 kg/m²       Data Review:     Labs: Results:       Chemistry Recent Labs     01/08/23  0539 01/07/23  1951 01/07/23  0710 01/06/23  0856 01/06/23  0258   *  --  193* 228* 224*   *  --  135* 134* 133*   K 3.3* 3.3* 2.8* 3.9 3.4*   CL 93*  --  95* 96* 94*   CO2 32  --  33* 33* 32   BUN 8  --  11 16 15   CREA 0.72  --  0.69 0.77 0.86   CA 7.4*  --  7.0* 7.5* 7.8*   MG 2.1  --  1.9  --  2.0   PHOS 2.6  --  2.0* 2.3* 2.0*   AGAP 5  --  7 5 7   BUCR 11*  --  16 21* 17   AP  --   --   --   --  58   TP  --   --   --   --  5.3*   ALB  --   --   --   --  1.8*   GLOB  --   --   --   --  3.5   AGRAT  --   --   --   --  0.5*        CBC w/Diff Recent Labs     01/08/23  0539 01/07/23  0710 01/06/23  0856   WBC 8.6 9.9 15.5*   RBC 3.80* 3.76* 4.28*   HGB 12.2* 11.9* 13.7   HCT 34.9* 33.9* 38.8    162 188   GRANS 79* 80* 89*   LYMPH 9* 7* 3*   EOS 1 1 0        Cardiac Enzymes No results found for: CPK, CK, CKMMB, CKMB, RCK3, CKMBT, CKNDX, CKND1, AMY, TROPT, TROIQ, NITHIN, TROPT, TNIPOC, BNP, BNPP   Coagulation No results for input(s): PTP, INR, APTT, INREXT, INREXT in the last 72 hours.     Lipid Panel Lab Results   Component Value Date/Time    Cholesterol, total 183 07/06/2022 10:33 AM    HDL Cholesterol 50 07/06/2022 10:33 AM    LDL, calculated 87.2 07/06/2022 10:33 AM    VLDL, calculated 45.8 07/06/2022 10:33 AM    Triglyceride 113 01/07/2023 07:10 AM    CHOL/HDL Ratio 3.7 07/06/2022 10:33 AM      BNP No results found for: BNP, BNPP, XBNPT   Liver Enzymes Recent Labs     01/06/23  0258   TP 5.3*   ALB 1.8*   AP 58        Thyroid Studies Lab Results   Component Value Date/Time    TSH 3.17 01/04/2023 04:53 AM          Signed By: Delores Alexander MD     January 8, 2023

## 2023-01-08 NOTE — PROGRESS NOTES
[facility] 2 CV STEPDOWN  [address] [facility] [address] [id number]  [phone]  Colon and Rectal Surgery Progress Note      Patient: Carmelina Nugent MRN: [id number]  SSN: [id number]    YOB: 1955  Age: 75 y.o. Sex: male      Admit Date: 1/1/2023    LOS: 7 days     Subjective:     Comfortable. Objective:     Vitals:    01/07/23 2342 01/08/23 0000 01/08/23 0410 01/08/23 0723   BP: (!) 144/82  (!) 148/78 (!) 154/63   Pulse: 60 60 61 67   Resp: 18  18 18   Temp: 98.4 °F (36.9 °C)  97.3 °F (36.3 °C) 98.6 °F (37 °C)   SpO2: 97%  95% 96%   Weight:       Height:            Intake and Output:  Current Shift: No intake/output data recorded.   Last three shifts: 01/06 1901 - 01/08 0700  In: 851 [I.V.:851]  Out: 5005 [Urine:1570; Drains:150]  NG minimal    Physical Exam:     Constitutional: well developed, in NAD  Abdomen: soft, appr tender, ND    Lab/Data Review:    CMP:   Lab Results   Component Value Date/Time     (L) 01/08/2023 05:39 AM    K 3.3 (L) 01/08/2023 05:39 AM    CL 93 (L) 01/08/2023 05:39 AM    CO2 32 01/08/2023 05:39 AM    AGAP 5 01/08/2023 05:39 AM     (H) 01/08/2023 05:39 AM    BUN 8 01/08/2023 05:39 AM    CREA 0.72 01/08/2023 05:39 AM    CA 7.4 (L) 01/08/2023 05:39 AM    MG 2.1 01/08/2023 05:39 AM    PHOS 2.6 01/08/2023 05:39 AM     CBC:   Lab Results   Component Value Date/Time    WBC 8.6 01/08/2023 05:39 AM    HGB 12.2 (L) 01/08/2023 05:39 AM    HCT 34.9 (L) 01/08/2023 05:39 AM     01/08/2023 05:39 AM        Assessment:     S/p Ex lap for cecal perf, re-exploration with diverting loop ileostomy, possible colon cancer    Plan:     NG removed, will start clears  Continue dressing changes  TPN    Signed By: Stef Shea MD        January 8, 2023

## 2023-01-08 NOTE — PROGRESS NOTES
Nursing staff paged to report that patient heart rate was 170 briefly in (sinus tachycardia). Patient was asymptomatic. Labs noted for potassium of 3.3 last evening this was not reported     RN instructed to draw morning labs at this time and will replete electrolytes as indicated.

## 2023-01-08 NOTE — PROGRESS NOTES
[facility] Hospitalist Group  Progress Note    Patient: Mary Call Age: 75 y.o. : 1955 MR#: [id number] SSN: [id number]  Date/Time: 2023     C/C: Abdominal pain      Subjective:   HPI : Patient with abdominal pain from visceral perforation now s/p surgery details below hospitalist issues team is consulted for his medical issues. Review of Systems:   Alert awake oriented  Patient is being cleaned and dressed  Colostomy bag is functioning  Patient was asking me regarding removal of Reaves catheter NG tube  Otherwise he denies any chest pain shortness of breath no nausea vomiting no cough expectoration no fever chills rigors    Assessment/Plan:     1. Leucocytosis -ID consulted, for double coverage added Levaquin continue Zosyn, get culture from abdominal drainage and  2 Hypokalemia  3 Hypomagnesemia   4 Hypocalcemia - follow ionized calcium , Calcium gluconate 1 amp   5 DM2   6 S/p Exploratory laparotomy, right hemicolectomy ( 2023 ) - intra operative finding : Perforated cecum x2 with gross feculent contamination right hemipelvis with numerous intraloop small bowel adhesions and fibrinous exudate. 7 Hypotension - improved , IV albumin   8 Hypoalbuminemia -IV albumin ordered, blood pressure was also low which is slowly improving.   Patient is alert awake oriented no evidence of any encephalopathy, hypotension  9 development of atrial tachycardia-paroxysmal atrial fibrillation versus atrial flutter    Plan  - s/p Second surgery - s/p exploratory laparotomy, right hemicolectomy, POD 1  diverting loop ileostomy  -NG tube discontinued , ON TPN and clears     - Reaves to be removed in AM     - Fecal peritonitis - Continue current antibiotics - Follow surgical fluid culture - follow ID recs    -Hypokalemia-on replacement-repeat potassium ordered    -Low-dose Cardizem drip for tachyarrhythmia, continue patient on telemetry patient has tendency to develop severe bradycardia arrhythmia with brandon blocker. Objective:       General:  Alert, cooperative, no acute distress   HEENT: No facial asymmetry, BLAKE [place], External ears - WNL    Cardiovascular: S1S2 - irregular , No Murmur   Pulmonary: Equal expansion , No Use of accessory muscles , No Rales No Rhonchi    GI:  +BS in all four quadrants, soft, non-tender  Extremities:  No edema; 2+ dorsalis pedis pulses bilaterally  Neuro: Alert and oriented X 2.        DVT Prophylaxis:  []Lovenox  []Hep SQ  []SCDs  []Coumadin   []On Heparin gtt    [] Eliquis [] Xarelto     Vitals:         VS: Visit Vitals  /69   Pulse 80   Temp 98.4 °F (36.9 °C)   Resp 18   Ht 5' 9\" (1.753 m)   Wt 84.8 kg (187 lb)   SpO2 94%   BMI 27.62 kg/m²      Tmax/24hrs: Temp (24hrs), Av.1 °F (36.7 °C), Min:97.3 °F (36.3 °C), Max:98.6 °F (37 °C)        Medications:   Current Facility-Administered Medications   Medication Dose Route Frequency    TPN ADULT PERIKABIVEN W/ ADDITIVES   IntraVENous CONTINUOUS    dilTIAZem (CARDIZEM) 100 mg in 0.9% sodium chloride (MBP/ADV) 100 mL infusion  2.5 mg/hr IntraVENous CONTINUOUS    TPN ADULT PERIKABIVEN W/ ADDITIVES   IntraVENous CONTINUOUS    sodium chloride (NS) flush 5-40 mL  5-40 mL IntraVENous Q8H    sodium chloride (NS) flush 5-40 mL  5-40 mL IntraVENous PRN    morphine 4 mg/mL injection 4 mg  4 mg IntraVENous Q4H PRN    morphine 2 mg/mL injection 2 mg  2 mg IntraVENous Q2H PRN    enoxaparin (LOVENOX) injection 40 mg  40 mg SubCUTAneous T26R    folic acid (FOLVITE) 1 mg in 0.9% sodium chloride 50 mL ivpb   IntraVENous DAILY    thiamine (B-1) 200 mg in 0.9% sodium chloride 50 mL IVPB  200 mg IntraVENous DAILY    naloxone (NARCAN) injection 0.4 mg  0.4 mg IntraVENous EVERY 2 MINUTES AS NEEDED    sodium chloride (NS) flush 5-10 mL  5-10 mL IntraVENous PRN    piperacillin-tazobactam (ZOSYN) 3.375 g in 0.9% sodium chloride (MBP/ADV) 100 mL MBP  3.375 g IntraVENous Q8H    phenol throat spray (CHLORASEPTIC) 1 Spray  1 Spray Oral PRN    insulin lispro (HUMALOG) injection   SubCUTAneous Q6H    glucose chewable tablet 16 g  4 Tablet Oral PRN    glucagon (GLUCAGEN) injection 1 mg  1 mg IntraMUSCular PRN    dextrose 10% infusion 0-250 mL  0-250 mL IntraVENous PRN       Labs:    Recent Labs     01/08/23  0539 01/07/23  0710 01/06/23  0856   WBC 8.6 9.9 15.5*   HGB 12.2* 11.9* 13.7   HCT 34.9* 33.9* 38.8    162 188     Recent Labs     01/08/23  0539 01/07/23  1951 01/07/23  0710 01/06/23  0856 01/06/23  0258   *  --  135* 134* 133*   K 3.3* 3.3* 2.8* 3.9 3.4*   CL 93*  --  95* 96* 94*   CO2 32  --  33* 33* 32   *  --  193* 228* 224*   BUN 8  --  11 16 15   CREA 0.72  --  0.69 0.77 0.86   CA 7.4*  --  7.0* 7.5* 7.8*   MG 2.1  --  1.9  --  2.0   PHOS 2.6  --  2.0* 2.3* 2.0*   ALB  --   --   --   --  1.8*   ALT  --   --   --   --  15*         Time spent on direct patient care >30 mints     Complexity : High complex - due to multiple medical issues outlined above. CODE Status : Full code    Case discussed with:  [x]Patient  [] Family  [x]Nursing  []Case Management         Disclaimer: Sections of this note are dictated utilizing voice recognition software, which may have resulted in some phonetic based errors in grammar and contents. Even though attempts were made to correct all the mistakes, some may have been missed, and remained in the body of the document. If questions arise, please contact our department.     Signed By: Missy Cross MD     January 8, 2023

## 2023-01-08 NOTE — PROGRESS NOTES
Bedside and verbal shift report given to Elly Tobias (oncoming nurse) by Cathy Manley RN (off going nurse). Report included the following information SBAR, Intake/Output, Mar and Cardiac rhythm. 0900: NG-Tube removed. Patient medicated per mar.

## 2023-01-08 NOTE — PROGRESS NOTES
Comprehensive Nutrition Assessment    Type and Reason for Visit: Initial, Consult, Positive nutrition screen, NPO/clear liquid    Nutrition Recommendations/Plan:    Provide parenteral nutrition using standard premixed product until patient is able to tolerate adequate intake of oral diet. Continue current PN order. See below for order contents/details. Continue current diet as tolerated by patient - advance per MD  Add oral nutrition supplement to optimize nutrition intake opportunity: Gelatein 20 (each provides 80 kcal, 20g protein) BID     Monitor tolerance of PPN, tolerance of current diet and ONS, weight, labs and plan of care during admission. Parenteral Nutrition Order:   Current PN:  Next PN:       Malnutrition Assessment:  Malnutrition Status:  Severe malnutrition (01/02/23 0935)    Context:  Acute illness     Findings of the 6 clinical characteristics of malnutrition:   Energy Intake:  50% or less of est energy requirements for 5 or more days  Weight Loss:  Greater than 5% over 1 month     Body Fat Loss:  No significant body fat loss,     Muscle Mass Loss:  Mild muscle mass loss, Temples (temporalis)  Fluid Accumulation:  No significant fluid accumulation,     Strength:  Not performed     Nutrition Assessment:    Admitted for abd pain that has been increasing the past 3 weeks; perforated bowel, s/p ex lap, right hemicolectomy 1/1. NPO/CL x > 5 days. Pt S/p Ex lap for cecal perf, re-exploration with diverting loop ileostomy, possible colon cancer per MD note. NGT in place. PPN initiated on 1/5. Current lab shows low Na (130), Potassium (3.3), BUN/Creatinine ratio (11), Calcium (7.4), and Phosphorus (2.6)-WNL now. Potassium being replaced outside PPN per chart review. Currently, pt diet advance to CLEAR LIQUIDS-will include Gelatein to promote PO intake.  Due to Na levels trending down, will continue PPN at current rate of 60mL/hour to provide 970 kcal, 97 g of dextrose and 34 g of protein (1440 mL of fluid provided). Will continue to monitor tolerance of oral diet; may wean off PPN if tolerance of oral diet continues and at least 75% of estimated nutrition needs are met from oral diet. Nutrition Related Findings:    Last BM 1/8-colostomy. Output: 350mL (urine), 0mL (NGT), 400mL (colostomy). Pertinent Medications: potassium chloride, lovenox, folic acid, thiamine, humalog. Wound Type: Surgical incision    Current Nutrition Intake & Therapies:  Average Meal Intake: NPO  Average Supplement Intake: NPO  TPN ADULT PERIKABIVEN W/ ADDITIVES  ADULT DIET Clear Liquid    Anthropometric Measures:  Height: 5' 9\" (175.3 cm)  Ideal Body Weight (IBW): 160 lbs (73 kg)  Admission Body Weight: 169 lb 15.6 oz (77.1 kg)  Current Body Wt:  84.8 kg (186 lb 15.2 oz), 116.8 % IBW. Bed scale  Current BMI (kg/m2): 27.6  Usual Body Weight: 81.6 kg (180 lb)  % Weight Change (Calculated): 1.1  Weight Adjustment: No adjustment  BMI Category: Overweight (BMI 25.0-29. 9)    Estimated Daily Nutrient Needs:  Energy Requirements Based On: Formula  Weight Used for Energy Requirements: Admission  Energy (kcal/day): 0454-2897 (MSJ 1.2-1.3)  Weight Used for Protein Requirements: Current  Protein (g/day):  (1.2-1.4 g/day)  Method Used for Fluid Requirements: 1 ml/kcal  Fluid (ml/day): 9450-9793    Nutrition Diagnosis:   Severe malnutrition, In context of acute illness or injury related to acute injury/trauma, altered GI function as evidenced by Criteria as identified in malnutrition assessment  Altered GI function related to altered GI structure (2/2 perforated bowel) as evidenced by NPO or clear liquid status due to medical condition (s/p ex lap)    Nutrition Interventions:   Food and/or Nutrient Delivery: Continue current diet, Start oral nutrition supplement, Vitamin supplement, Continue parenteral nutrition  Nutrition Education/Counseling: Education not indicated, No recommendations at this time  Coordination of Nutrition Care: Continue to monitor while inpatient  Plan of Care discussed with: . Goals:  Previous Goal Met: Progressing toward goal(s)  Goals: Meet at least 75% of estimated needs, by next RD assessment       Nutrition Monitoring and Evaluation:   Behavioral-Environmental Outcomes: None identified  Food/Nutrient Intake Outcomes: Parenteral nutrition intake/tolerance, Diet advancement/tolerance, Food and nutrient intake, Supplement intake  Physical Signs/Symptoms Outcomes: Biochemical data, GI status, Meal time behavior, Hemodynamic status, Weight, Nutrition focused physical findings    Discharge Planning:     Too soon to determine    DayHumberto Mercado, LEON, LD   Contact: [phone]

## 2023-01-09 LAB
ALBUMIN SERPL-MCNC: 2.1 G/DL (ref 3.4–5)
ALBUMIN/GLOB SERPL: 0.5 (ref 0.8–1.7)
ALP SERPL-CCNC: 55 U/L (ref 45–117)
ALT SERPL-CCNC: 16 U/L (ref 16–61)
ANION GAP SERPL CALC-SCNC: 7 MMOL/L (ref 3–18)
ANION GAP SERPL CALC-SCNC: 8 MMOL/L (ref 3–18)
AST SERPL-CCNC: 15 U/L (ref 10–38)
BACTERIA SPEC CULT: NORMAL
BACTERIA SPEC CULT: NORMAL
BASOPHILS # BLD: 0 K/UL (ref 0–0.1)
BASOPHILS # BLD: 0 K/UL (ref 0–0.1)
BASOPHILS NFR BLD: 0 % (ref 0–2)
BASOPHILS NFR BLD: 0 % (ref 0–2)
BILIRUB SERPL-MCNC: 1 MG/DL (ref 0.2–1)
BUN SERPL-MCNC: 7 MG/DL (ref 7–18)
BUN SERPL-MCNC: 9 MG/DL (ref 7–18)
BUN/CREAT SERPL: 10 (ref 12–20)
BUN/CREAT SERPL: 12 (ref 12–20)
CALCIUM SERPL-MCNC: 7.6 MG/DL (ref 8.5–10.1)
CALCIUM SERPL-MCNC: 8 MG/DL (ref 8.5–10.1)
CHLORIDE SERPL-SCNC: 93 MMOL/L (ref 100–111)
CHLORIDE SERPL-SCNC: 94 MMOL/L (ref 100–111)
CO2 SERPL-SCNC: 29 MMOL/L (ref 21–32)
CO2 SERPL-SCNC: 29 MMOL/L (ref 21–32)
CREAT SERPL-MCNC: 0.7 MG/DL (ref 0.6–1.3)
CREAT SERPL-MCNC: 0.77 MG/DL (ref 0.6–1.3)
DIFFERENTIAL METHOD BLD: ABNORMAL
DIFFERENTIAL METHOD BLD: ABNORMAL
EOSINOPHIL # BLD: 0.1 K/UL (ref 0–0.4)
EOSINOPHIL # BLD: 0.1 K/UL (ref 0–0.4)
EOSINOPHIL NFR BLD: 1 % (ref 0–5)
EOSINOPHIL NFR BLD: 2 % (ref 0–5)
ERYTHROCYTE [DISTWIDTH] IN BLOOD BY AUTOMATED COUNT: 12.3 % (ref 11.6–14.5)
ERYTHROCYTE [DISTWIDTH] IN BLOOD BY AUTOMATED COUNT: 12.4 % (ref 11.6–14.5)
GLOBULIN SER CALC-MCNC: 4.2 G/DL (ref 2–4)
GLUCOSE BLD STRIP.AUTO-MCNC: 152 MG/DL (ref 70–110)
GLUCOSE BLD STRIP.AUTO-MCNC: 160 MG/DL (ref 70–110)
GLUCOSE BLD STRIP.AUTO-MCNC: 179 MG/DL (ref 70–110)
GLUCOSE BLD STRIP.AUTO-MCNC: 202 MG/DL (ref 70–110)
GLUCOSE SERPL-MCNC: 185 MG/DL (ref 74–99)
GLUCOSE SERPL-MCNC: 258 MG/DL (ref 74–99)
GRAM STN SPEC: NORMAL
GRAM STN SPEC: NORMAL
HCT VFR BLD AUTO: 34.9 % (ref 36–48)
HCT VFR BLD AUTO: 38.4 % (ref 36–48)
HGB BLD-MCNC: 12.3 G/DL (ref 13–16)
HGB BLD-MCNC: 13.4 G/DL (ref 13–16)
IMM GRANULOCYTES # BLD AUTO: 0.1 K/UL (ref 0–0.04)
IMM GRANULOCYTES # BLD AUTO: 0.1 K/UL (ref 0–0.04)
IMM GRANULOCYTES NFR BLD AUTO: 1 % (ref 0–0.5)
IMM GRANULOCYTES NFR BLD AUTO: 1 % (ref 0–0.5)
LACTATE SERPL-SCNC: 1.9 MMOL/L (ref 0.4–2)
LYMPHOCYTES # BLD: 1 K/UL (ref 0.9–3.6)
LYMPHOCYTES # BLD: 1.3 K/UL (ref 0.9–3.6)
LYMPHOCYTES NFR BLD: 12 % (ref 21–52)
LYMPHOCYTES NFR BLD: 12 % (ref 21–52)
MAGNESIUM SERPL-MCNC: 1.8 MG/DL (ref 1.6–2.6)
MAGNESIUM SERPL-MCNC: 2.3 MG/DL (ref 1.6–2.6)
MCH RBC QN AUTO: 31.2 PG (ref 24–34)
MCH RBC QN AUTO: 32 PG (ref 24–34)
MCHC RBC AUTO-ENTMCNC: 34.9 G/DL (ref 31–37)
MCHC RBC AUTO-ENTMCNC: 35.2 G/DL (ref 31–37)
MCV RBC AUTO: 89.5 FL (ref 78–100)
MCV RBC AUTO: 90.9 FL (ref 78–100)
MONOCYTES # BLD: 0.8 K/UL (ref 0.05–1.2)
MONOCYTES # BLD: 0.9 K/UL (ref 0.05–1.2)
MONOCYTES NFR BLD: 8 % (ref 3–10)
MONOCYTES NFR BLD: 9 % (ref 3–10)
NEUTS SEG # BLD: 6.4 K/UL (ref 1.8–8)
NEUTS SEG # BLD: 8.2 K/UL (ref 1.8–8)
NEUTS SEG NFR BLD: 77 % (ref 40–73)
NEUTS SEG NFR BLD: 78 % (ref 40–73)
NRBC # BLD: 0 K/UL (ref 0–0.01)
NRBC # BLD: 0 K/UL (ref 0–0.01)
NRBC BLD-RTO: 0 PER 100 WBC
NRBC BLD-RTO: 0 PER 100 WBC
PHOSPHATE SERPL-MCNC: 2.5 MG/DL (ref 2.5–4.9)
PLATELET # BLD AUTO: 222 K/UL (ref 135–420)
PLATELET # BLD AUTO: 269 K/UL (ref 135–420)
PMV BLD AUTO: 9.5 FL (ref 9.2–11.8)
PMV BLD AUTO: 9.8 FL (ref 9.2–11.8)
POTASSIUM SERPL-SCNC: 3.4 MMOL/L (ref 3.5–5.5)
POTASSIUM SERPL-SCNC: 3.5 MMOL/L (ref 3.5–5.5)
PROT SERPL-MCNC: 6.3 G/DL (ref 6.4–8.2)
RBC # BLD AUTO: 3.84 M/UL (ref 4.35–5.65)
RBC # BLD AUTO: 4.29 M/UL (ref 4.35–5.65)
SERVICE CMNT-IMP: NORMAL
SERVICE CMNT-IMP: NORMAL
SODIUM SERPL-SCNC: 130 MMOL/L (ref 136–145)
SODIUM SERPL-SCNC: 130 MMOL/L (ref 136–145)
TROPONIN-HIGH SENSITIVITY: 15 NG/L (ref 0–78)
WBC # BLD AUTO: 10.6 K/UL (ref 4.6–13.2)
WBC # BLD AUTO: 8.3 K/UL (ref 4.6–13.2)

## 2023-01-09 PROCEDURE — 77030015696

## 2023-01-09 PROCEDURE — 74011250636 HC RX REV CODE- 250/636

## 2023-01-09 PROCEDURE — 65270000029 HC RM PRIVATE

## 2023-01-09 PROCEDURE — 82962 GLUCOSE BLOOD TEST: CPT

## 2023-01-09 PROCEDURE — 84484 ASSAY OF TROPONIN QUANT: CPT

## 2023-01-09 PROCEDURE — 74011636637 HC RX REV CODE- 636/637: Performed by: PHYSICIAN ASSISTANT

## 2023-01-09 PROCEDURE — 74011250636 HC RX REV CODE- 250/636: Performed by: PHYSICIAN ASSISTANT

## 2023-01-09 PROCEDURE — 77030013076 HC PCH OST BAG COLO -A

## 2023-01-09 PROCEDURE — 83735 ASSAY OF MAGNESIUM: CPT

## 2023-01-09 PROCEDURE — 2709999900 HC NON-CHARGEABLE SUPPLY

## 2023-01-09 PROCEDURE — 93005 ELECTROCARDIOGRAM TRACING: CPT

## 2023-01-09 PROCEDURE — 83605 ASSAY OF LACTIC ACID: CPT

## 2023-01-09 PROCEDURE — 84100 ASSAY OF PHOSPHORUS: CPT

## 2023-01-09 PROCEDURE — 77030040392 HC DRSG OPTIFOAM MDII -A

## 2023-01-09 PROCEDURE — 74011000250 HC RX REV CODE- 250

## 2023-01-09 PROCEDURE — 74011250637 HC RX REV CODE- 250/637: Performed by: SURGERY

## 2023-01-09 PROCEDURE — 74011250636 HC RX REV CODE- 250/636: Performed by: EMERGENCY MEDICINE

## 2023-01-09 PROCEDURE — 77030040162

## 2023-01-09 PROCEDURE — 36415 COLL VENOUS BLD VENIPUNCTURE: CPT

## 2023-01-09 PROCEDURE — 74011250636 HC RX REV CODE- 250/636: Performed by: SURGERY

## 2023-01-09 PROCEDURE — 74011250637 HC RX REV CODE- 250/637: Performed by: INTERNAL MEDICINE

## 2023-01-09 PROCEDURE — 85025 COMPLETE CBC W/AUTO DIFF WBC: CPT

## 2023-01-09 PROCEDURE — 74011000258 HC RX REV CODE- 258: Performed by: EMERGENCY MEDICINE

## 2023-01-09 PROCEDURE — 74011000258 HC RX REV CODE- 258: Performed by: SURGERY

## 2023-01-09 PROCEDURE — 99233 SBSQ HOSP IP/OBS HIGH 50: CPT | Performed by: INTERNAL MEDICINE

## 2023-01-09 PROCEDURE — 74011000250 HC RX REV CODE- 250: Performed by: SURGERY

## 2023-01-09 PROCEDURE — 94762 N-INVAS EAR/PLS OXIMTRY CONT: CPT

## 2023-01-09 PROCEDURE — 80053 COMPREHEN METABOLIC PANEL: CPT

## 2023-01-09 PROCEDURE — 99232 SBSQ HOSP IP/OBS MODERATE 35: CPT | Performed by: INTERNAL MEDICINE

## 2023-01-09 PROCEDURE — 77030010520

## 2023-01-09 RX ORDER — DIGOXIN 0.25 MG/ML
INJECTION INTRAMUSCULAR; INTRAVENOUS
Status: COMPLETED
Start: 2023-01-09 | End: 2023-01-09

## 2023-01-09 RX ORDER — AMIODARONE HCL/D5W 450 MG/250
0.5-1 PLASTIC BAG, INJECTION (ML) INTRAVENOUS CONTINUOUS
Status: DISCONTINUED | OUTPATIENT
Start: 2023-01-09 | End: 2023-01-09

## 2023-01-09 RX ORDER — ADENOSINE 3 MG/ML
INJECTION, SOLUTION INTRAVENOUS
Status: COMPLETED
Start: 2023-01-09 | End: 2023-01-09

## 2023-01-09 RX ORDER — SODIUM CHLORIDE 9 MG/ML
150 INJECTION, SOLUTION INTRAVENOUS ONCE
Status: COMPLETED | OUTPATIENT
Start: 2023-01-09 | End: 2023-01-09

## 2023-01-09 RX ORDER — ASPIRIN 81 MG/1
81 TABLET ORAL DAILY
Status: DISCONTINUED | OUTPATIENT
Start: 2023-01-09 | End: 2023-01-10

## 2023-01-09 RX ORDER — DILTIAZEM HYDROCHLORIDE 5 MG/ML
INJECTION INTRAVENOUS
Status: COMPLETED
Start: 2023-01-09 | End: 2023-01-09

## 2023-01-09 RX ORDER — POTASSIUM CHLORIDE 20 MEQ/1
20 TABLET, EXTENDED RELEASE ORAL 2 TIMES DAILY
Status: DISCONTINUED | OUTPATIENT
Start: 2023-01-09 | End: 2023-01-11 | Stop reason: HOSPADM

## 2023-01-09 RX ADMIN — DIGOXIN: 0.25 INJECTION INTRAMUSCULAR; INTRAVENOUS at 13:00

## 2023-01-09 RX ADMIN — SODIUM CHLORIDE, PRESERVATIVE FREE 10 ML: 5 INJECTION INTRAVENOUS at 13:26

## 2023-01-09 RX ADMIN — AMIODARONE HYDROCHLORIDE 150 MG: 1.5 INJECTION, SOLUTION INTRAVENOUS at 12:41

## 2023-01-09 RX ADMIN — Medication 2 UNITS: at 19:00

## 2023-01-09 RX ADMIN — THIAMINE HYDROCHLORIDE 200 MG: 100 INJECTION, SOLUTION INTRAMUSCULAR; INTRAVENOUS at 09:30

## 2023-01-09 RX ADMIN — ADENOSINE 6 MG: 3 INJECTION INTRAVENOUS at 12:16

## 2023-01-09 RX ADMIN — ADENOSINE 12 MG: 3 INJECTION INTRAVENOUS at 12:23

## 2023-01-09 RX ADMIN — Medication 4 UNITS: at 13:24

## 2023-01-09 RX ADMIN — PIPERACILLIN SODIUM AND TAZOBACTAM SODIUM 3.38 G: 3; .375 INJECTION, POWDER, LYOPHILIZED, FOR SOLUTION INTRAVENOUS at 13:25

## 2023-01-09 RX ADMIN — AMIODARONE HYDROCHLORIDE 0.5 MG/MIN: 1.8 INJECTION, SOLUTION INTRAVENOUS at 18:46

## 2023-01-09 RX ADMIN — DILTIAZEM HYDROCHLORIDE: 5 INJECTION INTRAVENOUS at 12:44

## 2023-01-09 RX ADMIN — ASPIRIN 81 MG: 81 TABLET, COATED ORAL at 09:30

## 2023-01-09 RX ADMIN — AMIODARONE HYDROCHLORIDE 1 MG/MIN: 1.8 INJECTION, SOLUTION INTRAVENOUS at 13:00

## 2023-01-09 RX ADMIN — PIPERACILLIN SODIUM AND TAZOBACTAM SODIUM 3.38 G: 3; .375 INJECTION, POWDER, LYOPHILIZED, FOR SOLUTION INTRAVENOUS at 05:08

## 2023-01-09 RX ADMIN — FOLIC ACID: 5 INJECTION, SOLUTION INTRAMUSCULAR; INTRAVENOUS; SUBCUTANEOUS at 09:45

## 2023-01-09 RX ADMIN — PIPERACILLIN SODIUM AND TAZOBACTAM SODIUM 3.38 G: 3; .375 INJECTION, POWDER, LYOPHILIZED, FOR SOLUTION INTRAVENOUS at 20:29

## 2023-01-09 RX ADMIN — Medication 2 UNITS: at 06:35

## 2023-01-09 RX ADMIN — POTASSIUM CHLORIDE 20 MEQ: 1500 TABLET, EXTENDED RELEASE ORAL at 20:38

## 2023-01-09 RX ADMIN — ENOXAPARIN SODIUM 40 MG: 100 INJECTION SUBCUTANEOUS at 08:00

## 2023-01-09 RX ADMIN — Medication 2 UNITS: at 00:06

## 2023-01-09 RX ADMIN — DEXTROSE, SOYBEAN OIL, ELECTROLYTES, LYSINE, PHENYLALANINE, LEUCINE, VALINE, THREONINE, METHIONINE, ISOLEUCINE, TRYPTOPHAN, ALANINE, ARGININE, GLYCINE, PROLINE, HISTIDINE, GLUTAMIC ACID, SERINE, ASPARTIC ACID AND TYROSINE
6.8; 3.5; 170; 174; 105; 68; 20; 187; 164; 164; 152; 116; 116; 116; 40; 333; 235; 164; 141; 141; 116; 94; 71; 4.8 INJECTION, EMULSION INTRAVENOUS at 20:32

## 2023-01-09 RX ADMIN — SODIUM CHLORIDE 150 ML/HR: 9 INJECTION, SOLUTION INTRAVENOUS at 12:46

## 2023-01-09 NOTE — PROGRESS NOTES
[facility]  Rapid/Medical Response Team Note      Patient:    Dion Bird 75 y.o. male, : 1955  Patient MRN: [id number]    Admission Date: 2023   Admission Diagnosis: Perforated bowel ([facility] [address] [K63.1]      RAPID RESPONSE  Rapid response called for: SVT    rates over 200 noted with stable blood pressure and O2  Upon arrival to the room, patient was resting comfortably in bed and denied chest pain, palpitations, shortness of breath, headache or dizziness, weakness, or any sensory disturbances. patient is status post hemicolectomy 23. Was initially admitted for peritonitis after perforated cecum on 2023. He was recently treated with IV levofloxacin and was noted to have an episode of atrial fibrillation and associated QT prolongation which resolved with Cardizem drip on 1/3. Patient also had similar rapid called on 23 for sustained wide-complex tachycardia. OBJECTIVE    RRT/MRT start time: 11:47              RRT/MRT end time: 12:56  Visit Vitals  /73 (BP 1 Location: Left upper arm)   Pulse 86   Temp 97.6 °F (36.4 °C)   Resp 16   Ht 5' 9\" (1.753 m)   Wt 84.8 kg (187 lb)   SpO2 97%   BMI 27.62 kg/m²        Physical Exam:  Gen: NAD, comfortable  HEENT: normocephalic, atraumatic, MMM  CV: RRR, S1/S2 present without M/R/G, +2 radial pulses, well-perfused  Pulm: CTAB, no wheezes, no crackles  Abd: S/NT/ND, no rebound, no guarding, no hepatosplenomegaly   MSK: no clubbing, no edema  Skin: warm, dry, intact, no rash  Neuro: CN II-XII grossly intact, no focal deficits appreciated   Psych: appropriate, alert, oriented x3      ASSESSMENT/PLAN AND DISPOSITION  Dion Bird is a 75y.o. year old male admitted for Perforated bowel ([facility] [address] [K63.1]  Rapid Response Team/ Medical Response Team Called For: SVT    Patient asymptomatic. Denies any chest pain or dizziness. Reports occasionally feels as if he's having some irregular heart beats that resolves spontaneously.     Hr up to 200 and had 2 briefs episodes of where he reverted to sinus tachycardia but was unsustained    In setting of CVT stepdown completed the below:    Medications Administered During Rapid:  6mg Adenosine - reverted and was unsustained  12mg adenosine - revered and unsustained  Increased diltiazem drip to 7.5 mg/h - this was subsequently discontinued once started on amiodarone and digoxin  250 ns bolus  Digoxin 500 mcg bolus  150 bolus amiodarone followed by drip    EKG: SVT    Labs: CBC, CMP, Lactate, Trop-I    Other interventions: Vagal maneuvers  - Cardiology consulted and present during rapid once       Medical Problem(s)    Plan:  - cardiology recommendations include: continue with digoxin loading dose in 6 hours. Amiodarone drip to titrate  - follow blood work results  - Continue with cardiac monitoring for change in heart rate and rhythm    Disposition: remain on CVT   Patient condition: stable      Attending Dr. Debo Hurd notified of rapid response and is in agreement with plan. Primary team resuming care. [facility] Senior resident Dr. Rodolfo Denis present during Rapid Response.       Alesha Macias MD -PGY 1  [facility]  January 9, 2023  1:33 PM

## 2023-01-09 NOTE — PROGRESS NOTES
conducted an initial consultation and Spiritual Assessment for Bess Chao, who is a 75 y. o.,male. Patients Primary Language is: [place]. According to the patients EMR Pentecostalism Affiliation is: Isha Alcala The reason the Patient came to the hospital is:   Patient Active Problem List    Diagnosis Date Noted    Atrial flutter ([facility] [address] 01/03/2023    Severe protein-calorie malnutrition ([facility] [address] 01/02/2023    Perforated bowel ([facility] [address] 01/01/2023    Hypokalemia 01/01/2023    Hyponatremia 01/01/2023    Lactic acidosis 01/01/2023    Leukocytosis 01/01/2023    Hyperglycemia 01/01/2023    Anxiety     Benign essential hypertension         The  provided the following Interventions:  Initiated a relationship of care and support with patient through a RRT  event. Patient began having chest pain  and will be monitored further. Medication will be adjusted  and closer watch put in place. Patient to stay where he is,  Provided information about [address] Thereis no advance directive present. Offered prayer and assurance of continued prayers on patients behalf. The following outcomes were achieved:  Patient shared limited information about his medical narrative and spiritual journey/beliefs. Patient processed feeling about current hospitalization. Patient expressed gratitude for pastoral care visit. Assessment:  Patient does not have any Scientology/cultural needs that will affect patients preferences in health care. There are no further spiritual or Scientology issues which require Spiritual Care Services interventions at this time. Plan:  Chaplains will continue to follow and will provide pastoral care on an as needed/requested basis    . Bria Hancock   Spiritual Care   [phone]

## 2023-01-09 NOTE — PROGRESS NOTES
Infectious Disease progress Note        Reason: secondary peritonitis, cecal perforation    Current abx Prior abx   Zosyn since 1/1/23      Lines:       Assessment :   75y.o. year old male with PMH significant for HTN, type 2 DM who presented to ed on 1/1/23 with severe abdominal pain. Clinical presentation c/w secondary peritonitis due to cecal perforation s/p exploratory laparotomy, right hemicolectomy on 1/1/23. Intra op findings noted  Worsening leukocytosis noted 1/2/23 likely due to post op inflammation     Surgery follow-up appreciated. CT scan 1/4 with lesion in proximal descending colon. S/p laparotomy, diverting loop ileostomy on 1/5/23. Intra op cx 1/5- negative    Atrial fib with rvr/paroxysmal atrial flutter   Worsening leukocytosis 1/6-likely SIRS response to intra-abdominal inflammation. Will monitor for new infection-rule out bloodstream infection, cystitis    RRT this afternoon for SVT with rates in the 200s. Recommendations:    - continue zosyn.   -Follow-up cardiology recommendations regarding SVT  --follow up surgery recommendations regarding TPN/enteral nutrition  -monitor cbc, temp, clinically   -Will switch to oral antibiotics if able to tolerate p.o. diet      Above plan was discussed in details with patient, and primary team, RN. Please call me if any further questions or concerns. Will continue to participate in the care of this patient. HPI:        Denies increased chest pain, shortness of breath, nausea, vomiting. Tolerated breakfast this am  Past Medical History:   Diagnosis Date    Anxiety     Benign essential hypertension     DM type 2 (diabetes mellitus, type 2) ([facility] [address]        History reviewed. No pertinent surgical history.     Current Discharge Medication List        CONTINUE these medications which have NOT CHANGED    Details   atorvastatin (LIPITOR) 10 mg tablet Take 1 tablet by mouth daily  Qty: 90 Tablet, Refills: 3    Associated Diagnoses: Type 2 diabetes mellitus without complication, without long-term current use of insulin (HCC)      atenoloL-chlorthalidone (TENORETIC) 50-25 mg per tablet Take 1 Tablet by mouth daily. Qty: 90 Tablet, Refills: 3    Associated Diagnoses: Benign essential hypertension      potassium chloride (K-DUR, KLOR-CON) 20 mEq tablet 1 qd  Qty: 90 Tab, Refills: 3      clobetasoL (TEMOVATE) 0.05 % ointment Apply  to affected area two (2) times a day. For psoriasis  Qty: 15 g, Refills: 5    Associated Diagnoses: Psoriasis, unspecified      amLODIPine (NORVASC) 10 mg tablet Take 1 Tablet by mouth daily.  1 qd  Qty: 90 Tablet, Refills: 3    Associated Diagnoses: Benign essential hypertension           STOP taking these medications       lisinopriL (PRINIVIL, ZESTRIL) 10 mg tablet Comments:   Reason for Stopping:               Current Facility-Administered Medications   Medication Dose Route Frequency    aspirin delayed-release tablet 81 mg  81 mg Oral DAILY    TPN ADULT PERIKABIVEN W/ ADDITIVES   IntraVENous CONTINUOUS    dilTIAZem (CARDIZEM) 100 mg in 0.9% sodium chloride (MBP/ADV) 100 mL infusion  2.5 mg/hr IntraVENous CONTINUOUS    sodium chloride (NS) flush 5-40 mL  5-40 mL IntraVENous Q8H    sodium chloride (NS) flush 5-40 mL  5-40 mL IntraVENous PRN    morphine 4 mg/mL injection 4 mg  4 mg IntraVENous Q4H PRN    morphine 2 mg/mL injection 2 mg  2 mg IntraVENous Q2H PRN    enoxaparin (LOVENOX) injection 40 mg  40 mg SubCUTAneous P43L    folic acid (FOLVITE) 1 mg in 0.9% sodium chloride 50 mL ivpb   IntraVENous DAILY    thiamine (B-1) 200 mg in 0.9% sodium chloride 50 mL IVPB  200 mg IntraVENous DAILY    naloxone (NARCAN) injection 0.4 mg  0.4 mg IntraVENous EVERY 2 MINUTES AS NEEDED    sodium chloride (NS) flush 5-10 mL  5-10 mL IntraVENous PRN    piperacillin-tazobactam (ZOSYN) 3.375 g in 0.9% sodium chloride (MBP/ADV) 100 mL MBP  3.375 g IntraVENous Q8H    phenol throat spray (CHLORASEPTIC) 1 Spray  1 Spray Oral PRN    insulin lispro (HUMALOG) injection   SubCUTAneous Q6H    glucose chewable tablet 16 g  4 Tablet Oral PRN    glucagon (GLUCAGEN) injection 1 mg  1 mg IntraMUSCular PRN    dextrose 10% infusion 0-250 mL  0-250 mL IntraVENous PRN       Allergies: Patient has no known allergies.     Family History   Problem Relation Age of Onset    OSTEOARTHRITIS Father     No Known Problems Mother      Social History     Socioeconomic History    Marital status: SINGLE     Spouse name: Not on file    Number of children: Not on file    Years of education: Not on file    Highest education level: Not on file   Occupational History    Not on file   Tobacco Use    Smoking status: Former     Types: Cigarettes     Quit date: 1980     Years since quittin.0    Smokeless tobacco: Never   Vaping Use    Vaping Use: Never used   Substance and Sexual Activity    Alcohol use: Yes     Comment: heavy    Drug use: No    Sexual activity: Not on file   Other Topics Concern    Not on file   Social History Narrative    Not on file     Social Determinants of Health     Financial Resource Strain: Low Risk     Difficulty of Paying Living Expenses: Not hard at all   Food Insecurity: No Food Insecurity    Worried About Running Out of Food in the Last Year: Never true    Ran Out of Food in the Last Year: Never true   Transportation Needs: Not on file   Physical Activity: Not on file   Stress: Not on file   Social Connections: Not on file   Intimate Partner Violence: Not on file   Housing Stability: Not on file     Social History     Tobacco Use   Smoking Status Former    Types: Cigarettes    Quit date: 1980    Years since quittin.0   Smokeless Tobacco Never        Temp (24hrs), Av.4 °F (36.9 °C), Min:97.9 °F (36.6 °C), Max:98.7 °F (37.1 °C)    Visit Vitals  BP (!) 140/86 (BP 1 Location: Left upper arm, BP Patient Position: At rest)   Pulse 97   Temp 98.6 °F (37 °C)   Resp 20   Ht 5' 9\" (1.753 m)   Wt 84.8 kg (187 lb)   SpO2 96%   BMI 27.62 kg/m²       ROS: 12 point ROS obtained in details. Pertinent positives as mentioned in HPI,   otherwise negative    Physical Exam:    General: Well developed, well nourished male laying on the bed AAOx3 in no acute distress. General:   awake alert and oriented   HEENT:  Normocephalic, atraumatic,  EOMI, no scleral icterus or pallor; no conjunctival hemmohage;  nasal and oral mucous are moist and without evidence of lesions. Neck supple, no bruits. Lymph Nodes:   no cervical, axillary or inguinal adenopathy   Lungs:   non-labored, bilaterally clear to auscultation- no crackles wheezes rales or rhonchi   Heart:  RRR, s1 and s2; no rubs or gallops, no edema, + pedal pulses   Abdomen:  soft, non-distended, active bowel sounds, no hepatomegaly, no splenomegaly. + surgical changes on abdomen. + diffuse tenderness- no guarding   Genitourinary:  deferred   Extremities:   no clubbing, cyanosis; no joint effusions or swelling; Full ROM of all large joints to the upper and lower extremities; muscle mass appropriate for age   Neurologic:  No gross focal sensory abnormalities; 5/5 muscle strength to upper and lower extremities. Speech appropriate.  Cranial nerves intact                        Skin:  No rash or ulcers noted   Back:  no spinal or paraspinal muscle tenderness or rigidity, no CVA tenderness     Psychiatric:  No suicidal or homicidal ideations, appropriate mood and affect         Labs: Results:   Chemistry Recent Labs     01/09/23 0427 01/08/23 0539 01/07/23 1951 01/07/23  0710   * 191*  --  193*   * 130*  --  135*   K 3.5 3.3* 3.3* 2.8*   CL 94* 93*  --  95*   CO2 29 32  --  33*   BUN 7 8  --  11   CREA 0.70 0.72  --  0.69   CA 7.6* 7.4*  --  7.0*   AGAP 7 5  --  7   BUCR 10* 11*  --  16        CBC w/Diff Recent Labs     01/09/23 0427 01/08/23  0539 01/07/23  0710   WBC 8.3 8.6 9.9   RBC 3.84* 3.80* 3.76*   HGB 12.3* 12.2* 11.9*   HCT 34.9* 34.9* 33.9*    190 162   GRANS 77* 79* 80*   LYMPH 12* 9* 7* EOS 2 1 1        Microbiology Recent Labs     01/06/23  1100   CULT NO GROWTH 2 DAYS            RADIOLOGY:    All available imaging studies/reports in [facility] for this admission were reviewed        Disclaimer: Sections of this note are dictated utilizing voice recognition software, which may have resulted in some phonetic based errors in grammar and contents. Even though attempts were made to correct all the mistakes, some may have been missed, and remained in the body of the document. If questions arise, please contact our department.     Dr. Jose Nagel, Infectious Disease Specialist  [phone]  January 9, 2023  6:40 AM

## 2023-01-09 NOTE — WOUND CARE
Physical Exam  Patient: Kimber Huerta  Room #: 1257  Date:  1//23    LUIS: [id number]    Situation: Ostomy Care     Assessment:   Patient found sitting. Patient is Awake and alert, Oriented x person, place, time and situation, and Pleasant and conversant. Patients primary caregiver is Spouse. Ostomy  Date of Surgery: 1/5/23  Ostomy type: ileostomy  Ostomy: Pink, Moist, and Budded Loop ileostomy,  Right upper quadrant  Output: Watery moderate amount    Care:  Old appliance removed and site cleaned with water and wash cloths. Skin prep applied followed by barrier ring stretched and molded to fit snug against stoma. Wafer cut to fit then applied to patient, followed by pouch. Items Used:    Skin prep , Barrier ring, Wafer, and Pouch      Recommendations:   Teaching provided as follows:  Gently remove old pouch. Clean skin with water and wash cloth or paper towels. Fan dry and apply skin prep. Cut wafer to fit your stoma, then apply barrier ring either around stoma or to back of wafer. Apply this to your skin, aligning wafer over stoma. Apply pouch to wafer. Change your pouch every 3-5 days. Follow a low-fiber diet until your post op appointment with your MD. Keep at least one pouch change with you at all times, just in case of leakage. Supplies are provided by a third party provider, such as [employer] or [employer], much like a mail-in pharmacy. Most insurances allow 20 drainable wafers/pouches per month. You can manage odor by using pouches with charcoal filters, lubricating deodorizers, non-alcohol mouthwash or peppermint Tic Tacs. Place in the pouch prior to application. When you are ready, you may wear your regular clothing. Avoid wearing the waist band directly over the stoma. Written Information provided and left in room. Plan to meet with patient and significant other tomorrow, 1/10/23 @ 1300. Care discussed with primary nurse, Marina Hampton RN.   Care turned over to nursing staff.     Myrna MEJIAN, RN, [facility], [address] [place] [address] Dept  [phone]

## 2023-01-09 NOTE — PROGRESS NOTES
POD# 8 & 4    Admit Date: 1/1/2023    Assessment    Ky White is a 75 y.o. male  POD 8 s/p exploratory laparotomy, right hemicolectomy, POD 4  diverting loop ileostomy    Patient Active Problem List   Diagnosis Code    Anxiety F41. 9    Benign essential hypertension I10    Perforated bowel (HCC) K63.1    Hypokalemia E87.6    Hyponatremia E87.1    Lactic acidosis E87.20    Leukocytosis D72.829    Hyperglycemia R73.9    Severe protein-calorie malnutrition (HCC) E43    Atrial flutter ([address] I48.92       Plan  -regular diet, continue TPN today and should not need to renew tomorrow if tolerating diet  -aspirin started per cards recs; cardizem gtt will need to be transitioned to oral medication today  - for home wound care and new stoma  -possible discharge in next 24-48 hours if care can be coordinated     Subjective    Overnight events: No acute events overnight. He has no complaints. Anxious to leave. Objective    Physical Exam:  Visit Vitals  BP (!) 140/86 (BP 1 Location: Left upper arm, BP Patient Position: At rest)   Pulse 97   Temp 98.6 °F (37 °C)   Resp 20   Ht 5' 9\" (1.753 m)   Wt 84.8 kg (187 lb)   SpO2 96%   BMI 27.62 kg/m²       Intake/Output Summary (Last 24 hours) at 1/9/2023 0655  Last data filed at 1/9/2023 0400  Gross per 24 hour   Intake 360 ml   Output 1580 ml   Net -1220 ml     Physical Exam  Constitutional:       Appearance: Normal appearance. Abdominal:      General: There is no distension. Palpations: Abdomen is soft. Comments: Ileostomy pink with stool in bag, midline incision clean, GEO 1&2 serous   Neurological:      Mental Status: He is alert.              Jamel Lubin DO  Phone: [phone]

## 2023-01-09 NOTE — PROGRESS NOTES
Cardiology Progress Note    Admit Date: 1/1/2023  Attending Cardiologist: Dr. Erin Hooper:     -Peritonitis due to cecal perforation s/p exploratory laparotomy, right hemicolectomy  -Paroxysmal Atrial flutter with 2-1 block, new dx this admission, in setting of above. Converted to SR.   - SVT episodes   -Prolonged QTc of 516 ms which is likely secondary to Levaquin. Now discontinued. Improved. -HFpEF. LVEF normal by Echo this admission. Euvolemic.   -H/o bradycardia on Atenolol 100  mg BID.   -H/o etoh abuse. Primary Cardiologist is Dr. Clarissa Bush. Plan:     RRT this afternoon for SVT with rates in the 200s. He eventually cardioverted after the following combination of: Adenosine 6mg/12mg, 500 mcg IV digoxin, IV amiodarone 150 mg bolus + amio drip. He seemed to respond well to IV amiodarone. Recommend to continue IV amiodarone for now and d/c cardizem gtt. If able to tolerate po medications, would start low dose lopressor if BP will tolerate. Event monitor to be placed at discharge.     ----------------------------------------  Patient developed SVT at 200 bpm this afternoon. Finally resolved with amiodarone. We will continue for now. Likely in atrial flutter with one-to-one conduction. Asymptomatic. I saw, examined, and evaluated this patient and performed the substantive portion of the encounter for > 50% of the time including extensive history, physical exam, and medical decision making as discussed with patient and next-of-kin as needed. I personally reviewed the patient's labs, tests, vitals, orders, medications, updated history, and other providers assessments as well. I personally agree with the findings as stated and the plan as documented. Anju Garcia MD      Subjective:     Asymptomatic to elevated rates.      Objective:      Patient Vitals for the past 8 hrs:   Temp Pulse Resp BP SpO2   01/09/23 1254 -- 86 -- 129/73 --   01/09/23 1246 -- (!) 190 -- 90/63 --   01/09/23 1228 -- (!) 197 -- 92/65 --   01/09/23 1223 -- (!) 190 -- 103/70 --   01/09/23 1152 97.6 °F (36.4 °C) (!) 195 16 (!) 103/49 97 %           Patient Vitals for the past 96 hrs:   Weight   01/07/23 1430 84.8 kg (187 lb)   01/07/23 0800 82.8 kg (182 lb 8.7 oz)         TELE: SR/PAF                Current Facility-Administered Medications   Medication Dose Route Frequency Last Admin    aspirin delayed-release tablet 81 mg  81 mg Oral DAILY 81 mg at 01/09/23 0930    TPN ADULT PERIKABIVEN W/ ADDITIVES   IntraVENous CONTINUOUS      dilTIAZem (CARDIZEM) 5 mg/mL injection          amiodarone (NEXTERONE) 360 mg in dextrose 200 mL (1.8 mg/mL) infusion  0.5-1 mg/min IntraVENous TITRATE 1 mg/min at 01/09/23 1300    amiodarone (NEXTERONE) 150 mg in dextrose 5% 100 ml 150 mg/100 mL (1.5 mg/mL) bolus premix          amiodarone (NEXTERONE) 360 mg in dextrose 200 mL (1.8 mg/mL) 360 mg/200 mL (1.8 mg/mL) infusion          TPN ADULT PERIKABIVEN W/ ADDITIVES   IntraVENous CONTINUOUS New Bag at 01/08/23 1924    dilTIAZem (CARDIZEM) 100 mg in 0.9% sodium chloride (MBP/ADV) 100 mL infusion  2.5 mg/hr IntraVENous CONTINUOUS 10 mg/hr at 01/09/23 1229    sodium chloride (NS) flush 5-40 mL  5-40 mL IntraVENous Q8H 10 mL at 01/08/23 2213    sodium chloride (NS) flush 5-40 mL  5-40 mL IntraVENous PRN      morphine 4 mg/mL injection 4 mg  4 mg IntraVENous Q4H PRN      morphine 2 mg/mL injection 2 mg  2 mg IntraVENous Q2H PRN 2 mg at 01/05/23 2000    enoxaparin (LOVENOX) injection 40 mg  40 mg SubCUTAneous Q24H 40 mg at 34/79/23 4129    folic acid (FOLVITE) 1 mg in 0.9% sodium chloride 50 mL ivpb   IntraVENous DAILY New Bag at 01/09/23 0945    thiamine (B-1) 200 mg in 0.9% sodium chloride 50 mL IVPB  200 mg IntraVENous DAILY 200 mg at 01/09/23 0930    naloxone (NARCAN) injection 0.4 mg  0.4 mg IntraVENous EVERY 2 MINUTES AS NEEDED      sodium chloride (NS) flush 5-10 mL  5-10 mL IntraVENous PRN 10 mL at 01/04/23 0708    piperacillin-tazobactam (ZOSYN) 3.375 g in 0.9% sodium chloride (MBP/ADV) 100 mL MBP  3.375 g IntraVENous Q8H 3.375 g at 01/09/23 0508    phenol throat spray (CHLORASEPTIC) 1 Spray  1 Spray Oral PRN 1 Spray at 01/02/23 0622    insulin lispro (HUMALOG) injection   SubCUTAneous Q6H 2 Units at 01/09/23 0635    glucose chewable tablet 16 g  4 Tablet Oral PRN      glucagon (GLUCAGEN) injection 1 mg  1 mg IntraMUSCular PRN      dextrose 10% infusion 0-250 mL  0-250 mL IntraVENous PRN           Intake/Output Summary (Last 24 hours) at 1/9/2023 1304  Last data filed at 1/9/2023 0600  Gross per 24 hour   Intake 360 ml   Output 2565 ml   Net -2205 ml         Physical Exam:  General:  alert, cooperative, no distress, appears stated age  Neck:  no JVD  Lungs:  clear to auscultation bilaterally  Heart:  RRR  Abdomen:  soft, bandage covering incision   Extremities:  extremities normal, atraumatic, no cyanosis or edema    Visit Vitals  /73 (BP 1 Location: Left upper arm)   Pulse 86   Temp 97.6 °F (36.4 °C)   Resp 16   Ht 5' 9\" (1.753 m)   Wt 84.8 kg (187 lb)   SpO2 97%   BMI 27.62 kg/m²       Data Review:     Labs: Results:       Chemistry Recent Labs     01/09/23 0427 01/08/23  0539 01/07/23 1951 01/07/23  0710   * 191*  --  193*   * 130*  --  135*   K 3.5 3.3* 3.3* 2.8*   CL 94* 93*  --  95*   CO2 29 32  --  33*   BUN 7 8  --  11   CREA 0.70 0.72  --  0.69   CA 7.6* 7.4*  --  7.0*   MG 2.3 2.1  --  1.9   PHOS 2.5 2.6  --  2.0*   AGAP 7 5  --  7   BUCR 10* 11*  --  16        CBC w/Diff Recent Labs     01/09/23 0427 01/08/23  0539 01/07/23  0710   WBC 8.3 8.6 9.9   RBC 3.84* 3.80* 3.76*   HGB 12.3* 12.2* 11.9*   HCT 34.9* 34.9* 33.9*    190 162   GRANS 77* 79* 80*   LYMPH 12* 9* 7*   EOS 2 1 1        Cardiac Enzymes No results found for: CPK, CK, CKMMB, CKMB, RCK3, CKMBT, CKNDX, CKND1, AYM, TROPT, TROIQ, NITHIN, TROPT, TNIPOC, BNP, BNPP   Coagulation No results for input(s): PTP, INR, APTT, INREXT, INREXT in the last 72 hours.     Lipid Panel Lab Results   Component Value Date/Time    Cholesterol, total 183 07/06/2022 10:33 AM    HDL Cholesterol 50 07/06/2022 10:33 AM    LDL, calculated 87.2 07/06/2022 10:33 AM    VLDL, calculated 45.8 07/06/2022 10:33 AM    Triglyceride 113 01/07/2023 07:10 AM    CHOL/HDL Ratio 3.7 07/06/2022 10:33 AM      BNP No results found for: BNP, BNPP, XBNPT   Liver Enzymes No results for input(s): TP, ALB, TBIL, AP in the last 72 hours.     No lab exists for component: SGOT, GPT, DBIL     Thyroid Studies Lab Results   Component Value Date/Time    TSH 3.17 01/04/2023 04:53 AM          Signed By: Basilio Vázquez PA-C     January 9, 2023

## 2023-01-09 NOTE — PROGRESS NOTES
Bedside shift change report given to BAIRON Eng (oncoming nurse) by Amelia Gilbert (offgoing nurse). Report included the following information SBAR, Kardex, Intake/Output, MAR, Recent Results, and Cardiac Rhythm Sinus Arrhythmia . Wound Prevention Checklist    Patient: Ky White (43 y.o. male)  Date: 2023  Diagnosis: Perforated bowel ([address] [K63.1] Perforated bowel ([address]    Precautions:         []  Heel prevention boots placed on patient    [x]  Patient turned q2h during shift    []  Lift team ordered    []  Patient on [place] bed/Specialty bed    []  Each Wound is documented during shift (Stage, Color, drainage, odor, measurements, and dressings)    [x]  Dual skin check done with Lina Rose RN     0940: Shift assessment done. V/S obtained. Pt resting quietly in bed, visitor at the bedside. Pt A/Ox4. No c/o pain or SOB. New bag of TPN given. GEO drains noted draining and patent. Ileostomy noted clean, dry, and intact. Turned and repositioned pt. No other needs expressed. Call light within reach. 2012: Scheduled med given. No complaints. 2200: Pt resting quietly in bed; watching TV. No distress noted at this time. 2330: No changes from previous assessment. Pt sleeping comfortably. No c/o pain or SOB. Incision site clean, dry, and intact. Ileostomy bag draining and patent. GEO drains draining and patent. No other needs expressed. Call light within reach. 0006: B; Insulin coverage given per protocol. 0200: Pt sleeping comfortably. No distress noted at this time. 0400: No changes from previous assessment. Pt sleeping comfortably. No c/o pain or SOB. Incision site clean, dry, and intact. Ileostomy bag draining and patent. GEO drains draining and patent. No other needs expressed. Call light within reach. 0515: Scheduled meds given. 0600: CHG Bath done. Emptied ileostomy bag, and GEO drain. Incision site dressing changed. No complaints.      0630: New IV inserted by Lele Espinal: Bedside shift change report given to [id number] [facility] (oncoming nurse) by Priscilla Hoff (offgoing nurse). Report included the following information SBAR, Kardex, Intake/Output, MAR, Recent Results, and Cardiac Rhythm Sinus Arrhythmia. Nannette Chen

## 2023-01-09 NOTE — ACP (ADVANCE CARE PLANNING)
Advance Care Planning   Advance Care Planning Inpatient Note  [address] [facility] Department    Today's Date: 1/9/2023  Unit: [facility] 2 CV STEPDOWN    Received request from . Upon review of chart and communication with care team, patient's decision making abilities are not in question. Patient was/were present in the room during visit. Goals of ACP Conversation:  Discuss Advance Care planning documents    Health Care Decision Makers:    No healthcare decision makers have been documented. Click here to complete [id number] including selection of the Healthcare Decision Maker Relationship (ie \"Primary\")  Summary:  No Decision Maker named by patient at this time    Advance Care Planning Documents (Patient Wishes) on file:  None     Assessment:    Patient seen as the result of an RRT called this morning. Patient having chest discomfort. Doctors checked out patient and will treat him by adjusting his medication for now and watch him closely. There is currently no room for him to go to for additional  help. There is no advance directive present.      Interventions:  Deferred conversation as patient not interested in completing an advance directive at this time    Care Preferences Communicated:  No    Outcomes/Plan:      Sharan [facility] on 1/9/2023 at 1:09 PM

## 2023-01-09 NOTE — PROGRESS NOTES
Comprehensive Nutrition Assessment    Type and Reason for Visit: Reassess, Consult, NPO/clear liquid    Nutrition Recommendations/Plan:   Continue regular diet per surgery. Monitor tolerance/acceptance of PO. D/C oral supplement per pt dislike. Provide parenteral nutrition using standard premixed product until patient is able to tolerate adequate intake of oral diet. Continue current PN order. See below for order contents/details. Continue to monitor tolerance of PN/PO, weight, labs, and plan of care during admission. Parenteral Nutrition Order:   Current PN:  Next PN:       Malnutrition Assessment:  Malnutrition Status:  Severe malnutrition (01/02/23 0935)    Context:  Acute illness     Findings of the 6 clinical characteristics of malnutrition:   Energy Intake:  50% or less of est energy requirements for 5 or more days  Weight Loss:  Greater than 5% over 1 month     Body Fat Loss:  No significant body fat loss,     Muscle Mass Loss:  Mild muscle mass loss, Temples (temporalis)  Fluid Accumulation:  No significant fluid accumulation,     Strength:  Not performed     Nutrition Assessment:    Admitted for abd pain that has been increasing the past 3 weeks; perforated bowel, s/p ex lap, right hemicolectomy 1/1. NPO/CL x > 5 days. Pt S/p Ex lap for cecal perf, re-exploration with diverting loop ileostomy, possible colon cancer per MD note. PO diet advanced to regular today 1/9. Per surgery 1/9, continue TPN today and should not need to renew tomorrow if tolerating diet. Visited pt, pt reports eating ~50-60% of breakfast this morning, tolerating diet, no n/v. Does not like/want any oral supplements, stating they are too sweet. Plan to continue current PPN at 60 ml/hr for tonight.     Nutrition Related Findings:    Pertinent Meds: lovenox, folic acid, humalog, thiamine  Pertinent Labs: Na 130 L, K 3.5 wnl (trending up), Phos 2.5 wnl, Mg 2.3 wnl Wound Type: Surgical incision    Current Nutrition Intake & Therapies:  Average Meal Intake: NPO  Average Supplement Intake: NPO  ADULT ORAL NUTRITION SUPPLEMENT Breakfast, Dinner; Protein Modular  TPN ADULT PERIKABIVEN W/ ADDITIVES  ADULT DIET Regular    Anthropometric Measures:  Height: 5' 9\" (175.3 cm)  Ideal Body Weight (IBW): 160 lbs (73 kg)  Admission Body Weight: 169 lb 15.6 oz (77.1 kg)  Current Body Wt:  84.8 kg (186 lb 15.2 oz), 116.8 % IBW. Bed scale  Current BMI (kg/m2): 27.6  Usual Body Weight: 81.6 kg (180 lb)  % Weight Change (Calculated): 1.1  Weight Adjustment: No adjustment  BMI Category: Overweight (BMI 25.0-29. 9)    Estimated Daily Nutrient Needs:  Energy Requirements Based On: Formula  Weight Used for Energy Requirements: Admission  Energy (kcal/day): 0328-3133 (MSJ 1.2-1.3)  Weight Used for Protein Requirements: Current  Protein (g/day):  (1.2-1.4 g/day)  Method Used for Fluid Requirements: 1 ml/kcal  Fluid (ml/day): 1399-0131    Nutrition Diagnosis:   Severe malnutrition, In context of acute illness or injury related to acute injury/trauma, altered GI function as evidenced by Criteria as identified in malnutrition assessment  Altered GI function related to altered GI structure (2/2 perforated bowel) as evidenced by NPO or clear liquid status due to medical condition (s/p ex lap)    Nutrition Interventions:   Food and/or Nutrient Delivery: Continue current diet, Discontinue oral nutrition supplement, Continue parenteral nutrition  Nutrition Education/Counseling: No recommendations at this time  Coordination of Nutrition Care: Continue to monitor while inpatient  Plan of Care discussed with: pt    Goals:  Previous Goal Met: Progressing toward goal(s)  Goals: Meet at least 75% of estimated needs, by next RD assessment       Nutrition Monitoring and Evaluation:   Behavioral-Environmental Outcomes: None identified  Food/Nutrient Intake Outcomes: Food and nutrient intake, Parenteral nutrition intake/tolerance  Physical Signs/Symptoms Outcomes: Biochemical data, GI status, Meal time behavior, Hemodynamic status, Weight, Nutrition focused physical findings    Discharge Planning:     Too soon to determine    Clemencia Garner [address] [place] [address], [address] [place] [address] Dr  Contact: [phone]

## 2023-01-10 LAB
ANION GAP SERPL CALC-SCNC: 7 MMOL/L (ref 3–18)
ATRIAL RATE: 115 BPM
BASOPHILS # BLD: 0 K/UL (ref 0–0.1)
BASOPHILS NFR BLD: 0 % (ref 0–2)
BUN SERPL-MCNC: 9 MG/DL (ref 7–18)
BUN/CREAT SERPL: 13 (ref 12–20)
CALCIUM SERPL-MCNC: 7.9 MG/DL (ref 8.5–10.1)
CALCULATED R AXIS, ECG10: -13 DEGREES
CALCULATED T AXIS, ECG11: 118 DEGREES
CHLORIDE SERPL-SCNC: 97 MMOL/L (ref 100–111)
CO2 SERPL-SCNC: 29 MMOL/L (ref 21–32)
CREAT SERPL-MCNC: 0.71 MG/DL (ref 0.6–1.3)
DIAGNOSIS, 93000: NORMAL
DIFFERENTIAL METHOD BLD: ABNORMAL
EOSINOPHIL # BLD: 0.2 K/UL (ref 0–0.4)
EOSINOPHIL NFR BLD: 2 % (ref 0–5)
ERYTHROCYTE [DISTWIDTH] IN BLOOD BY AUTOMATED COUNT: 12.2 % (ref 11.6–14.5)
GLUCOSE BLD STRIP.AUTO-MCNC: 138 MG/DL (ref 70–110)
GLUCOSE BLD STRIP.AUTO-MCNC: 157 MG/DL (ref 70–110)
GLUCOSE BLD STRIP.AUTO-MCNC: 157 MG/DL (ref 70–110)
GLUCOSE BLD STRIP.AUTO-MCNC: 194 MG/DL (ref 70–110)
GLUCOSE BLD STRIP.AUTO-MCNC: 242 MG/DL (ref 70–110)
GLUCOSE SERPL-MCNC: 177 MG/DL (ref 74–99)
HCT VFR BLD AUTO: 34.6 % (ref 36–48)
HGB BLD-MCNC: 12.1 G/DL (ref 13–16)
IMM GRANULOCYTES # BLD AUTO: 0.1 K/UL (ref 0–0.04)
IMM GRANULOCYTES NFR BLD AUTO: 1 % (ref 0–0.5)
LYMPHOCYTES # BLD: 0.9 K/UL (ref 0.9–3.6)
LYMPHOCYTES NFR BLD: 11 % (ref 21–52)
MAGNESIUM SERPL-MCNC: 2.1 MG/DL (ref 1.6–2.6)
MCH RBC QN AUTO: 31.3 PG (ref 24–34)
MCHC RBC AUTO-ENTMCNC: 35 G/DL (ref 31–37)
MCV RBC AUTO: 89.6 FL (ref 78–100)
MONOCYTES # BLD: 0.8 K/UL (ref 0.05–1.2)
MONOCYTES NFR BLD: 9 % (ref 3–10)
NEUTS SEG # BLD: 6.4 K/UL (ref 1.8–8)
NEUTS SEG NFR BLD: 77 % (ref 40–73)
NRBC # BLD: 0 K/UL (ref 0–0.01)
NRBC BLD-RTO: 0 PER 100 WBC
PHOSPHATE SERPL-MCNC: 2.6 MG/DL (ref 2.5–4.9)
PLATELET # BLD AUTO: 266 K/UL (ref 135–420)
PMV BLD AUTO: 9.4 FL (ref 9.2–11.8)
POTASSIUM SERPL-SCNC: 3.4 MMOL/L (ref 3.5–5.5)
Q-T INTERVAL, ECG07: 318 MS
QRS DURATION, ECG06: 88 MS
QTC CALCULATION (BEZET), ECG08: 447 MS
RBC # BLD AUTO: 3.86 M/UL (ref 4.35–5.65)
SODIUM SERPL-SCNC: 133 MMOL/L (ref 136–145)
VENTRICULAR RATE, ECG03: 119 BPM
WBC # BLD AUTO: 8.3 K/UL (ref 4.6–13.2)

## 2023-01-10 PROCEDURE — 74011250636 HC RX REV CODE- 250/636: Performed by: EMERGENCY MEDICINE

## 2023-01-10 PROCEDURE — 84100 ASSAY OF PHOSPHORUS: CPT

## 2023-01-10 PROCEDURE — 74011250637 HC RX REV CODE- 250/637: Performed by: PHYSICIAN ASSISTANT

## 2023-01-10 PROCEDURE — 74011000258 HC RX REV CODE- 258: Performed by: SURGERY

## 2023-01-10 PROCEDURE — 83735 ASSAY OF MAGNESIUM: CPT

## 2023-01-10 PROCEDURE — 82962 GLUCOSE BLOOD TEST: CPT

## 2023-01-10 PROCEDURE — 85025 COMPLETE CBC W/AUTO DIFF WBC: CPT

## 2023-01-10 PROCEDURE — 74011250636 HC RX REV CODE- 250/636: Performed by: SURGERY

## 2023-01-10 PROCEDURE — 36415 COLL VENOUS BLD VENIPUNCTURE: CPT

## 2023-01-10 PROCEDURE — 2709999900 HC NON-CHARGEABLE SUPPLY

## 2023-01-10 PROCEDURE — 74011250637 HC RX REV CODE- 250/637: Performed by: INTERNAL MEDICINE

## 2023-01-10 PROCEDURE — 74011000258 HC RX REV CODE- 258: Performed by: EMERGENCY MEDICINE

## 2023-01-10 PROCEDURE — 99232 SBSQ HOSP IP/OBS MODERATE 35: CPT | Performed by: INTERNAL MEDICINE

## 2023-01-10 PROCEDURE — 74011250637 HC RX REV CODE- 250/637: Performed by: SURGERY

## 2023-01-10 PROCEDURE — 94762 N-INVAS EAR/PLS OXIMTRY CONT: CPT

## 2023-01-10 PROCEDURE — 80048 BASIC METABOLIC PNL TOTAL CA: CPT

## 2023-01-10 PROCEDURE — 74011636637 HC RX REV CODE- 636/637: Performed by: PHYSICIAN ASSISTANT

## 2023-01-10 PROCEDURE — 74011000250 HC RX REV CODE- 250: Performed by: SURGERY

## 2023-01-10 PROCEDURE — 65270000029 HC RM PRIVATE

## 2023-01-10 RX ORDER — METRONIDAZOLE 500 MG/1
500 TABLET ORAL EVERY 12 HOURS
Status: DISCONTINUED | OUTPATIENT
Start: 2023-01-10 | End: 2023-01-11 | Stop reason: HOSPADM

## 2023-01-10 RX ORDER — CEFUROXIME AXETIL 250 MG/1
500 TABLET ORAL EVERY 12 HOURS
Status: DISCONTINUED | OUTPATIENT
Start: 2023-01-10 | End: 2023-01-11 | Stop reason: HOSPADM

## 2023-01-10 RX ORDER — METOPROLOL TARTRATE 25 MG/1
25 TABLET, FILM COATED ORAL 2 TIMES DAILY
Status: DISCONTINUED | OUTPATIENT
Start: 2023-01-10 | End: 2023-01-11 | Stop reason: HOSPADM

## 2023-01-10 RX ADMIN — THIAMINE HYDROCHLORIDE 200 MG: 100 INJECTION, SOLUTION INTRAMUSCULAR; INTRAVENOUS at 08:22

## 2023-01-10 RX ADMIN — APIXABAN 5 MG: 5 TABLET, FILM COATED ORAL at 17:07

## 2023-01-10 RX ADMIN — METOPROLOL TARTRATE 25 MG: 25 TABLET, FILM COATED ORAL at 17:05

## 2023-01-10 RX ADMIN — Medication 4 UNITS: at 18:00

## 2023-01-10 RX ADMIN — METRONIDAZOLE 500 MG: 500 TABLET ORAL at 14:12

## 2023-01-10 RX ADMIN — PIPERACILLIN SODIUM AND TAZOBACTAM SODIUM 3.38 G: 3; .375 INJECTION, POWDER, LYOPHILIZED, FOR SOLUTION INTRAVENOUS at 12:51

## 2023-01-10 RX ADMIN — ASPIRIN 81 MG: 81 TABLET, COATED ORAL at 08:09

## 2023-01-10 RX ADMIN — ENOXAPARIN SODIUM 40 MG: 100 INJECTION SUBCUTANEOUS at 08:09

## 2023-01-10 RX ADMIN — SODIUM CHLORIDE, PRESERVATIVE FREE 10 ML: 5 INJECTION INTRAVENOUS at 22:55

## 2023-01-10 RX ADMIN — PIPERACILLIN SODIUM AND TAZOBACTAM SODIUM 3.38 G: 3; .375 INJECTION, POWDER, LYOPHILIZED, FOR SOLUTION INTRAVENOUS at 05:25

## 2023-01-10 RX ADMIN — METOPROLOL TARTRATE 25 MG: 25 TABLET, FILM COATED ORAL at 12:59

## 2023-01-10 RX ADMIN — CEFUROXIME AXETIL 500 MG: 250 TABLET, FILM COATED ORAL at 22:55

## 2023-01-10 RX ADMIN — POTASSIUM CHLORIDE 20 MEQ: 1500 TABLET, EXTENDED RELEASE ORAL at 17:05

## 2023-01-10 RX ADMIN — CEFUROXIME AXETIL 500 MG: 250 TABLET, FILM COATED ORAL at 16:17

## 2023-01-10 RX ADMIN — Medication 2 UNITS: at 12:51

## 2023-01-10 RX ADMIN — FOLIC ACID: 5 INJECTION, SOLUTION INTRAMUSCULAR; INTRAVENOUS; SUBCUTANEOUS at 08:22

## 2023-01-10 RX ADMIN — Medication 2 UNITS: at 00:11

## 2023-01-10 RX ADMIN — POTASSIUM CHLORIDE 20 MEQ: 1500 TABLET, EXTENDED RELEASE ORAL at 08:09

## 2023-01-10 RX ADMIN — Medication 2 UNITS: at 06:39

## 2023-01-10 RX ADMIN — METRONIDAZOLE 500 MG: 500 TABLET ORAL at 22:55

## 2023-01-10 NOTE — PROGRESS NOTES
[facility] Hospitalist Group  Progress Note    Patient: Fuentes Wise Age: 75 y.o. : 1955 MR#: [id number] SSN: [id number]  Date/Time: 1/10/2023     C/C: Abdominal pain      Subjective:   HPI : Patient with abdominal pain from visceral perforation now s/p surgery details below hospitalist issues team is consulted for his medical issues. Review of Systems:   Patient alert awake oriented denies any pain  Patient has no nausea vomiting colostomy bag is functioning  Patient is just started on solid diet today  Off TPN   Assessment/Plan:     1. Leucocytosis -ID consulted, for double coverage added Levaquin continue Zosyn, get culture from abdominal drainage and  2 Hypokalemia  3 Hypomagnesemia   4 Hypocalcemia - follow ionized calcium , Calcium gluconate 1 amp   5 DM2   6 S/p Exploratory laparotomy, right hemicolectomy ( 2023 ) - intra operative finding : Perforated cecum x2 with gross feculent contamination right hemipelvis with numerous intraloop small bowel adhesions and fibrinous exudate. 7 Hypotension - improved , IV albumin   8 Hypoalbuminemia -IV albumin ordered, blood pressure was also low which is slowly improving. Patient is alert awake oriented no evidence of any encephalopathy, hypotension  9 development of atrial tachycardia-paroxysmal atrial fibrillation versus atrial flutter- New 2- 1 block with afib now NSR   10   Prolonged QTc of 516 ms which is likely secondary to Levaquin. Now discontinued. Improved      Plan  -For now continue solid diet if tolerated DC TPN  - s/p Second surgery - s/p exploratory laparotomy, right hemicolectomy, POD 1  diverting loop ileostomy  -NG tube discontinued , ON TPN     - Reaves to be removed.     - Fecal peritonitis - Continue current antibiotics - Follow surgical fluid culture - follow ID recs    -Hypokalemia-on replacement-repeat potassium ordered    -Low-dose Cardizem drip for tachyarrhythmia, continue patient on telemetry patient has tendency to develop severe bradycardia arrhythmia with brandon blocker. On 2023  \" RRT this afternoon for SVT with rates in the 200s. He eventually cardioverted after the following combination of: Adenosine 6mg/12mg, 500 mcg IV digoxin, IV amiodarone 150 mg bolus + amio drip. He seemed to respond well to IV amiodarone. Recommend to continue IV amiodarone for now and d/c cardizem gtt. If able to tolerate po medications, would start low dose lopressor if BP will tolerate. Event monitor to be placed at discharge. \"- per cardiology     Objective:       General:  Alert, cooperative, no acute distress   HEENT: No facial asymmetry, BLAKE Valdez, External ears - WNL    Cardiovascular: S1S2 - irregular , No Murmur   Pulmonary: Equal expansion , No Use of accessory muscles , No Rales No Rhonchi    GI:  +BS in all four quadrants, soft, non-tender  Extremities:  No edema; 2+ dorsalis pedis pulses bilaterally  Neuro: Alert and oriented X 2.        DVT Prophylaxis:  []Lovenox  []Hep SQ  []SCDs  []Coumadin   []On Heparin gtt    [] Eliquis [] Xarelto     Vitals:         VS: Visit Vitals  BP (!) 141/70   Pulse 74   Temp 98.1 °F (36.7 °C)   Resp 22   Ht 5' 9\" (1.753 m)   Wt 79.4 kg (175 lb)   SpO2 97%   BMI 25.84 kg/m²      Tmax/24hrs: Temp (24hrs), Av.8 °F (36.6 °C), Min:97.2 °F (36.2 °C), Max:98.1 °F (36.7 °C)        Medications:   Current Facility-Administered Medications   Medication Dose Route Frequency    apixaban (ELIQUIS) tablet 5 mg  5 mg Oral BID    metoprolol tartrate (LOPRESSOR) tablet 25 mg  25 mg Oral BID    cefUROXime (CEFTIN) tablet 500 mg  500 mg Oral Q12H    metroNIDAZOLE (FLAGYL) tablet 500 mg  500 mg Oral Q12H    TPN ADULT PERIKABIVEN W/ ADDITIVES   IntraVENous CONTINUOUS    potassium chloride (K-DUR, KLOR-CON M20) SR tablet 20 mEq  20 mEq Oral BID    sodium chloride (NS) flush 5-40 mL  5-40 mL IntraVENous Q8H    sodium chloride (NS) flush 5-40 mL  5-40 mL IntraVENous PRN    folic acid (FOLVITE) 1 mg in 0.9% sodium chloride 50 mL ivpb   IntraVENous DAILY    thiamine (B-1) 200 mg in 0.9% sodium chloride 50 mL IVPB  200 mg IntraVENous DAILY    naloxone (NARCAN) injection 0.4 mg  0.4 mg IntraVENous EVERY 2 MINUTES AS NEEDED    sodium chloride (NS) flush 5-10 mL  5-10 mL IntraVENous PRN    phenol throat spray (CHLORASEPTIC) 1 Spray  1 Spray Oral PRN    insulin lispro (HUMALOG) injection   SubCUTAneous Q6H    glucose chewable tablet 16 g  4 Tablet Oral PRN    glucagon (GLUCAGEN) injection 1 mg  1 mg IntraMUSCular PRN    dextrose 10% infusion 0-250 mL  0-250 mL IntraVENous PRN       Labs:    Recent Labs     01/10/23  0527 01/09/23  1205 01/09/23 0427   WBC 8.3 10.6 8.3   HGB 12.1* 13.4 12.3*   HCT 34.6* 38.4 34.9*    269 222     Recent Labs     01/10/23  0527 01/09/23  1205 01/09/23 0427 01/08/23  0539   * 130* 130* 130*   K 3.4* 3.4* 3.5 3.3*   CL 97* 93* 94* 93*   CO2 29 29 29 32   * 185* 258* 191*   BUN 9 9 7 8   CREA 0.71 0.77 0.70 0.72   CA 7.9* 8.0* 7.6* 7.4*   MG 2.1 1.8 2.3 2.1   PHOS 2.6  --  2.5 2.6   ALB  --  2.1*  --   --    ALT  --  16  --   --          Time spent on direct patient care >30 mints     Complexity : High complex - due to multiple medical issues outlined above. CODE Status : Full code    Case discussed with:  [x]Patient  [] Family  [x]Nursing  []Case Management         Disclaimer: Sections of this note are dictated utilizing voice recognition software, which may have resulted in some phonetic based errors in grammar and contents. Even though attempts were made to correct all the mistakes, some may have been missed, and remained in the body of the document. If questions arise, please contact our department.     Signed By: Beth Roberts MD     January 10, 2023

## 2023-01-10 NOTE — PROGRESS NOTES
Bedside shift change report given to BAIRON Eng (oncoming nurse) by Kike Carlos (offgoing nurse). Report included the following information SBAR, Kardex, Intake/Output, MAR, Recent Results, and Cardiac Rhythm Sinus Arrhythmia . Wound Prevention Checklist    Patient: Margarita Miles (1 y.o. male)  Date: 1/10/2023  Diagnosis: Perforated bowel ([address] [K63.1] Perforated bowel ([address]    Precautions:         []  Heel prevention boots placed on patient    [x]  Patient turned q2h during shift    []  Lift team ordered    []  Patient on [place] bed/Specialty bed    []  Each Wound is documented during shift (Stage, Color, drainage, odor, measurements, and dressings)    [x]  Dual skin check done with Hakeem Perez RN     0280: Shift assessment done. V/S obtained. Pt resting quietly in bed. A/Ox4. No c/o pain or SOB at this time. Amiodarone gtt running; rate verified. TPN running; rate verified. DOM drains patent and draining. Ileostomy site clean, dry, and intact. No leak from the ileostomy bag. Incision site dressing noted dry drainage. No other needs expressed. Call light within reach. 2030: Scheduled meds given. No complaints. New bag of TPN given. 2100: Pt moved to Room 2315.     0000: B; Insulin coverage given per protocol. 0045: No changes form previous assessment. Pt resting quietly in bed. No c/o pain or SOB at this time. Amiodarone gtt and TPN running. DOM drains patent and draining. CHG bath done. Leaked noted on Ileostomy bag. Bag and wafer changed; site care done. Incision site dressing changed. No other needs expressed. Call light within reach. 0200: Pt sleeping comfortably. No distress noted at this time. 0500: Pt HR sustained between 53-60 bpm. Held Amiodarone gtt for now. MD notified. 0530: No changes from previous assessment. Pt resting quietly in bed. No c/o pain or SOB at this time. TPN running. Dom drains patent and draining; emptied.  Incision site dressing changed. Gown changed. 0630: B; Insulin coverage given per protocol. 0715: Bedside shift change report given to BAIRON Altman (oncoming nurse) by Bri David (offgoing nurse). Report included the following information SBAR, Kardex, Intake/Output, MAR, Recent Results, and Cardiac Rhythm A-fib .

## 2023-01-10 NOTE — PROGRESS NOTES
Cardiology Progress Note    Admit Date: 1/1/2023  Attending Cardiologist: Dr. Erin Hooper:     -Peritonitis due to cecal perforation s/p exploratory laparotomy, right hemicolectomy  -Paroxysmal Atrial flutter with 2-1 block, new dx this admission, in setting of above. Converted to SR.   - SVT episodes   -Prolonged QTc of 516 ms which is likely secondary to Levaquin. Now discontinued. Improved. -HFpEF. LVEF normal by Echo this admission. Euvolemic.   -H/o bradycardia on Atenolol 100  mg BID.   -H/o etoh abuse. Primary Cardiologist is Dr. Clarissa Bush. Plan:     Rhythm stable on IV amiodarone. Once able to tolerate po, would start low dose BB. Will need to be started on anticoagulation for stroke prophylaxis, once able from a sx standpoint. Continue to monitor and replace electrolytes as needed. Recommend maintaining K at 4.0 and Mg at 2.0. Event monitor to be placed at discharge.     --------------------------------------------------------  Doing well today and had lunch. We will stop amiodarone drip. Last episode of sustained SVT yesterday afternoon. We will start oral anticoagulation with Eliquis. He can discharge today or tomorrow from cardiac standpoint follow-up with Dr. Clarissa Bush. I would avoid increasing AV blocking agents much more given his previous bradycardia. I saw, examined, and evaluated this patient and performed the substantive portion of the encounter for > 50% of the time including extensive history, physical exam, and medical decision making as discussed with patient and next-of-kin as needed. I personally reviewed the patient's labs, tests, vitals, orders, medications, updated history, and other providers assessments as well. I personally agree with the findings as stated and the plan as documented. Anju Garcia MD      Subjective:     No new complaints.      Objective:      Patient Vitals for the past 8 hrs:   Temp Pulse Resp BP SpO2   01/10/23 0724 97.6 °F (36.4 °C) 60 17 (!) 144/81 97 %   01/10/23 0534 97.7 °F (36.5 °C) 61 16 (!) 148/65 98 %           Patient Vitals for the past 96 hrs:   Weight   01/09/23 1933 79.4 kg (175 lb)   01/07/23 1430 84.8 kg (187 lb)   01/07/23 0800 82.8 kg (182 lb 8.7 oz)         TELE: SR/PAF                Current Facility-Administered Medications   Medication Dose Route Frequency Last Admin    aspirin delayed-release tablet 81 mg  81 mg Oral DAILY 81 mg at 01/10/23 0809    TPN ADULT PERIKABIVEN W/ ADDITIVES   IntraVENous CONTINUOUS New Bag at 01/09/23 2032    amiodarone (NEXTERONE) 360 mg in dextrose 200 mL (1.8 mg/mL) infusion  0.5-1 mg/min IntraVENous TITRATE 0.5 mg/min at 01/09/23 1846    potassium chloride (K-DUR, KLOR-CON M20) SR tablet 20 mEq  20 mEq Oral BID 20 mEq at 01/10/23 0809    sodium chloride (NS) flush 5-40 mL  5-40 mL IntraVENous Q8H 10 mL at 01/09/23 1326    sodium chloride (NS) flush 5-40 mL  5-40 mL IntraVENous PRN      enoxaparin (LOVENOX) injection 40 mg  40 mg SubCUTAneous Q24H 40 mg at 77/74/44 0774    folic acid (FOLVITE) 1 mg in 0.9% sodium chloride 50 mL ivpb   IntraVENous DAILY New Bag at 01/10/23 5124    thiamine (B-1) 200 mg in 0.9% sodium chloride 50 mL IVPB  200 mg IntraVENous DAILY 200 mg at 01/10/23 0822    naloxone (NARCAN) injection 0.4 mg  0.4 mg IntraVENous EVERY 2 MINUTES AS NEEDED      sodium chloride (NS) flush 5-10 mL  5-10 mL IntraVENous PRN 10 mL at 01/04/23 0708    piperacillin-tazobactam (ZOSYN) 3.375 g in 0.9% sodium chloride (MBP/ADV) 100 mL MBP  3.375 g IntraVENous Q8H 3.375 g at 01/10/23 0525    phenol throat spray (CHLORASEPTIC) 1 Spray  1 Spray Oral PRN 1 Spray at 01/02/23 0622    insulin lispro (HUMALOG) injection   SubCUTAneous Q6H 2 Units at 01/10/23 0639    glucose chewable tablet 16 g  4 Tablet Oral PRN      glucagon (GLUCAGEN) injection 1 mg  1 mg IntraMUSCular PRN      dextrose 10% infusion 0-250 mL  0-250 mL IntraVENous PRN           Intake/Output Summary (Last 24 hours) at 1/10/2023 7519  Last data filed at 1/10/2023 1112  Gross per 24 hour   Intake 360 ml   Output 2705 ml   Net -2345 ml         Physical Exam:  General:  alert, cooperative, no distress, appears stated age  Neck:  no JVD  Lungs:  clear to auscultation bilaterally  Heart:  RRR  Abdomen:  soft, bandage covering incision   Extremities:  extremities normal, atraumatic, no cyanosis or edema    Visit Vitals  BP (!) 144/81   Pulse 60   Temp 97.6 °F (36.4 °C)   Resp 17   Ht 5' 9\" (1.753 m)   Wt 79.4 kg (175 lb)   SpO2 97%   BMI 25.84 kg/m²       Data Review:     Labs: Results:       Chemistry Recent Labs     01/10/23  0527 01/09/23  1205 01/09/23  0427 01/08/23  0539   * 185* 258* 191*   * 130* 130* 130*   K 3.4* 3.4* 3.5 3.3*   CL 97* 93* 94* 93*   CO2 29 29 29 32   BUN 9 9 7 8   CREA 0.71 0.77 0.70 0.72   CA 7.9* 8.0* 7.6* 7.4*   MG 2.1 1.8 2.3 2.1   PHOS 2.6  --  2.5 2.6   AGAP 7 8 7 5   BUCR 13 12 10* 11*   AP  --  55  --   --    TP  --  6.3*  --   --    ALB  --  2.1*  --   --    GLOB  --  4.2*  --   --    AGRAT  --  0.5*  --   --         CBC w/Diff Recent Labs     01/10/23  0527 01/09/23  1205 01/09/23  0427   WBC 8.3 10.6 8.3   RBC 3.86* 4.29* 3.84*   HGB 12.1* 13.4 12.3*   HCT 34.6* 38.4 34.9*    269 222   GRANS 77* 78* 77*   LYMPH 11* 12* 12*   EOS 2 1 2        Cardiac Enzymes No results found for: CPK, CK, CKMMB, CKMB, RCK3, CKMBT, CKNDX, CKND1, AMY, TROPT, TROIQ, NITHIN, TROPT, TNIPOC, BNP, BNPP   Coagulation No results for input(s): PTP, INR, APTT, INREXT, INREXT in the last 72 hours.     Lipid Panel Lab Results   Component Value Date/Time    Cholesterol, total 183 07/06/2022 10:33 AM    HDL Cholesterol 50 07/06/2022 10:33 AM    LDL, calculated 87.2 07/06/2022 10:33 AM    VLDL, calculated 45.8 07/06/2022 10:33 AM    Triglyceride 113 01/07/2023 07:10 AM    CHOL/HDL Ratio 3.7 07/06/2022 10:33 AM      BNP No results found for: BNP, BNPP, XBNPT   Liver Enzymes Recent Labs     01/09/23  1205   TP 6.3*   ALB 2.1* AP 55        Thyroid Studies Lab Results   Component Value Date/Time    TSH 3.17 01/04/2023 04:53 AM          Signed By: Kaia Fofana PA-C     January 10, 2023

## 2023-01-10 NOTE — WOUND CARE
Physical Exam  Ostomy Follow Up     Name: Jerri Diez  Date: 1/10/23  Room: 2315    Subjective: no complaints. Reports pouch leaked several times since yesterday. Assessment: wafer and pouch intact, no leakage noted. Bag is about 1/2 full. Ostomy  Date of Surgery: 1/5/23  Ostomy type: ileostomy  Ostomy: Pink, Moist, and Budded Loop ileostomy,  Right lower quadrant  Output: Watery and Brown large amount    Demonstration of ostomy care provided, with significant others participation. Questions answered. Plan: empty pouch when 1/3 full, no more. Increase your caloric intake and increase carbohydrate consumption in effort to thicken stool. Pay special attention to you output, and replace fluids orally. Patient will need home health referral for continued ostomy care/teaching at home.      Didier MEJIAN, RN, [address] Wound Care Dept.  [phone]

## 2023-01-10 NOTE — PROGRESS NOTES
Comprehensive Nutrition Assessment    Type and Reason for Visit: Reassess    Nutrition Recommendations/Plan:   Continue regular diet per surgery. Monitor tolerance/acceptance of PO. Noted no more PPN per surgery, let PPN bag run out - notified RN. Continue to monitor tolerance of PO, weight, labs, and plan of care during admission. Malnutrition Assessment:  Malnutrition Status:  Severe malnutrition (01/02/23 0935)    Context:  Acute illness     Findings of the 6 clinical characteristics of malnutrition:   Energy Intake:  50% or less of est energy requirements for 5 or more days  Weight Loss:  Greater than 5% over 1 month     Body Fat Loss:  No significant body fat loss,     Muscle Mass Loss:  Mild muscle mass loss, Temples (temporalis)  Fluid Accumulation:  No significant fluid accumulation,     Strength:  Not performed     Nutrition Assessment:    Admitted for abd pain that has been increasing the past 3 weeks; perforated bowel, s/p ex lap, right hemicolectomy 1/1. NPO/CL x > 5 days. Pt S/p Ex lap for cecal perf, re-exploration with diverting loop ileostomy, possible colon cancer per MD note. PO diet advanced to regular 1/9. Pt reports tolerating PO diet, denies n/v. Reports eating a good portion of each meal yesterday and was eating breakfast at time of visit. PO diet alone, pt meeting approximately >75% of estimated needs. Noted surgery note today, d/c TPN. Ordered for K replacement. Notified RN, let PPN bag run out.     Nutrition Related Findings:    Pertinent Meds: folic acid, lovenox, zosyn, KCl, thiamine  Pertinent Labs: POC Glucose 157-202 mg/dl x 24 hrs, Na 133 L (trending up), K 3.4 L, Phos 2.6 wnl, Mg 2.1 wnl Wound Type: Surgical incision    Current Nutrition Intake & Therapies:  Average Meal Intake: NPO  Average Supplement Intake: NPO  ADULT DIET Regular  TPN ADULT PERIKABIVEN W/ ADDITIVES    Anthropometric Measures:  Height: 5' 9\" (175.3 cm)  Ideal Body Weight (IBW): 160 lbs (73 kg)  Admission Body Weight: 169 lb 15.6 oz (77.1 kg)  Current Body Wt:  79.4 kg (175 lb 0.7 oz), 116.8 % IBW. Bed scale  Current BMI (kg/m2): 25.8  Usual Body Weight: 81.6 kg (180 lb)  % Weight Change (Calculated): 1.1  Weight Adjustment: No adjustment  BMI Category: Overweight (BMI 25.0-29. 9)    Estimated Daily Nutrient Needs:  Energy Requirements Based On: Formula  Weight Used for Energy Requirements: Admission  Energy (kcal/day): 3559-7511 (MSJ 1.2-1.3)  Weight Used for Protein Requirements: Current  Protein (g/day):  (1.2-1.4 g/day)  Method Used for Fluid Requirements: 1 ml/kcal  Fluid (ml/day): 2021-2190    Nutrition Diagnosis:   Severe malnutrition, In context of acute illness or injury related to acute injury/trauma, altered GI function as evidenced by Criteria as identified in malnutrition assessment  Altered GI function related to altered GI structure (2/2 perforated bowel) as evidenced by NPO or clear liquid status due to medical condition (s/p ex lap)    Nutrition Interventions:   Food and/or Nutrient Delivery: Continue current diet, Discontinue parenteral nutrition  Nutrition Education/Counseling: No recommendations at this time  Coordination of Nutrition Care: Continue to monitor while inpatient  Plan of Care discussed with: pt    Goals:  Previous Goal Met: Progressing toward goal(s)  Goals: Meet at least 75% of estimated needs, by next RD assessment       Nutrition Monitoring and Evaluation:   Behavioral-Environmental Outcomes: None identified  Food/Nutrient Intake Outcomes: Food and nutrient intake, Parenteral nutrition intake/tolerance  Physical Signs/Symptoms Outcomes: Biochemical data, GI status, Meal time behavior, Hemodynamic status, Weight, Nutrition focused physical findings    Discharge Planning:    Continue current diet    Clemencia Cao [address], [address] [place] [address]   Contact: [phone]

## 2023-01-10 NOTE — PROGRESS NOTES
[facility] Hospitalist Group  Progress Note    Patient: Freddy Lozano Age: 75 y.o. : 1955 MR#: [id number] SSN: [id number]  Date/Time: 2023     C/C: Abdominal pain      Subjective:   HPI : Patient with abdominal pain from visceral perforation now s/p surgery details below hospitalist issues team is consulted for his medical issues. Review of Systems:   Patient alert awake oriented denies any pain  Patient has no nausea vomiting colostomy bag is functioning  Patient is just started on solid diet today  We will continue TPN for today  Assessment/Plan:     1. Leucocytosis -ID consulted, for double coverage added Levaquin continue Zosyn, get culture from abdominal drainage and  2 Hypokalemia  3 Hypomagnesemia   4 Hypocalcemia - follow ionized calcium , Calcium gluconate 1 amp   5 DM2   6 S/p Exploratory laparotomy, right hemicolectomy ( 2023 ) - intra operative finding : Perforated cecum x2 with gross feculent contamination right hemipelvis with numerous intraloop small bowel adhesions and fibrinous exudate. 7 Hypotension - improved , IV albumin   8 Hypoalbuminemia -IV albumin ordered, blood pressure was also low which is slowly improving. Patient is alert awake oriented no evidence of any encephalopathy, hypotension  9 development of atrial tachycardia-paroxysmal atrial fibrillation versus atrial flutter- New 2- 1 block with afib now NSR   10   Prolonged QTc of 516 ms which is likely secondary to Levaquin. Now discontinued. Improved      Plan  -For now continue solid diet if tolerated DC TPN  - s/p Second surgery - s/p exploratory laparotomy, right hemicolectomy, POD 1  diverting loop ileostomy  -NG tube discontinued , ON TPN     - Reaves to be removed.     - Fecal peritonitis - Continue current antibiotics - Follow surgical fluid culture - follow ID recs    -Hypokalemia-on replacement-repeat potassium ordered    -Low-dose Cardizem drip for tachyarrhythmia, continue patient on telemetry patient has tendency to develop severe bradycardia arrhythmia with brandon blocker. On 2023  \" RRT this afternoon for SVT with rates in the 200s. He eventually cardioverted after the following combination of: Adenosine 6mg/12mg, 500 mcg IV digoxin, IV amiodarone 150 mg bolus + amio drip. He seemed to respond well to IV amiodarone. Recommend to continue IV amiodarone for now and d/c cardizem gtt. If able to tolerate po medications, would start low dose lopressor if BP will tolerate. Event monitor to be placed at discharge. \"- per cardiology     Objective:       General:  Alert, cooperative, no acute distress   HEENT: No facial asymmetry, BLAKE Val[place], External ears - WNL    Cardiovascular: S1S2 - irregular , No Murmur   Pulmonary: Equal expansion , No Use of accessory muscles , No Rales No Rhonchi    GI:  +BS in all four quadrants, soft, non-tender  Extremities:  No edema; 2+ dorsalis pedis pulses bilaterally  Neuro: Alert and oriented X 2.        DVT Prophylaxis:  []Lovenox  []Hep SQ  []SCDs  []Coumadin   []On Heparin gtt    [] Eliquis [] Xarelto     Vitals:         VS: Visit Vitals  /74   Pulse 66 Comment: rechecked thru monitor   Temp 97.2 °F (36.2 °C)   Resp 16   Ht 5' 9\" (1.753 m)   Wt 79.4 kg (175 lb)   SpO2 97%   BMI 25.84 kg/m²      Tmax/24hrs: Temp (24hrs), Av °F (36.7 °C), Min:97.2 °F (36.2 °C), Max:98.7 °F (37.1 °C)        Medications:   Current Facility-Administered Medications   Medication Dose Route Frequency    aspirin delayed-release tablet 81 mg  81 mg Oral DAILY    TPN ADULT PERIKABIVEN W/ ADDITIVES   IntraVENous CONTINUOUS    amiodarone (NEXTERONE) 360 mg in dextrose 200 mL (1.8 mg/mL) infusion  0.5-1 mg/min IntraVENous TITRATE    amiodarone (NEXTERONE) 150 mg in dextrose 5% 100 ml 150 mg/100 mL (1.5 mg/mL) bolus premix        amiodarone (NEXTERONE) 360 mg in dextrose 200 mL (1.8 mg/mL) 360 mg/200 mL (1.8 mg/mL) infusion        amiodarone (NEXTERONE) 360 mg in dextrose 200 mL (1.8 mg/mL) 360 mg/200 mL (1.8 mg/mL) infusion        TPN ADULT PERIKABIVEN W/ ADDITIVES   IntraVENous CONTINUOUS    sodium chloride (NS) flush 5-40 mL  5-40 mL IntraVENous Q8H    sodium chloride (NS) flush 5-40 mL  5-40 mL IntraVENous PRN    morphine 4 mg/mL injection 4 mg  4 mg IntraVENous Q4H PRN    morphine 2 mg/mL injection 2 mg  2 mg IntraVENous Q2H PRN    enoxaparin (LOVENOX) injection 40 mg  40 mg SubCUTAneous I45G    folic acid (FOLVITE) 1 mg in 0.9% sodium chloride 50 mL ivpb   IntraVENous DAILY    thiamine (B-1) 200 mg in 0.9% sodium chloride 50 mL IVPB  200 mg IntraVENous DAILY    naloxone (NARCAN) injection 0.4 mg  0.4 mg IntraVENous EVERY 2 MINUTES AS NEEDED    sodium chloride (NS) flush 5-10 mL  5-10 mL IntraVENous PRN    piperacillin-tazobactam (ZOSYN) 3.375 g in 0.9% sodium chloride (MBP/ADV) 100 mL MBP  3.375 g IntraVENous Q8H    phenol throat spray (CHLORASEPTIC) 1 Spray  1 Spray Oral PRN    insulin lispro (HUMALOG) injection   SubCUTAneous Q6H    glucose chewable tablet 16 g  4 Tablet Oral PRN    glucagon (GLUCAGEN) injection 1 mg  1 mg IntraMUSCular PRN    dextrose 10% infusion 0-250 mL  0-250 mL IntraVENous PRN       Labs:    Recent Labs     01/09/23  1205 01/09/23  0427 01/08/23  0539   WBC 10.6 8.3 8.6   HGB 13.4 12.3* 12.2*   HCT 38.4 34.9* 34.9*    222 190     Recent Labs     01/09/23  1205 01/09/23  0427 01/08/23  0539 01/07/23  1951 01/07/23  0710   * 130* 130*  --  135*   K 3.4* 3.5 3.3*   < > 2.8*   CL 93* 94* 93*  --  95*   CO2 29 29 32  --  33*   * 258* 191*  --  193*   BUN 9 7 8  --  11   CREA 0.77 0.70 0.72  --  0.69   CA 8.0* 7.6* 7.4*  --  7.0*   MG 1.8 2.3 2.1  --  1.9   PHOS  --  2.5 2.6  --  2.0*   ALB 2.1*  --   --   --   --    ALT 16  --   --   --   --     < > = values in this interval not displayed. Time spent on direct patient care >30 mints     Complexity : High complex - due to multiple medical issues outlined above.      CODE Status : Full code    Case discussed with:  [x]Patient  [] Family  [x]Nursing  []Case Management         Disclaimer: Sections of this note are dictated utilizing voice recognition software, which may have resulted in some phonetic based errors in grammar and contents. Even though attempts were made to correct all the mistakes, some may have been missed, and remained in the body of the document. If questions arise, please contact our department.     Signed By: Candy Frances MD     January 9, 2023

## 2023-01-10 NOTE — PROGRESS NOTES
Infectious Disease progress Note        Reason: secondary peritonitis, cecal perforation    Current abx Prior abx   Zosyn since 1/1/23      Lines:       Assessment :   75y.o. year old male with PMH significant for HTN, type 2 DM who presented to ed on 1/1/23 with severe abdominal pain. Clinical presentation c/w secondary peritonitis due to cecal perforation s/p exploratory laparotomy, right hemicolectomy on 1/1/23. Intra op findings noted  Worsening leukocytosis noted 1/2/23 likely due to post op inflammation     Surgery follow-up appreciated. CT scan 1/4 with lesion in proximal descending colon. S/p laparotomy, diverting loop ileostomy on 1/5/23. Intra op cx 1/5- negative    Atrial fib with rvr/paroxysmal atrial flutter   Worsening leukocytosis 1/6-likely SIRS response to intra-abdominal inflammation. Will monitor for new infection-rule out bloodstream infection, cystitis    RRT on 1/9/23 for SVT with rates in the 200s. Clinically better today. Tolerating p.o. diet. Recommendations:    -Discontinue zosyn. Start p.o. cefuroxime, metronidazole till 1/14/2023  -Follow-up cardiology recommendations regarding SVT  --follow up surgery recommendations regarding TPN/enteral nutrition; abdominal drain removal  -monitor cbc, temp, clinically   -Discharge planning per primary team      Above plan was discussed in details with patient, and primary team, RN. Please call me if any further questions or concerns. Will continue to participate in the care of this patient. HPI:        Denies increased chest pain, shortness of breath, nausea, vomiting. Tolerated breakfast this am  Past Medical History:   Diagnosis Date    Anxiety     Benign essential hypertension     DM type 2 (diabetes mellitus, type 2) ([facility] [address]        History reviewed. No pertinent surgical history.     Current Discharge Medication List        CONTINUE these medications which have NOT CHANGED    Details   atorvastatin (LIPITOR) 10 mg tablet Take 1 tablet by mouth daily  Qty: 90 Tablet, Refills: 3    Associated Diagnoses: Type 2 diabetes mellitus without complication, without long-term current use of insulin (HCC)      atenoloL-chlorthalidone (TENORETIC) 50-25 mg per tablet Take 1 Tablet by mouth daily. Qty: 90 Tablet, Refills: 3    Associated Diagnoses: Benign essential hypertension      potassium chloride (K-DUR, KLOR-CON) 20 mEq tablet 1 qd  Qty: 90 Tab, Refills: 3      clobetasoL (TEMOVATE) 0.05 % ointment Apply  to affected area two (2) times a day. For psoriasis  Qty: 15 g, Refills: 5    Associated Diagnoses: Psoriasis, unspecified      amLODIPine (NORVASC) 10 mg tablet Take 1 Tablet by mouth daily.  1 qd  Qty: 90 Tablet, Refills: 3    Associated Diagnoses: Benign essential hypertension           STOP taking these medications       lisinopriL (PRINIVIL, ZESTRIL) 10 mg tablet Comments:   Reason for Stopping:               Current Facility-Administered Medications   Medication Dose Route Frequency    apixaban (ELIQUIS) tablet 5 mg  5 mg Oral BID    metoprolol tartrate (LOPRESSOR) tablet 25 mg  25 mg Oral BID    TPN ADULT PERIKABIVEN W/ ADDITIVES   IntraVENous CONTINUOUS    potassium chloride (K-DUR, KLOR-CON M20) SR tablet 20 mEq  20 mEq Oral BID    sodium chloride (NS) flush 5-40 mL  5-40 mL IntraVENous Q8H    sodium chloride (NS) flush 5-40 mL  5-40 mL IntraVENous PRN    folic acid (FOLVITE) 1 mg in 0.9% sodium chloride 50 mL ivpb   IntraVENous DAILY    thiamine (B-1) 200 mg in 0.9% sodium chloride 50 mL IVPB  200 mg IntraVENous DAILY    naloxone (NARCAN) injection 0.4 mg  0.4 mg IntraVENous EVERY 2 MINUTES AS NEEDED    sodium chloride (NS) flush 5-10 mL  5-10 mL IntraVENous PRN    phenol throat spray (CHLORASEPTIC) 1 Spray  1 Spray Oral PRN    insulin lispro (HUMALOG) injection   SubCUTAneous Q6H    glucose chewable tablet 16 g  4 Tablet Oral PRN    glucagon (GLUCAGEN) injection 1 mg  1 mg IntraMUSCular PRN    dextrose 10% infusion 0-250 mL  0-250 mL IntraVENous PRN       Allergies: Patient has no known allergies. Family History   Problem Relation Age of Onset    OSTEOARTHRITIS Father     No Known Problems Mother      Social History     Socioeconomic History    Marital status: SINGLE     Spouse name: Not on file    Number of children: Not on file    Years of education: Not on file    Highest education level: Not on file   Occupational History    Not on file   Tobacco Use    Smoking status: Former     Types: Cigarettes     Quit date: 1980     Years since quittin.0    Smokeless tobacco: Never   Vaping Use    Vaping Use: Never used   Substance and Sexual Activity    Alcohol use: Yes     Comment: heavy    Drug use: No    Sexual activity: Not on file   Other Topics Concern    Not on file   Social History Narrative    Not on file     Social Determinants of Health     Financial Resource Strain: Low Risk     Difficulty of Paying Living Expenses: Not hard at all   Food Insecurity: No Food Insecurity    Worried About Running Out of Food in the Last Year: Never true    Ran Out of Food in the Last Year: Never true   Transportation Needs: Not on file   Physical Activity: Not on file   Stress: Not on file   Social Connections: Not on file   Intimate Partner Violence: Not on file   Housing Stability: Not on file     Social History     Tobacco Use   Smoking Status Former    Types: Cigarettes    Quit date: 1980    Years since quittin.0   Smokeless Tobacco Never        Temp (24hrs), Av.7 °F (36.5 °C), Min:97.2 °F (36.2 °C), Max:98 °F (36.7 °C)    Visit Vitals  /87 Comment: Repeat   Pulse 67   Temp 98 °F (36.7 °C)   Resp 14   Ht 5' 9\" (1.753 m)   Wt 79.4 kg (175 lb)   SpO2 99%   BMI 25.84 kg/m²       ROS: 12 point ROS obtained in details. Pertinent positives as mentioned in HPI,   otherwise negative    Physical Exam:    General: Well developed, well nourished male laying on the bed AAOx3 in no acute distress.     General:   awake alert and oriented HEENT:  Normocephalic, atraumatic,  EOMI, no scleral icterus or pallor; no conjunctival hemmohage;  nasal and oral mucous are moist and without evidence of lesions. Neck supple, no bruits. Lymph Nodes:   no cervical, axillary or inguinal adenopathy   Lungs:   non-labored, bilaterally clear to auscultation- no crackles wheezes rales or rhonchi   Heart:  RRR, s1 and s2; no rubs or gallops, no edema, + pedal pulses   Abdomen:  soft, non-distended, active bowel sounds, no hepatomegaly, no splenomegaly. + surgical changes on abdomen. + diffuse tenderness- no guarding   Genitourinary:  deferred   Extremities:   no clubbing, cyanosis; no joint effusions or swelling; Full ROM of all large joints to the upper and lower extremities; muscle mass appropriate for age   Neurologic:  No gross focal sensory abnormalities; 5/5 muscle strength to upper and lower extremities. Speech appropriate. Cranial nerves intact                        Skin:  No rash or ulcers noted   Back:  no spinal or paraspinal muscle tenderness or rigidity, no CVA tenderness     Psychiatric:  No suicidal or homicidal ideations, appropriate mood and affect         Labs: Results:   Chemistry Recent Labs     01/10/23  0527 01/09/23  1205 01/09/23 0427   * 185* 258*   * 130* 130*   K 3.4* 3.4* 3.5   CL 97* 93* 94*   CO2 29 29 29   BUN 9 9 7   CREA 0.71 0.77 0.70   CA 7.9* 8.0* 7.6*   AGAP 7 8 7   BUCR 13 12 10*   AP  --  55  --    TP  --  6.3*  --    ALB  --  2.1*  --    GLOB  --  4.2*  --    AGRAT  --  0.5*  --         CBC w/Diff Recent Labs     01/10/23  0527 01/09/23  1205 01/09/23  0427   WBC 8.3 10.6 8.3   RBC 3.86* 4.29* 3.84*   HGB 12.1* 13.4 12.3*   HCT 34.6* 38.4 34.9*    269 222   GRANS 77* 78* 77*   LYMPH 11* 12* 12*   EOS 2 1 2        Microbiology No results for input(s): CULT in the last 72 hours.          RADIOLOGY:    All available imaging studies/reports in [facility] for this admission were reviewed        Disclaimer: Sections of this note are dictated utilizing voice recognition software, which may have resulted in some phonetic based errors in grammar and contents. Even though attempts were made to correct all the mistakes, some may have been missed, and remained in the body of the document. If questions arise, please contact our department.     Dr. Leigha Chu, Infectious Disease Specialist  [phone]  January 10, 2023  6:40 AM

## 2023-01-10 NOTE — ROUTINE PROCESS
Wound Prevention Checklist    Patient: Joaquín Holcomb (48 y.o. male)  Date: 1/9/2023  Diagnosis: Perforated bowel ([facility] [address] [K63.1] Perforated bowel ([address]    Precautions:         []  Heel prevention boots placed on patient    []  Patient turned q2h during shift    []  Lift team ordered    [x]  Patient on [place] bed/Specialty bed    [x]  Each Wound is documented during shift (Stage, Color, drainage, odor, measurements, and dressings)  Patient has no noted pressure injuries. Patient has a midline incision with dressing present that is clean, dry and intact with no drainage present. Patient also has 2 GEO drains and an ileostomy present.      [x]  Dual skin check done with BAIRON Johnson RN

## 2023-01-10 NOTE — PROGRESS NOTES
Bedside shift change report given to BAIRON Eng (oncoming nurse) by Devin Rivas (offgoing nurse). Report included the following information SBAR, Kardex, Intake/Output, MAR, Recent Results, and Cardiac Rhythm Sinus Arrhythmia .      Wound Prevention Checklist    Patient: Fuentes Wise (37 y.o. male)  Date: 1/9/2023  Diagnosis: Perforated bowel ([address] [K63.1] Perforated bowel ([facility] [address]    Precautions:         []  Heel prevention boots placed on patient    [x]  Patient turned q2h during shift    []  Lift team ordered    []  Patient on [place] bed/Specialty bed    []  Each Wound is documented during shift (Stage, Color, drainage, odor, measurements, and dressings)    [x]  Dual skin check done with Mary Montes RN

## 2023-01-10 NOTE — PROGRESS NOTES
POD# 9 & 5    Admit Date: 1/1/2023    Assessment    Fuentes Wise is a 75 y.o. male POD 9 s/p exploratory laparotomy, right hemicolectomy, POD 5  diverting loop ileostomy    Patient Active Problem List   Diagnosis Code    Anxiety F41. 9    Benign essential hypertension I10    Perforated bowel (HCC) K63.1    Hypokalemia E87.6    Hyponatremia E87.1    Lactic acidosis E87.20    Leukocytosis D72.829    Hyperglycemia R73.9    Severe protein-calorie malnutrition (HCC) E43    Atrial flutter (HCC) I48.92       Plan  -regular diet, d/c TPN  -will d/c drains prior to discharge  -patient can be placed on oral antibiotics once recommendations given from ID  -awaiting cards recommendations- hopefully discharge in next 24-48 hours depending on cardiac status  -will replace potassium    Subjective    Overnight events: No acute events overnight. Raid response called yesterday for SVT. Patient asymptomatic. Feeling well. Awaiting final cards recommendations. Tolerating diet. Objective    Physical Exam:  Visit Vitals  BP (!) 144/81   Pulse 60   Temp 97.6 °F (36.4 °C)   Resp 17   Ht 5' 9\" (1.753 m)   Wt 79.4 kg (175 lb)   SpO2 97%   BMI 25.84 kg/m²       Intake/Output Summary (Last 24 hours) at 1/10/2023 1007  Last data filed at 1/10/2023 0921  Gross per 24 hour   Intake 360 ml   Output 2705 ml   Net -2345 ml     Physical Exam  Constitutional:       Appearance: Normal appearance. He is normal weight. Abdominal:      Comments: Ostomy pink with stool in bag  Midline incision clean    GEO drains minimal serous   Neurological:      Mental Status: He is alert.              Pranav Li DO  Phone: [phone]

## 2023-01-10 NOTE — ROUTINE PROCESS
1147: Rapid response called for RRT. Aptient sustained heart rates over 200 with stable blood pressure and SpO2. Patient was asymptomatic and denied reports of chest pain. , shortness of breath, headache, and sensory disturbances. Patient remained alert and oriented x 4.        1151: EKG performed. SVT noted. 1154: Patient blood glucose taken which was 179. Visit Vitals  /73 (BP 1 Location: Left upper arm)   Pulse 86   Temp 97.6 °F (36.4 °C)   Resp 16   SpO2 97%     Medications Administered During Rapid:  6mg Adenosine     12mg adenosine     Increased diltiazem drip to 7.5 mg/h  ad then to 10mg/h. Medication was discontinued once patient started on amiodarone and digoxin    250 ns bolus    Digoxin 500 mcg bolus    150 bolus amiodarone followed by drip and     1mg/min.       1256: RRT ended

## 2023-01-10 NOTE — PROGRESS NOTES
Bedside and Verbal shift change report given to Pari Murry (oncoming nurse) by Lashae Vick (offgoing nurse). Report included the following information SBAR, Intake/Output, and MAR.

## 2023-01-11 ENCOUNTER — HOME HEALTH ADMISSION (OUTPATIENT)
Dept: HOME HEALTH SERVICES | Facility: HOME HEALTH | Age: 68
End: 2023-01-11
Payer: MEDICARE

## 2023-01-11 ENCOUNTER — APPOINTMENT (OUTPATIENT)
Dept: NON INVASIVE DIAGNOSTICS | Age: 68
DRG: 853 | End: 2023-01-11
Attending: PHYSICIAN ASSISTANT
Payer: MEDICARE

## 2023-01-11 VITALS
TEMPERATURE: 98.2 F | BODY MASS INDEX: 25.92 KG/M2 | DIASTOLIC BLOOD PRESSURE: 76 MMHG | RESPIRATION RATE: 15 BRPM | HEART RATE: 80 BPM | SYSTOLIC BLOOD PRESSURE: 135 MMHG | HEIGHT: 69 IN | WEIGHT: 175 LBS | OXYGEN SATURATION: 90 %

## 2023-01-11 LAB
ANION GAP SERPL CALC-SCNC: 5 MMOL/L (ref 3–18)
BUN SERPL-MCNC: 8 MG/DL (ref 7–18)
BUN/CREAT SERPL: 11 (ref 12–20)
CALCIUM SERPL-MCNC: 7.8 MG/DL (ref 8.5–10.1)
CHLORIDE SERPL-SCNC: 96 MMOL/L (ref 100–111)
CO2 SERPL-SCNC: 30 MMOL/L (ref 21–32)
CREAT SERPL-MCNC: 0.74 MG/DL (ref 0.6–1.3)
GLUCOSE BLD STRIP.AUTO-MCNC: 131 MG/DL (ref 70–110)
GLUCOSE BLD STRIP.AUTO-MCNC: 162 MG/DL (ref 70–110)
GLUCOSE SERPL-MCNC: 143 MG/DL (ref 74–99)
MAGNESIUM SERPL-MCNC: 2.1 MG/DL (ref 1.6–2.6)
PHOSPHATE SERPL-MCNC: 2.7 MG/DL (ref 2.5–4.9)
POTASSIUM SERPL-SCNC: 3.6 MMOL/L (ref 3.5–5.5)
SODIUM SERPL-SCNC: 131 MMOL/L (ref 136–145)

## 2023-01-11 PROCEDURE — 93270 REMOTE 30 DAY ECG REV/REPORT: CPT

## 2023-01-11 PROCEDURE — 74011250637 HC RX REV CODE- 250/637: Performed by: INTERNAL MEDICINE

## 2023-01-11 PROCEDURE — 99232 SBSQ HOSP IP/OBS MODERATE 35: CPT | Performed by: INTERNAL MEDICINE

## 2023-01-11 PROCEDURE — 77030040162

## 2023-01-11 PROCEDURE — 82962 GLUCOSE BLOOD TEST: CPT

## 2023-01-11 PROCEDURE — 80048 BASIC METABOLIC PNL TOTAL CA: CPT

## 2023-01-11 PROCEDURE — 77030010520

## 2023-01-11 PROCEDURE — 77030013076 HC PCH OST BAG COLO -A

## 2023-01-11 PROCEDURE — 74011250636 HC RX REV CODE- 250/636: Performed by: SURGERY

## 2023-01-11 PROCEDURE — 2709999900 HC NON-CHARGEABLE SUPPLY

## 2023-01-11 PROCEDURE — 74011000250 HC RX REV CODE- 250: Performed by: SURGERY

## 2023-01-11 PROCEDURE — 36415 COLL VENOUS BLD VENIPUNCTURE: CPT

## 2023-01-11 PROCEDURE — 74011000258 HC RX REV CODE- 258: Performed by: SURGERY

## 2023-01-11 PROCEDURE — 77030015696

## 2023-01-11 PROCEDURE — 74011250637 HC RX REV CODE- 250/637: Performed by: PHYSICIAN ASSISTANT

## 2023-01-11 PROCEDURE — 84100 ASSAY OF PHOSPHORUS: CPT

## 2023-01-11 PROCEDURE — 83735 ASSAY OF MAGNESIUM: CPT

## 2023-01-11 PROCEDURE — 77030041076 HC DRSG AG OPTICELL MDII -A

## 2023-01-11 RX ORDER — METOPROLOL TARTRATE 25 MG/1
25 TABLET, FILM COATED ORAL 2 TIMES DAILY
Qty: 60 TABLET | Refills: 0 | Status: SHIPPED | OUTPATIENT
Start: 2023-01-11

## 2023-01-11 RX ORDER — HYDROCODONE BITARTRATE AND ACETAMINOPHEN 5; 325 MG/1; MG/1
1 TABLET ORAL
Qty: 15 TABLET | Refills: 0 | Status: SHIPPED | OUTPATIENT
Start: 2023-01-11 | End: 2023-01-14

## 2023-01-11 RX ORDER — CEFUROXIME AXETIL 500 MG/1
500 TABLET ORAL EVERY 12 HOURS
Qty: 6 TABLET | Refills: 0 | Status: SHIPPED | OUTPATIENT
Start: 2023-01-11 | End: 2023-01-14

## 2023-01-11 RX ORDER — POTASSIUM CHLORIDE 20 MEQ/1
20 TABLET, EXTENDED RELEASE ORAL 2 TIMES DAILY
Qty: 60 TABLET | Refills: 1 | Status: SHIPPED | OUTPATIENT
Start: 2023-01-11 | End: 2023-02-10

## 2023-01-11 RX ORDER — METRONIDAZOLE 500 MG/1
500 TABLET ORAL EVERY 12 HOURS
Qty: 6 TABLET | Refills: 0 | Status: SHIPPED | OUTPATIENT
Start: 2023-01-11 | End: 2023-01-14

## 2023-01-11 RX ADMIN — FOLIC ACID: 5 INJECTION, SOLUTION INTRAMUSCULAR; INTRAVENOUS; SUBCUTANEOUS at 09:39

## 2023-01-11 RX ADMIN — POTASSIUM CHLORIDE 20 MEQ: 1500 TABLET, EXTENDED RELEASE ORAL at 09:33

## 2023-01-11 RX ADMIN — CEFUROXIME AXETIL 500 MG: 250 TABLET, FILM COATED ORAL at 09:33

## 2023-01-11 RX ADMIN — APIXABAN 5 MG: 5 TABLET, FILM COATED ORAL at 09:33

## 2023-01-11 RX ADMIN — THIAMINE HYDROCHLORIDE 200 MG: 100 INJECTION, SOLUTION INTRAMUSCULAR; INTRAVENOUS at 09:41

## 2023-01-11 RX ADMIN — METRONIDAZOLE 500 MG: 500 TABLET ORAL at 09:33

## 2023-01-11 RX ADMIN — SODIUM CHLORIDE, PRESERVATIVE FREE 10 ML: 5 INJECTION INTRAVENOUS at 05:45

## 2023-01-11 RX ADMIN — METOPROLOL TARTRATE 25 MG: 25 TABLET, FILM COATED ORAL at 09:33

## 2023-01-11 NOTE — HOME CARE
Received home health referral for [facility] for (SN - ostomy care and education). Discharge noted for today. Spoke with patient's spouse via phone;  patient identifiers verified. Explained home health care services and routines. Demographics verified including insurance, phone and address confirmed. Patient has the following DME: has none at this time. Able to use spouse's cane if needed. Caregivers available:  lives with spouse as primary caregiver. Noted in chart, both patient and spouse have been educated for ostomy care. Orders noted and arranged to be processed by central intake.       ---  Adeel Wayne LPN  [facility] - INPATIENT Liaison

## 2023-01-11 NOTE — PROGRESS NOTES
Cardiology Progress Note    Admit Date: 1/1/2023  Attending Cardiologist: Dr. Tawny Foxper:     -Peritonitis due to cecal perforation s/p exploratory laparotomy, right hemicolectomy  -Paroxysmal Atrial flutter with 2-1 block, new dx this admission, in setting of above. Eliquis started this admission for [facility].   - SVT episodes, s/p adenosine, digoxin and IV amiodarone loading. Event monitor to be placed at discharge.     -Prolonged QTc of 516 ms which is likely secondary to Levaquin. Now discontinued. Improved. -HFpEF. LVEF normal by Echo this admission. Euvolemic.   -H/o bradycardia on Atenolol 100  mg BID.   -H/o etoh abuse. Primary Cardiologist Dr. Gary Farley. Plan:       I saw, evaluated, interviewed and examined the patient personally. Patient did not have any episode of atrial fibrillation or flutter yesterday. He has frequent PACs  No evidence of fluid overload on exam.  Currently on Lopressor low-dose and Eliquis  Event monitor to be placed upon discharge  Was discharged, follow-up with primary cardiologist Dr. Marty Kessler MD       Rhythm and rates stable overnight. Continue po lopressor 25 mg BID and Eliquis 5mg BID at discharge. Event monitor to be placed today. If any issues regarding event monitor, please call ext #[phone]. Tech notified to place event monitor today. Stable for discharge from a CV standpoint. Plan for follow up visit with Dr. Gary Farley in the office in 4-6 weeks. Will sign off and be available as needed. Subjective:     No new complaints. Denies dizziness or CP.      Objective:      Patient Vitals for the past 8 hrs:   Temp Pulse Resp BP SpO2   01/11/23 0817 98.2 °F (36.8 °C) 80 15 135/76 90 %   01/11/23 0403 98.7 °F (37.1 °C) 69 20 (!) 141/77 97 %           Patient Vitals for the past 96 hrs:   Weight   01/09/23 1933 79.4 kg (175 lb)   01/07/23 1430 84.8 kg (187 lb)         TELE: SR/PAF                Current Facility-Administered Medications Medication Dose Route Frequency Last Admin    apixaban (ELIQUIS) tablet 5 mg  5 mg Oral BID 5 mg at 01/11/23 0933    metoprolol tartrate (LOPRESSOR) tablet 25 mg  25 mg Oral BID 25 mg at 01/11/23 0933    cefUROXime (CEFTIN) tablet 500 mg  500 mg Oral Q12H 500 mg at 01/11/23 0933    metroNIDAZOLE (FLAGYL) tablet 500 mg  500 mg Oral Q12H 500 mg at 01/11/23 0933    potassium chloride (K-DUR, KLOR-CON M20) SR tablet 20 mEq  20 mEq Oral BID 20 mEq at 01/11/23 0933    sodium chloride (NS) flush 5-40 mL  5-40 mL IntraVENous Q8H 10 mL at 01/11/23 0545    sodium chloride (NS) flush 5-40 mL  5-40 mL IntraVENous PRN      folic acid (FOLVITE) 1 mg in 0.9% sodium chloride 50 mL ivpb   IntraVENous DAILY New Bag at 01/11/23 0939    thiamine (B-1) 200 mg in 0.9% sodium chloride 50 mL IVPB  200 mg IntraVENous DAILY 200 mg at 01/11/23 0941    naloxone (NARCAN) injection 0.4 mg  0.4 mg IntraVENous EVERY 2 MINUTES AS NEEDED      sodium chloride (NS) flush 5-10 mL  5-10 mL IntraVENous PRN 10 mL at 01/04/23 0708    phenol throat spray (CHLORASEPTIC) 1 Spray  1 Spray Oral PRN 1 Spray at 01/02/23 0622    insulin lispro (HUMALOG) injection   SubCUTAneous Q6H 4 Units at 01/10/23 1800    glucose chewable tablet 16 g  4 Tablet Oral PRN      glucagon (GLUCAGEN) injection 1 mg  1 mg IntraMUSCular PRN      dextrose 10% infusion 0-250 mL  0-250 mL IntraVENous PRN           Intake/Output Summary (Last 24 hours) at 1/11/2023 1048  Last data filed at 1/11/2023 1044  Gross per 24 hour   Intake 1502 ml   Output 2270 ml   Net -768 ml         Physical Exam:  General:  alert, cooperative, no distress, appears stated age  Neck:  no JVD  Lungs:  clear to auscultation bilaterally  Heart:  RRR  Abdomen:  soft, bandage covering incision   Extremities:  extremities normal, atraumatic, no cyanosis or edema    Visit Vitals  /76   Pulse 80   Temp 98.2 °F (36.8 °C)   Resp 15   Ht 5' 9\" (1.753 m)   Wt 79.4 kg (175 lb)   SpO2 90%   BMI 25.84 kg/m²       Data Review:     Labs: Results:       Chemistry Recent Labs     01/11/23  0452 01/10/23  0527 01/09/23  1205 01/09/23  0427   * 177* 185* 258*   * 133* 130* 130*   K 3.6 3.4* 3.4* 3.5   CL 96* 97* 93* 94*   CO2 30 29 29 29   BUN 8 9 9 7   CREA 0.74 0.71 0.77 0.70   CA 7.8* 7.9* 8.0* 7.6*   MG 2.1 2.1 1.8 2.3   PHOS 2.7 2.6  --  2.5   AGAP 5 7 8 7   BUCR 11* 13 12 10*   AP  --   --  55  --    TP  --   --  6.3*  --    ALB  --   --  2.1*  --    GLOB  --   --  4.2*  --    AGRAT  --   --  0.5*  --         CBC w/Diff Recent Labs     01/10/23  0527 01/09/23  1205 01/09/23  0427   WBC 8.3 10.6 8.3   RBC 3.86* 4.29* 3.84*   HGB 12.1* 13.4 12.3*   HCT 34.6* 38.4 34.9*    269 222   GRANS 77* 78* 77*   LYMPH 11* 12* 12*   EOS 2 1 2        Cardiac Enzymes No results found for: CPK, CK, CKMMB, CKMB, RCK3, CKMBT, CKNDX, CKND1, AMY, TROPT, TROIQ, NITHIN, TROPT, TNIPOC, BNP, BNPP   Coagulation No results for input(s): PTP, INR, APTT, INREXT, INREXT in the last 72 hours.     Lipid Panel Lab Results   Component Value Date/Time    Cholesterol, total 183 07/06/2022 10:33 AM    HDL Cholesterol 50 07/06/2022 10:33 AM    LDL, calculated 87.2 07/06/2022 10:33 AM    VLDL, calculated 45.8 07/06/2022 10:33 AM    Triglyceride 113 01/07/2023 07:10 AM    CHOL/HDL Ratio 3.7 07/06/2022 10:33 AM      BNP No results found for: BNP, BNPP, XBNPT   Liver Enzymes Recent Labs     01/09/23  1205   TP 6.3*   ALB 2.1*   AP 55        Thyroid Studies Lab Results   Component Value Date/Time    TSH 3.17 01/04/2023 04:53 AM          Signed By: Karri Loomis PA-C     January 11, 2023

## 2023-01-11 NOTE — PROGRESS NOTES
POD# 10&6    Admit Date: 1/1/2023    Assessment    Evelena Kidney is a 75 y.o. male POD 10 s/p exploratory laparotomy, right hemicolectomy, POD 6  diverting loop ileostomy    Patient Active Problem List   Diagnosis Code    Anxiety F41. 9    Benign essential hypertension I10    Perforated bowel (HCC) K63.1    Hypokalemia E87.6    Hyponatremia E87.1    Lactic acidosis E87.20    Leukocytosis D72.829    Hyperglycemia R73.9    Severe protein-calorie malnutrition (HCC) E43    Atrial flutter (HCC) I48.92       Plan  -regular diet  -oral antibiotics  -eliquis per cards  -stable for discharge today with outpatient follow up in 2 weeks    Subjective    Overnight events: No acute events overnight. Feeling well without complaints    Objective    Physical Exam:   Visit Vitals  BP (!) 141/77 (BP 1 Location: Left lower arm, BP Patient Position: Semi fowlers)   Pulse 69   Temp 98.7 °F (37.1 °C)   Resp 20   Ht 5' 9\" (1.753 m)   Wt 79.4 kg (175 lb)   SpO2 97%   BMI 25.84 kg/m²       Intake/Output Summary (Last 24 hours) at 1/11/2023 0804  Last data filed at 1/11/2023 0403  Gross per 24 hour   Intake 1622 ml   Output 2120 ml   Net -498 ml     Physical Exam  Constitutional:       Appearance: Normal appearance. He is normal weight. Abdominal:      Comments: Ostomy with stool in bag, GEO drains serous   Neurological:      Mental Status: He is alert.              Nat Doss DO  Phone: [phone]

## 2023-01-11 NOTE — PROGRESS NOTES
Bedside and Verbal shift change report given to MAHSA Fisher (oncoming nurse) by Shavon Stevenson RN (offgoing nurse).  Report included the following information SBAR, Kardex, Intake/Output, MAR, and Cardiac Rhythm NSR    Wound Prevention Checklist    Patient: Selena Dsouza [de-identified]75 y.o. male)  Date: 1/11/2023  Diagnosis: Perforated bowel ([address] [K63.1] Perforated bowel ([address]    Precautions:         []  Heel prevention boots placed on patient    []  Patient turned q2h during shift    []  Lift team ordered    [x]  Patient on [place] bed/Specialty bed    []  Each Wound is documented during shift (Stage, Color, drainage, odor, measurements, and dressings)    [x]  Dual skin check done with     Rosendo Paulino RN

## 2023-01-11 NOTE — PROGRESS NOTES
1316: Patient left floor with BAIRON Bonilla in wheelchair. Safely transported to automobile. Discharge paperwork discussed with patient, understanding verified. All belongings given to patient.

## 2023-01-11 NOTE — WOUND CARE
Physical Exam  Ostomy Follow Up     Date: 1/11/23  Room: 2315    Subjective: patient in good spirits, excited to go home. He has no complaints other than soreness around his abdomen and buttocks, \"due to sitting on it too long\". Assessment: drain tubes removed via surgeon this am, both sites covered with silicone dressings. Midline abd incision dressing c/d/I, however, this will be redressed after stoma care provided, as dressing must be removed in order to provide stoma care. Ostomy  Date of Surgery: 1/5/23  Ostomy type: ileostomy  Ostomy: Pink, Moist, and Budded Loop ileostomy,  Right lower quadrant  Output: Loose/Soft and Brown moderate amount  stool has begun to thicken to very thin pudding consistency. Teaching: Reviewed steps for providing ostomy care, patient seems to be able to recall steps, but has some confusion. He is inst that wafer/pouch changes can be done every 3-5 days, wear time is no more than 5 days. He is also provided demonstration and verbal instruction for ostomy care, while replacing entire system. Once this is completed, dressing replaced to midline incision (opticell remained in place). He is then assisted to sitting position and transferred to standard chair, in order to practice emptying his pouch into toilet. He verbalizes understanding and feels capable of return demonstration. Stressed the importance of hydration again. Plan: home with home health. Return to Ostomy Department on 1/25/23 at 1:00pm, check-in at main hospital entrance.        Rob MEJIAN, RN, [facility], [address] Wound Care Dept.  [phone]

## 2023-01-11 NOTE — PROGRESS NOTES
Bedside and verbal shift report given to BAIRON Denson (oncoming nurse) by Amparo Thomson RN (off going nurse). Report included the following information SBAR, Intake/Output, Mar and Cardiac rhythm. 1682: Patient medicated per mar.

## 2023-01-11 NOTE — PROGRESS NOTES
Discharge order noted for today. FOC previously signed for [facility]. Referral called to [facility] at [facility] and placed via [address]. No additional transition of care needs identified.     Henry Rojas, MSN, RN, [address], [facility]  Care Management  [phone]

## 2023-01-11 NOTE — DISCHARGE SUMMARY
Discharge Summary     Patient: Fuentes Wise MRN: [id number]  SSN: [id number]    YOB: 1955  Age: 75 y.o. Sex: male       Admit Date: 1/1/2023    Discharge Date: 1/11/2023      Admission Diagnoses: Perforated bowel ([facility] [K63.1]    Discharge Diagnoses:   Problem List as of 1/11/2023 Date Reviewed: 1/6/2021            Codes Class Noted - Resolved    Atrial flutter ([facility] ICD-10-CM: I48.92  ICD-9-CM: 427.32  1/3/2023 - Present        Severe protein-calorie malnutrition ([facility] ICD-10-CM: E43  ICD-9-CM: 262  1/2/2023 - Present        * (Principal) Perforated bowel ([facility] ICD-10-CM: K63.1  ICD-9-CM: 569.83  1/1/2023 - Present        Hypokalemia ICD-10-CM: E87.6  ICD-9-CM: 276.8  1/1/2023 - Present        Hyponatremia ICD-10-CM: E87.1  ICD-9-CM: 276.1  1/1/2023 - Present        Lactic acidosis ICD-10-CM: E87.20  ICD-9-CM: 276.2  1/1/2023 - Present        Leukocytosis ICD-10-CM: D72.829  ICD-9-CM: 288.60  1/1/2023 - Present        Hyperglycemia ICD-10-CM: R73.9  ICD-9-CM: 790.29  1/1/2023 - Present        Anxiety ICD-10-CM: F41.9  ICD-9-CM: 300.00  Unknown - Present        Benign essential hypertension ICD-10-CM: I10  ICD-9-CM: 401.1  Unknown - Present            Discharge Condition: Stable    Operative Procedure: 1/1/2023 exploratory laparotomy right hemicolectomy; 1/5/2023 Diverting loop ileostomy    HPI: Mr. Rylee Ruiz is a 75y.o. year old male who presented with severe abdominal pain for the last 3 weeks increasing in intensity. He does have a history of alcohol abuse approximately 6 beers a day but states he quit for the last 1.5 months completely upon the advise from his primary care doctor. He has noticed pain mostly on the right side of his abdomen and change in caliber stool this week after taking laxative. No blood. He has never undergone colonoscopy but states he did a stool test several months ago and did not hear back about results. Records reviewed from 10/2022 reports negative Cologuard. No fevers or chills. He has not had a bowel movement in the last week. He has no appetite and has a sensation of stool like vomit in his mouth. Pain increasing with any oral intake. There is no family history of colon cancer. He has never undergone abdominal surgery. Consults:  Hospitalist, Cardiology, Infectious disease    Hospital Course: On hospital day 1 patient underwent surgery with no known immediate complications. He was massively fluid resuscitated for sepsis. Hospitalist was consulted for medical management. Infectious disease was also consulted for sepsis and gross feculent peritonitis. Postoperatively patient was doing well no complaints but did have rapid response secondary to development of prolonged QT and a flutter which he was asymptomatic from. Prolonged QT was thought to be secondary to Levaquin which was discontinued and this improved. Cardiology was consulted for management and he was placed on cardiac telemetry. Patient was initiated on TPN and remained n.p.o. during this time. There was concern for distal obstruction and CT scan with rectal contrast was obtained confirming an obstructing mass in the distal descending colon which was the cause of the cecal perforation. He was taken back to the operating room on postoperative day 4 after resuscitation, IV antibiotics and underwent a diverting loop ileostomy. He tolerated this well. Postoperatively pain remained well controlled and NG tube was clamped when his ileostomy began having function and he was started on a diet. His diet was advanced as tolerated and TPN was eventually discontinued. He did have 2 subsequent rapid response for SVT and aflutter that they were able to break with medication. He was then placed on Eliquis and oral beta-blockers by cardiology which he tolerated well with no subsequent episodes. Wound care was established for home and ostomy teaching was provided to the patient.   Held comfortable for discharge and instructions were reviewed with the patient. Physical Exam on Discharge:  Visit Vitals  /76   Pulse 80   Temp 98.2 °F (36.8 °C)   Resp 15   Ht 5' 9\" (1.753 m)   Wt 79.4 kg (175 lb)   SpO2 90%   BMI 25.84 kg/m²     Physical Exam  Constitutional:       Appearance: Normal appearance. He is normal weight. HENT:      Head: Normocephalic and atraumatic. Eyes:      Extraocular Movements: Extraocular movements intact. Conjunctiva/sclera: Conjunctivae normal.      Pupils: Pupils are equal, round, and reactive to light. Cardiovascular:      Rate and Rhythm: Normal rate. Pulmonary:      Effort: Pulmonary effort is normal.   Abdominal:      Palpations: Abdomen is soft. Tenderness: There is no abdominal tenderness. There is no guarding. Comments: Ostomy pink with brown stool, midline would clean    Skin:     General: Skin is warm and dry. Neurological:      General: No focal deficit present. Mental Status: He is alert and oriented to person, place, and time. Mental status is at baseline. Psychiatric:         Mood and Affect: Mood normal.         Behavior: Behavior normal.         Thought Content:  Thought content normal.         Judgment: Judgment normal.       Labs:   Recent Results (from the past 24 hour(s))   GLUCOSE, POC    Collection Time: 01/10/23 11:10 AM   Result Value Ref Range    Glucose (POC) 194 (H) 70 - 110 mg/dL   GLUCOSE, POC    Collection Time: 01/10/23  5:37 PM   Result Value Ref Range    Glucose (POC) 242 (H) 70 - 110 mg/dL   GLUCOSE, POC    Collection Time: 01/10/23 10:54 PM   Result Value Ref Range    Glucose (POC) 138 (H) 70 - 297 mg/dL   METABOLIC PANEL, BASIC    Collection Time: 01/11/23  4:52 AM   Result Value Ref Range    Sodium 131 (L) 136 - 145 mmol/L    Potassium 3.6 3.5 - 5.5 mmol/L    Chloride 96 (L) 100 - 111 mmol/L    CO2 30 21 - 32 mmol/L    Anion gap 5 3.0 - 18 mmol/L    Glucose 143 (H) 74 - 99 mg/dL    BUN 8 7.0 - 18 MG/DL    Creatinine 0.74 0.6 - 1.3 MG/DL    BUN/Creatinine ratio 11 (L) 12 - 20      eGFR >60 >60 ml/min/1.73m2    Calcium 7.8 (L) 8.5 - 10.1 MG/DL   MAGNESIUM    Collection Time: 01/11/23  4:52 AM   Result Value Ref Range    Magnesium 2.1 1.6 - 2.6 mg/dL   PHOSPHORUS    Collection Time: 01/11/23  4:52 AM   Result Value Ref Range    Phosphorus 2.7 2.5 - 4.9 MG/DL   GLUCOSE, POC    Collection Time: 01/11/23  5:44 AM   Result Value Ref Range    Glucose (POC) 131 (H) 70 - 110 mg/dL        Significant Diagnostic Studies:   XR ABD (KUB)  Narrative: EXAM: XR ABD (KUB)    INDICATION: Tachycardia, rapid response team was called, postop day one colon  resection    COMPARISON: Chest radiograph 1/1/2023. FINDINGS: Supine views of the abdomen    Enteric tube tip overlies the distal stomach. The stomach is under distended. Multiple air-filled dilated small bowel loops in the abdomen. Drainage catheters  overlies the right abdomen and the midpelvis. Impression: Multiple dilated small bowel loops likely postoperative ileus. Disposition: stable for discharge    PCP: Emily Aldrich NP    Discharge Medications:   Current Discharge Medication List        START taking these medications    Details   metroNIDAZOLE (FLAGYL) 500 mg tablet Take 1 Tablet by mouth every twelve (12) hours for 3 days. Qty: 6 Tablet, Refills: 0      metoprolol tartrate (LOPRESSOR) 25 mg tablet Take 1 Tablet by mouth two (2) times a day. Qty: 60 Tablet, Refills: 0      cefUROXime (CEFTIN) 500 mg tablet Take 1 Tablet by mouth every twelve (12) hours for 3 days. Qty: 6 Tablet, Refills: 0      apixaban (ELIQUIS) 5 mg tablet Take 1 Tablet by mouth two (2) times a day for 30 days. Qty: 60 Tablet, Refills: 1      HYDROcodone-acetaminophen (NORCO) 5-325 mg per tablet Take 1 Tablet by mouth every six (6) hours as needed for Pain for up to 3 days. Max Daily Amount: 4 Tablets. Qty: 15 Tablet, Refills: 0    Associated Diagnoses:  Bowel perforation ([address]           CONTINUE these medications which have CHANGED    Details   potassium chloride (K-DUR, KLOR-CON M20) 20 mEq tablet Take 1 Tablet by mouth two (2) times a day for 30 days. Qty: 60 Tablet, Refills: 1           CONTINUE these medications which have NOT CHANGED    Details   atorvastatin (LIPITOR) 10 mg tablet Take 1 tablet by mouth daily  Qty: 90 Tablet, Refills: 3    Associated Diagnoses: Type 2 diabetes mellitus without complication, without long-term current use of insulin (HCC)      clobetasoL (TEMOVATE) 0.05 % ointment Apply  to affected area two (2) times a day. For psoriasis  Qty: 15 g, Refills: 5    Associated Diagnoses: Psoriasis, unspecified           STOP taking these medications       atenoloL-chlorthalidone (TENORETIC) 50-25 mg per tablet Comments:   Reason for Stopping:         lisinopriL (PRINIVIL, ZESTRIL) 10 mg tablet Comments:   Reason for Stopping:         amLODIPine (NORVASC) 10 mg tablet Comments:   Reason for Stopping:               Post Operative Discharge Instructions    No driving for 24 hours after surgery and off of prescription pain medication. Avoid activities that bump or cause jarring movements at the surgical site for 10 days. No lifting more than 10-15 pounds for 6 weeks after surgery or until cleared for activity at your follow up. Walking is encouraged after surgery. Stairs are ok to climb. DIET:    Diet as tolerated. Start with liquids then advance your diet based on how you fell. No alcoholic beverages for 24 hours after surgery or while on antibiotics or pain mdications. Drink plenty of water. MEDICATIONS:    Use daily stool softners (over the counter such as Colace or Senekot) while on pain medications. Resume pre-operative medications. If you are on any blood thinners see special instructions below. Use prescriptions given or Tylenol, Ibuprofen as needed for pain. Do not use more than 4000mg of Tylenol (acetaminophen) per day.  Be aware this may be  in your prescription medication as well. Be aware narcotic prescriptions are tightly controlled in the state of [place]. If requiring more than one refill, a follow up appointment will be required. WOUND CARE:    Home health for midline wound is set up and ostomy care    Do not tub bathe, swim, or soak incisions until cleared to do so at your follow up. Ice bag to the affected area; 20 minutes on and 20 minutes off if desired. FOLLOW UP CARE:    You should have an appointment scheduled within 14 days after surgery. If this is not yet scheduled, call the office. CALL DOCTOR IF:  Temperature is over 101 degrees, a slight fever can be normal 24-48 hour after surgery. Nausea & vomiting that persists more than 24 hours after surgery. Your wound appears very red, hot, painful or swollen. Excessive bleeding occurs form the incision. **Between hours 9-5 M-F please call the office number listed with questions [phone], cell phone [phone]         Follow-up Appointments   Procedures    FOLLOW UP VISIT Appointment in: Two Weeks Follow up 2 weeks with Dr. Daphnie Santo Follow up 4 weeks with cardiology     Follow up 2 weeks with Dr. Daphnie Santo  Follow up 4 weeks with cardiology     Standing Status:   Standing     Number of Occurrences:   1     Order Specific Question:   Appointment in     Answer:    Two Weeks       Signed By: Jose Maria Wolf DO     January 11, 2023

## 2023-01-11 NOTE — PROGRESS NOTES
Bedside and Verbal shift change report given to Prasad Hernandez RN (oncoming nurse) by Kvng Eldridge RN (offgoing nurse). Report included the following information SBAR, Kardex, Intake/Output, and Cardiac Rhythm NSR .

## 2023-01-11 NOTE — PROGRESS NOTES
Bedside and Verbal shift change report given to MAHSA Guthrie (oncoming nurse) by Jose Talbert RN (offgoing nurse). Report included the following information SBAR, Kardex, Procedure Summary, Intake/Output, MAR, and Cardiac Rhythm NSR .

## 2023-01-12 ENCOUNTER — PATIENT OUTREACH (OUTPATIENT)
Dept: CASE MANAGEMENT | Age: 68
End: 2023-01-12

## 2023-01-12 LAB
BACTERIA SPEC CULT: NORMAL
SERVICE CMNT-IMP: NORMAL

## 2023-01-12 NOTE — PROGRESS NOTES
Care Transitions Initial Call    Call within 2 business days of discharge: yes     Patient: Freddy Lozano Patient : 1955 MRN: [id number]    Last Discharge [address]       Date Complaint Diagnosis Description Type Department Provider    23 Abdominal Pain Bowel perforation ([facility] . .. ED to Hosp-Admission (Discharged) (ADMIT) [id number] Marylou Cevallos, DO; Telc. .. Was this an external facility discharge? No   Discharge Facility: [facility]     Challenges to be reviewed by the provider   Additional needs identified to be addressed with provider: no           Method of communication with provider : none    Care Transition Nurse (CTN) contacted the patient by telephone to perform post hospital discharge assessment. Verified name and  with patient as identifiers. Provided introduction to self, and explanation of the CTN role. Patient reports:   - He is doing pretty good. CTN reviewed  COVID-19 related testing which was available at this time. Test results were negative. Patient informed of results, if available? no as results were not pending at time of hospital discharge. Advance Care Planning:   Does patient have an Advance Directive:   Not noted to be on file at this time. Inpatient Readmission Risk score: Unplanned Readmit Risk Score: 10.1    Was this a readmission? no   Patient stated reason for the admission: n/a     Patients top risk factors for readmission:   Medical Condition  - Please see discharge summary or electronic medical record (EMR) for additional information as needed. Interventions to address risk factors:   Obtained and reviewed discharge summary and/or continuity of care documents      CTN reviewed discharge instructions, medical action plan and red flags with patient who verbalized understanding. Were discharge instructions available to patient? yes.      Reviewed appropriate site of care based on symptoms and resources available to patient including: PCP, After hours contact number-provider office phone number , Home Health, and When to call 911. Patient given an opportunity to ask questions and does not have any further questions or concerns at this time. The patient agrees to contact the PCP office for questions related to their healthcare. Medication reconciliation was attempted with patient, who verbalizes understanding of administration of home medications. Patient declined review of medications at this time. Patient is agreeable with CTN calling back at another time or day for review. Referral to Pharm D needed: no, not at this time     Home Health/Outpatient orders at discharge:  yes  [address]: [facility]   Date of initial visit: To be scheduled, per chart review     Durable Medical Equipment ordered at discharge: None noted at this time   [facility]:   [facility] received:     Was patient discharged with a pulse oximeter? No, not noted at this time     Is follow up appointment scheduled within 7 days of discharge? no.    Discussed follow-up appointments. If no appointment was previously scheduled, appointment scheduling:  request submitted. [facility] [place] [address] follow up appointment(s):   Future Appointments   Date Time Provider Ravi Whitten   1/25/2023  9:45 AM Darnell Alvarez DO [facility] BS AMB   2/7/2023  8:30 AM Gail Guzman MD [facility] BS AMB   3/7/2023  2:00 PM Belen Landis NP [facility] BS AMB     Non-BS/Specialty  follow up appointment(s):   None noted at this time     Plan for follow-up call in 1-2 days/as needed  based on severity of symptoms and risk factors. Plan for next call: Follow up with medication review. CTN provided contact information for future needs. Goals Addressed                   This Visit's Progress     Attends follow-up appointments as directed. Prevent complications post hospitalization.         Supportive resources in place to maintain patient in the community (ie. Home Health, DME equipment, refer to, medication assistant plan, etc.)        Understands red flags post discharge.

## 2023-01-13 ENCOUNTER — PATIENT OUTREACH (OUTPATIENT)
Dept: CASE MANAGEMENT | Age: 68
End: 2023-01-13

## 2023-01-13 ENCOUNTER — HOME CARE VISIT (OUTPATIENT)
Dept: SCHEDULING | Facility: HOME HEALTH | Age: 68
End: 2023-01-13
Payer: MEDICARE

## 2023-01-13 ENCOUNTER — HOME CARE VISIT (OUTPATIENT)
Dept: HOME HEALTH SERVICES | Facility: HOME HEALTH | Age: 68
End: 2023-01-13

## 2023-01-13 VITALS
DIASTOLIC BLOOD PRESSURE: 82 MMHG | SYSTOLIC BLOOD PRESSURE: 150 MMHG | HEART RATE: 68 BPM | RESPIRATION RATE: 18 BRPM | OXYGEN SATURATION: 97 % | TEMPERATURE: 98.2 F

## 2023-01-13 PROCEDURE — 400018 HH-NO PAY CLAIM PROCEDURE

## 2023-01-13 PROCEDURE — G0299 HHS/HOSPICE OF RN EA 15 MIN: HCPCS

## 2023-01-13 NOTE — Clinical Note
75y.o. year old male who presented to ED on 1/1/23 with severe abdominal pain for the last 3 weeks increasing in intensity. Per notes, \"He does have a history of alcohol abuse approximately 6 beers a day but states he quit for the last 1.5 months completely upon the advise from his primary care doctor. He has noticed pain mostly on the right side of his abdomen and change in caliber stool this week after taking laxative. He has not had a bowel movement in the last week. He has no appetite and has a sensation of stool like vomit in his mouth. Pain increasing with any oral intake. There is no family history of colon cancer. He has never undergone abdominal surgery. On hospital day 1 patient underwent surgery with no known immediate complications. He was massively fluid resuscitated for sepsis. Hospitalist was consulted for medical management. Infectious disease was also consulted for sepsis and gross feculent peritonitis. Postoperatively patient was doing well no complaints but did have rapid response secondary to development of prolonged QT and a flutter which he was asymptomatic from. Prolonged QT was thought to be secondary to Levaquin which was discontinued and this improved. Cardiology was consulted for management and he was placed on cardiac telemetry. Patient was initiated on TPN and remained n.p.o. during this time. There was concern for distal obstruction and CT scan with rectal contrast was obtained confirming an obstructing mass in the distal descending colon which was the cause of the cecal perforation. He was taken back to the operating room on postoperative day 4 after resuscitation, IV antibiotics and underwent a diverting loop ileostomy. Postoperatively pain remained well controlled and NG tube was clamped when his ileostomy began having function and he was started on a diet. His diet was advanced as tolerated and TPN was eventually discontinued.   He did have 2 subsequent rapid response for SVT and aflutter that they were able to break with medication. He was then placed on Eliquis and oral beta-blockers by cardiology which he tolerated well with no subsequent episodes. \" Patient discharged home on 1/11/23. Home health admission completed on 1/13/23 and papwerwork signed. Patient is in bed with his wife at nursing arrival. Patient's step-daughter at bedside also. Patient is alert and oriented x 4, pleasant and cooperative. Respiratory rate regular, lungs clear in all fields, no cough present. Heart rate irregular, denies any chest pain or heart palpitations. Patient educated on s/s of aflutter/afib, no edema present. Patient has abdominal pain and is taking pain meds as directed. RLQ ileostomy present with dark brown and yellow liquid stool. Ostomy changed and tolerated mediocre due to pain, 2 different brand of supplies ordered for patient to try. Patient is to change ostomy every 3-5days. Sn educated patient and wife on how to change but due to being new will need multiple visits for education and assist. Patient had stoma paste under the wafer that was very difficult to get off and may affect reapplication. Bowel sounds present in all 4 quads and abdomen non-tender. Patient denies any nausea or vomiting and s/s of UTI. Mid abdomen wound care completed and tolerated well. Left side abdomen has 2 incision sites that are open to air, no drainage, no redness, no warmth. Pics taken, uploaded and measurements documented under wound assessment. SN offered patient PT and OT services but declined at this time. Med rec performed. Patient on norco for the pain, Take norco with food. Swallow the capsule or tablet whole to avoid exposure to a potentially fatal overdose. Do not crush, chew, break, open, or dissolve. Monitor for respiratory depression and only take as directed by your dr. Side effects include Nausea, vomiting, constipation, lightheadedness, dizziness, or drowsiness may occur.  Do not drink alcohol with this medication, do not operate heavy machinery. Patient is on apixaban and educated that for apixaban - Take it at the same time each day. Take it with a full glass of water. You can take it  with or without food. Use a pillbox to help keep track of your doses. If you miss a dose. Never take a double dose. Increased risk of bleeding. Increased bruising, monitor stool and urine for any blood. Patient is on 2 antibiotics and educated on foods with probiotics to help with gut health and prevent diarrhea. Patient reminded to drink enough fluids and eat a cardiac diet that is ostomy friendly, to include no gassy foods.

## 2023-01-13 NOTE — PROGRESS NOTES
Transition of Care Follow Up Call     Care Transitions Nurse ( CTN) spoke with patient via telephone call. Patient verified name and date of birth with patient as identifiers. Patient reports that home health staff have  been out to visit with him. Medication review discussed with patient. Patient gave the phone to another individual  to review medications with CTN. Review of medications completed. Patient has no needs, concerns, or questions at this time.

## 2023-01-15 NOTE — HOME HEALTH
Skilled services/Home bound verification:     Skilled Reason for admission/summary of clinical condition:  s/p laparotomy, diverting loop ileostomy, large bowel obstruction. 75y.o. year old male who presented to ED on 1/1/23 with severe abdominal pain for the last 3 weeks increasing in intensity. Per notes, \"He does have a history of alcohol abuse approximately 6 beers a day but states he quit for the last 1.5 months completely upon the advise from his primary care doctor. He has noticed pain mostly on the right side of his abdomen and change in caliber stool this week after taking laxative. He has not had a bowel movement in the last week. He has no appetite and has a sensation of stool like vomit in his mouth. Pain increasing with any oral intake. There is no family history of colon cancer. He has never undergone abdominal surgery. On hospital day 1 patient underwent surgery with no known immediate complications. He was massively fluid resuscitated for sepsis. Hospitalist was consulted for medical management. Infectious disease was also consulted for sepsis and gross feculent peritonitis. Postoperatively patient was doing well no complaints but did have rapid response secondary to development of prolonged QT and a flutter which he was asymptomatic from. Prolonged QT was thought to be secondary to Levaquin which was discontinued and this improved. Cardiology was consulted for management and he was placed on cardiac telemetry. Patient was initiated on TPN and remained n.p.o. during this time. There was concern for distal obstruction and CT scan with rectal contrast was obtained confirming an obstructing mass in the distal descending colon which was the cause of the cecal perforation. He was taken back to the operating room on postoperative day 4 after resuscitation, IV antibiotics and underwent a diverting loop ileostomy.  Postoperatively pain remained well controlled and NG tube was clamped when his ileostomy began having function and he was started on a diet. His diet was advanced as tolerated and TPN was eventually discontinued. He did have 2 subsequent rapid response for SVT and aflutter that they were able to break with medication. He was then placed on Eliquis and oral beta-blockers by cardiology which he tolerated well with no subsequent episodes. \" Patient discharged home on 1/11/23. Home health admission completed on 1/13/23 and papwerwork signed. Patient is in bed with his wife at nursing arrival. Patient's step-daughter at bedside also. Patient is alert and oriented x 4, pleasant and cooperative. Respiratory rate regular, lungs clear in all fields, no cough present. Heart rate irregular, denies any chest pain or heart palpitations. Patient educated on s/s of aflutter/afib, no edema present. Patient has abdominal pain and is taking pain meds as directed. RLQ ileostomy present with dark brown and yellow liquid stool. Ostomy changed and tolerated mediocre due to pain, 2 different brand of supplies ordered for patient to try. Patient is to change ostomy every 3-5days. Sn educated patient and wife on how to change but due to being new will need multiple visits for education and assist. Patient had stoma paste under the wafer that was very difficult to get off and may affect reapplication. Bowel sounds present in all 4 quads and abdomen non-tender. Patient denies any nausea or vomiting and s/s of UTI. Mid abdomen wound care completed and tolerated well. Left side abdomen has 2 incision sites that are open to air, no drainage, no redness, no warmth. Pics taken, uploaded and measurements documented under wound assessment. SN offered patient PT and OT services but declined at this time. Med rec performed. Patient on norco for the pain, Take norco with food. Swallow the capsule or tablet whole to avoid exposure to a potentially fatal overdose. Do not crush, chew, break, open, or dissolve.  Monitor for respiratory depression and only take as directed by your dr. Side effects include Nausea, vomiting, constipation, lightheadedness, dizziness, or drowsiness may occur. Do not drink alcohol with this medication, do not operate heavy machinery. Patient is on apixaban and educated that for apixaban - Take it at the same time each day. Take it with a full glass of water. You can take it with or without food. Use a pillbox to help keep track of your doses. If you miss a dose. Never take a double dose. Increased risk of bleeding. Increased bruising, monitor stool and urine for any blood. Patient is on 2 antibiotics and educated on foods with probiotics to help with gut health and prevent diarrhea. Patient reminded to drink enough fluids and eat a cardiac diet that is ostomy friendly, to include no gassy foods. This patient is homebound for the following reasons Requires considerable and taxing effort to leave the home , Requires the assistance of 1 or more persons to leave the home  and Only leaves the home for medical reasons or [employer]ist services and are infrequent and of short duration for other reasons . Caregiver: relative. Caregiver assists with IADL's and ADL's. Medications reconciled and all medications are available in the home this visit. The following education was provided regarding medications: all medications reviewed and educated on to include: name, dose, route, frequency, side effects and effectiveness  Medications  are too early to tell if effective at this time. High risk medication teaching regarding anticoagulants, hyperglycemic agents or opiod narcotics performed (specify) - norco, apixaban,    Lucrezia Paci, DO notified of any discrepancies/look a like medications/medication interactions - none noted     Home health supplies by type and quantity ordered/delivered this visit include: ostomy supplies    Patient education provided this visit to include: see interventions. Patient level of understanding of education provided: see interventions    Sharps Education Provided: n/a  Patient response to procedure performed:  tolerated mediocre due to pain    Home exercise program/Homework provided: Patient educated on fall precautions to include Use good lighting in all rooms, make sure you use nightlights, Place frequently used items in easy-to-reach places, Set up furniture so that there are clear paths around it, Remove throw rugs and other tripping hazards from the floors, Avoid walking on wet floors, Keep moving. Physical activity can go a long way toward fall prevention, Wear sensible shoes with backs on them, Use assistive devices. Report any s/sx of abnormal bleeding to MD immediately/seek medical treatment for any: black tary stools, dark/tea/blood, hemtoma, dizziness, frequent gum/nose bleeds, unusal brusing, or prolong bleeding. Instruct patient/caregiver on risks for pressure injury such as : sedentary lifestyle or immobility, inadequate nutrition, moisture, frictions, shear, incontinence, poor hygiene, age, or conditions that affect blood flow to the extremities. Instruct patient/caregiver on prevention strategies such as frequent position changes (at least every 2 hours), proper positions of use of pressure relieving devices to alleviate pressure points, maintenance of hygiene, checking skin regularly, and follow your physician-recommended diet. Pt/Caregiver instructed on plan of care and are agreeable to plan of care at this time. Physician Griffin Fajardo DO  notified of patient admission to home health and plan of care including anticipated frequency of SN 1w1, 3w3, 2w3, 1w2, 2prn and treatments/interventions/modalities of ileostomy care, pain management, disease process management, med management    Discharge planning discussed with patient and caregiver. Discharge planning as follows: discharge when all goals met.   Pt/Caregiver did verbalize understanding of discharge planning. Next MD appointment TBD (date) with MD/NP/PA. Patient/caregiver encouraged/instructed to keep appointment as lack of follow through with physician appointment could result in discontinuation of home care services for non-compliance.

## 2023-01-16 ENCOUNTER — HOME CARE VISIT (OUTPATIENT)
Dept: SCHEDULING | Facility: HOME HEALTH | Age: 68
End: 2023-01-16
Payer: MEDICARE

## 2023-01-16 VITALS
DIASTOLIC BLOOD PRESSURE: 78 MMHG | TEMPERATURE: 97.8 F | SYSTOLIC BLOOD PRESSURE: 134 MMHG | RESPIRATION RATE: 16 BRPM | HEART RATE: 78 BPM

## 2023-01-16 PROCEDURE — G0300 HHS/HOSPICE OF LPN EA 15 MIN: HCPCS

## 2023-01-16 NOTE — CASE COMMUNICATION
Patient has a small shearing area to his left inner buttock crease, approx 2.2 cm round, tender to touch, cleaned and left open to air, pt educated about offsetting pressure from his buttocks, discussed small subtle changes from side to side, acknowledged understanding, will continue to monitor.

## 2023-01-16 NOTE — HOME HEALTH
Skilled reason for visit: Wound Care,New Colostomy care and education,and and Medication Management       Caregiver involvement: Spouse and step daughter are primary caregivers, step daughter present for visit       Medications reviewed and all medications are available in the home this visit. No new medications per Pt       The following education was provided regarding medications: Patient is well fimiliar with medication and was able to provide this nurse on medications and give two potential side effects. MD notified of any discrepancies/look a-like medications/medication interactions: None       Medications are effective at this time. Home health supplies by type and quantity ordered/delivered this visit include: Colostomy supply order completed at previous visit, discussed pending delay due to postal holiday schedule. Patient education provided this visit: Patient was educated on items checked in interventions tab and discussed pts upcoming appointments and his comfort with learning to do his own colostomy care, indicated he is capable and comfortable with learning       Patient level of understanding of education provided: Patient verbalized understanding to all education in interventions tab. Skilled Care Performed this visit: Wound care and Dressing change performed as ordered, Vital Assessment, and Medication and Disease Management         Patient response to procedure performed:  Patient tolerated vital assessment and wound care well and slight facial grimicing and indications of slight discomfort. Patient was received lying in bed. Patient's Progress towards personal goals:  Progressing towards goals in interventions tab. Continued need for the following skills: Nursing will continue to follow patient until wound is healed and education is understood and able to be verbalized by patient/caregiver.       Plan for next visit: Wound Care, Education, and Vital Assessment       Patient and/or caregiver notified and agrees to changes in the Plan of Care: NA        Caregiver involvement: step daughter present to asst pt with ADL's as needed      Noted with a small red shearing area to left inner buttock crease (2.2 cm round) no drainage or odor present, tender to touch, area cleaned with wound cleanser, left open to air, pt instructed to off set pressure to his buttocks by laying side to side and preferrably getting up oob for meals as tolerated to prevent further skin breakdown and prevent PNA. Pvr made aware.

## 2023-01-17 ENCOUNTER — HOME CARE VISIT (OUTPATIENT)
Dept: HOME HEALTH SERVICES | Facility: HOME HEALTH | Age: 68
End: 2023-01-17
Payer: MEDICARE

## 2023-01-18 ENCOUNTER — HOME CARE VISIT (OUTPATIENT)
Dept: SCHEDULING | Facility: HOME HEALTH | Age: 68
End: 2023-01-18
Payer: MEDICARE

## 2023-01-18 PROCEDURE — G0299 HHS/HOSPICE OF RN EA 15 MIN: HCPCS

## 2023-01-19 ENCOUNTER — PATIENT OUTREACH (OUTPATIENT)
Dept: CASE MANAGEMENT | Age: 68
End: 2023-01-19

## 2023-01-19 VITALS
TEMPERATURE: 97.7 F | HEART RATE: 80 BPM | OXYGEN SATURATION: 99 % | SYSTOLIC BLOOD PRESSURE: 140 MMHG | DIASTOLIC BLOOD PRESSURE: 88 MMHG | RESPIRATION RATE: 20 BRPM

## 2023-01-19 NOTE — PROGRESS NOTES
Care Transitions Follow Up Call    Challenges to be reviewed by the provider   Additional needs identified to be addressed with provider: no  none           Method of communication with provider : none      Care Transition Nurse (CTN) contacted the patient by telephone to follow up after admission on 2023 to 2023 . Verified name and  with patient as identifiers. Addressed changes since last contact:   Patient reports:   - He is feeling great   - He is walking a bit   - Home Health care staff have been out to visit   - He is getting stronger every day   - Everything is good   - Patient asked for assistance with information regarding follow up appointment on 23 that is not listed on his discharge paperwork. Follow up appointment completed? no.   Was follow up appointment scheduled within 7 days of discharge? no.   Request for PCP follow up appointment has been submitted. Advance Care Planning:   Does patient have an Advance Directive:   not on file per chart review       Patients top risk factors for readmission:   Medical Condition    Interventions to address risk factors:   CTN reviewed EMR. Per Chart review patient is scheduled to attend a follow up appojntment at the Wound care/Ostomy Department on 23 @ 1:00 pm.     CTN called the provided phone number and left a general message to determine if it permissible to give patient this phone number for follow up if needed. Patient's name or other information was not left on the voicemail message.          [facility] follow up appointment(s):   Future Appointments   Date Time Provider Department Center   2023 To Be Determined BA[facility] Krause   2023 To Be Determined [facility] Krause   2023 To Be Determined Marita Penaloza RN [address]   2023  9:45 AM Kristin Wilson DO [facility] BS AMB   2023 To Be Determined BA[facility] Krause   2023 To Be Determined Jennie Bye, RN [address] [facility] [address]   2/1/2023 To Be Determined Isabel Bye, RN [address] [facility] [address]   2/3/2023 To Be Determined Jennie Bye, RN [address] [facility] [address] [facility] [address]   2/7/2023 To Be Determined Isabel Bye, RN [address] [facility] [address]   2/7/2023  8:30 AM Rhina Padilla MD [facility] BS AMB   2/9/2023 To Be Determined Jennie Bye, [facility]   2/14/2023 To Be Determined Jennie Bye, RN [address] [facility] [address]   2/16/2023 To Be Determined Jennie Bye, RN [address] [facility] [address]   2/21/2023 To Be Determined Jennie Bye, RN [address] [facility] [address]   2/23/2023 To Be Determined Jennie Bye, RN [address] [facility] [address] [facility] [address]   3/2/2023 To Be Determined Jennie Bye, RN [address] [facility] [address] [facility] [address]   3/7/2023  2:00 PM Leona Briseno NP [facility] BS AMB   3/9/2023 To Be Determined Isabel Bye, RN [address] [facility]       Non-[facility] follow up appointment(s):   None reported at this time     CTN provided contact information for future needs. Plan for follow-up call in 7-10 days based on severity of symptoms and risk factors. Plan for Follow Up calls: Follow up with appointments as needed    Discuss Referral to the  [facility] Care Management Team of [facility] as needed or appropriate. Assess for resolution of Transition of Care     CTN called the [facility]  with [facility]  and spoke with Carlisle. Contact phone number for department confirmed. CTN returned call to patient. CTN  spoke with patient and confirmed contact name, department name, date, time, and place per discussion with Carlisle with wound care and per chart review.

## 2023-01-19 NOTE — HOME HEALTH
Skilled reason for visit: wound care and ostomy care teaching     Caregiver involvement: pt spouse is primary caregiver and assists with meal prep and medication prefill. Medications reviewed and all medications are available in the home this visit. The following education was provided regarding medications:   patient to continue to take medications as prescribed. patient aware to monitor for effectiveness and to notify staff of any adverse reactions to medications/any changes to medication regimen. .    MD notified of any discrepancies/look a-like medications/medication interactions: na  Medications are effective at this time. Home health supplies by type and quantity ordered/delivered this visit include: adequat supplies     Patient education provided this visit:Skilled nurse teaching how patient performed colostomy care , washed the stoma itself and the skin around the stoma with soft paper towels, mild soap and water. Measured the stoma, cut out the opening, removed the paper back and set it aside. Finally hold the punch with the sticky side toward your body. Center the opening on the stoma , then press firmly abdomen for 30 seconds. Sharps education provided: Containers should be made of hard plastic, be puncture-resistant and leakproof,   such as a laundry detergent or bleach bottle. When the container is ¾ full, it should be sealed with tape and labeled   DO NOT RECYCLE prior to discarding in the regular trash.           Patient level of understanding of education provided: pt verbalized understanding of education provided this visit      Skilled Care Performed this visit: same as reason for visit     Patient response to procedure performed:  pt tolerated without complaints of pain      Agency Progress toward goals: progressing    Patient's Progress towards personal goals: progressing    Home exercise program: discussed fall precautions in detail- having lighted hallways, removing throw rugs, monitoring medication that may alter mental status. perform skin inspections looking for any skin breakdown or redness. monitor for edema. frequent position changes. Watch for signs and symptoms of infection which include; fever, drainage, foul smell, lethargy. Continued need for the following skills: Nursing    Plan for next visit: wound care and ostomy teaching     Patient and/or caregiver notified and agrees to changes in the Plan of Care YES/NO/NA: N/A      The following discharge planning was discussed with the pt/caregiver:  Patient will be discharged once education has completed, patient is medically stable and pt is able to independently manage wound care/has healed or no longer requires skilled care.

## 2023-01-20 ENCOUNTER — HOME CARE VISIT (OUTPATIENT)
Dept: SCHEDULING | Facility: HOME HEALTH | Age: 68
End: 2023-01-20
Payer: MEDICARE

## 2023-01-20 PROCEDURE — G0299 HHS/HOSPICE OF RN EA 15 MIN: HCPCS

## 2023-01-21 ENCOUNTER — HOME CARE VISIT (OUTPATIENT)
Dept: SCHEDULING | Facility: HOME HEALTH | Age: 68
End: 2023-01-21
Payer: MEDICARE

## 2023-01-21 PROCEDURE — G0299 HHS/HOSPICE OF RN EA 15 MIN: HCPCS

## 2023-01-23 ENCOUNTER — HOME CARE VISIT (OUTPATIENT)
Dept: SCHEDULING | Facility: HOME HEALTH | Age: 68
End: 2023-01-23
Payer: MEDICARE

## 2023-01-23 VITALS
RESPIRATION RATE: 14 BRPM | OXYGEN SATURATION: 100 % | SYSTOLIC BLOOD PRESSURE: 130 MMHG | HEART RATE: 60 BPM | DIASTOLIC BLOOD PRESSURE: 72 MMHG | TEMPERATURE: 97.8 F

## 2023-01-23 PROCEDURE — G0299 HHS/HOSPICE OF RN EA 15 MIN: HCPCS

## 2023-01-23 NOTE — HOME HEALTH
Caregiver involvement: Wife is caregiver, she is available 24/7 she  Assists with ADLs, Medication management, Transportation to appointments, Meal prep and assists with ambulation. Medications reconciled and all medications are available in the home this visit. The following education was provided regarding medications,  Reminded to take all prescribed meds as directed and at the same time each day. Medications  are effective at this time. Home health supplies by type and quantity ordered/delivered this visit include: All supplies are in the home. Ordered skin prep. Patient education provided this visit:I was called to come to see patient for leaking ostomy. Wafer not fully adheared at the top. I took off old wafer and bag, Prepped the area with DWC then after drying I applied skin prep to help protect his skin and increase adhearence of wafer. I  also changed colostomy bag and wafer, I showed him how to protect skin and the importance of same. I went over the importanceof keeping Abdomidal wound clean and intact. ENCOURAGED PATIENT TO HAVE PROTEIN WITH EACH MEAL TO PROMOTE WOUND HEALING. Patient is on Eliquis for his A-fib. We went over the dangers of Anticoaqulants and what A-fib is nd how it cn lead to CVA. Ptient /caregiver with good understanding of both. Patient is a fall risk, went over the need of having someone with him when ambulating, keep hallways and living areas free of clutter and throw rugs. Progress toward goals: Ostomy is putting out brown liqud stool. Ostomy looks healthy, but there is a bit of excoriation around it. I made sure skin prep was on and ring covered all surrounding skin to prevent this from getting worse. Home exercise program: . Discussed s/s of infection to monitor for, s/s of UTI, who to report to/when. Instructed cg to notify staff/md/seek tx if complications occur. Patient instructed to maintain clear pathways in home and to minimize clutter to prevent falls from occurring/minimize fall potential.   Patient needing a well balanced diet with the 5 food groups, patient to increase fiber in diet, fresh fruits and vegetables, whole grains and increase water intake. Needs the assistance of one other for safe ambulationMaintain healthy low sodium  diet, Continue to monitor B/P and observe for signs of infection. Be alert for signs of stroke. Work with PT to increase strength and f/u with PCP. I went over the importance of taking all prescriptions as ordered. I discussed how to avoid extra sodium in his diet. We discussed the signs of infection and when to call MD.  We discussed the high risk for falls and ways to prevent falls in the future. We discussed taking B/P daily and keeping a log. We also discussed the need of a heart healthy diet, and the need to change positions frequently. Continued need for the following skills: Nursing    The following discharge planning was discussed with the pt/caregiver: Will discharge from nursing when education is complete and patient is medically stable and he and or caregiver can manage ostomy. Remington Trevino

## 2023-01-24 VITALS
DIASTOLIC BLOOD PRESSURE: 71 MMHG | TEMPERATURE: 97.8 F | OXYGEN SATURATION: 98 % | TEMPERATURE: 97.7 F | HEART RATE: 80 BPM | HEART RATE: 70 BPM | SYSTOLIC BLOOD PRESSURE: 140 MMHG | RESPIRATION RATE: 20 BRPM | SYSTOLIC BLOOD PRESSURE: 137 MMHG | OXYGEN SATURATION: 98 % | RESPIRATION RATE: 18 BRPM | DIASTOLIC BLOOD PRESSURE: 72 MMHG

## 2023-01-24 NOTE — PROGRESS NOTES
CC:   Chief Complaint   Patient presents with    Post OP Follow Up     Exploratory laparotomy, right hemicolectomy on 1/1/23        Assessment:    ICD-10-CM ICD-9-CM    1. Colonic mass  K63.89 569.89       2. Bowel perforation ([address]  K63.1 569.83       3. S/P ileostomy ([address]  Z93.2 V44.2       4. Atrial flutter, unspecified type ([address]  I48.92 427.32           Plan: The wound has not been packed properly and skin has sealed over the cavity. It was packed today with Aquacel Ag. We will notify home nursing to pack the wound. I would like to see him back in 1 month for wound check. We also discussed my recommendation that he will need colonoscopy and referral to Dr. Pili Hidalgo our colorectal specialist.  He understands that surgery may not happen for several months down the road. He agrees with this plan and will call prior to his follow up if he has questions or concerns. HPI:  Libby Sorenson is a 75 y.o. male who is here after his initial follow up from discharge from the hospital. He presented to the ED on 1/1/2023 with perforated cecum and sepsis. He was having decreased appetite several weeks prior to this and constipation. Normal cologuard in October. He has never undergone colonoscopy. He underwent emergent exploratory laparotomy and right hemicolectomy for perforation and sepsis. Postprocedure CT scan with rectal contrast did demonstrate an obstructing colonic mass in the descending colon. CEA level was normal.  Patient went back to the surgery for a loop ileostomy and the mass was palpable in the proximal descending colon but resection was not performed due to significant dilation of the bowel. States his appetite has returned and he is slowly regaining weight that he lost prior to his hospitalization and during his hospitalization. He reports no abdominal pain or problems with his ileostomy. He is doing wound care as instructed.   He does have upcoming appointment with cardiology as he was found to be in atrial flutter during the hospital admission. Allergies:  No Known Allergies    Medication Review:  Current Outpatient Medications on File Prior to Visit   Medication Sig Dispense Refill    metoprolol tartrate (LOPRESSOR) 25 mg tablet Take 1 Tablet by mouth two (2) times a day. 60 Tablet 0    apixaban (ELIQUIS) 5 mg tablet Take 1 Tablet by mouth two (2) times a day for 30 days. 60 Tablet 1    potassium chloride (K-DUR, KLOR-CON M20) 20 mEq tablet Take 1 Tablet by mouth two (2) times a day for 30 days. 60 Tablet 1    clobetasoL (TEMOVATE) 0.05 % ointment Apply  to affected area two (2) times a day. For psoriasis 15 g 5    atorvastatin (LIPITOR) 10 mg tablet Take 1 tablet by mouth daily 90 Tablet 3     No current facility-administered medications on file prior to visit. Systems Review:  Review of Systems   Constitutional:  Negative for fatigue, fever and unexpected weight change. Gastrointestinal:  Negative for abdominal pain. PMH:  Past Medical History:   Diagnosis Date    Anxiety     Benign essential hypertension     DM type 2 (diabetes mellitus, type 2) ([facility]        Surgical History:  History reviewed. No pertinent surgical history.     Social History:  Social History     Socioeconomic History    Marital status: SINGLE   Tobacco Use    Smoking status: Former     Types: Cigarettes     Quit date: 1980     Years since quittin.0    Smokeless tobacco: Never   Vaping Use    Vaping Use: Never used   Substance and Sexual Activity    Alcohol use: Yes     Comment: heavy    Drug use: No     Social Determinants of Health     Financial Resource Strain: Low Risk     Difficulty of Paying Living Expenses: Not hard at all   Food Insecurity: No Food Insecurity    Worried About Running Out of Food in the Last Year: Never true    Ran Out of Food in the Last Year: Never true       Family History:  Family History   Problem Relation Age of Onset    OSTEOARTHRITIS Father     No Known Problems Mother No results displayed because visit has over 200 results. XR ABD (KUB)  Narrative: EXAM: XR ABD (KUB)    INDICATION: Tachycardia, rapid response team was called, postop day one colon  resection    COMPARISON: Chest radiograph 1/1/2023. FINDINGS: Supine views of the abdomen    Enteric tube tip overlies the distal stomach. The stomach is under distended. Multiple air-filled dilated small bowel loops in the abdomen. Drainage catheters  overlies the right abdomen and the midpelvis. Impression: Multiple dilated small bowel loops likely postoperative ileus. Physical Exam:  Visit Vitals  /83   Pulse 87   Temp 97.1 °F (36.2 °C) (Temporal)   Resp 17   Ht 5' 9\" (1.753 m)   Wt 71.2 kg (157 lb)   BMI 23.18 kg/m²    BMI: Body mass index is 23.18 kg/m². Physical Exam  Abdominal:      Comments: Stoma pink with stool in bag    Midline wound without erythema, beefy granulation tissue in wound with tunneling along the length of the incision        I have reviewed the information entered by the clinical staff and/or patient and verified it as accurate or edited where necessary.      Electronically signed by:    Rosalio Christianson DO, MPH

## 2023-01-24 NOTE — HOME HEALTH
Skilled reason for visit: wound care and ostomy care teaching , disease med management          Caregiver involvement: pt spouse is primary caregiver and assists with meal prep and medication prefill and arranging transport to MD          Medications reviewed and all medications are available in the home this visit. The following education was provided regarding medications:   patient to continue to take medications as prescribed. patient aware to monitor for effectiveness and to notify staff of any adverse reactions to medications/any changes to medication regimen. .      MD notified of any discrepancies/look a-like medications/medication interactions: na    Medications are effective at this time. Home health supplies by type and quantity ordered/delivered this visit include: adequat supplies          Patient education provided this visit:Skilled nurse teaching how patient performed colostomy care , washed the stoma itself and the skin around the stoma with soft paper towels, mild soap and water. Measured the stoma, cut out the opening, removed the paper back and set it aside. Finally hold the punch with the sticky side toward your body. Center the opening on the stoma , then press firmly abdomen for 30 seconds. Sharps education provided: Containers should be made of hard plastic, be puncture-resistant and leakproof,     such as a laundry detergent or bleach bottle. When the container is ¾ full, it should be sealed with tape and labeled     DO NOT RECYCLE prior to discarding in the regular trash.                      Patient level of understanding of education provided: pt verbalized understanding of education provided this visit              Skilled Care Performed this visit: same as reason for visit          Patient response to procedure performed:  pt tolerated without complaints of pain              Agency Progress toward goals: progressing         Patient's Progress towards personal goals: progressing         Home exercise program: discussed fall precautions in detail- having lighted hallways, removing throw rugs, monitoring medication that may alter mental status. perform skin inspections looking for any skin breakdown or redness. monitor for edema. frequent position changes. Watch for signs and symptoms of infection which include; fever, drainage, foul smell, lethargy. Continued need for the following skills: Nursing         Plan for next visit: wound care and ostomy teaching          Patient and/or caregiver notified and agrees to changes in the Plan of Care YES/NO/NA: N/A           The following discharge planning was discussed with the pt/caregiver:  Patient will be discharged once education has completed, patient is medically stable and pt is able to independently manage wound care/has healed or no longer requires skilled care.

## 2023-01-24 NOTE — HOME HEALTH
Skilled reason for visit: wound and ostomy care         Caregiver involvement: pt spouse is primary caregiver and assists with meal prep and medication prefill. Medications reviewed and all medications are available in the home this visit. The following education was provided regarding medications:   patient to continue to take medications as prescribed. patient aware to monitor for effectiveness and to notify staff of any adverse reactions to medications/any changes to medication regimen. .      MD notified of any discrepancies/look a-like medications/medication interactions: na    Medications are effective at this time. Home health supplies by type and quantity ordered/delivered this visit include: adequate wound care and ostomy supplies          Patient education provided this visit:Skilled nurse teaching how patient performed colostomy care , washed the stoma itself and the skin around the stoma with soft paper towels, mild soap and water. Measured the stoma, cut out the opening, removed the paper back and set it aside. Finally hold the punch with the sticky side toward your body. Center the opening on the stoma , then press firmly abdomen for 30 seconds. Sharps education provided: Containers should be made of hard plastic, be puncture-resistant and leakproof,     such as a laundry detergent or bleach bottle. When the container is ¾ full, it should be sealed with tape and labeled     DO NOT RECYCLE prior to discarding in the regular trash.                      Patient level of understanding of education provided: pt verbalized understanding of education provided this visit              Skilled Care Performed this visit: same as reason for visit          Patient response to procedure performed:  pt tolerated without complaints of pain              Agency Progress toward goals: progressing         Patient's Progress towards personal goals: progressing         Home exercise program: discussed fall precautions in detail- having lighted hallways, removing throw rugs, monitoring medication that may alter mental status. perform skin inspections looking for any skin breakdown or redness. monitor for edema. frequent position changes. Watch for signs and symptoms of infection which include; fever, drainage, foul smell, lethargy. Continued need for the following skills: Nursing         Plan for next visit: wound care and ostomy teaching          Patient and/or caregiver notified and agrees to changes in the Plan of Care YES/NO/NA: N/A           The following discharge planning was discussed with the pt/caregiver:  Patient will be discharged once education has completed, patient is medically stable and pt is able to independently manage wound care/has healed or no longer requires skilled care.

## 2023-01-25 ENCOUNTER — OFFICE VISIT (OUTPATIENT)
Dept: SURGERY | Age: 68
End: 2023-01-25
Payer: MEDICARE

## 2023-01-25 VITALS
RESPIRATION RATE: 17 BRPM | DIASTOLIC BLOOD PRESSURE: 83 MMHG | WEIGHT: 157 LBS | TEMPERATURE: 97.1 F | BODY MASS INDEX: 23.25 KG/M2 | HEIGHT: 69 IN | SYSTOLIC BLOOD PRESSURE: 132 MMHG | HEART RATE: 87 BPM

## 2023-01-25 DIAGNOSIS — I48.92 ATRIAL FLUTTER, UNSPECIFIED TYPE (HCC): ICD-10-CM

## 2023-01-25 DIAGNOSIS — Z93.2 S/P ILEOSTOMY (HCC): ICD-10-CM

## 2023-01-25 DIAGNOSIS — K63.1 BOWEL PERFORATION (HCC): ICD-10-CM

## 2023-01-25 DIAGNOSIS — K63.89 COLONIC MASS: Primary | ICD-10-CM

## 2023-01-25 PROCEDURE — 99024 POSTOP FOLLOW-UP VISIT: CPT | Performed by: SURGERY

## 2023-01-25 NOTE — LETTER
1/25/2023    Patient: Nya Tai   YOB: 1955   Date of Visit: 1/25/2023     Dany Alvarez, [address]  [address]  [id number]  [address] [facility] [address]    Dear Dany Alvarez, NP,      Thank you for referring Mr. Nya Tai to [address] for evaluation. My notes for this consultation are attached. If you have questions, please do not hesitate to call me. I look forward to following your patient along with you.       Sincerely,    [place], [address] [facility] [address]

## 2023-01-26 ENCOUNTER — PATIENT OUTREACH (OUTPATIENT)
Dept: CASE MANAGEMENT | Age: 68
End: 2023-01-26

## 2023-01-26 ENCOUNTER — HOME CARE VISIT (OUTPATIENT)
Dept: SCHEDULING | Facility: HOME HEALTH | Age: 68
End: 2023-01-26
Payer: MEDICARE

## 2023-01-26 VITALS
HEART RATE: 69 BPM | DIASTOLIC BLOOD PRESSURE: 77 MMHG | OXYGEN SATURATION: 100 % | RESPIRATION RATE: 17 BRPM | SYSTOLIC BLOOD PRESSURE: 128 MMHG

## 2023-01-26 PROCEDURE — G0299 HHS/HOSPICE OF RN EA 15 MIN: HCPCS

## 2023-01-26 NOTE — PROGRESS NOTES
Care Transitions Follow Up Call    Challenges to be reviewed by the provider   Additional needs identified to be addressed with provider: no  none           Method of communication with provider : none      Care Transition Nurse (CTN) contacted the patient by telephone to follow up after admission on 1/1/2023 to 1/11/2023 . Verified name  with patient as identifier. CTN also verified phone number called is the same as listed in patient demographics in the electronic medical record. Addressed/reveiwed changes since last contact:   - per chart review, patient attended surgical follow up on 1/25/23. Patient reports:   - He is doing great   - Home Health care staff is there now and changed bag   - Everything looks good   - He is eating good   - More active     The conversation with patient was kept short as patient related that home health care staff are present at the current time. Follow up appointment completed? yes. Was follow up appointment scheduled within 7 days of discharge? no.         Patients top risk factors for readmission:   Medical Condition    Interventions to address risk factors:   CTN followed up with condition of patient. Patient asked to please call CTN back as needed for assistance as needed.         [facility] follow up appointment(s):   Future Appointments   Date Time Provider Department Center   1/28/2023 To Be Determined Tanda Carrel, RN [address]   1/30/2023 To Be Determined Tanda Carrel, RN [address]   2/1/2023 To Be Determined Tanda Carrel, RN [address] [facility] [address] [place] [address]   2/3/2023 To Be Determined Tanda Carrel, RN [address] [facility] [address] [place] [address]   2/7/2023 To Be Determined Tanda Carrel, RN [address] [facility] [address] [place] [address]   2/7/2023  8:30 AM Liliana Quiles MD [facility] BS AMB   2/9/2023 To Be Determined Tanda Carrel, RN [address]   2/14/2023 To Be Determined Tanda Carrel, RN [address] [facility] [address] [place] [address]   2/16/2023 To Be Determined Tanda Carrel, RN [address]   2/21/2023 To Be Determined Shaunna Ireland Laisha RN [address]   2/22/2023  1:00 PM Gene Shipman DO [facility] AMB   2/23/2023 To Be Determined Delaney Reach, RN [address]   2/23/2023  1:30 PM Rolanda De La Cruz MD [facility] BS AMB   3/2/2023 To Be Determined Delaney Reach, RN [address]   3/7/2023  2:00 PM Klaudia Moya NP [facility] BS AMB   3/9/2023 To Be Determined Delaney Reach, RN [address] [facility]       Non-[facility] follow up appointment(s):   None reported at this time     CTN provided contact information for future needs. Plan for follow-up call in 7-10 days /asneeded based on severity of symptoms and risk factors. Plan for Follow Up calls: Follow up with appointments as needed    Discuss Referral to the  [facility] Amb[facility] Care Management Team of [facility] as needed or appropriate. Assess for resolution of Transition of Care Services if at least 30 days post hospital discharge ( including day of discharge). Goals Addressed                   This Visit's Progress     Attends follow-up appointments as directed. Target Date:  2-10-23    Patient to follow up with:    PCP  Surgeon     1/26/2023   Patient attended surgical follow up on 1-25-23. Prevent complications post hospitalization. Target Date: 2-12-23 1-12-23   Patient and/or family members were alerted to the availability of physician or other advanced practioners after hours, weekends, and holidays. Please call the PCP office phone number and speak with the answering service for assistance. Discussed with patient to please call 911 for emergency assistance as needed. Care Transitions Nurse ( CTN)  contact information provided for follow up as needed. Supportive resources in place to maintain patient in the community (ie. Home Health, DME equipment, refer to, medication assistant plan, etc.)        Target Date:  2-12-23 1/12/23  [address] [facility] [address] staff to provide skilled home care visits.         Understands red flags post discharge.         Target Date: 2-12-23 1/12/23  Care Transitions Nurse (CTN) reviewed red flags with patient as follows:   Please call 911 for immediate assistance for symptoms such as:   - Chest Pain  - Severe shortness of breath   - Change in mental status   -- New or worsening symptoms    -  Other

## 2023-01-26 NOTE — HOME HEALTH
Skilled reason for visit: Ostomy care and teaching, wound care    Caregiver involvement: wife assisted with ADLs. Medications reviewed and all medications are available in the home this visit. The following education was provided regarding medications:  SN Instructed patient about the Eliquis ( apixaban ) this is helps to prevent that platelets in your blood from sticking together and forming a blood clot. Eliquis is used to lower the risk of stroke caused by a blood clot in people with a heart rhythm disorder called atrial fibrillation. Because Eliquis keeps your blood from coagulating ( clotting ) to prevent unwanted blood clots, this medicine can also make it easier for you to bleed, even from a minor injury such as a fall or a bump on the head. Do not stop taking Eliquis unless your doctor tells you to. Stopping suddenly can increase your risk of blood clot or stroke. MD notified of any discrepancies/look a-like medications/medication interactions: none  Medications are effective at this time. Home health supplies by type and quantity ordered/delivered this visit include: none    Patient education provided this visit: SN instructed patient instructed patient caregiver how to do Ostomy Care, as follow: Preparing, Applying, and Removing an Ostomy System to make the process easier and more effective, here are several easy steps you and your patients can follow when applying and removing an ostomy system: Remove Use an Adhesive Remover Wipe, Clean Clean & Dry Clean peristomal skin with non-moisturizing or non-oily soap, rinse well with clean water and pat dry, Measure Measure Stoma Place the stoma measuring guide over the stoma, measuring the stoma at the base., Protect use an Adhesive Remover Wipe, Apply New Pouching System/Skin Barrier. Make sure to have a good seal around the stoma.     Sharps education provided: n/a    Patient level of understanding of education provided: patient and wife verbalized understanding    Skilled Care Performed this visit: ostomy care and teaching, wound care    Patient response to procedure performed:  patient tolerated w/o c/o pain or discomfort    Agency Progress toward goals: progressing    Patient's Progress towards personal goals: progressing    Home exercise program: take all medications as prescribed, monitor for s/s of infection    Continued need for the following skills: Nursing    Plan for next visit: wound care, ostomy care and teaching    Patient and/or caregiver notified and agrees to changes in the Plan of Care YES/NO/NA: YES      The following discharge planning was discussed with the pt/caregiver: Discharge when goals are met, wounds are healed, patient/caregiver able to manage disease process, mediations, and pain

## 2023-01-27 ENCOUNTER — TELEPHONE (OUTPATIENT)
Dept: WOUND CARE | Age: 68
End: 2023-01-27

## 2023-01-27 NOTE — TELEPHONE ENCOUNTER
Patient was scheduled for a follow up visit today with ostomy clinic. However, he reports being unable to make it. Discussed with patient over the phone, he reports pain is improved to almost nil, he has been emptying his pouch independently, not having trouble with leakage and home care continues to follow. He has returned to his usual diet and is getting plenty of fluids and activity. He may call the department if problems should arise, at this time he does not need a scheduled follow up visit with the ostomy clinic.       Dee MEJIAN, RN, [address] Dept./Ostomy Clinic  [phone]

## 2023-01-28 ENCOUNTER — HOME CARE VISIT (OUTPATIENT)
Dept: SCHEDULING | Facility: HOME HEALTH | Age: 68
End: 2023-01-28
Payer: MEDICARE

## 2023-01-28 VITALS
DIASTOLIC BLOOD PRESSURE: 76 MMHG | RESPIRATION RATE: 18 BRPM | SYSTOLIC BLOOD PRESSURE: 127 MMHG | TEMPERATURE: 97.7 F | HEART RATE: 63 BPM

## 2023-01-28 PROCEDURE — G0299 HHS/HOSPICE OF RN EA 15 MIN: HCPCS

## 2023-01-30 ENCOUNTER — HOME CARE VISIT (OUTPATIENT)
Dept: SCHEDULING | Facility: HOME HEALTH | Age: 68
End: 2023-01-30
Payer: MEDICARE

## 2023-01-30 VITALS
DIASTOLIC BLOOD PRESSURE: 80 MMHG | RESPIRATION RATE: 20 BRPM | SYSTOLIC BLOOD PRESSURE: 138 MMHG | OXYGEN SATURATION: 98 % | TEMPERATURE: 98.1 F | HEART RATE: 78 BPM

## 2023-01-30 PROCEDURE — G0299 HHS/HOSPICE OF RN EA 15 MIN: HCPCS

## 2023-01-30 NOTE — Clinical Note
Patient noted to have open area where stitches and stoma have pulled away from skin at medial aspect of stoma. Patient states he \"felt something pull\" over the past weekend. Patient requires further medical attention. Please call patient for further instructions. I left vm this am as well.

## 2023-01-31 NOTE — HOME HEALTH
Skilled reason for visit: assess, teach, abd wound dressing change, ostomy wafer and pouch change    Caregiver involvement: assists with adl's and iadl's, meals, medication management, transportation. Medications reviewed and all medications are available in the home this visit. The following education was provided regarding medications:  take as ordered, do not stop or start medication without notifying MD.    MD notified of any discrepancies/look a-like medications/medication interactions: n/a  Medications are effective at this time.       Home health supplies by type and quantity ordered/delivered this visit include: n/a    Patient education provided this visit: wound healing process, when to call RN/ MD, emergency management, ostomy wafer and pouch change procedure, disease process and medication management    Sharps education provided: n/a    Patient level of understanding of education provided: pt/ CG state partial understanding and partial teach back    Skilled Care Performed this visit: VS, body systems assessment, abd wound dressing change, ostomy wafer and pouch change, teaching as above    Patient response to procedure performed: tolerated well without adverse effects noted    Agency Progress toward goals: progressing, wounds healing slowly, ostomy functioning    Patient's Progress towards personal goals: taking medication as ordered, VS within normal limits for patient    Home exercise program: continue to take medication as ordered, keep MD appointments, paced progressive activity, follow ordered diet    Continued need for the following skills: Nursing    Plan for next visit: abd wound care, ostomy care, body systems assess    Patient and/or caregiver notified and agrees to changes in the Plan of Care YES/NO/NA: N/A      The following discharge planning was discussed with the pt/caregiver: d/c when goals met or per patient request

## 2023-02-01 ENCOUNTER — OFFICE VISIT (OUTPATIENT)
Dept: SURGERY | Age: 68
End: 2023-02-01
Payer: MEDICARE

## 2023-02-01 ENCOUNTER — HOME CARE VISIT (OUTPATIENT)
Dept: SCHEDULING | Facility: HOME HEALTH | Age: 68
End: 2023-02-01
Payer: MEDICARE

## 2023-02-01 VITALS
DIASTOLIC BLOOD PRESSURE: 74 MMHG | TEMPERATURE: 98 F | WEIGHT: 158 LBS | RESPIRATION RATE: 20 BRPM | HEART RATE: 72 BPM | OXYGEN SATURATION: 98 % | SYSTOLIC BLOOD PRESSURE: 134 MMHG | HEIGHT: 69 IN | BODY MASS INDEX: 23.4 KG/M2

## 2023-02-01 DIAGNOSIS — I48.92 ATRIAL FLUTTER, UNSPECIFIED TYPE (HCC): ICD-10-CM

## 2023-02-01 DIAGNOSIS — Z93.2 S/P ILEOSTOMY (HCC): ICD-10-CM

## 2023-02-01 DIAGNOSIS — K63.89 COLONIC MASS: Primary | ICD-10-CM

## 2023-02-01 DIAGNOSIS — K63.1 BOWEL PERFORATION (HCC): ICD-10-CM

## 2023-02-01 PROCEDURE — 99024 POSTOP FOLLOW-UP VISIT: CPT | Performed by: SURGERY

## 2023-02-01 RX ORDER — METOPROLOL TARTRATE 25 MG/1
TABLET, FILM COATED ORAL
Qty: 60 TABLET | Refills: 0 | Status: SHIPPED | OUTPATIENT
Start: 2023-02-01

## 2023-02-01 NOTE — PROGRESS NOTES
CC:   Chief Complaint   Patient presents with    Post OP Follow Up     ED on 1/1/2023 with perforated cecum and sepsis colonic mass in the descending colon          Assessment:    ICD-10-CM ICD-9-CM    1. Colonic mass  K63.89 569.89       2. Bowel perforation ([address]  K63.1 569.83       3. S/P ileostomy ([facility] [address]  Z93.2 V44.2       4. Atrial flutter, unspecified type ([address]  I48.92 427.32           Plan: The ileostomy does not need revision or additional sutures. Continue local wound care with home health. A small piece of aquacel was placed under the ring. This area will heal if maintain proper pouching of stoma and protection of skin. The midline wound  was packed today with Aquacel Ag. We will notify home nursing to monitor this area and small square of dressing can be placed under the ring. I would like to see him back in 1 month for wound check. HPI: He is here today to follow up sutures. A few days ago when home nursing came out they noted the sutures had  from the skin. He reports no difficulty pouching his ostomy and has no pain. 1/25/2023  Talia Ramirez is a 75 y.o. male who is here after his initial follow up from discharge from the hospital. He presented to the ED on 1/1/2023 with perforated cecum and sepsis. He was having decreased appetite several weeks prior to this and constipation. Normal cologuard in October. He has never undergone colonoscopy. He underwent emergent exploratory laparotomy and right hemicolectomy for perforation and sepsis. Postprocedure CT scan with rectal contrast did demonstrate an obstructing colonic mass in the descending colon. CEA level was normal.  Patient went back to the surgery for a loop ileostomy and the mass was palpable in the proximal descending colon but resection was not performed due to significant dilation of the bowel.   States his appetite has returned and he is slowly regaining weight that he lost prior to his hospitalization and during his hospitalization. He reports no abdominal pain or problems with his ileostomy. He is doing wound care as instructed. He does have upcoming appointment with cardiology as he was found to be in atrial flutter during the hospital admission. Allergies:  No Known Allergies    Medication Review:  Current Outpatient Medications on File Prior to Visit   Medication Sig Dispense Refill    apixaban (ELIQUIS) 5 mg tablet Take 1 Tablet by mouth two (2) times a day for 30 days. 60 Tablet 1    potassium chloride (K-DUR, KLOR-CON M20) 20 mEq tablet Take 1 Tablet by mouth two (2) times a day for 30 days. 60 Tablet 1    clobetasoL (TEMOVATE) 0.05 % ointment Apply  to affected area two (2) times a day. For psoriasis 15 g 5    atorvastatin (LIPITOR) 10 mg tablet Take 1 tablet by mouth daily 90 Tablet 3     No current facility-administered medications on file prior to visit. Systems Review:  Review of Systems   Constitutional:  Negative for fatigue, fever and unexpected weight change. Gastrointestinal:  Negative for abdominal pain.      PMH:  Past Medical History:   Diagnosis Date    Anxiety     Benign essential hypertension     DM type 2 (diabetes mellitus, type 2) ([facility] [address]        Surgical History:  Past Surgical History:   Procedure Laterality Date    HX GI      loop ileostomy       Social History:  Social History     Socioeconomic History    Marital status: SINGLE   Tobacco Use    Smoking status: Former     Types: Cigarettes     Quit date: 1980     Years since quittin.1    Smokeless tobacco: Never   Vaping Use    Vaping Use: Never used   Substance and Sexual Activity    Alcohol use: Not Currently     Comment: heavy    Drug use: No     Social Determinants of Health     Financial Resource Strain: Low Risk     Difficulty of Paying Living Expenses: Not hard at all   Food Insecurity: No Food Insecurity    Worried About Running Out of Food in the Last Year: Never true    Ran Out of Food in the Last Year: Never true Family History:  Family History   Problem Relation Age of Onset    OSTEOARTHRITIS Father     No Known Problems Mother        No results displayed because visit has over 200 results. XR ABD (KUB)  Narrative: EXAM: XR ABD (KUB)    INDICATION: Tachycardia, rapid response team was called, postop day one colon  resection    COMPARISON: Chest radiograph 1/1/2023. FINDINGS: Supine views of the abdomen    Enteric tube tip overlies the distal stomach. The stomach is under distended. Multiple air-filled dilated small bowel loops in the abdomen. Drainage catheters  overlies the right abdomen and the midpelvis. Impression: Multiple dilated small bowel loops likely postoperative ileus. Physical Exam:  Visit Vitals  /74   Pulse 72   Temp 98 °F (36.7 °C)   Resp 20   Ht 5' 9\" (1.753 m)   Wt 71.7 kg (158 lb)   SpO2 98%   BMI 23.33 kg/m²      BMI: Body mass index is 23.33 kg/m². Physical Exam  Abdominal:      Comments: Stoma pink with stool in bag. Small area medial at the 3 oclock position adjacent to ostomy with small 0.5cm ulceration of skin superficial    Midline wound without erythema, beefy granulation tissue in wound with tunneling along the length of the incision        I have reviewed the information entered by the clinical staff and/or patient and verified it as accurate or edited where necessary.      Electronically signed by:    Clarissa Rivera DO, MPH

## 2023-02-02 ENCOUNTER — HOME CARE VISIT (OUTPATIENT)
Dept: SCHEDULING | Facility: HOME HEALTH | Age: 68
End: 2023-02-02
Payer: MEDICARE

## 2023-02-02 VITALS
DIASTOLIC BLOOD PRESSURE: 74 MMHG | TEMPERATURE: 97.7 F | HEART RATE: 62 BPM | RESPIRATION RATE: 18 BRPM | SYSTOLIC BLOOD PRESSURE: 132 MMHG | OXYGEN SATURATION: 98 %

## 2023-02-02 PROCEDURE — G0299 HHS/HOSPICE OF RN EA 15 MIN: HCPCS

## 2023-02-02 NOTE — Clinical Note
Patient with raised, red, painful, pinpoint rash periarea of abd incision and ileostomy stoma. Please consider sending order for antifungal powder to patient's pharmacy.

## 2023-02-03 ENCOUNTER — HOME CARE VISIT (OUTPATIENT)
Dept: SCHEDULING | Facility: HOME HEALTH | Age: 68
End: 2023-02-03
Payer: MEDICARE

## 2023-02-03 ENCOUNTER — PATIENT OUTREACH (OUTPATIENT)
Dept: CASE MANAGEMENT | Age: 68
End: 2023-02-03

## 2023-02-03 ENCOUNTER — TELEPHONE (OUTPATIENT)
Dept: SURGERY | Age: 68
End: 2023-02-03

## 2023-02-03 VITALS
TEMPERATURE: 97 F | RESPIRATION RATE: 20 BRPM | SYSTOLIC BLOOD PRESSURE: 142 MMHG | HEART RATE: 88 BPM | OXYGEN SATURATION: 98 % | DIASTOLIC BLOOD PRESSURE: 80 MMHG

## 2023-02-03 PROCEDURE — G0299 HHS/HOSPICE OF RN EA 15 MIN: HCPCS

## 2023-02-03 NOTE — HOME HEALTH
Skilled reason for visit: prn as ileostomy wafer leaking and patient unable to complete appliance change, body systems assess, teach disease process including ostomy appliance change, medication management  Caregiver involvement: wife supportive as able as has own health issues, assists with adl's and iadl's prn. Medications reviewed and all medications are available in the home this visit. The following education was provided regarding medications:  take all medications as ordered, do not stop or start any medications without notifying MD, obtain refills as needed, indications, potential side effects, contraindications of lipitor, lopressor, KCL, eliquantonette   MD notified of any discrepancies/look a-like medications/medication interactions: n/a  Medications are effective at this time.       Home health supplies by type and quantity ordered/delivered this visit include: n/a    Patient education provided this visit: as above, continued teaching of ileostomy appliance change procedure    Sharps education provided: n/a    Patient level of understanding of education provided: partial understanding, partial teach back    Skilled Care Performed this visit: VS, as above, assisted patient to change abd incision dressing and ileostomy appliance, taught disease process and medication management    Patient response to procedure performed:  receptive to teaching, 100% understanding stated and teach back abd incision dressing change, stated 100% understanding with partial teach back of ileostomy appliance change and troubleshooting    Agency Progress toward goals: progressing as above    Patient's Progress towards personal goals: progressing as above    Home exercise program: continue to take medications as ordered, continue to follow ordered diet, increase po protein to assist with healing    Continued need for the following skills: Nursing    Plan for next visit: VS, body systems assess, teach disease process and medication management, continue teaching ileostomy appliance change    Patient and/or caregiver notified and agrees to changes in the Plan of Care YES/NO/NA: N/A      The following discharge planning was discussed with the pt/caregiver: d/c when goals met or per patient request

## 2023-02-03 NOTE — PROGRESS NOTES
Care Transitions Follow Up Call      Challenges to be reviewed by the provider   Additional needs identified to be addressed with provider: no  none           Method of communication with provider : none      Care Transition Nurse (CTN) contacted the patient by telephone to follow up after admission on 1/1/23. Verified name with patient as identifier as the conversation was kept short as patient currently with home health care staff. CTN verified that the phone number called is the same phone number as listed in patient demographics in the electronic medical record. Reviewed changes since last contact:   - Patient  attended Surgical follow up appointment on 2-1-23. Patient reports:   - Everything is fine, going great   - Home health care staff are with patient at the current time. Advance Care Planning:   Does patient have an Advance Directive:  Not noted to on file at this time. [facility] follow up appointment(s):   Future Appointments   Date Time Provider Ravi Whittne   2/7/2023 To Be Determined Honey Sole, BAIRON Page   2/9/2023 To Be Determined Honey Sole, BAIRON Page   2/14/2023 To Be Determined Honey Sole, BAIRON Paeg   2/16/2023 To Be Determined Honey Sole, RN [address] [facility] [address]   2/21/2023 To Be Determined Honey Sole, RN [address] [facility] [address]   2/23/2023 To Be Determined Honey Sole, RN [address] [facility] [address]   3/2/2023 To Be Determined Honey Sole, RN [address] [facility] [address]   3/9/2023 To Be Determined Honey Sole, RN [address] [facility]       Non-[facility] follow up appointment(s):   None reported at this time     CTN provided contact information for future needs. Plan for follow-up call in 7-10 days / as needed  based on severity of symptoms and risk factors. Plan for Follow Up calls:    Follow up with appointments as needed    Discuss Referral to the  [facility] Team of [facility] as needed or appropriate. Follow up with ACP as needed. Assess for resolution of Transition of Care Services      Goals Addressed                   This Visit's Progress     Attends follow-up appointments as directed. Target Date:  2-10-23    Patient to follow up with:    PCP  Surgeon     1/26/2023   Patient attended surgical follow up on 1-25-23.     2/3/2023   Patient attended surgical follow up on 2/1/22       Supportive resources in place to maintain patient in the community (ie. Home Health, DME equipment, refer to, medication assistant plan, etc.)        Target Date:  2-12-23 1/12/23  [address] [facility] [address] staff to provide skilled home care visits. 2/3/2023   [facility] staff are providing skilled home care visits.

## 2023-02-05 NOTE — HOME HEALTH
Skilled reason for visit: body systems assess, teach, wound assess  Caregiver involvement: wife assists with adl's and iadl's, wound and ostomy care prn. Medications reviewed and all medications are available in the home this visit. The following education was provided regarding medications:  take medications as ordered. MD notified of any discrepancies/look a-like medications/medication interactions: n/a  Medications are effective at this time.       Home health supplies by type and quantity ordered/delivered this visit include: 1 piece ostomy appliance, non woven 4 x 4 gauze, adhesive remover, skin protectant      Patient education provided this visit: disease process and medication management, infection prevention, paced progressive activity, increased protein po to promote wound healing, reinforced wound care and ostomy appliance change    Sharps education provided: n/a    Patient level of understanding of education provided: states 100% understanding of teaching and teach back    Skilled Care Performed this visit: VS, body systems assess, teaching as above     Patient response to procedure performed: receptive to teaching, patient able to change abd incision dressing and ostomy appliance with spouse assist as teach back    Agency Progress toward goals: progressing as above    Patient's Progress towards personal goals: progressing as above, ostomy patent, pain controlled    Home exercise program: follow ordered diet, take medications as ordered, paced progressive activity    Continued need for the following skills: Nursing    Plan for next visit: VS, body systems assess, continue teaching of disease process and medication management    Patient and/or caregiver notified and agrees to changes in the Plan of Care YES/NO/NA: N/A      The following discharge planning was discussed with the pt/caregiver: d/c when goals met or per patient request

## 2023-02-06 ENCOUNTER — HOME CARE VISIT (OUTPATIENT)
Dept: SCHEDULING | Facility: HOME HEALTH | Age: 68
End: 2023-02-06
Payer: MEDICARE

## 2023-02-06 VITALS
HEART RATE: 78 BPM | SYSTOLIC BLOOD PRESSURE: 132 MMHG | TEMPERATURE: 97.7 F | OXYGEN SATURATION: 98 % | DIASTOLIC BLOOD PRESSURE: 72 MMHG | RESPIRATION RATE: 16 BRPM

## 2023-02-06 PROCEDURE — G0299 HHS/HOSPICE OF RN EA 15 MIN: HCPCS

## 2023-02-06 NOTE — HOME HEALTH
Skilled reason for visit: body systems assess, teach disease process and medication management, reinforce ostomy troubleshooting and appliance change    Caregiver involvement: wife assists with iadl's, ostomy appliance and mid abd dressing change. Medications reviewed and all medications are available in the home this visit. The following education was provided regarding medications: take medications as ordered, do not stop or start medication without MD notification. MD notified of any discrepancies/look a-like medications/medication interactions: n/a  Medications are effective at this time. Home health supplies by type and quantity ordered/delivered this visit include: 1 box of 1 piece ostomy appliances, skin barrier spray, adhesive remover spray, non sterile 4x4 gauze    Patient education provided this visit: as above, patient/ wife able to verbalize ostomy troubleshooting and 1 piece appliance change procedure with few cues required; patient declined to perform ostomy appliance change this visit as one he with wife's assist applied yesterday was still patent. Patient performed mid abd incision dressing change with good technique with no cues required with wife assist.     Sharps education provided: n/a    Patient level of understanding of education provided: patient/ wife state 100% understanding of teaching and teach back.     Skilled Care Performed this visit: VS, as above    Patient response to procedure performed: tolerated mid abd incision dressing change without pain reported by patient    Agency Progress toward goals: progressing, see care plan    Patient's Progress towards personal goals: progressing, see care plan, see above    Home exercise program: follow ordered diet, take medications as ordered    Continued need for the following skills: Nursing    Plan for next visit: disease process and medication management, d/c teaching    Patient and/or caregiver notified and agrees to changes in the Plan of Care YES/NO/NA: N/A      The following discharge planning was discussed with the pt/caregiver: d/c when goals met or per patient request

## 2023-02-09 ENCOUNTER — TELEPHONE (OUTPATIENT)
Dept: SURGERY | Age: 68
End: 2023-02-09

## 2023-02-09 ENCOUNTER — HOME CARE VISIT (OUTPATIENT)
Dept: SCHEDULING | Facility: HOME HEALTH | Age: 68
End: 2023-02-09
Payer: MEDICARE

## 2023-02-09 VITALS
HEART RATE: 72 BPM | OXYGEN SATURATION: 97 % | RESPIRATION RATE: 16 BRPM | DIASTOLIC BLOOD PRESSURE: 56 MMHG | SYSTOLIC BLOOD PRESSURE: 106 MMHG | TEMPERATURE: 97.5 F

## 2023-02-09 PROCEDURE — G0299 HHS/HOSPICE OF RN EA 15 MIN: HCPCS

## 2023-02-09 NOTE — TELEPHONE ENCOUNTER
Discussed with dr mejia eliquis and potassium medication per dr mejia with readback he is to continue eliquis and potassium as prescribed until seeing the cardiologist pt states an understanding

## 2023-02-09 NOTE — Clinical Note
Patient's mid abd open incision lateral edge appears to have a black stitch remnant. Per Marlena Cooks, MD's nurse, patient does not need to receive further medical evaluation prior to patient's already scheduled MD appointment in 2 weeks.

## 2023-02-09 NOTE — Clinical Note
Patient originally ordered to take K dur and Eliquis for 30 day. Patient has 2 tabs of each left. There is 1 refill on each bottle. Please notify patient if he is to obtain refills or not.    Thank you

## 2023-02-10 ENCOUNTER — PATIENT OUTREACH (OUTPATIENT)
Dept: CASE MANAGEMENT | Age: 68
End: 2023-02-10

## 2023-02-10 NOTE — HOME HEALTH
Skilled reason for visit: body systems assessment, teach disease process and medication management, wound assessment    Caregiver involvement: wife assists with adl's and iadl's, ostomy management, abd open incision dressing changes prn. Medications reviewed and all medications are available in the home this visit. The following education was provided regarding medications: take medications as ordered. MD notified of any discrepancies/look a-like medications/medication interactions: n/a  Medications are effective at this time. Home health supplies by type and quantity ordered/delivered this visit include: n/a    Patient education provided this visit: see care plan    Sharps education provided: n/a    Patient level of understanding of education provided: stated 100% understanding and teach back    Skilled Care Performed this visit: VS, body systems assessment, wound and ostomy assess, teach disease process and medication management    Patient response to procedure performed: receptive to teaching, tolerated all procedures well without adverse effects noted     Agency Progress toward goals: progressing, see care plan    Patient's Progress towards personal goals: progressing, patient taking medications as ordered, wife assists with ostomy and open incision wound management prn    Home exercise program: take medications as ordered, perform ostomy appliance change and open incision dressing changes as ordered, get refills of Eliquis and K-dur and take as ordered, continue to monitor stitch reminent at lateral edge of mid abd open incison, report any s&s of infection: redness, warmth, swelling, drainage, pain, foul odor to MD and Aric Nava RN.     Continued need for the following skills: Nursing    Plan for next visit: VS, body systems assess, ostomy and mid abd incision assess, teach disease process and medication teaching    Patient and/or caregiver notified and agrees to changes in the Plan of Care YES/NO/NA: YES The following discharge planning was discussed with the pt/caregiver: when goals met, patient/caregiver able to manage disease process, medications and pain.

## 2023-02-10 NOTE — PROGRESS NOTES
Care Transitions Follow Up Call    Care Transitions Nurse ( CTN) attempted to contact patient via telephone call. An individual answered the phone call and related that patient was not available.

## 2023-02-14 ENCOUNTER — HOME CARE VISIT (OUTPATIENT)
Dept: SCHEDULING | Facility: HOME HEALTH | Age: 68
End: 2023-02-14
Payer: MEDICARE

## 2023-02-14 VITALS
OXYGEN SATURATION: 97 % | HEART RATE: 51 BPM | TEMPERATURE: 97.3 F | RESPIRATION RATE: 16 BRPM | SYSTOLIC BLOOD PRESSURE: 151 MMHG | DIASTOLIC BLOOD PRESSURE: 80 MMHG

## 2023-02-14 PROCEDURE — G0299 HHS/HOSPICE OF RN EA 15 MIN: HCPCS

## 2023-02-15 NOTE — HOME HEALTH
Skilled reason for visit: body systems assess, teach disease process and medication management, wound care, d/c teaching    Caregiver involvement: family assist with iadl's prn, including wife assists with mid abd incision and R abd ostomy appliance changes prn. Medications reviewed and all medications are available in the home this visit. The following education was provided regarding medications: take medications as ordered, obtain refills prn. MD notified of any discrepancies/look a-like medications/medication interactions: n/a  Medications are effective at this time. Home health supplies by type and quantity ordered/delivered this visit include: n/a    Patient education provided this visit: as above    Sharps education provided: n/a    Patient level of understanding of education provided: stated 100% understanding and teach back. Skilled Care Performed this visit: VS, assess and teaching as above, assisted patient with mid abd dressing change, wound assess. Patient response to procedure performed: receptive to teaching, tolerated assess, wound care, teaching without c/o pain or any other adverse effects. [facility]. Patient's Progress towards personal goals: progressing, plan to d/c from agency 2/16/23. Home exercise program: continue to follow ordered diet, continue to take medications as ordered, obtain medication refills prn, continue wound care and ostomy management as ordered/ previously taught. Continued need for the following skills: Nursing    Plan for next visit: VS, body systems assess, wound and ostomy assess, d/c from agency    Patient and/or caregiver notified and agrees to changes in the Plan of Care YES/NO/NA: N/A      The following discharge planning was discussed with the pt/caregiver: patient agrees to d/c from agency at next [facility] visit 2/16/23.

## 2023-02-16 ENCOUNTER — CARE COORDINATION (OUTPATIENT)
Facility: CLINIC | Age: 68
End: 2023-02-16

## 2023-02-16 ENCOUNTER — HOME CARE VISIT (OUTPATIENT)
Dept: SCHEDULING | Facility: HOME HEALTH | Age: 68
End: 2023-02-16
Payer: MEDICARE

## 2023-02-16 VITALS
TEMPERATURE: 96.9 F | DIASTOLIC BLOOD PRESSURE: 66 MMHG | RESPIRATION RATE: 18 BRPM | OXYGEN SATURATION: 98 % | SYSTOLIC BLOOD PRESSURE: 117 MMHG | HEART RATE: 62 BPM

## 2023-02-16 PROCEDURE — G0299 HHS/HOSPICE OF RN EA 15 MIN: HCPCS

## 2023-02-16 NOTE — CARE COORDINATION
[facility] Care Transitions Follow Up Call    Patient Current Location:  [place]    Care Transition Nurse contacted the patient by telephone to follow up after admission on 2023 .  Verified name and  with patient as identifiers.    Patient: Last Millard  Patient : 1955   MRN: [id number]    Reason for Admission:   Perforated Bowel   Please see Discharge Summary for additional discharge diagnose or information as needed.   Discharge Date: 23 RARS: No data recorded    Needs to be reviewed by the provider   Additional needs identified to be addressed with provider: No  none             Method of communication with provider: none.    Patient reports:   - He is doing real good   - He was released from home health   - Incision healing up       Addressed changes since last contact:    No hospital / ED visits noted at this time       Follow Up  Future Appointments   Date Time Provider Department Center   2023 10:00 AM Zeina Meredith MD [facility]   2023  1:00 PM Victorina Carpenter DO [facility]   2023  1:30 PM Cornelio Sharma MD [facility]   3/7/2023  2:00 PM Herson Pearson APRN - NP [facility]     Non-[facility] follow up appointment(s):   None reported per patient     Care Transition Nurse reviewed medical action plan with patient and discussed any barriers to care and/or understanding of plan of care after discharge. Discussed appropriate site of care based on symptoms and resources available to patient including: medical providers and ED as needed for medical concerns or urgent needs. The patient agrees to contact the PCP office for questions related to their healthcare.     Patients top risk factors for readmission:   Medical Condition     Interventions to address risk factors:   Medical action plan reviewed with patient  Patient declined referral to Ambulatory Care Management staff with [facility] for additional services.     Offered patient enrollment in the Remote  Patient Monitoring (RPM) program for in-home monitoring: NA.     Care Transitions Subsequent and Final Call    Subsequent and Final Calls  Do you have any ongoing symptoms?: No  Do you have any needs or concerns that I can assist you with?: No  Care Transitions Interventions  No Identified Needs  Other Interventions:             Care Transition Nurse provided contact information for future needs. No further follow-up call indicated based on severity of symptoms and risk factors. Plan for next call:  n/a   This is the final outreach call with patient unless otherwise noted. Patient has CTN contact information for assistance as needed.      Melissa Amaya RN

## 2023-02-16 NOTE — Clinical Note
Patient d/c from [facility]. No longer homebound. Drives self to store to go shopping daily; patient reports is not taxing. Patient is independent with wife's assist to change mid abd dressing. Patient manages ostomy independently except wife assists with appliance change. Patient is independent with medication management. Arrangements made for patient to receive ostomy supplies by mail order. Patient has contact information.

## 2023-02-20 NOTE — HOME HEALTH
Skilled reason for visit: assess, education    Caregiver involvement: lives with son and daughter who assist with adl's and iadl's prn. Medications reviewed and all medications are available in the home this visit. The following education was provided regarding medications: take medications as ordered. MD notified of any discrepancies/look a-like medications/medication interactions: n/a  Medications are effective at this time.       Home health supplies by type and quantity ordered/delivered this visit include: n/a    Patient education provided this visit: as above, see care plan    Sharps education provided: n/a    Patient level of understanding of education provided: stated 100% understanding of teaching and teach back    Skilled Care Performed this visit: VS, body systems assess, teaching as above, see care plan    Patient response to procedure performed:  receptive to teaching, tolerated assess and teaching without adverse effects noted    Agency Progress toward goals: progressing, see care plan    Patient's Progress towards personal goals: progressing, see care plan    Home exercise program: continue to follow ordered diet, take medications as ordered    Continued need for the following skills: Nursing    Plan for next visit: assess, teach disease process and medication management    Patient and/or caregiver notified and agrees to changes in the Plan of Care YES/NO/NA: N/A      The following discharge planning was discussed with the pt/caregiver: d/c when goals met or per patient request

## 2023-02-22 ENCOUNTER — OFFICE VISIT (OUTPATIENT)
Age: 68
End: 2023-02-22

## 2023-02-22 VITALS
HEIGHT: 69 IN | WEIGHT: 164 LBS | RESPIRATION RATE: 18 BRPM | DIASTOLIC BLOOD PRESSURE: 64 MMHG | HEART RATE: 60 BPM | OXYGEN SATURATION: 99 % | SYSTOLIC BLOOD PRESSURE: 118 MMHG | BODY MASS INDEX: 24.29 KG/M2 | TEMPERATURE: 98.4 F

## 2023-02-22 DIAGNOSIS — Z93.2 ILEOSTOMY STATUS (HCC): ICD-10-CM

## 2023-02-22 DIAGNOSIS — K63.1 PERFORATION OF INTESTINE (NONTRAUMATIC) (HCC): Primary | ICD-10-CM

## 2023-02-22 DIAGNOSIS — I48.92 ATRIAL FLUTTER, UNSPECIFIED TYPE (HCC): ICD-10-CM

## 2023-02-22 PROCEDURE — 99024 POSTOP FOLLOW-UP VISIT: CPT | Performed by: SURGERY

## 2023-02-22 RX ORDER — HYDROCODONE BITARTRATE AND ACETAMINOPHEN 5; 325 MG/1; MG/1
TABLET ORAL
COMMUNITY
Start: 2023-01-11

## 2023-02-22 RX ORDER — APIXABAN 5 MG/1
TABLET, FILM COATED ORAL
COMMUNITY
Start: 2023-02-12

## 2023-02-22 RX ORDER — POTASSIUM CHLORIDE 20 MEQ/1
20 TABLET, EXTENDED RELEASE ORAL 2 TIMES DAILY
COMMUNITY
Start: 2023-02-11 | End: 2023-03-12

## 2023-02-22 ASSESSMENT — ENCOUNTER SYMPTOMS
SHORTNESS OF BREATH: 0
ABDOMINAL PAIN: 0
CHEST TIGHTNESS: 0

## 2023-02-22 NOTE — PROGRESS NOTES
CC:   Chief Complaint   Patient presents with    Follow-up     Perforation of intestine (nontraumatic) ([facility] [address]  K63.1        2. Ileostomy status (                Assessment:    ICD-10-CM    1. Perforation of intestine (nontraumatic) ([facility])  K63.1       2. Ileostomy status ([facility] [address]  Z93.2       3. Atrial flutter, unspecified type [facility])  I48.92           Plan: Continue local wound care with home health, his wound is progressing nicely. He is scheduled to follow up with Dr. Bernardino Abdalla to discuss colonoscopy and surgery in the future. He agrees with this plan. HPI:  Rod Bermudez is a 75 y.o. male who is here after his initial follow up from discharge from the hospital. He presented to the ED on 1/1/2023 with perforated cecum and sepsis. He was having decreased appetite several weeks prior to this and constipation. Normal cologuard in October. He has never undergone colonoscopy. He underwent emergent exploratory laparotomy and right hemicolectomy for perforation and sepsis. Postprocedure CT scan with rectal contrast did demonstrate an obstructing colonic mass in the descending colon. CEA level was normal.  Patient went back to the surgery for a loop ileostomy and the mass was palpable in the proximal descending colon but resection was not performed due to significant dilation of the bowel. States his appetite has returned and he is slowly regaining weight that he lost prior to his hospitalization and during his hospitalization. He reports no abdominal pain or problems with his ileostomy. He is doing wound care as instructed. He does have upcoming appointment with cardiology as he was found to be in atrial flutter during the hospital admission. He is continuing his anticoagulation until he sees them. His appetite is good and weight continues to increase.     Allergies:  No Known Allergies    Medication Review:  Current Outpatient Medications on File Prior to Visit   Medication Sig Dispense Refill    ELIQUIS 5 MG TABS tablet take 1 tablet by mouth twice a day      HYDROcodone-acetaminophen (NORCO) 5-325 MG per tablet take 1 tablet by mouth every 6 hours AS NEEDED FOR PAIN for up to 3 days      potassium chloride (KLOR-CON M) 20 MEQ extended release tablet Take 20 mEq by mouth 2 times daily      atorvastatin (LIPITOR) 10 MG tablet Take 1 tablet by mouth daily      metoprolol tartrate (LOPRESSOR) 25 MG tablet take 1 tablet by mouth twice a day      clobetasol (TEMOVATE) 0.05 % ointment Apply topically 2 times daily (Patient not taking: Reported on 2023)       No current facility-administered medications on file prior to visit.       Systems Review:  Review of Systems   Constitutional:  Negative for fatigue, fever and unexpected weight change.   Respiratory:  Negative for chest tightness and shortness of breath.    Cardiovascular:  Negative for chest pain and palpitations.   Gastrointestinal:  Negative for abdominal pain.   Hematological:  Negative for adenopathy. Does not bruise/bleed easily.     PMH:  Past Medical History:   Diagnosis Date    Anxiety     Benign essential hypertension     DM type 2 (diabetes mellitus, type 2) ([facility])        Surgical History:  Past Surgical History:   Procedure Laterality Date    GI      loop ileostomy       Social History:  Social History     Socioeconomic History    Marital status: Single     Spouse name: None    Number of children: None    Years of education: None    Highest education level: None   Tobacco Use    Smoking status: Former     Types: Cigarettes     Quit date: 1980     Years since quittin.1    Smokeless tobacco: Never   Substance and Sexual Activity    Alcohol use: Not Currently    Drug use: No     Social Determinants of Health     Financial Resource Strain: Low Risk     Difficulty of Paying Living Expenses: Not hard at all   Food Insecurity: No Food Insecurity    Worried About Running Out of Food in the Last Year: Never true    Ran Out of Food in the Last Year: Never true  Family History:  Family History   Problem Relation Age of Onset    Osteoarthritis Father     No Known Problems Mother        No visits with results within 3 Month(s) from this visit.    Latest known visit with results is:   Orders Only on 01/04/2021   Component Date Value Ref Range Status    Glucose 01/04/2021 123 (A)  65 - 99 mg/dL Final    BUN 01/04/2021 11  8 - 27 mg/dL Final    Creatinine 01/04/2021 1.22  0.76 - 1.27 mg/dL Final    EGFR IF NonAfrican American 01/04/2021 61  >59 mL/min/1.73 Final    GFR  01/04/2021 71  >59 mL/min/1.73 Final    Bun/Cre Ratio 01/04/2021 9 (A)  10 - 24 NA Final    Sodium 01/04/2021 141  134 - 144 mmol/L Final    Potassium 01/04/2021 3.1 (A)  3.5 - 5.2 mmol/L Final    Chloride 01/04/2021 96  96 - 106 mmol/L Final    CO2 01/04/2021 30 (A)  20 - 29 mmol/L Final    Calcium 01/04/2021 9.4  8.6 - 10.2 mg/dL Final    Total Protein 01/04/2021 7.3  6.0 - 8.5 g/dL Final    Albumin 01/04/2021 4.0  3.8 - 4.8 g/dL Final    Globulin, Total 01/04/2021 3.3  1.5 - 4.5 g/dL Final    Albumin/Globulin Ratio 01/04/2021 1.2  1.2 - 2.2 NA Final    Total Bilirubin 01/04/2021 1.3 (A)  0.0 - 1.2 mg/dL Final    Alkaline Phosphatase 01/04/2021 57  39 - 117 IU/L Final    AST 01/04/2021 14  0 - 40 IU/L Final    ALT 01/04/2021 11  0 - 44 IU/L Final    Hemoglobin A1C 01/04/2021 6.1 (A)  4.8 - 5.6 % Final    Comment:          Prediabetes: 5.7 - 6.4           Diabetes: >6.4           Glycemic control for adults with diabetes: <7.0      eAG 01/04/2021 128  mg/dL Final    WBC 01/04/2021 9.7  3.4 - 10.8 x10E3/uL Final    RBC 01/04/2021 5.25  4.14 - 5.80 x10E6/uL Final    Hemoglobin 01/04/2021 17.0  13.0 - 17.7 g/dL Final    Hematocrit 01/04/2021 47.7  37.5 - 51.0 % Final    MCV 01/04/2021 91  79 - 97 fL Final    MCH 01/04/2021 32.4  26.6 - 33.0 pg Final    MCHC 01/04/2021 35.6  31.5 - 35.7 g/dL Final    RDW 01/04/2021 12.1  11.6 - 15.4 % Final    Platelets 98/32/9017 232  150 - 450 x10E3/uL Final Neutrophils % 01/04/2021 61  Not Estab. % Final    Lymphocytes % 01/04/2021 23  Not Estab. % Final    Monocytes % 01/04/2021 12  Not Estab. % Final    Eosinophils % 01/04/2021 3  Not Estab. % Final    Basophils % 01/04/2021 1  Not Estab. % Final    Neutrophils Absolute 01/04/2021 5.9  1.4 - 7.0 x10E3/uL Final    Lymphocytes Absolute 01/04/2021 2.2  0.7 - 3.1 x10E3/uL Final    Monocytes Absolute 01/04/2021 1.2 (A)  0.1 - 0.9 x10E3/uL Final    Eosinophils Absolute 01/04/2021 0.3  0.0 - 0.4 x10E3/uL Final    Basophils Absolute 01/04/2021 0.1  0.0 - 0.2 x10E3/uL Final    Immature Granulocytes 01/04/2021 0  Not Estab. % Final    Granulocyte Absolute Count 01/04/2021 0.0  0.0 - 0.1 x10E3/uL Final    TSH 01/04/2021 3.950  0.450 - 4.500 uIU/mL Final    Cholesterol, Total 01/04/2021 145  100 - 199 mg/dL Final    Triglycerides 01/04/2021 130  0 - 149 mg/dL Final    HDL 01/04/2021 48  >39 mg/dL Final    VLDL 01/04/2021 23  5 - 40 mg/dL Final    LDL Calculated 01/04/2021 74  0 - 99 mg/dL Final    Creatinine, Ur 01/05/2021 66.7  Not Estab. mg/dL Final    Microalb, Ur 01/05/2021 16.4  Not Estab. ug/mL Final    Microalbumin Creatinine Ratio 01/05/2021 25  0 - 29 mg/g creat Final    Comment:                        Normal:                0 -  29                         Moderately increased: 30 - 300                         Severely increased:       >300      HCV Ab 01/04/2021 <0.1  0.0 - 0.9 s/co ratio Final    Comment:                                   Negative:     < 0.8                               Indeterminate: 0.8 - 0.9                                    Positive:     > 0.9   The CDC recommends that a positive HCV antibody result   be followed up with a HCV Nucleic Acid Amplification   test ([id number]).          CONVERSION RESULT - Cardiac Event Monitor with [facility]    Patient monitored for 27d 16h 13m    During 3 symptomatic episodes of heart racing, skipped beat,   lightheadedness PACs with sinus tachycardia noted and 1 episode of PSVT   converting to sinus rhythm. Frequent A-fib/SVT with total A-fib burden of 10%. Some episodes   counted as A-fib appear to be SVT episodes and vice versa. Total supraventricular ectopy burden of 17%    PVC burden of 3%    70 events were transmitted. 3 patient triggered; 67 auto triggered  Patient monitored for 27d 16h 13m  AF occurred 196 time(s) with HR range of 50 - 120; Total AF [place] 10%  SVT occurred 7 time(s) with fastest run 203 BPM  311,202 PACs with PAC burden of 17%  78,940 PVCs with PVC burden of 3%         Physical Exam:  Temp 98.4 °F (36.9 °C)   Resp 18   Ht 5' 9\" (1.753 m)   Wt 164 lb (74.4 kg)   BMI 24.22 kg/m²  BMI: Body mass index is 24.22 kg/m². Physical Exam  Abdominal:      General: Abdomen is flat. Palpations: Abdomen is soft. Tenderness: There is no abdominal tenderness. Comments: Ileostomy with stool in bag    Midline wound healing well with aquacel dressing       I have reviewed the information entered by the clinical staff and/or patient and verified it as accurate or edited where necessary.      Electronically signed by:    Dewayne Jiménez DO, MPH

## 2023-02-23 ENCOUNTER — HOSPITAL ENCOUNTER (OUTPATIENT)
Facility: HOSPITAL | Age: 68
Discharge: HOME | End: 2023-02-23
Payer: MEDICARE

## 2023-02-23 ENCOUNTER — OFFICE VISIT (OUTPATIENT)
Age: 68
End: 2023-02-23
Payer: MEDICARE

## 2023-02-23 VITALS
WEIGHT: 164 LBS | SYSTOLIC BLOOD PRESSURE: 117 MMHG | HEART RATE: 67 BPM | TEMPERATURE: 98.7 F | BODY MASS INDEX: 24.29 KG/M2 | HEIGHT: 69 IN | RESPIRATION RATE: 15 BRPM | OXYGEN SATURATION: 99 % | DIASTOLIC BLOOD PRESSURE: 69 MMHG

## 2023-02-23 DIAGNOSIS — K56.609 COLON OBSTRUCTION (HCC): ICD-10-CM

## 2023-02-23 DIAGNOSIS — K56.609 COLON OBSTRUCTION (HCC): Primary | ICD-10-CM

## 2023-02-23 PROBLEM — E11.9 TYPE 2 DIABETES MELLITUS (HCC): Status: ACTIVE | Noted: 2023-02-23

## 2023-02-23 LAB
ANION GAP SERPL CALC-SCNC: 7 MMOL/L (ref 3–18)
BUN SERPL-MCNC: 7 MG/DL (ref 7–18)
BUN/CREAT SERPL: 7 (ref 12–20)
CALCIUM SERPL-MCNC: 8.3 MG/DL (ref 8.5–10.1)
CHLORIDE SERPL-SCNC: 101 MMOL/L (ref 100–111)
CO2 SERPL-SCNC: 30 MMOL/L (ref 21–32)
CREAT SERPL-MCNC: 0.95 MG/DL (ref 0.6–1.3)
EKG ATRIAL RATE: 31 BPM
EKG DIAGNOSIS: NORMAL
EKG Q-T INTERVAL: 440 MS
EKG QRS DURATION: 78 MS
EKG QTC CALCULATION (BAZETT): 457 MS
EKG R AXIS: 7 DEGREES
EKG T AXIS: 31 DEGREES
EKG VENTRICULAR RATE: 65 BPM
GLUCOSE SERPL-MCNC: 107 MG/DL (ref 74–99)
POTASSIUM SERPL-SCNC: 3 MMOL/L (ref 3.5–5.5)
SODIUM SERPL-SCNC: 138 MMOL/L (ref 136–145)

## 2023-02-23 PROCEDURE — 1036F TOBACCO NON-USER: CPT | Performed by: COLON & RECTAL SURGERY

## 2023-02-23 PROCEDURE — 3017F COLORECTAL CA SCREEN DOC REV: CPT | Performed by: COLON & RECTAL SURGERY

## 2023-02-23 PROCEDURE — G8420 CALC BMI NORM PARAMETERS: HCPCS | Performed by: COLON & RECTAL SURGERY

## 2023-02-23 PROCEDURE — 80048 BASIC METABOLIC PNL TOTAL CA: CPT

## 2023-02-23 PROCEDURE — 3074F SYST BP LT 130 MM HG: CPT | Performed by: COLON & RECTAL SURGERY

## 2023-02-23 PROCEDURE — G8484 FLU IMMUNIZE NO ADMIN: HCPCS | Performed by: COLON & RECTAL SURGERY

## 2023-02-23 PROCEDURE — G8427 DOCREV CUR MEDS BY ELIG CLIN: HCPCS | Performed by: COLON & RECTAL SURGERY

## 2023-02-23 PROCEDURE — 93005 ELECTROCARDIOGRAM TRACING: CPT

## 2023-02-23 PROCEDURE — 36415 COLL VENOUS BLD VENIPUNCTURE: CPT

## 2023-02-23 PROCEDURE — 3078F DIAST BP <80 MM HG: CPT | Performed by: COLON & RECTAL SURGERY

## 2023-02-23 PROCEDURE — 1123F ACP DISCUSS/DSCN MKR DOCD: CPT | Performed by: COLON & RECTAL SURGERY

## 2023-02-23 PROCEDURE — 99204 OFFICE O/P NEW MOD 45 MIN: CPT | Performed by: COLON & RECTAL SURGERY

## 2023-02-23 NOTE — PROGRESS NOTES
HPI: Charyl Nyhan is a 70 y.o. male presenting with chief complain of large bowel obstruction with cecal perforation status post right colectomy followed by diverting loop ileostomy. The patient presented with a cecal perforation. This was secondary to a descending colon lesion seen on imaging. He underwent right colectomy for the perforation and then loop ileostomy. He recovered well after that and in the office today he tells me he is tolerating diet and his stoma is functioning well. Prior to all this he had normal bowel function, moving his bowels daily to every other day. He has no history of incontinence. He has no family history of colon cancer. He has never had a colonoscopy.     Past Medical History:   Diagnosis Date    Anxiety     Benign essential hypertension     DM type 2 (diabetes mellitus, type 2) ([facility])        Past Surgical History:   Procedure Laterality Date    GI      loop ileostomy       Family History   Problem Relation Age of Onset    Osteoarthritis Father     No Known Problems Mother        Social History     Socioeconomic History    Marital status: Single     Spouse name: None    Number of children: None    Years of education: None    Highest education level: None   Tobacco Use    Smoking status: Former     Types: Cigarettes     Quit date: 1980     Years since quittin.1    Smokeless tobacco: Never   Substance and Sexual Activity    Alcohol use: Not Currently    Drug use: No     Social Determinants of Health     Financial Resource Strain: Low Risk     Difficulty of Paying Living Expenses: Not hard at all   Food Insecurity: No Food Insecurity    Worried About Running Out of Food in the Last Year: Never true    Ran Out of Food in the Last Year: Never true       Current Outpatient Medications   Medication Instructions    atorvastatin (LIPITOR) 10 MG tablet Take 1 tablet by mouth daily    clobetasol (TEMOVATE) 0.05 % ointment 2 TIMES DAILY    ELIQUIS 5 MG TABS tablet take 1 tablet by mouth twice a day    HYDROcodone-acetaminophen (NORCO) 5-325 MG per tablet take 1 tablet by mouth every 6 hours AS NEEDED FOR PAIN for up to 3 days    metoprolol tartrate (LOPRESSOR) 25 MG tablet take 1 tablet by mouth twice a day    potassium chloride (KLOR-CON M) 20 MEQ extended release tablet 20 mEq, Oral, 2 TIMES DAILY        No Known Allergies    ROS: negative    Vitals:    02/23/23 1337   BP: 117/69   Pulse: 67   Resp: 15   Temp: 98.7 °F (37.1 °C)   SpO2: 99%       Physical Exam  Constitutional:       Appearance: He is well-developed. HENT:      Head: Normocephalic and atraumatic. Abdominal:      General: There is no distension. Palpations: Abdomen is soft. Tenderness: There is no abdominal tenderness. Skin:     General: Skin is warm and dry. Wound is nearly healed, there is a small sinus above the umbilicus which is touched with silver nitrate and repacked, there is a very superficial sinus in the lower pole which is touched with silver nitrate as well.     Lab Results   Component Value Date    WBC 8.3 01/10/2023    HGB 12.1 (L) 01/10/2023    HCT 34.6 (L) 01/10/2023    MCV 89.6 01/10/2023     01/10/2023     Lab Results   Component Value Date/Time     01/11/2023 04:52 AM    K 3.6 01/11/2023 04:52 AM    CL 96 01/11/2023 04:52 AM    CO2 30 01/11/2023 04:52 AM    BUN 8 01/11/2023 04:52 AM    CREATININE 0.74 01/11/2023 04:52 AM    GLUCOSE 143 01/11/2023 04:52 AM    CALCIUM 7.8 01/11/2023 04:52 AM      Lab Results   Component Value Date/Time    MG 2.1 01/11/2023 04:52 AM     Lab Results   Component Value Date    CALCIUM 7.8 (L) 01/11/2023    PHOS 2.7 01/11/2023       Assessment / Plan    Status post right colectomy followed by diverting loop ileostomy for a descending colon lesion causing large bowel obstruction and cecal perforation  Diet as tolerated  Continue wound care, follow-up in 3 weeks for wound check  Schedule flexible sigmoidoscopy for 6 weeks from now  He can take enemas for this, however we will not do distal irrigations through the ileostomy as he may be completely obstructed    The diagnoses and plan were discussed with the patient. All questions answered. Plan of care agreed to by all concerned.

## 2023-02-23 NOTE — LETTER
[address] [facility] [address]  [address] [place] [address]  Phone: [phone]  Fax: [phone]           Brenda Núñez MD      February 23, 2023     Patient: Frde Teran   MR Number: [id number]   YOB: 1955   Date of Visit: 2/23/2023       Dear Alistair tay,    I saw Ayana Silva in the office today following recent hospitalization and surgery for cecal perforation. He is recovering well and his ileostomy is functioning without difficulty. Imaging displayed a possible colon mass in the descending colon as the cause. I will give him some additional time to recover and perform flexible sigmoidoscopy in approximately 6 weeks time for diagnostic purposes. If there is a colon cancer as the cause of his obstruction I will complete staging with CT of the chest and CEA. We will then make plans for surgical excision. We may be able to perform both excising the lesion as well as reversing his ileostomy simultaneously. If you have questions, please do not hesitate to call me. I look forward to following Ayana Shira along with you. Sincerely,        Brenda Núñez MD    CC providers:   MAXINE Cobos NP  [address]  [address]  [address]  [place]  [address] [place] [address]  [facility] [employer]

## 2023-02-24 ENCOUNTER — TELEPHONE (OUTPATIENT)
Age: 68
End: 2023-02-24

## 2023-02-24 NOTE — TELEPHONE ENCOUNTER
Patient at the store. Notified spouse, Patricia Monteiro, of potassium results and for patient to stay on K supplement and follow up with pcp. She understands and will relay info to patient.

## 2023-03-10 ENCOUNTER — TELEPHONE (OUTPATIENT)
Age: 68
End: 2023-03-10

## 2023-03-10 NOTE — TELEPHONE ENCOUNTER
Spoke to pt he states he does not need refill on his metoprolol or potassium and he is aware that his cardiologist will be the one to prescribe this medication for him once he gets in to see him pt states an understanding

## 2023-03-13 ENCOUNTER — CLINICAL DOCUMENTATION (OUTPATIENT)
Age: 68
End: 2023-03-13

## 2023-03-13 ENCOUNTER — TELEPHONE (OUTPATIENT)
Age: 68
End: 2023-03-13

## 2023-03-13 NOTE — TELEPHONE ENCOUNTER
Spoke with patient per Dr. Meredith regarding BP.  Please tell him to keep a chart of his blood pressure and heart rate twice a day and bring it to the visit.  Continue the same medications. He voices understanding and acceptance of this advice and will call back if any further questions or concerns.

## 2023-03-13 NOTE — TELEPHONE ENCOUNTER
Patient called and stated that his bp is up its 145/79 this morning and after 1:00 he checked again and it was 179/84 heart rate at 55 he wants to know what to do about the Metoprilol 25mg.

## 2023-03-13 NOTE — TELEPHONE ENCOUNTER
Please tell him to keep a chart of his blood pressure and heart rate twice a day and bring it to the visit.   Continue the same medications

## 2023-03-13 NOTE — TELEPHONE ENCOUNTER
Patient states you did CV in hospital and about 3 days age his BP has now been increasing more as the days go by. He states BP this morning was 145/79 and then after 1pm BP went up to 179/84 HR 55. He states no other symptoms and states no stress or strenuous activities. He will see Debra Thompson in office for post op hospital visit on 3/28/23.  Please advise

## 2023-03-13 NOTE — PROGRESS NOTES
Chart reviewed. Patient had exploratory laparotomy for cecal perforation on January 1, 2023. Anastomosis was ileum to the mid transverse colon. He subsequently underwent reoperation on January 5, 2023. Mass was noted in the proximal descending colon. Loop ileostomy was performed. Pathology from the initial operation was consistent with ischemia and perforation. Patient has a history of alcohol abuse, drinking 6 beers per day. He has no history of prior colonoscopy. He had a negative Cologuard recently. Postoperative course was significant for episodes of V. tach and atrial flutter. He was placed on Eliquis by cardiology. Given all the above he should certainly see cardiology prior to any reintervention. Given that anastomosis was performed to the mid transverse colon he will likely require completion colectomy. Can consider ileosigmoid anastomosis as well.

## 2023-03-14 RX ORDER — POTASSIUM CHLORIDE 20 MEQ/1
TABLET, EXTENDED RELEASE ORAL
Qty: 60 TABLET | OUTPATIENT
Start: 2023-03-14

## 2023-03-16 ENCOUNTER — OFFICE VISIT (OUTPATIENT)
Age: 68
End: 2023-03-16

## 2023-03-16 VITALS
SYSTOLIC BLOOD PRESSURE: 162 MMHG | RESPIRATION RATE: 17 BRPM | BODY MASS INDEX: 22.96 KG/M2 | HEIGHT: 69 IN | HEART RATE: 66 BPM | WEIGHT: 155 LBS | DIASTOLIC BLOOD PRESSURE: 77 MMHG | TEMPERATURE: 98.1 F | OXYGEN SATURATION: 100 %

## 2023-03-16 DIAGNOSIS — K56.609 COLON OBSTRUCTION (HCC): Primary | ICD-10-CM

## 2023-03-16 DIAGNOSIS — K63.89 COLONIC MASS: ICD-10-CM

## 2023-03-16 NOTE — PROGRESS NOTES
Subjective: Tolerating diet with good bowel function. Past medical history and ROS were reviewed and unchanged. Abdomen: Soft, nontender nondistended  Wound fully healed now, ileostomy pink    Assessment / Plan    Status post right colectomy followed by diverting loop ileostomy for obstructing descending colon mass  Flexible sigmoidoscopy, biopsies  Should come off Eliquis  Discussed some specifics for future surgery, which will likely be completion subtotal or total colectomy    20 minutes was spent in patient care. The diagnoses and plan were discussed with patient. All questions answered. Plan of care agreed to by all concerned.

## 2023-03-17 ENCOUNTER — TELEPHONE (OUTPATIENT)
Age: 68
End: 2023-03-17

## 2023-03-17 DIAGNOSIS — I48.92 ATRIAL FLUTTER (HCC): Primary | ICD-10-CM

## 2023-03-17 DIAGNOSIS — I10 BENIGN ESSENTIAL HYPERTENSION: ICD-10-CM

## 2023-03-17 NOTE — TELEPHONE ENCOUNTER
Requested Prescriptions     Pending Prescriptions Disp Refills    metoprolol tartrate (LOPRESSOR) 25 MG tablet 180 tablet 1     Sig: Take 1 tablet by mouth 2 times daily

## 2023-03-17 NOTE — TELEPHONE ENCOUNTER
Patient called and stated he need his Metoprolol 25mg  refilled because his pcp no longer refill it he would like a 90day supply and the pharmacy is [facility] @ [address] [place] [address]

## 2023-03-21 ENCOUNTER — HOSPITAL ENCOUNTER (OUTPATIENT)
Facility: HOSPITAL | Age: 68
Discharge: HOME | End: 2023-03-24
Payer: MEDICARE

## 2023-03-21 LAB
ANION GAP SERPL CALC-SCNC: 1 MMOL/L (ref 3–18)
BUN SERPL-MCNC: 10 MG/DL (ref 7–18)
BUN/CREAT SERPL: 10 (ref 12–20)
CALCIUM SERPL-MCNC: 9.2 MG/DL (ref 8.5–10.1)
CHLORIDE SERPL-SCNC: 103 MMOL/L (ref 100–111)
CO2 SERPL-SCNC: 32 MMOL/L (ref 21–32)
CREAT SERPL-MCNC: 0.98 MG/DL (ref 0.6–1.3)
GLUCOSE SERPL-MCNC: 111 MG/DL (ref 74–99)
POTASSIUM SERPL-SCNC: 3.8 MMOL/L (ref 3.5–5.5)
SODIUM SERPL-SCNC: 136 MMOL/L (ref 136–145)

## 2023-03-21 PROCEDURE — 80048 BASIC METABOLIC PNL TOTAL CA: CPT

## 2023-03-21 PROCEDURE — 36415 COLL VENOUS BLD VENIPUNCTURE: CPT

## 2023-03-28 ENCOUNTER — OFFICE VISIT (OUTPATIENT)
Age: 68
End: 2023-03-28
Payer: MEDICARE

## 2023-03-28 VITALS
DIASTOLIC BLOOD PRESSURE: 64 MMHG | HEART RATE: 56 BPM | HEIGHT: 69 IN | WEIGHT: 167 LBS | SYSTOLIC BLOOD PRESSURE: 160 MMHG | BODY MASS INDEX: 24.73 KG/M2

## 2023-03-28 DIAGNOSIS — I50.32 CHRONIC DIASTOLIC CHF (CONGESTIVE HEART FAILURE) (HCC): ICD-10-CM

## 2023-03-28 DIAGNOSIS — Z09 HOSPITAL DISCHARGE FOLLOW-UP: ICD-10-CM

## 2023-03-28 DIAGNOSIS — R00.1 BRADYCARDIA: ICD-10-CM

## 2023-03-28 DIAGNOSIS — I10 BENIGN ESSENTIAL HYPERTENSION: ICD-10-CM

## 2023-03-28 DIAGNOSIS — I48.3 TYPICAL ATRIAL FLUTTER (HCC): Primary | ICD-10-CM

## 2023-03-28 PROCEDURE — 3078F DIAST BP <80 MM HG: CPT | Performed by: NURSE PRACTITIONER

## 2023-03-28 PROCEDURE — G8427 DOCREV CUR MEDS BY ELIG CLIN: HCPCS | Performed by: NURSE PRACTITIONER

## 2023-03-28 PROCEDURE — 99214 OFFICE O/P EST MOD 30 MIN: CPT | Performed by: NURSE PRACTITIONER

## 2023-03-28 PROCEDURE — G8420 CALC BMI NORM PARAMETERS: HCPCS | Performed by: NURSE PRACTITIONER

## 2023-03-28 PROCEDURE — 1036F TOBACCO NON-USER: CPT | Performed by: NURSE PRACTITIONER

## 2023-03-28 PROCEDURE — 1123F ACP DISCUSS/DSCN MKR DOCD: CPT | Performed by: NURSE PRACTITIONER

## 2023-03-28 PROCEDURE — 3017F COLORECTAL CA SCREEN DOC REV: CPT | Performed by: NURSE PRACTITIONER

## 2023-03-28 PROCEDURE — 1111F DSCHRG MED/CURRENT MED MERGE: CPT | Performed by: NURSE PRACTITIONER

## 2023-03-28 PROCEDURE — G8484 FLU IMMUNIZE NO ADMIN: HCPCS | Performed by: NURSE PRACTITIONER

## 2023-03-28 PROCEDURE — 3077F SYST BP >= 140 MM HG: CPT | Performed by: NURSE PRACTITIONER

## 2023-03-28 RX ORDER — POTASSIUM CHLORIDE 20 MEQ/1
20 TABLET, EXTENDED RELEASE ORAL 2 TIMES DAILY
COMMUNITY
End: 2023-03-28

## 2023-03-28 RX ORDER — LOSARTAN POTASSIUM 25 MG/1
25 TABLET ORAL DAILY
Qty: 90 TABLET | Refills: 2 | Status: SHIPPED | OUTPATIENT
Start: 2023-03-28

## 2023-03-28 ASSESSMENT — ENCOUNTER SYMPTOMS
APNEA: 0
CHEST TIGHTNESS: 0
NAUSEA: 0
BACK PAIN: 0
ABDOMINAL PAIN: 0
COUGH: 0
ABDOMINAL DISTENTION: 0
SHORTNESS OF BREATH: 0
WHEEZING: 0

## 2023-03-28 NOTE — PROGRESS NOTES
1. Have you been to the ER, urgent care clinic since your last visit? Hospitalized since your last visit? Yes Where: [facility]    2. Have you seen or consulted any other health care providers outside of the [address] [place] [address] since your last visit? Include any pap smears or colon screening. No     3. Do you need any refills today?    no

## 2023-03-28 NOTE — PROGRESS NOTES
HISTORY OF PRESENT ILLNESS    Maritza Arana is a 70 y.o. male. 2023  Patient seen following January admission for Peritonitis due to cecal perforation s/p exploratory laparotomy, right hemicolectomy  -Paroxysmal Atrial flutter with 2-1 block, new dx this admission, in setting of above. Eliquis started this admission for [facility].   - SVT episodes, s/p adenosine, digoxin and IV amiodarone loading. Since discharge he complains of 3 episodes of palpitations. He denies chest pain shortness of breath or edema. He does endorse mildly elevated blood pressure 140-160s over 70s. Family History   Problem Relation Age of Onset    Osteoarthritis Father     No Known Problems Mother        Past Medical History:   Diagnosis Date    Anxiety     Benign essential hypertension     DM type 2 (diabetes mellitus, type 2) ([facility])        Past Surgical History:   Procedure Laterality Date    GI      loop ileostomy       Social History     Tobacco Use    Smoking status: Former     Types: Cigarettes     Quit date: 1980     Years since quittin.2    Smokeless tobacco: Never   Substance Use Topics    Alcohol use: Not Currently       No Known Allergies    Prior to Admission medications    Medication Sig Start Date End Date Taking? Authorizing Provider   potassium chloride (KLOR-CON M) 20 MEQ extended release tablet Take 20 mEq by mouth 2 times daily   Yes Historical Provider, MD   metoprolol tartrate (LOPRESSOR) 25 MG tablet take 1 tablet by mouth twice a day 3/27/23  Yes Victor Manuel Suarez MD   ELIQUIS 5 MG TABS tablet take 1 tablet by mouth twice a day 23  Yes Historical Provider, MD   atorvastatin (LIPITOR) 10 MG tablet Take 1 tablet by mouth daily 22  Yes Historical Provider, MD         BP (!) 160/64   Pulse 56   Ht 5' 9\" (1.753 m)   Wt 167 lb (75.8 kg)   BMI 24.66 kg/m²         Review of Systems   Constitutional:  Negative for activity change and fatigue. HENT:  Negative for congestion.     Eyes:

## 2023-03-29 ENCOUNTER — FOLLOWUP TELEPHONE ENCOUNTER (OUTPATIENT)
Facility: HOSPITAL | Age: 68
End: 2023-03-29

## 2023-03-29 NOTE — PERIOP NOTE
Alda Millard's PAT phone assessment completed on 3/29. The following instructions were reviewed with Vic Barlow and he verbalized understanding. Do NOT eat or drink anything, including candy, gum, or ice chips after midnight on 4/5, unless you have specific instructions from your surgeon or anesthesia provider to do so. You may brush your teeth before coming to the hospital.  No smoking 24 hours prior to the day of surgery. No alcohol 24 hours prior to the day of surgery. No recreational drugs for one week prior to the day of surgery. Leave all valuables, including money/purse, at home. Remove all jewelry, nail polish, acrylic nails, and makeup (including mascara); no lotions powders, deodorant, or perfume/cologne/after shave on the skin. Glasses/contact lenses and dentures may be worn to the hospital.  They will be removed prior to surgery. Call your doctor if symptoms of a cold or illness develop within 24-48 hours prior to your surgery. 10.  AN ADULT MUST DRIVE YOU HOME AFTER OUTPATIENT SURGERY. 11.  If you are having an outpatient procedure, please make arrangements for a responsible adult to be with you for 24 hours after your surgery. 12. ONE VISITOR in the hospital at this time for outpatient procedures. Exceptions may be made for surgical admissions, per hospital guidelines        Special Instructions:      Bring list of CURRENT medications. Bring any pertinent legal medical records. Take these medications the morning of surgery with a sip of water:  as directed by MD  Follow physician instructions about stopping anticoagulants. Complete bowel prep per MD instructions.

## 2023-03-29 NOTE — TELEPHONE ENCOUNTER
Outpatient ostomy clinic visit with pt & his wife:   Pt has loop ileostomy 1/5/2023. Pt's stoma appliance is Coloplast 1 piece & ordered through [employer]. Pt using stoma powder & skin prep only when needed for any peristomal skin irritations. Pt states he has intact skin today, appliance has been doing well for 4 days, pt would like to see if it can last until 5 days. Instruction provided that if his appliance leaks at day 5, then 4 days wear time is his sweet spot. Instruction provided to not use stoma powder preventatively. Went over daily care tips & how to watch out for silent leaks. Pt has my number for questions & follow up prn any issues. Education provided & ensured pt states he is eating well & drinking fluids well.   Cyndy LUND, RN, [facility], [address]

## 2023-03-31 ENCOUNTER — HOSPITAL ENCOUNTER (OUTPATIENT)
Facility: HOSPITAL | Age: 68
End: 2023-03-31
Payer: MEDICARE

## 2023-03-31 DIAGNOSIS — I48.3 TYPICAL ATRIAL FLUTTER (HCC): ICD-10-CM

## 2023-03-31 DIAGNOSIS — I50.32 CHRONIC DIASTOLIC CHF (CONGESTIVE HEART FAILURE) (HCC): ICD-10-CM

## 2023-03-31 LAB
ANION GAP SERPL CALC-SCNC: 2 MMOL/L (ref 3–18)
BUN SERPL-MCNC: 7 MG/DL (ref 7–18)
BUN/CREAT SERPL: 7 (ref 12–20)
CALCIUM SERPL-MCNC: 9.1 MG/DL (ref 8.5–10.1)
CHLORIDE SERPL-SCNC: 103 MMOL/L (ref 100–111)
CO2 SERPL-SCNC: 31 MMOL/L (ref 21–32)
CREAT SERPL-MCNC: 1.01 MG/DL (ref 0.6–1.3)
GLUCOSE SERPL-MCNC: 139 MG/DL (ref 74–99)
POTASSIUM SERPL-SCNC: 4 MMOL/L (ref 3.5–5.5)
SODIUM SERPL-SCNC: 136 MMOL/L (ref 136–145)

## 2023-03-31 PROCEDURE — 36415 COLL VENOUS BLD VENIPUNCTURE: CPT

## 2023-03-31 PROCEDURE — 80048 BASIC METABOLIC PNL TOTAL CA: CPT

## 2023-04-05 ENCOUNTER — HOSPITAL ENCOUNTER (OUTPATIENT)
Facility: HOSPITAL | Age: 68
Setting detail: OUTPATIENT SURGERY
Discharge: HOME | End: 2023-04-05
Attending: COLON & RECTAL SURGERY | Admitting: COLON & RECTAL SURGERY
Payer: MEDICARE

## 2023-04-05 ENCOUNTER — ANESTHESIA EVENT (OUTPATIENT)
Facility: HOSPITAL | Age: 68
End: 2023-04-05
Payer: MEDICARE

## 2023-04-05 ENCOUNTER — ANESTHESIA (OUTPATIENT)
Facility: HOSPITAL | Age: 68
End: 2023-04-05
Payer: MEDICARE

## 2023-04-05 VITALS
TEMPERATURE: 98.5 F | HEIGHT: 69 IN | RESPIRATION RATE: 14 BRPM | BODY MASS INDEX: 24.59 KG/M2 | DIASTOLIC BLOOD PRESSURE: 67 MMHG | OXYGEN SATURATION: 99 % | SYSTOLIC BLOOD PRESSURE: 155 MMHG | HEART RATE: 58 BPM | WEIGHT: 166 LBS

## 2023-04-05 LAB — GLUCOSE BLD STRIP.AUTO-MCNC: 129 MG/DL (ref 70–110)

## 2023-04-05 PROCEDURE — 3700000000 HC ANESTHESIA ATTENDED CARE: Performed by: COLON & RECTAL SURGERY

## 2023-04-05 PROCEDURE — 7100000000 HC PACU RECOVERY - FIRST 15 MIN: Performed by: COLON & RECTAL SURGERY

## 2023-04-05 PROCEDURE — 3700000001 HC ADD 15 MINUTES (ANESTHESIA): Performed by: COLON & RECTAL SURGERY

## 2023-04-05 PROCEDURE — 6360000002 HC RX W HCPCS: Performed by: NURSE ANESTHETIST, CERTIFIED REGISTERED

## 2023-04-05 PROCEDURE — 2580000003 HC RX 258: Performed by: NURSE ANESTHETIST, CERTIFIED REGISTERED

## 2023-04-05 PROCEDURE — 2709999900 HC NON-CHARGEABLE SUPPLY: Performed by: COLON & RECTAL SURGERY

## 2023-04-05 PROCEDURE — 7100000010 HC PHASE II RECOVERY - FIRST 15 MIN: Performed by: COLON & RECTAL SURGERY

## 2023-04-05 PROCEDURE — 82962 GLUCOSE BLOOD TEST: CPT

## 2023-04-05 PROCEDURE — 88305 TISSUE EXAM BY PATHOLOGIST: CPT

## 2023-04-05 PROCEDURE — 7100000001 HC PACU RECOVERY - ADDTL 15 MIN: Performed by: COLON & RECTAL SURGERY

## 2023-04-05 PROCEDURE — 3600007512: Performed by: COLON & RECTAL SURGERY

## 2023-04-05 PROCEDURE — 3600007502: Performed by: COLON & RECTAL SURGERY

## 2023-04-05 RX ORDER — SODIUM CHLORIDE 0.9 % (FLUSH) 0.9 %
5-40 SYRINGE (ML) INJECTION PRN
Status: CANCELLED | OUTPATIENT
Start: 2023-04-05

## 2023-04-05 RX ORDER — DEXTROSE MONOHYDRATE 100 MG/ML
INJECTION, SOLUTION INTRAVENOUS CONTINUOUS PRN
Status: CANCELLED | OUTPATIENT
Start: 2023-04-05

## 2023-04-05 RX ORDER — SODIUM CHLORIDE, SODIUM LACTATE, POTASSIUM CHLORIDE, CALCIUM CHLORIDE 600; 310; 30; 20 MG/100ML; MG/100ML; MG/100ML; MG/100ML
INJECTION, SOLUTION INTRAVENOUS CONTINUOUS PRN
Status: DISCONTINUED | OUTPATIENT
Start: 2023-04-05 | End: 2023-04-05 | Stop reason: SDUPTHER

## 2023-04-05 RX ORDER — DIPHENHYDRAMINE HYDROCHLORIDE 50 MG/ML
12.5 INJECTION INTRAMUSCULAR; INTRAVENOUS
Status: CANCELLED | OUTPATIENT
Start: 2023-04-05 | End: 2023-04-06

## 2023-04-05 RX ORDER — ONDANSETRON 2 MG/ML
4 INJECTION INTRAMUSCULAR; INTRAVENOUS
Status: CANCELLED | OUTPATIENT
Start: 2023-04-05 | End: 2023-04-06

## 2023-04-05 RX ORDER — PROPOFOL 10 MG/ML
INJECTION, EMULSION INTRAVENOUS PRN
Status: DISCONTINUED | OUTPATIENT
Start: 2023-04-05 | End: 2023-04-05 | Stop reason: SDUPTHER

## 2023-04-05 RX ADMIN — PROPOFOL 50 MG: 10 INJECTION, EMULSION INTRAVENOUS at 07:36

## 2023-04-05 RX ADMIN — PROPOFOL 100 MG: 10 INJECTION, EMULSION INTRAVENOUS at 07:35

## 2023-04-05 RX ADMIN — PROPOFOL 25 MG: 10 INJECTION, EMULSION INTRAVENOUS at 07:42

## 2023-04-05 RX ADMIN — PROPOFOL 25 MG: 10 INJECTION, EMULSION INTRAVENOUS at 07:39

## 2023-04-05 RX ADMIN — SODIUM CHLORIDE, SODIUM LACTATE, POTASSIUM CHLORIDE, AND CALCIUM CHLORIDE: 600; 310; 30; 20 INJECTION, SOLUTION INTRAVENOUS at 07:26

## 2023-04-05 ASSESSMENT — PAIN - FUNCTIONAL ASSESSMENT: PAIN_FUNCTIONAL_ASSESSMENT: 0-10

## 2023-04-05 NOTE — H&P
No Known Allergies    ROS: negative    Vitals:    04/05/23 0638   BP: (!) 183/92   Pulse: 68   Resp: 18   Temp: 97.9 °F (36.6 °C)   SpO2: 100%       Physical Exam  Constitutional:       Appearance: He is well-developed. HENT:      Head: Normocephalic and atraumatic. Abdominal:      General: There is no distension. Palpations: Abdomen is soft. Tenderness: There is no abdominal tenderness. Skin:     General: Skin is warm and dry. Lab Results   Component Value Date    WBC 8.3 01/10/2023    HGB 12.1 (L) 01/10/2023    HCT 34.6 (L) 01/10/2023    MCV 89.6 01/10/2023     01/10/2023     Lab Results   Component Value Date/Time     03/31/2023 02:39 PM    K 4.0 03/31/2023 02:39 PM     03/31/2023 02:39 PM    CO2 31 03/31/2023 02:39 PM    BUN 7 03/31/2023 02:39 PM    CREATININE 1.01 03/31/2023 02:39 PM    GLUCOSE 139 03/31/2023 02:39 PM    CALCIUM 9.1 03/31/2023 02:39 PM      Lab Results   Component Value Date/Time    MG 2.1 01/11/2023 04:52 AM     Lab Results   Component Value Date    CALCIUM 9.1 03/31/2023    PHOS 2.7 01/11/2023       Assessment / Plan    Flex sig    The diagnoses and plan were discussed with the patient. All questions answered. Plan of care agreed to by all concerned.

## 2023-04-05 NOTE — DISCHARGE INSTRUCTIONS
Follow up in office to discuss surgery  Restart Eliquis in 3 days  DISCHARGE SUMMARY from Nurse    PATIENT INSTRUCTIONS:    After general anesthesia or intravenous sedation, for 24 hours or while taking prescription Narcotics:  Limit your activities  Do not drive and operate hazardous machinery  Do not make important personal or business decisions  Do  not drink alcoholic beverages  If you have not urinated within 8 hours after discharge, please contact your surgeon on call. Report the following to your surgeon:  Excessive pain, swelling, redness or odor of or around the surgical area  Temperature over 100.5  Nausea and vomiting lasting longer than 4 hours or if unable to take medications  Any signs of decreased circulation or nerve impairment to extremity: change in color, persistent  numbness, tingling, coldness or increase pain  Any questions    What to do at Home:    *  Please give a list of your current medications to your Primary Care Provider. *  Please update this list whenever your medications are discontinued, doses are      changed, or new medications (including over-the-counter products) are added. *  Please carry medication information at all times in case of emergency situations. These are general instructions for a healthy lifestyle:    No smoking/ No tobacco products/ Avoid exposure to second hand smoke  Surgeon General's Warning:  Quitting smoking now greatly reduces serious risk to your health. Obesity, smoking, and sedentary lifestyle greatly increases your risk for illness    A healthy diet, regular physical exercise & weight monitoring are important for maintaining a healthy lifestyle    You may be retaining fluid if you have a history of heart failure or if you experience any of the following symptoms:  Weight gain of 3 pounds or more overnight or 5 pounds in a week, increased swelling in our hands or feet or shortness of breath while lying flat in bed.   Please call your doctor

## 2023-04-05 NOTE — ANESTHESIA PRE PROCEDURE
Department of Anesthesiology  Preprocedure Note       Name:  Randy Cardenas   Age:  70 y.o.  :  1955                                          MRN:  [id number]         Date:  2023      Surgeon: Chayo Edwards):  Cuate Gomez MD    Procedure: Procedure(s): FLEXIBLE SIGMOIDOSCOPY    Medications prior to admission:   Prior to Admission medications    Medication Sig Start Date End Date Taking? Authorizing Provider   losartan (COZAAR) 25 MG tablet Take 1 tablet by mouth daily 3/28/23   MAXINE Chambers NP   metoprolol tartrate (LOPRESSOR) 25 MG tablet take 1 tablet by mouth twice a day 3/27/23   Maureen Bhatia MD   ELIQUIS 5 MG TABS tablet take 1 tablet by mouth twice a day 23   Historical Provider, MD   atorvastatin (LIPITOR) 10 MG tablet Take 1 tablet by mouth daily 22   Historical Provider, MD       Current medications:    No current facility-administered medications for this encounter.      Facility-Administered Medications Ordered in Other Encounters   Medication Dose Route Frequency Provider Last Rate Last Admin    propofol infusion   IntraVENous PRN MAXINE Sellers CRNA   50 mg at 23 0736       Allergies:  No Known Allergies    Problem List:    Patient Active Problem List   Diagnosis Code    Benign essential hypertension I10    Anxiety F41.9    Perforated bowel ([facility] K63.1    Hypokalemia E87.6    Hyponatremia E87.1    Lactic acidosis E87.20    Leukocytosis D72.829    Hyperglycemia R73.9    Severe protein-calorie malnutrition ([facility] E43    Atrial flutter (HCC) I48.92    Type 2 diabetes mellitus E11.9       Past Medical History:        Diagnosis Date    A-fib [facility])     per event monitor report cardiac noet 3/28/23    Anxiety     Benign essential hypertension     CHF (congestive heart failure) ([facility]     per cardiac note 3/28/23    DM type 2 (diabetes mellitus, type 2) (HCC)     Hypokalemia     Hyponatremia     Leukocytosis     Paroxysmal atrial flutter
Procedure Laterality Date    GI      loop ileostomy    HEMICOLECTOMY Right 2023    laparotomy    LEFT COLECTOMY Left 2023    ileostomy       Social History:    Social History     Tobacco Use    Smoking status: Former     Types: Cigarettes     Quit date: 1980     Years since quittin.2    Smokeless tobacco: Never   Substance Use Topics    Alcohol use: Not Currently                                Counseling given: Not Answered      Vital Signs (Current):   Vitals:    23 1204 23 0638   BP:  (!) 183/92   Pulse:  68   Resp:  18   Temp:  97.9 °F (36.6 °C)   TempSrc:  Oral   SpO2:  100%   Weight: 167 lb (75.8 kg) 166 lb (75.3 kg)   Height: 5' 9\" (1.753 m) 5' 9\" (1.753 m)                                              BP Readings from Last 3 Encounters:   23 (!) 183/92   23 (!) 160/64   23 (!) 162/77       NPO Status: Time of last liquid consumption:                         Time of last solid consumption:                         Date of last liquid consumption: 23                        Date of last solid food consumption: 23    BMI:   Wt Readings from Last 3 Encounters:   23 166 lb (75.3 kg)   23 167 lb (75.8 kg)   23 155 lb (70.3 kg)     Body mass index is 24.51 kg/m².     CBC:   Lab Results   Component Value Date/Time    WBC 8.3 01/10/2023 05:27 AM    RBC 3.86 01/10/2023 05:27 AM    HGB 12.1 01/10/2023 05:27 AM    HCT 34.6 01/10/2023 05:27 AM    MCV 89.6 01/10/2023 05:27 AM    RDW 12.2 01/10/2023 05:27 AM     01/10/2023 05:27 AM       CMP:   Lab Results   Component Value Date/Time     2023 02:39 PM    K 4.0 2023 02:39 PM     2023 02:39 PM    CO2 31 2023 02:39 PM    BUN 7 2023 02:39 PM    CREATININE 1.01 2023 02:39 PM    GFRAA >60 2022 10:33 AM    AGRATIO 0.5 2023 12:05 PM    LABGLOM >60 2023 02:39 PM    GLUCOSE 139 2023 02:39 PM    PROT 6.3 2023

## 2023-04-05 NOTE — OP NOTE
[facility]  [address], [address] [facility],[address]   [place], [address]  [phone]      Sigmoidoscopy Procedure Note      Rod Bermudez  1955  [id number]                Date of Procedure: 04/05/23    Preoperative diagnosis: Large bowel obstruction    Postoperative diagnosis: Descending colon mass    :  Jazmyn Spence MD    Assistant(s): Circulator: Rexanne Lundborg, RN  Endoscopy Technician: Olya Campa    Sedation: None    Procedure Details:  Prior to the procedure, a history and physical were performed. The patients medications, allergies and sensitivities were reviewed and all questions were answered. After informed consent was obtained for the procedure, with all risks and benefits of procedure explained. The patient was taken to the endoscopy suite and placed in the left lateral decubitus position. Patient identification and proposed procedure were verified prior to the procedure by the nurse and I. A digital rectal exam was performed and was normal.  The Olympus video colonoscope was introduced through the anus and advanced to descending colon. The quality of preparation was good. The colonoscope was slowly withdrawn and the mucosa examined for any abnormalities. The patient tolerated the procedure well. There were no complications. Findings/Interventions:   Polyps - none    Obstructing mass descending colon. Could not traverse. Biopsied with cold forceps. Ink injected distally. EBL: none    Recommendations: Follow up to discuss surgery. NO aspirin for 5 days. Hold eliquis for 3 days.      Discharge Disposition:  Margherita Runner, MD  4/5/2023  7:59 AM

## 2023-04-06 NOTE — ANESTHESIA POSTPROCEDURE EVALUATION
Department of Anesthesiology  Postprocedure Note    Patient: Bry Smith  MRN: [id number]  YOB: 1955  Date of evaluation: 4/5/2023      Procedure Summary     Date: 04/05/23 Room / Location: [facility] ENDO 01 / [facility] ENDOSCOPY    Anesthesia Start: 6194 Anesthesia Stop: 8840    Procedure: [address] and endoscopic tattoo (Rectum) Diagnosis:       Colon obstruction ([address]      Colonic mass      (Colon obstruction ([address] [K56.609])      (Colonic mass [K63.89])    Surgeons: Hannah Salinas MD Responsible Provider: Joshua Conklin MD    Anesthesia Type: MAC ASA Status: 3          Anesthesia Type: MAC    Philipp Phase I: Philipp Score: 10    Philipp Phase II: Philipp Score: 10      Anesthesia Post Evaluation    Patient location during evaluation: bedside  Patient participation: complete - patient participated  Pain score: 0  Airway patency: patent  Nausea & Vomiting: no nausea  Complications: no  Cardiovascular status: hemodynamically stable  Respiratory status: acceptable  Hydration status: euvolemic

## 2023-04-17 ENCOUNTER — FOLLOWUP TELEPHONE ENCOUNTER (OUTPATIENT)
Facility: HOSPITAL | Age: 68
End: 2023-04-17

## 2023-04-17 NOTE — TELEPHONE ENCOUNTER
Patient requested visit with Outpatient Ostomy clinic for review of pouching process. His girlfriend is his primary caregiver and she is currently hospitalized and unable to assist him with ostomy care. Reviewed ostomy care and provided with Coloplast flip pouch x4, and 4 larger barrier rings. Demonstrated cutting barrier ring then applying around stoma. Stoma is well budded with slight hernia to the lateral side. Demonstrated how to apply pouch while holding skin in a manner so that he is able to visualize the base of the stoma. Did note some mild folliculitis, treated with 3M skin barrier. Jewel CHEATHAMN, RN, [address] Wound Care Dept.   Office: [phone]  Work Cell: [phone]

## 2023-04-20 ENCOUNTER — OFFICE VISIT (OUTPATIENT)
Age: 68
End: 2023-04-20
Payer: MEDICARE

## 2023-04-20 VITALS
BODY MASS INDEX: 24.88 KG/M2 | HEIGHT: 69 IN | HEART RATE: 50 BPM | SYSTOLIC BLOOD PRESSURE: 172 MMHG | WEIGHT: 168 LBS | OXYGEN SATURATION: 99 % | RESPIRATION RATE: 16 BRPM | DIASTOLIC BLOOD PRESSURE: 77 MMHG | TEMPERATURE: 97.8 F

## 2023-04-20 DIAGNOSIS — K56.609 COLON OBSTRUCTION (HCC): Primary | ICD-10-CM

## 2023-04-20 PROCEDURE — 3017F COLORECTAL CA SCREEN DOC REV: CPT | Performed by: COLON & RECTAL SURGERY

## 2023-04-20 PROCEDURE — 3078F DIAST BP <80 MM HG: CPT | Performed by: COLON & RECTAL SURGERY

## 2023-04-20 PROCEDURE — 1123F ACP DISCUSS/DSCN MKR DOCD: CPT | Performed by: COLON & RECTAL SURGERY

## 2023-04-20 PROCEDURE — 1036F TOBACCO NON-USER: CPT | Performed by: COLON & RECTAL SURGERY

## 2023-04-20 PROCEDURE — 3077F SYST BP >= 140 MM HG: CPT | Performed by: COLON & RECTAL SURGERY

## 2023-04-20 PROCEDURE — G8427 DOCREV CUR MEDS BY ELIG CLIN: HCPCS | Performed by: COLON & RECTAL SURGERY

## 2023-04-20 PROCEDURE — 99214 OFFICE O/P EST MOD 30 MIN: CPT | Performed by: COLON & RECTAL SURGERY

## 2023-04-20 PROCEDURE — G8420 CALC BMI NORM PARAMETERS: HCPCS | Performed by: COLON & RECTAL SURGERY

## 2023-04-20 NOTE — PROGRESS NOTES
Subjective: Flexible sigmoidoscopy showed an obstructing mass in the proximal descending colon. Could not be traversed. It was biopsied but these were likely benign areas. He was injected distally. Past medical history and ROS were reviewed and unchanged. Abdomen: Soft nontender nondistended    Pathology consistent with adenoma    Assessment / Plan    Obstructing descending colon mass  Schedule robotic left colectomy, ileostomy reversal with ileosigmoid anastomosis  Clear liquid diet with 1 bottle of magnesium citrate prep, 2 enema prep  Cardiac clearance  Procedure discussed in detail with diagrams showing portions of remaining colon to remove    30 minutes was spent in patient care. The diagnoses and plan were discussed with patient. All questions answered. Plan of care agreed to by all concerned.

## 2023-04-20 NOTE — PROGRESS NOTES
Kika Goldstein is a 70 y.o. male  Chief Complaint   Patient presents with    Post-Op Check     Discuss surgical options foe colon mass     Vitals:    04/20/23 0901 04/20/23 0902 04/20/23 0903   BP: (!) 208/87 (!) 186/82 (!) 172/77   Site: Left Upper Arm  Left Upper Arm   Position:   Sitting   Pulse:   50   Resp:   16   Temp:   97.8 °F (36.6 °C)   TempSrc:   Temporal   SpO2:   99%   Weight:   168 lb (76.2 kg)   Height:   5' 9\" (1.753 m)     Kika Glodstein has been given the following recommendations today due to him elevated BP reading referred to Alternative/PCP

## 2023-04-27 ENCOUNTER — HOSPITAL ENCOUNTER (OUTPATIENT)
Facility: HOSPITAL | Age: 68
Discharge: HOME | End: 2023-04-27
Payer: MEDICARE

## 2023-04-27 LAB
ANION GAP SERPL CALC-SCNC: 5 MMOL/L (ref 3–18)
APTT PPP: 20.8 SEC (ref 23–36.4)
BUN SERPL-MCNC: 8 MG/DL (ref 7–18)
BUN/CREAT SERPL: 7 (ref 12–20)
CALCIUM SERPL-MCNC: 9.8 MG/DL (ref 8.5–10.1)
CHLORIDE SERPL-SCNC: 104 MMOL/L (ref 100–111)
CO2 SERPL-SCNC: 32 MMOL/L (ref 21–32)
CREAT SERPL-MCNC: 1.11 MG/DL (ref 0.6–1.3)
ERYTHROCYTE [DISTWIDTH] IN BLOOD BY AUTOMATED COUNT: 13.2 % (ref 11.6–14.5)
GLUCOSE SERPL-MCNC: 184 MG/DL (ref 74–99)
HCT VFR BLD AUTO: 41.6 % (ref 36–48)
HGB BLD-MCNC: 13.4 G/DL (ref 13–16)
INR PPP: 1 (ref 0.8–1.2)
MCH RBC QN AUTO: 29.1 PG (ref 24–34)
MCHC RBC AUTO-ENTMCNC: 32.2 G/DL (ref 31–37)
MCV RBC AUTO: 90.2 FL (ref 78–100)
NRBC # BLD: 0 K/UL (ref 0–0.01)
NRBC BLD-RTO: 0 PER 100 WBC
PLATELET # BLD AUTO: 226 K/UL (ref 135–420)
PMV BLD AUTO: 10.1 FL (ref 9.2–11.8)
POTASSIUM SERPL-SCNC: 3.9 MMOL/L (ref 3.5–5.5)
PROTHROMBIN TIME: 13.8 SEC (ref 11.5–15.2)
RBC # BLD AUTO: 4.61 M/UL (ref 4.35–5.65)
SODIUM SERPL-SCNC: 141 MMOL/L (ref 136–145)
WBC # BLD AUTO: 6.4 K/UL (ref 4.6–13.2)

## 2023-04-27 PROCEDURE — 36415 COLL VENOUS BLD VENIPUNCTURE: CPT

## 2023-04-27 PROCEDURE — 80048 BASIC METABOLIC PNL TOTAL CA: CPT

## 2023-04-27 PROCEDURE — 85730 THROMBOPLASTIN TIME PARTIAL: CPT

## 2023-04-27 PROCEDURE — 85027 COMPLETE CBC AUTOMATED: CPT

## 2023-04-27 PROCEDURE — 85610 PROTHROMBIN TIME: CPT

## 2023-05-03 ENCOUNTER — OFFICE VISIT (OUTPATIENT)
Age: 68
End: 2023-05-03
Payer: MEDICARE

## 2023-05-03 VITALS
HEIGHT: 69 IN | SYSTOLIC BLOOD PRESSURE: 204 MMHG | WEIGHT: 167 LBS | BODY MASS INDEX: 24.73 KG/M2 | DIASTOLIC BLOOD PRESSURE: 79 MMHG | OXYGEN SATURATION: 100 % | HEART RATE: 51 BPM

## 2023-05-03 DIAGNOSIS — Z98.890 STATUS POST LAPAROTOMY: ICD-10-CM

## 2023-05-03 DIAGNOSIS — I10 ESSENTIAL HYPERTENSION: ICD-10-CM

## 2023-05-03 DIAGNOSIS — E78.00 HYPERCHOLESTEREMIA: ICD-10-CM

## 2023-05-03 DIAGNOSIS — I48.0 PAROXYSMAL ATRIAL FIBRILLATION (HCC): Primary | ICD-10-CM

## 2023-05-03 PROBLEM — D68.69 SECONDARY HYPERCOAGULABLE STATE (HCC): Status: ACTIVE | Noted: 2023-05-03

## 2023-05-03 PROCEDURE — 3078F DIAST BP <80 MM HG: CPT | Performed by: INTERNAL MEDICINE

## 2023-05-03 PROCEDURE — G8420 CALC BMI NORM PARAMETERS: HCPCS | Performed by: INTERNAL MEDICINE

## 2023-05-03 PROCEDURE — 3074F SYST BP LT 130 MM HG: CPT | Performed by: INTERNAL MEDICINE

## 2023-05-03 PROCEDURE — G8427 DOCREV CUR MEDS BY ELIG CLIN: HCPCS | Performed by: INTERNAL MEDICINE

## 2023-05-03 PROCEDURE — 1123F ACP DISCUSS/DSCN MKR DOCD: CPT | Performed by: INTERNAL MEDICINE

## 2023-05-03 PROCEDURE — 3017F COLORECTAL CA SCREEN DOC REV: CPT | Performed by: INTERNAL MEDICINE

## 2023-05-03 PROCEDURE — 1036F TOBACCO NON-USER: CPT | Performed by: INTERNAL MEDICINE

## 2023-05-03 PROCEDURE — 99214 OFFICE O/P EST MOD 30 MIN: CPT | Performed by: INTERNAL MEDICINE

## 2023-05-03 RX ORDER — LOSARTAN POTASSIUM 50 MG/1
50 TABLET ORAL DAILY
Qty: 90 TABLET | Refills: 1 | Status: SHIPPED | OUTPATIENT
Start: 2023-05-03

## 2023-05-03 ASSESSMENT — ENCOUNTER SYMPTOMS
COUGH: 0
ABDOMINAL PAIN: 0
SHORTNESS OF BREATH: 0
NAUSEA: 0
ABDOMINAL DISTENTION: 0
CHEST TIGHTNESS: 0
BACK PAIN: 0
APNEA: 0
WHEEZING: 0

## 2023-05-03 NOTE — PROGRESS NOTES
HISTORY OF PRESENT ILLNESS    Kera Rodriguez is a 70 y.o. male. 1/2023  Patient seen following January admission for Peritonitis due to cecal perforation s/p exploratory laparotomy, right hemicolectomy  -Paroxysmal Atrial flutter with 2-1 block, new dx this admission, in setting of above. Eliquis started this admission for [facility].   - SVT episodes, s/p adenosine, digoxin and IV amiodarone loading. Since discharge he complains of 3 episodes of palpitations. He denies chest pain shortness of breath or edema. He does endorse mildly elevated blood pressure 140-160s over 70s. 5/3/2023 denies chest pain and shortness of breath. Getting intermittent palpitations lasting a few minutes. These are rare. Happening about once a month. Still has a colostomy bag from previous abdominal surgery. Review of Systems   Constitutional:  Negative for activity change and fatigue. HENT:  Negative for congestion. Eyes:  Negative for visual disturbance. Respiratory:  Negative for apnea, cough, chest tightness, shortness of breath and wheezing. Cardiovascular:  Positive for palpitations. Negative for chest pain and leg swelling. Gastrointestinal:  Negative for abdominal distention, abdominal pain and nausea. Genitourinary:  Negative for hematuria. Musculoskeletal:  Negative for arthralgias, back pain and myalgias. Skin:  Negative for pallor. Neurological:  Negative for dizziness, syncope and headaches. Physical Exam  Vitals and nursing note reviewed. Constitutional:       General: He is not in acute distress. Appearance: Normal appearance. He is not ill-appearing. HENT:      Head: Normocephalic and atraumatic. Nose: Nose normal.      Mouth/Throat:      Mouth: Mucous membranes are moist.   Eyes:      Extraocular Movements: Extraocular movements intact. Neck:      Vascular: No carotid bruit. Cardiovascular:      Rate and Rhythm: Normal rate and regular rhythm.       Pulses: Normal

## 2023-05-03 NOTE — PROGRESS NOTES
1. Have you been to the ER, urgent care clinic since your last visit? Hospitalized since your last visit? No    2. Have you seen or consulted any other health care providers outside of the [address] [place] [address] since your last visit? Include any pap smears or colon screening. No    3. Since your last visit, have you had any of the following symptoms?    palpitations. 4.  Have you had any blood work, X-rays or cardiac testing? Yes Where: [facility]         5. Where do you normally have your labs drawn? 6. Do you need any refills today?    no

## 2023-05-09 ENCOUNTER — TELEPHONE (OUTPATIENT)
Age: 68
End: 2023-05-09

## 2023-05-09 RX ORDER — AMLODIPINE BESYLATE 5 MG/1
5 TABLET ORAL DAILY
Qty: 30 TABLET | Refills: 3 | Status: SHIPPED | OUTPATIENT
Start: 2023-05-09

## 2023-05-09 NOTE — TELEPHONE ENCOUNTER
Patient called was returned and notified per Dr. Todd Dee to be added to present medications. Please get a BP chart from home again next week for 3 to 4 days.    Patient stated he understood instructions

## 2023-05-09 NOTE — TELEPHONE ENCOUNTER
5/4 186/97 53  174/80 53  5/5  166/92 51  135/63 51  5/6  167/80 53  154/80 48  5/7   182/92 49  134/58 52  5/8  167/82 45  139/67 51  5/9  172/94 54    Patient stated he was seen on 5/3 and  was told to keep record of his blood pressure with med that was change on the 5/3.

## 2023-05-09 NOTE — TELEPHONE ENCOUNTER
I sent a prescription of Norvasc to be added to present medications.   Please get a BP chart from home again next week for 3 to 4 days

## 2023-05-10 ENCOUNTER — HOSPITAL ENCOUNTER (OUTPATIENT)
Facility: HOSPITAL | Age: 68
Discharge: HOME | End: 2023-05-13
Payer: MEDICARE

## 2023-05-10 LAB
ABO + RH BLD: NORMAL
BLOOD GROUP ANTIBODIES SERPL: NORMAL
SPECIMEN EXP DATE BLD: NORMAL

## 2023-05-10 PROCEDURE — 36415 COLL VENOUS BLD VENIPUNCTURE: CPT

## 2023-05-10 PROCEDURE — 86900 BLOOD TYPING SEROLOGIC ABO: CPT

## 2023-05-10 PROCEDURE — 86850 RBC ANTIBODY SCREEN: CPT

## 2023-05-10 PROCEDURE — 86901 BLOOD TYPING SEROLOGIC RH(D): CPT

## 2023-05-15 ENCOUNTER — ANESTHESIA EVENT (OUTPATIENT)
Facility: HOSPITAL | Age: 68
End: 2023-05-15
Payer: MEDICARE

## 2023-05-16 ENCOUNTER — HOSPITAL ENCOUNTER (INPATIENT)
Facility: HOSPITAL | Age: 68
LOS: 5 days | Discharge: HOME | DRG: 330 | End: 2023-05-21
Attending: COLON & RECTAL SURGERY | Admitting: COLON & RECTAL SURGERY
Payer: MEDICARE

## 2023-05-16 ENCOUNTER — ANESTHESIA (OUTPATIENT)
Facility: HOSPITAL | Age: 68
End: 2023-05-16
Payer: MEDICARE

## 2023-05-16 PROBLEM — K63.89 COLONIC MASS: Status: ACTIVE | Noted: 2023-05-16

## 2023-05-16 LAB — GLUCOSE BLD STRIP.AUTO-MCNC: 139 MG/DL (ref 70–110)

## 2023-05-16 PROCEDURE — 00840 ANES IPER PX LOWER ABD NOS: CPT | Performed by: NURSE ANESTHETIST, CERTIFIED REGISTERED

## 2023-05-16 PROCEDURE — 88342 IMHCHEM/IMCYTCHM 1ST ANTB: CPT

## 2023-05-16 PROCEDURE — 44204 LAPARO PARTIAL COLECTOMY: CPT | Performed by: COLON & RECTAL SURGERY

## 2023-05-16 PROCEDURE — A4216 STERILE WATER/SALINE, 10 ML: HCPCS | Performed by: COLON & RECTAL SURGERY

## 2023-05-16 PROCEDURE — S2900 ROBOTIC SURGICAL SYSTEM: HCPCS | Performed by: COLON & RECTAL SURGERY

## 2023-05-16 PROCEDURE — 00840 ANES IPER PX LOWER ABD NOS: CPT | Performed by: ANESTHESIOLOGY

## 2023-05-16 PROCEDURE — 2500000003 HC RX 250 WO HCPCS: Performed by: COLON & RECTAL SURGERY

## 2023-05-16 PROCEDURE — 6370000000 HC RX 637 (ALT 250 FOR IP): Performed by: COLON & RECTAL SURGERY

## 2023-05-16 PROCEDURE — 0DBB0ZZ EXCISION OF ILEUM, OPEN APPROACH: ICD-10-PCS | Performed by: COLON & RECTAL SURGERY

## 2023-05-16 PROCEDURE — 3700000001 HC ADD 15 MINUTES (ANESTHESIA): Performed by: COLON & RECTAL SURGERY

## 2023-05-16 PROCEDURE — 44213 LAP MOBIL SPLENIC FL ADD-ON: CPT | Performed by: COLON & RECTAL SURGERY

## 2023-05-16 PROCEDURE — 6360000002 HC RX W HCPCS: Performed by: NURSE ANESTHETIST, CERTIFIED REGISTERED

## 2023-05-16 PROCEDURE — 8E0W4CZ ROBOTIC ASSISTED PROCEDURE OF TRUNK REGION, PERCUTANEOUS ENDOSCOPIC APPROACH: ICD-10-PCS | Performed by: COLON & RECTAL SURGERY

## 2023-05-16 PROCEDURE — 6360000002 HC RX W HCPCS: Performed by: COLON & RECTAL SURGERY

## 2023-05-16 PROCEDURE — 6360000002 HC RX W HCPCS: Performed by: ANESTHESIOLOGY

## 2023-05-16 PROCEDURE — 2580000003 HC RX 258: Performed by: NURSE ANESTHETIST, CERTIFIED REGISTERED

## 2023-05-16 PROCEDURE — 7100000001 HC PACU RECOVERY - ADDTL 15 MIN: Performed by: COLON & RECTAL SURGERY

## 2023-05-16 PROCEDURE — 7100000000 HC PACU RECOVERY - FIRST 15 MIN: Performed by: COLON & RECTAL SURGERY

## 2023-05-16 PROCEDURE — 88341 IMHCHEM/IMCYTCHM EA ADD ANTB: CPT

## 2023-05-16 PROCEDURE — 82962 GLUCOSE BLOOD TEST: CPT

## 2023-05-16 PROCEDURE — 3600000009 HC SURGERY ROBOT BASE: Performed by: COLON & RECTAL SURGERY

## 2023-05-16 PROCEDURE — 2709999900 HC NON-CHARGEABLE SUPPLY: Performed by: COLON & RECTAL SURGERY

## 2023-05-16 PROCEDURE — A4216 STERILE WATER/SALINE, 10 ML: HCPCS | Performed by: NURSE ANESTHETIST, CERTIFIED REGISTERED

## 2023-05-16 PROCEDURE — 3700000000 HC ANESTHESIA ATTENDED CARE: Performed by: COLON & RECTAL SURGERY

## 2023-05-16 PROCEDURE — 2500000003 HC RX 250 WO HCPCS: Performed by: NURSE ANESTHETIST, CERTIFIED REGISTERED

## 2023-05-16 PROCEDURE — 2720000010 HC SURG SUPPLY STERILE: Performed by: COLON & RECTAL SURGERY

## 2023-05-16 PROCEDURE — 3600000019 HC SURGERY ROBOT ADDTL 15MIN: Performed by: COLON & RECTAL SURGERY

## 2023-05-16 PROCEDURE — 2580000003 HC RX 258: Performed by: COLON & RECTAL SURGERY

## 2023-05-16 PROCEDURE — 0DTG0ZZ RESECTION OF LEFT LARGE INTESTINE, OPEN APPROACH: ICD-10-PCS | Performed by: COLON & RECTAL SURGERY

## 2023-05-16 PROCEDURE — 88309 TISSUE EXAM BY PATHOLOGIST: CPT

## 2023-05-16 PROCEDURE — 1100000000 HC RM PRIVATE

## 2023-05-16 PROCEDURE — 44227 LAP CLOSE ENTEROSTOMY: CPT | Performed by: COLON & RECTAL SURGERY

## 2023-05-16 PROCEDURE — 88304 TISSUE EXAM BY PATHOLOGIST: CPT

## 2023-05-16 RX ORDER — MORPHINE SULFATE 2 MG/ML
2 INJECTION, SOLUTION INTRAMUSCULAR; INTRAVENOUS
Status: DISCONTINUED | OUTPATIENT
Start: 2023-05-16 | End: 2023-05-21 | Stop reason: HOSPADM

## 2023-05-16 RX ORDER — SODIUM CHLORIDE, SODIUM LACTATE, POTASSIUM CHLORIDE, CALCIUM CHLORIDE 600; 310; 30; 20 MG/100ML; MG/100ML; MG/100ML; MG/100ML
INJECTION, SOLUTION INTRAVENOUS CONTINUOUS
Status: DISCONTINUED | OUTPATIENT
Start: 2023-05-16 | End: 2023-05-19

## 2023-05-16 RX ORDER — GLYCOPYRROLATE 0.2 MG/ML
INJECTION INTRAMUSCULAR; INTRAVENOUS PRN
Status: DISCONTINUED | OUTPATIENT
Start: 2023-05-16 | End: 2023-05-16 | Stop reason: SDUPTHER

## 2023-05-16 RX ORDER — ACETAMINOPHEN 500 MG
1000 TABLET ORAL EVERY 6 HOURS
Status: DISCONTINUED | OUTPATIENT
Start: 2023-05-16 | End: 2023-05-21 | Stop reason: HOSPADM

## 2023-05-16 RX ORDER — HYDRALAZINE HYDROCHLORIDE 20 MG/ML
10 INJECTION INTRAMUSCULAR; INTRAVENOUS EVERY 6 HOURS PRN
Status: DISCONTINUED | OUTPATIENT
Start: 2023-05-16 | End: 2023-05-21 | Stop reason: HOSPADM

## 2023-05-16 RX ORDER — SODIUM CHLORIDE 9 MG/ML
INJECTION, SOLUTION INTRAVENOUS CONTINUOUS
Status: DISCONTINUED | OUTPATIENT
Start: 2023-05-16 | End: 2023-05-17

## 2023-05-16 RX ORDER — LABETALOL HYDROCHLORIDE 5 MG/ML
INJECTION, SOLUTION INTRAVENOUS PRN
Status: DISCONTINUED | OUTPATIENT
Start: 2023-05-16 | End: 2023-05-16 | Stop reason: SDUPTHER

## 2023-05-16 RX ORDER — AMLODIPINE BESYLATE 5 MG/1
5 TABLET ORAL DAILY
Status: DISCONTINUED | OUTPATIENT
Start: 2023-05-17 | End: 2023-05-21 | Stop reason: HOSPADM

## 2023-05-16 RX ORDER — HYDROMORPHONE HCL 110MG/55ML
PATIENT CONTROLLED ANALGESIA SYRINGE INTRAVENOUS PRN
Status: DISCONTINUED | OUTPATIENT
Start: 2023-05-16 | End: 2023-05-16 | Stop reason: SDUPTHER

## 2023-05-16 RX ORDER — BUPIVACAINE HYDROCHLORIDE 2.5 MG/ML
INJECTION, SOLUTION EPIDURAL; INFILTRATION; INTRACAUDAL PRN
Status: DISCONTINUED | OUTPATIENT
Start: 2023-05-16 | End: 2023-05-16 | Stop reason: ALTCHOICE

## 2023-05-16 RX ORDER — DEXAMETHASONE SODIUM PHOSPHATE 4 MG/ML
INJECTION, SOLUTION INTRA-ARTICULAR; INTRALESIONAL; INTRAMUSCULAR; INTRAVENOUS; SOFT TISSUE PRN
Status: DISCONTINUED | OUTPATIENT
Start: 2023-05-16 | End: 2023-05-16 | Stop reason: SDUPTHER

## 2023-05-16 RX ORDER — SODIUM CHLORIDE, SODIUM LACTATE, POTASSIUM CHLORIDE, CALCIUM CHLORIDE 600; 310; 30; 20 MG/100ML; MG/100ML; MG/100ML; MG/100ML
INJECTION, SOLUTION INTRAVENOUS CONTINUOUS
Status: DISCONTINUED | OUTPATIENT
Start: 2023-05-16 | End: 2023-05-16 | Stop reason: HOSPADM

## 2023-05-16 RX ORDER — METRONIDAZOLE 500 MG/100ML
500 INJECTION, SOLUTION INTRAVENOUS EVERY 8 HOURS
Status: COMPLETED | OUTPATIENT
Start: 2023-05-16 | End: 2023-05-17

## 2023-05-16 RX ORDER — PROCHLORPERAZINE EDISYLATE 5 MG/ML
5 INJECTION INTRAMUSCULAR; INTRAVENOUS
Status: DISCONTINUED | OUTPATIENT
Start: 2023-05-16 | End: 2023-05-16 | Stop reason: HOSPADM

## 2023-05-16 RX ORDER — MIDAZOLAM HYDROCHLORIDE 1 MG/ML
INJECTION INTRAMUSCULAR; INTRAVENOUS PRN
Status: DISCONTINUED | OUTPATIENT
Start: 2023-05-16 | End: 2023-05-16 | Stop reason: SDUPTHER

## 2023-05-16 RX ORDER — INSULIN LISPRO 100 [IU]/ML
1-15 INJECTION, SOLUTION INTRAVENOUS; SUBCUTANEOUS ONCE
Status: DISCONTINUED | OUTPATIENT
Start: 2023-05-16 | End: 2023-05-16 | Stop reason: HOSPADM

## 2023-05-16 RX ORDER — FENTANYL CITRATE 50 UG/ML
25 INJECTION, SOLUTION INTRAMUSCULAR; INTRAVENOUS EVERY 5 MIN PRN
Status: DISCONTINUED | OUTPATIENT
Start: 2023-05-17 | End: 2023-05-16 | Stop reason: HOSPADM

## 2023-05-16 RX ORDER — BUPIVACAINE HYDROCHLORIDE AND EPINEPHRINE 5; 5 MG/ML; UG/ML
INJECTION, SOLUTION EPIDURAL; INTRACAUDAL; PERINEURAL PRN
Status: DISCONTINUED | OUTPATIENT
Start: 2023-05-16 | End: 2023-05-16 | Stop reason: ALTCHOICE

## 2023-05-16 RX ORDER — INDOCYANINE GREEN AND WATER 25 MG
KIT INJECTION PRN
Status: DISCONTINUED | OUTPATIENT
Start: 2023-05-16 | End: 2023-05-16 | Stop reason: SDUPTHER

## 2023-05-16 RX ORDER — PROPOFOL 10 MG/ML
INJECTION, EMULSION INTRAVENOUS PRN
Status: DISCONTINUED | OUTPATIENT
Start: 2023-05-16 | End: 2023-05-16 | Stop reason: SDUPTHER

## 2023-05-16 RX ORDER — ONDANSETRON 2 MG/ML
4 INJECTION INTRAMUSCULAR; INTRAVENOUS EVERY 6 HOURS PRN
Status: DISCONTINUED | OUTPATIENT
Start: 2023-05-16 | End: 2023-05-21 | Stop reason: HOSPADM

## 2023-05-16 RX ORDER — SODIUM CHLORIDE, SODIUM LACTATE, POTASSIUM CHLORIDE, CALCIUM CHLORIDE 600; 310; 30; 20 MG/100ML; MG/100ML; MG/100ML; MG/100ML
INJECTION, SOLUTION INTRAVENOUS CONTINUOUS PRN
Status: DISCONTINUED | OUTPATIENT
Start: 2023-05-16 | End: 2023-05-16 | Stop reason: SDUPTHER

## 2023-05-16 RX ORDER — LABETALOL HYDROCHLORIDE 5 MG/ML
10 INJECTION, SOLUTION INTRAVENOUS
Status: DISCONTINUED | OUTPATIENT
Start: 2023-05-16 | End: 2023-05-16 | Stop reason: HOSPADM

## 2023-05-16 RX ORDER — LIDOCAINE HYDROCHLORIDE 20 MG/ML
INJECTION, SOLUTION INTRAVENOUS PRN
Status: DISCONTINUED | OUTPATIENT
Start: 2023-05-16 | End: 2023-05-16 | Stop reason: SDUPTHER

## 2023-05-16 RX ORDER — GABAPENTIN 300 MG/1
600 CAPSULE ORAL ONCE
Status: DISCONTINUED | OUTPATIENT
Start: 2023-05-16 | End: 2023-05-16 | Stop reason: HOSPADM

## 2023-05-16 RX ORDER — ROCURONIUM BROMIDE 10 MG/ML
INJECTION, SOLUTION INTRAVENOUS PRN
Status: DISCONTINUED | OUTPATIENT
Start: 2023-05-16 | End: 2023-05-16 | Stop reason: SDUPTHER

## 2023-05-16 RX ORDER — HYDROMORPHONE HYDROCHLORIDE 2 MG/ML
0.5 INJECTION, SOLUTION INTRAMUSCULAR; INTRAVENOUS; SUBCUTANEOUS EVERY 10 MIN PRN
Status: DISCONTINUED | OUTPATIENT
Start: 2023-05-16 | End: 2023-05-17

## 2023-05-16 RX ORDER — FENTANYL CITRATE 50 UG/ML
INJECTION, SOLUTION INTRAMUSCULAR; INTRAVENOUS PRN
Status: DISCONTINUED | OUTPATIENT
Start: 2023-05-16 | End: 2023-05-16 | Stop reason: SDUPTHER

## 2023-05-16 RX ORDER — DEXTROSE MONOHYDRATE 100 MG/ML
INJECTION, SOLUTION INTRAVENOUS CONTINUOUS PRN
Status: DISCONTINUED | OUTPATIENT
Start: 2023-05-16 | End: 2023-05-16 | Stop reason: HOSPADM

## 2023-05-16 RX ORDER — LIDOCAINE HYDROCHLORIDE 10 MG/ML
1 INJECTION, SOLUTION EPIDURAL; INFILTRATION; INTRACAUDAL; PERINEURAL
Status: DISCONTINUED | OUTPATIENT
Start: 2023-05-16 | End: 2023-05-16 | Stop reason: HOSPADM

## 2023-05-16 RX ORDER — ONDANSETRON 2 MG/ML
INJECTION INTRAMUSCULAR; INTRAVENOUS PRN
Status: DISCONTINUED | OUTPATIENT
Start: 2023-05-16 | End: 2023-05-16 | Stop reason: SDUPTHER

## 2023-05-16 RX ORDER — ACETAMINOPHEN 500 MG
1000 TABLET ORAL ONCE
Status: DISCONTINUED | OUTPATIENT
Start: 2023-05-16 | End: 2023-05-16 | Stop reason: HOSPADM

## 2023-05-16 RX ORDER — SODIUM CHLORIDE 9 MG/ML
INJECTION, SOLUTION INTRAVENOUS CONTINUOUS PRN
Status: DISCONTINUED | OUTPATIENT
Start: 2023-05-16 | End: 2023-05-16 | Stop reason: SDUPTHER

## 2023-05-16 RX ORDER — HYDRALAZINE HYDROCHLORIDE 20 MG/ML
10 INJECTION INTRAMUSCULAR; INTRAVENOUS ONCE
Status: COMPLETED | OUTPATIENT
Start: 2023-05-16 | End: 2023-05-16

## 2023-05-16 RX ORDER — GABAPENTIN 300 MG/1
300 CAPSULE ORAL 3 TIMES DAILY
Status: DISCONTINUED | OUTPATIENT
Start: 2023-05-16 | End: 2023-05-21 | Stop reason: HOSPADM

## 2023-05-16 RX ORDER — ONDANSETRON 2 MG/ML
4 INJECTION INTRAMUSCULAR; INTRAVENOUS
Status: DISCONTINUED | OUTPATIENT
Start: 2023-05-16 | End: 2023-05-16 | Stop reason: HOSPADM

## 2023-05-16 RX ORDER — HEPARIN SODIUM 5000 [USP'U]/ML
5000 INJECTION, SOLUTION INTRAVENOUS; SUBCUTANEOUS EVERY 8 HOURS SCHEDULED
Status: DISCONTINUED | OUTPATIENT
Start: 2023-05-17 | End: 2023-05-21 | Stop reason: HOSPADM

## 2023-05-16 RX ORDER — METRONIDAZOLE 500 MG/100ML
500 INJECTION, SOLUTION INTRAVENOUS ONCE
Status: COMPLETED | OUTPATIENT
Start: 2023-05-16 | End: 2023-05-16

## 2023-05-16 RX ORDER — DIPHENHYDRAMINE HYDROCHLORIDE 50 MG/ML
25 INJECTION INTRAMUSCULAR; INTRAVENOUS EVERY 6 HOURS PRN
Status: DISCONTINUED | OUTPATIENT
Start: 2023-05-16 | End: 2023-05-21 | Stop reason: HOSPADM

## 2023-05-16 RX ADMIN — INDOCYANINE GREEN AND WATER 15 MG: KIT at 10:04

## 2023-05-16 RX ADMIN — METRONIDAZOLE 500 MG: 500 INJECTION, SOLUTION INTRAVENOUS at 08:00

## 2023-05-16 RX ADMIN — WATER 2000 MG: 1 INJECTION, SOLUTION INTRAMUSCULAR; INTRAVENOUS; SUBCUTANEOUS at 07:50

## 2023-05-16 RX ADMIN — ONDANSETRON 8 MG: 2 INJECTION INTRAMUSCULAR; INTRAVENOUS at 12:28

## 2023-05-16 RX ADMIN — MORPHINE SULFATE 2 MG: 2 INJECTION, SOLUTION INTRAMUSCULAR; INTRAVENOUS at 17:23

## 2023-05-16 RX ADMIN — METOPROLOL TARTRATE 25 MG: 25 TABLET, FILM COATED ORAL at 21:04

## 2023-05-16 RX ADMIN — METRONIDAZOLE 500 MG: 500 INJECTION, SOLUTION INTRAVENOUS at 17:22

## 2023-05-16 RX ADMIN — FAMOTIDINE 20 MG: 10 INJECTION INTRAVENOUS at 21:04

## 2023-05-16 RX ADMIN — HYDROMORPHONE HYDROCHLORIDE 0.8 MG: 2 INJECTION INTRAMUSCULAR; INTRAVENOUS; SUBCUTANEOUS at 13:42

## 2023-05-16 RX ADMIN — SODIUM CHLORIDE, SODIUM LACTATE, POTASSIUM CHLORIDE, AND CALCIUM CHLORIDE: 600; 310; 30; 20 INJECTION, SOLUTION INTRAVENOUS at 07:29

## 2023-05-16 RX ADMIN — GABAPENTIN 300 MG: 300 CAPSULE ORAL at 21:02

## 2023-05-16 RX ADMIN — PROPOFOL 150 MG: 10 INJECTION, EMULSION INTRAVENOUS at 07:41

## 2023-05-16 RX ADMIN — ROCURONIUM BROMIDE 10 MG: 10 INJECTION, SOLUTION INTRAVENOUS at 10:10

## 2023-05-16 RX ADMIN — ROCURONIUM BROMIDE 70 MG: 10 INJECTION, SOLUTION INTRAVENOUS at 07:41

## 2023-05-16 RX ADMIN — SODIUM CHLORIDE, POTASSIUM CHLORIDE, SODIUM LACTATE AND CALCIUM CHLORIDE: 600; 310; 30; 20 INJECTION, SOLUTION INTRAVENOUS at 06:23

## 2023-05-16 RX ADMIN — LIDOCAINE HYDROCHLORIDE 100 MG: 20 INJECTION, SOLUTION INTRAVENOUS at 07:41

## 2023-05-16 RX ADMIN — HYDROMORPHONE HYDROCHLORIDE 0.4 MG: 2 INJECTION INTRAMUSCULAR; INTRAVENOUS; SUBCUTANEOUS at 13:21

## 2023-05-16 RX ADMIN — SODIUM CHLORIDE: 900 INJECTION, SOLUTION INTRAVENOUS at 10:20

## 2023-05-16 RX ADMIN — LABETALOL HYDROCHLORIDE 10 MG: 5 INJECTION, SOLUTION INTRAVENOUS at 09:14

## 2023-05-16 RX ADMIN — FAMOTIDINE 20 MG: 10 INJECTION, SOLUTION INTRAVENOUS at 06:27

## 2023-05-16 RX ADMIN — HYDROMORPHONE HYDROCHLORIDE 0.5 MG: 1 INJECTION, SOLUTION INTRAMUSCULAR; INTRAVENOUS; SUBCUTANEOUS at 14:16

## 2023-05-16 RX ADMIN — SODIUM CHLORIDE, POTASSIUM CHLORIDE, SODIUM LACTATE AND CALCIUM CHLORIDE: 600; 310; 30; 20 INJECTION, SOLUTION INTRAVENOUS at 17:00

## 2023-05-16 RX ADMIN — DEXAMETHASONE SODIUM PHOSPHATE 8 MG: 4 INJECTION, SOLUTION INTRAMUSCULAR; INTRAVENOUS at 07:50

## 2023-05-16 RX ADMIN — CEFAZOLIN 2000 MG: 1 INJECTION, POWDER, FOR SOLUTION INTRAMUSCULAR; INTRAVENOUS at 21:03

## 2023-05-16 RX ADMIN — GLYCOPYRROLATE 0.2 MG: 0.2 INJECTION INTRAMUSCULAR; INTRAVENOUS at 07:29

## 2023-05-16 RX ADMIN — SUGAMMADEX 200 MG: 100 INJECTION, SOLUTION INTRAVENOUS at 12:35

## 2023-05-16 RX ADMIN — HYDROMORPHONE HYDROCHLORIDE 0.5 MG: 2 INJECTION INTRAMUSCULAR; INTRAVENOUS; SUBCUTANEOUS at 14:46

## 2023-05-16 RX ADMIN — FENTANYL CITRATE 50 MCG: 50 INJECTION, SOLUTION INTRAMUSCULAR; INTRAVENOUS at 07:35

## 2023-05-16 RX ADMIN — WATER 2000 MG: 1 INJECTION, SOLUTION INTRAMUSCULAR; INTRAVENOUS; SUBCUTANEOUS at 12:00

## 2023-05-16 RX ADMIN — HYDROMORPHONE HYDROCHLORIDE 0.4 MG: 2 INJECTION INTRAMUSCULAR; INTRAVENOUS; SUBCUTANEOUS at 08:00

## 2023-05-16 RX ADMIN — MIDAZOLAM 2 MG: 1 INJECTION, SOLUTION INTRAMUSCULAR; INTRAVENOUS at 07:29

## 2023-05-16 RX ADMIN — HYDRALAZINE HYDROCHLORIDE 10 MG: 20 INJECTION INTRAMUSCULAR; INTRAVENOUS at 14:53

## 2023-05-16 RX ADMIN — FENTANYL CITRATE 50 MCG: 50 INJECTION, SOLUTION INTRAMUSCULAR; INTRAVENOUS at 09:09

## 2023-05-16 RX ADMIN — LABETALOL HYDROCHLORIDE 10 MG: 5 INJECTION, SOLUTION INTRAVENOUS at 08:22

## 2023-05-16 RX ADMIN — HYDROMORPHONE HYDROCHLORIDE 0.4 MG: 2 INJECTION INTRAMUSCULAR; INTRAVENOUS; SUBCUTANEOUS at 10:13

## 2023-05-16 ASSESSMENT — PAIN DESCRIPTION - DESCRIPTORS
DESCRIPTORS: ACHING
DESCRIPTORS: ACHING;SORE
DESCRIPTORS: SORE
DESCRIPTORS: ACHING
DESCRIPTORS: ACHING;SORE
DESCRIPTORS: SORE

## 2023-05-16 ASSESSMENT — PAIN DESCRIPTION - ORIENTATION
ORIENTATION: RIGHT

## 2023-05-16 ASSESSMENT — PAIN DESCRIPTION - LOCATION
LOCATION: ABDOMEN
LOCATION: ABDOMEN;INCISION
LOCATION: INCISION
LOCATION: ABDOMEN;INCISION
LOCATION: ABDOMEN;INCISION
LOCATION: ABDOMEN

## 2023-05-16 ASSESSMENT — PAIN SCALES - GENERAL
PAINLEVEL_OUTOF10: 5
PAINLEVEL_OUTOF10: 6
PAINLEVEL_OUTOF10: 5
PAINLEVEL_OUTOF10: 4
PAINLEVEL_OUTOF10: 4
PAINLEVEL_OUTOF10: 7

## 2023-05-16 ASSESSMENT — PAIN DESCRIPTION - PAIN TYPE
TYPE: SURGICAL PAIN

## 2023-05-16 ASSESSMENT — PAIN - FUNCTIONAL ASSESSMENT: PAIN_FUNCTIONAL_ASSESSMENT: 0-10

## 2023-05-16 NOTE — ANESTHESIA POSTPROCEDURE EVALUATION
Department of Anesthesiology  Postprocedure Note    Patient: Kyle Thomas  MRN: [id number]  YOB: 1955  Date of evaluation: 5/16/2023      Procedure Summary     Date: 05/16/23 Room / Location: [facility] MAIN 04 / [facility] MAIN OR    Anesthesia Start: 0555 Anesthesia Stop: 6978    Procedure: ROBOTIC LEFT COLECTOMY, ILEOSTOMY REVERSAL WITH ILEOSIGMOID ANASTOMOSIS (Abdomen) Diagnosis:       Colon obstruction ([address]      (Colon obstruction ([address] [T73.639])    Surgeons: Chanelle Peraza MD Responsible Provider: Dex Morgan MD    Anesthesia Type: general ASA Status: 3          Anesthesia Type: No value filed.     Philipp Phase I: Philipp Score: 9    Philipp Phase II:        Anesthesia Post Evaluation    Patient location during evaluation: PACU  Patient participation: complete - patient participated  Level of consciousness: awake  Airway patency: patent  Nausea & Vomiting: no nausea  Complications: no  Cardiovascular status: hypertensive  Respiratory status: acceptable  Hydration status: euvolemic

## 2023-05-16 NOTE — ANESTHESIA PRE PROCEDURE
Applicable): No results found for: PREGTESTUR, PREGSERUM, HCG, HCGQUANT     ABGs: No results found for: PHART, PO2ART, WLU2DVR, AUG8ZUC, BEART, G8MMPVRF     Type & Screen (If Applicable):  No results found for: LABABO, LABRH    Drug/Infectious Status (If Applicable):  Lab Results   Component Value Date/Time    HEPCAB <0.1 2021 12:00 AM       COVID-19 Screening (If Applicable):   Lab Results   Component Value Date/Time    COVID19 Not detected 2023 06:25 PM           Anesthesia Evaluation  Patient summary reviewed and Nursing notes reviewed  Airway: Mallampati: II          Dental: normal exam   (+) poor dentition      Pulmonary:Negative Pulmonary ROS and normal exam                               Cardiovascular:    (+) hypertension:, dysrhythmias: atrial fibrillation, CHF:,         Rhythm: regular  Rate: normal                    Neuro/Psych:   Negative Neuro/Psych ROS              GI/Hepatic/Renal:            ROS comment: Colon perf. Had colostomy   . Endo/Other:    (+) Diabetes, . Abdominal:              PE comment: Colostomy     Vascular: negative vascular ROS. Other Findings:        Department of Anesthesiology  Preprocedure Note       Name:  Johnson Lou   Age:  70 y.o.  :  1955                                          MRN:  [id number]         Date:  2023      Surgeon: Michelle Radford):  Adeel Kaiser MD    Procedure: Procedure(s):  ROBOTIC LEFT COLECTOMY, ILEOSTOMY REVERSAL WITH ILEOSIGMOID ANASTOMOSIS    Medications prior to admission:   Prior to Admission medications    Medication Sig Start Date End Date Taking?  Authorizing Provider   amLODIPine (NORVASC) 5 MG tablet Take 1 tablet by mouth daily 23   Dharmesh Barron MD   losartan (COZAAR) 50 MG tablet Take 1 tablet by mouth daily 5/3/23   Dharmesh Barron MD   apixaban (ELIQUIS) 5 MG TABS tablet Take by mouth 2 times daily    Historical Provider, MD   metoprolol tartrate (LOPRESSOR) 25 MG tablet take

## 2023-05-17 LAB
ANION GAP SERPL CALC-SCNC: 6 MMOL/L (ref 3–18)
BUN SERPL-MCNC: 14 MG/DL (ref 7–18)
BUN/CREAT SERPL: 11 (ref 12–20)
CALCIUM SERPL-MCNC: 8.3 MG/DL (ref 8.5–10.1)
CHLORIDE SERPL-SCNC: 105 MMOL/L (ref 100–111)
CO2 SERPL-SCNC: 29 MMOL/L (ref 21–32)
CREAT SERPL-MCNC: 1.3 MG/DL (ref 0.6–1.3)
ERYTHROCYTE [DISTWIDTH] IN BLOOD BY AUTOMATED COUNT: 13.5 % (ref 11.6–14.5)
GLUCOSE SERPL-MCNC: 135 MG/DL (ref 74–99)
HCT VFR BLD AUTO: 35.8 % (ref 36–48)
HGB BLD-MCNC: 11.8 G/DL (ref 13–16)
MAGNESIUM SERPL-MCNC: 1.9 MG/DL (ref 1.6–2.6)
MCH RBC QN AUTO: 29.1 PG (ref 24–34)
MCHC RBC AUTO-ENTMCNC: 33 G/DL (ref 31–37)
MCV RBC AUTO: 88.2 FL (ref 78–100)
NRBC # BLD: 0 K/UL (ref 0–0.01)
NRBC BLD-RTO: 0 PER 100 WBC
PHOSPHATE SERPL-MCNC: 4 MG/DL (ref 2.5–4.9)
PLATELET # BLD AUTO: 185 K/UL (ref 135–420)
PMV BLD AUTO: 9.5 FL (ref 9.2–11.8)
POTASSIUM SERPL-SCNC: 3.4 MMOL/L (ref 3.5–5.5)
RBC # BLD AUTO: 4.06 M/UL (ref 4.35–5.65)
SODIUM SERPL-SCNC: 140 MMOL/L (ref 136–145)
WBC # BLD AUTO: 14.7 K/UL (ref 4.6–13.2)

## 2023-05-17 PROCEDURE — A4216 STERILE WATER/SALINE, 10 ML: HCPCS | Performed by: COLON & RECTAL SURGERY

## 2023-05-17 PROCEDURE — 6360000002 HC RX W HCPCS: Performed by: ANESTHESIOLOGY

## 2023-05-17 PROCEDURE — 80048 BASIC METABOLIC PNL TOTAL CA: CPT

## 2023-05-17 PROCEDURE — 6370000000 HC RX 637 (ALT 250 FOR IP): Performed by: COLON & RECTAL SURGERY

## 2023-05-17 PROCEDURE — 84100 ASSAY OF PHOSPHORUS: CPT

## 2023-05-17 PROCEDURE — 83735 ASSAY OF MAGNESIUM: CPT

## 2023-05-17 PROCEDURE — 6360000002 HC RX W HCPCS: Performed by: COLON & RECTAL SURGERY

## 2023-05-17 PROCEDURE — 1100000000 HC RM PRIVATE

## 2023-05-17 PROCEDURE — 2500000003 HC RX 250 WO HCPCS: Performed by: COLON & RECTAL SURGERY

## 2023-05-17 PROCEDURE — 85027 COMPLETE CBC AUTOMATED: CPT

## 2023-05-17 PROCEDURE — 36415 COLL VENOUS BLD VENIPUNCTURE: CPT

## 2023-05-17 PROCEDURE — 2580000003 HC RX 258: Performed by: COLON & RECTAL SURGERY

## 2023-05-17 RX ADMIN — METOPROLOL TARTRATE 25 MG: 25 TABLET, FILM COATED ORAL at 09:12

## 2023-05-17 RX ADMIN — GABAPENTIN 300 MG: 300 CAPSULE ORAL at 09:12

## 2023-05-17 RX ADMIN — FAMOTIDINE 20 MG: 10 INJECTION INTRAVENOUS at 09:12

## 2023-05-17 RX ADMIN — CEFAZOLIN 2000 MG: 1 INJECTION, POWDER, FOR SOLUTION INTRAMUSCULAR; INTRAVENOUS at 04:11

## 2023-05-17 RX ADMIN — METRONIDAZOLE 500 MG: 500 INJECTION, SOLUTION INTRAVENOUS at 09:11

## 2023-05-17 RX ADMIN — HEPARIN SODIUM 5000 UNITS: 5000 INJECTION INTRAVENOUS; SUBCUTANEOUS at 05:24

## 2023-05-17 RX ADMIN — ACETAMINOPHEN 1000 MG: 500 TABLET ORAL at 09:11

## 2023-05-17 RX ADMIN — GABAPENTIN 300 MG: 300 CAPSULE ORAL at 21:03

## 2023-05-17 RX ADMIN — METRONIDAZOLE 500 MG: 500 INJECTION, SOLUTION INTRAVENOUS at 00:07

## 2023-05-17 RX ADMIN — METOPROLOL TARTRATE 25 MG: 25 TABLET, FILM COATED ORAL at 21:03

## 2023-05-17 RX ADMIN — HEPARIN SODIUM 5000 UNITS: 5000 INJECTION INTRAVENOUS; SUBCUTANEOUS at 21:03

## 2023-05-17 RX ADMIN — GABAPENTIN 300 MG: 300 CAPSULE ORAL at 13:22

## 2023-05-17 RX ADMIN — FAMOTIDINE 20 MG: 10 INJECTION INTRAVENOUS at 21:02

## 2023-05-17 RX ADMIN — ACETAMINOPHEN 1000 MG: 500 TABLET ORAL at 00:12

## 2023-05-17 RX ADMIN — ACETAMINOPHEN 1000 MG: 500 TABLET ORAL at 22:40

## 2023-05-17 RX ADMIN — HYDRALAZINE HYDROCHLORIDE 10 MG: 20 INJECTION, SOLUTION INTRAMUSCULAR; INTRAVENOUS at 21:02

## 2023-05-17 RX ADMIN — HYDROMORPHONE HYDROCHLORIDE 0.5 MG: 2 INJECTION INTRAMUSCULAR; INTRAVENOUS; SUBCUTANEOUS at 04:11

## 2023-05-17 RX ADMIN — HEPARIN SODIUM 5000 UNITS: 5000 INJECTION INTRAVENOUS; SUBCUTANEOUS at 13:21

## 2023-05-17 RX ADMIN — AMLODIPINE BESYLATE 5 MG: 5 TABLET ORAL at 09:12

## 2023-05-17 RX ADMIN — SODIUM CHLORIDE, POTASSIUM CHLORIDE, SODIUM LACTATE AND CALCIUM CHLORIDE: 600; 310; 30; 20 INJECTION, SOLUTION INTRAVENOUS at 05:24

## 2023-05-17 RX ADMIN — CEFAZOLIN 2000 MG: 1 INJECTION, POWDER, FOR SOLUTION INTRAMUSCULAR; INTRAVENOUS at 13:20

## 2023-05-17 ASSESSMENT — PAIN SCALES - GENERAL
PAINLEVEL_OUTOF10: 7
PAINLEVEL_OUTOF10: 7
PAINLEVEL_OUTOF10: 5
PAINLEVEL_OUTOF10: 4

## 2023-05-17 ASSESSMENT — PAIN DESCRIPTION - ORIENTATION
ORIENTATION: RIGHT
ORIENTATION: RIGHT

## 2023-05-17 ASSESSMENT — PAIN DESCRIPTION - DESCRIPTORS
DESCRIPTORS: ACHING;SORE
DESCRIPTORS: ACHING

## 2023-05-17 ASSESSMENT — PAIN DESCRIPTION - PAIN TYPE: TYPE: SURGICAL PAIN

## 2023-05-17 ASSESSMENT — PAIN - FUNCTIONAL ASSESSMENT: PAIN_FUNCTIONAL_ASSESSMENT: ACTIVITIES ARE NOT PREVENTED

## 2023-05-17 ASSESSMENT — PAIN DESCRIPTION - LOCATION
LOCATION: ABDOMEN
LOCATION: ABDOMEN

## 2023-05-18 LAB
ANION GAP SERPL CALC-SCNC: 7 MMOL/L (ref 3–18)
BUN SERPL-MCNC: 9 MG/DL (ref 7–18)
BUN/CREAT SERPL: 10 (ref 12–20)
CALCIUM SERPL-MCNC: 8.6 MG/DL (ref 8.5–10.1)
CHLORIDE SERPL-SCNC: 104 MMOL/L (ref 100–111)
CO2 SERPL-SCNC: 29 MMOL/L (ref 21–32)
CREAT SERPL-MCNC: 0.86 MG/DL (ref 0.6–1.3)
ERYTHROCYTE [DISTWIDTH] IN BLOOD BY AUTOMATED COUNT: 13.7 % (ref 11.6–14.5)
GLUCOSE BLD STRIP.AUTO-MCNC: 104 MG/DL (ref 70–110)
GLUCOSE SERPL-MCNC: 110 MG/DL (ref 74–99)
HCT VFR BLD AUTO: 37.6 % (ref 36–48)
HGB BLD-MCNC: 12.4 G/DL (ref 13–16)
MAGNESIUM SERPL-MCNC: 2.1 MG/DL (ref 1.6–2.6)
MCH RBC QN AUTO: 29.4 PG (ref 24–34)
MCHC RBC AUTO-ENTMCNC: 33 G/DL (ref 31–37)
MCV RBC AUTO: 89.1 FL (ref 78–100)
NRBC # BLD: 0 K/UL (ref 0–0.01)
NRBC BLD-RTO: 0 PER 100 WBC
PHOSPHATE SERPL-MCNC: 2 MG/DL (ref 2.5–4.9)
PLATELET # BLD AUTO: 166 K/UL (ref 135–420)
PMV BLD AUTO: 9.7 FL (ref 9.2–11.8)
POTASSIUM SERPL-SCNC: 3.1 MMOL/L (ref 3.5–5.5)
RBC # BLD AUTO: 4.22 M/UL (ref 4.35–5.65)
SODIUM SERPL-SCNC: 140 MMOL/L (ref 136–145)
WBC # BLD AUTO: 9.4 K/UL (ref 4.6–13.2)

## 2023-05-18 PROCEDURE — 1100000000 HC RM PRIVATE

## 2023-05-18 PROCEDURE — 82962 GLUCOSE BLOOD TEST: CPT

## 2023-05-18 PROCEDURE — 85027 COMPLETE CBC AUTOMATED: CPT

## 2023-05-18 PROCEDURE — 84100 ASSAY OF PHOSPHORUS: CPT

## 2023-05-18 PROCEDURE — 6370000000 HC RX 637 (ALT 250 FOR IP): Performed by: COLON & RECTAL SURGERY

## 2023-05-18 PROCEDURE — 80048 BASIC METABOLIC PNL TOTAL CA: CPT

## 2023-05-18 PROCEDURE — 2580000003 HC RX 258: Performed by: COLON & RECTAL SURGERY

## 2023-05-18 PROCEDURE — 6360000002 HC RX W HCPCS: Performed by: COLON & RECTAL SURGERY

## 2023-05-18 PROCEDURE — 83735 ASSAY OF MAGNESIUM: CPT

## 2023-05-18 PROCEDURE — A4216 STERILE WATER/SALINE, 10 ML: HCPCS | Performed by: COLON & RECTAL SURGERY

## 2023-05-18 PROCEDURE — 36415 COLL VENOUS BLD VENIPUNCTURE: CPT

## 2023-05-18 PROCEDURE — 2500000003 HC RX 250 WO HCPCS: Performed by: COLON & RECTAL SURGERY

## 2023-05-18 RX ORDER — POTASSIUM CHLORIDE 7.45 MG/ML
10 INJECTION INTRAVENOUS
Status: COMPLETED | OUTPATIENT
Start: 2023-05-18 | End: 2023-05-18

## 2023-05-18 RX ADMIN — METOPROLOL TARTRATE 25 MG: 25 TABLET, FILM COATED ORAL at 08:06

## 2023-05-18 RX ADMIN — POTASSIUM CHLORIDE 10 MEQ: 7.46 INJECTION, SOLUTION INTRAVENOUS at 08:32

## 2023-05-18 RX ADMIN — ACETAMINOPHEN 1000 MG: 500 TABLET ORAL at 04:52

## 2023-05-18 RX ADMIN — SODIUM CHLORIDE, POTASSIUM CHLORIDE, SODIUM LACTATE AND CALCIUM CHLORIDE: 600; 310; 30; 20 INJECTION, SOLUTION INTRAVENOUS at 21:07

## 2023-05-18 RX ADMIN — FAMOTIDINE 20 MG: 10 INJECTION INTRAVENOUS at 08:06

## 2023-05-18 RX ADMIN — POTASSIUM CHLORIDE 10 MEQ: 7.46 INJECTION, SOLUTION INTRAVENOUS at 09:46

## 2023-05-18 RX ADMIN — GABAPENTIN 300 MG: 300 CAPSULE ORAL at 08:06

## 2023-05-18 RX ADMIN — FAMOTIDINE 20 MG: 10 INJECTION INTRAVENOUS at 21:33

## 2023-05-18 RX ADMIN — ACETAMINOPHEN 1000 MG: 500 TABLET ORAL at 11:06

## 2023-05-18 RX ADMIN — HEPARIN SODIUM 5000 UNITS: 5000 INJECTION INTRAVENOUS; SUBCUTANEOUS at 21:33

## 2023-05-18 RX ADMIN — POTASSIUM CHLORIDE 10 MEQ: 7.46 INJECTION, SOLUTION INTRAVENOUS at 12:31

## 2023-05-18 RX ADMIN — HEPARIN SODIUM 5000 UNITS: 5000 INJECTION INTRAVENOUS; SUBCUTANEOUS at 13:35

## 2023-05-18 RX ADMIN — ACETAMINOPHEN 1000 MG: 500 TABLET ORAL at 22:53

## 2023-05-18 RX ADMIN — METOPROLOL TARTRATE 25 MG: 25 TABLET, FILM COATED ORAL at 21:32

## 2023-05-18 RX ADMIN — GABAPENTIN 300 MG: 300 CAPSULE ORAL at 13:35

## 2023-05-18 RX ADMIN — HEPARIN SODIUM 5000 UNITS: 5000 INJECTION INTRAVENOUS; SUBCUTANEOUS at 05:00

## 2023-05-18 RX ADMIN — GABAPENTIN 300 MG: 300 CAPSULE ORAL at 21:32

## 2023-05-18 RX ADMIN — ACETAMINOPHEN 1000 MG: 500 TABLET ORAL at 16:48

## 2023-05-18 RX ADMIN — POTASSIUM CHLORIDE 10 MEQ: 7.46 INJECTION, SOLUTION INTRAVENOUS at 11:06

## 2023-05-18 ASSESSMENT — PAIN SCALES - GENERAL
PAINLEVEL_OUTOF10: 0

## 2023-05-19 LAB
ANION GAP SERPL CALC-SCNC: 4 MMOL/L (ref 3–18)
BUN SERPL-MCNC: 7 MG/DL (ref 7–18)
BUN/CREAT SERPL: 9 (ref 12–20)
CALCIUM SERPL-MCNC: 8.8 MG/DL (ref 8.5–10.1)
CHLORIDE SERPL-SCNC: 105 MMOL/L (ref 100–111)
CO2 SERPL-SCNC: 30 MMOL/L (ref 21–32)
CREAT SERPL-MCNC: 0.75 MG/DL (ref 0.6–1.3)
ERYTHROCYTE [DISTWIDTH] IN BLOOD BY AUTOMATED COUNT: 13.5 % (ref 11.6–14.5)
GLUCOSE SERPL-MCNC: 113 MG/DL (ref 74–99)
HCT VFR BLD AUTO: 39.1 % (ref 36–48)
HGB BLD-MCNC: 12.7 G/DL (ref 13–16)
MAGNESIUM SERPL-MCNC: 2.1 MG/DL (ref 1.6–2.6)
MCH RBC QN AUTO: 29.1 PG (ref 24–34)
MCHC RBC AUTO-ENTMCNC: 32.5 G/DL (ref 31–37)
MCV RBC AUTO: 89.7 FL (ref 78–100)
NRBC # BLD: 0 K/UL (ref 0–0.01)
NRBC BLD-RTO: 0 PER 100 WBC
PHOSPHATE SERPL-MCNC: 2 MG/DL (ref 2.5–4.9)
PLATELET # BLD AUTO: 175 K/UL (ref 135–420)
PMV BLD AUTO: 9.7 FL (ref 9.2–11.8)
POTASSIUM SERPL-SCNC: 3.4 MMOL/L (ref 3.5–5.5)
RBC # BLD AUTO: 4.36 M/UL (ref 4.35–5.65)
SODIUM SERPL-SCNC: 139 MMOL/L (ref 136–145)
WBC # BLD AUTO: 8.7 K/UL (ref 4.6–13.2)

## 2023-05-19 PROCEDURE — 6370000000 HC RX 637 (ALT 250 FOR IP): Performed by: COLON & RECTAL SURGERY

## 2023-05-19 PROCEDURE — 2580000003 HC RX 258: Performed by: COLON & RECTAL SURGERY

## 2023-05-19 PROCEDURE — 2500000003 HC RX 250 WO HCPCS: Performed by: COLON & RECTAL SURGERY

## 2023-05-19 PROCEDURE — A4216 STERILE WATER/SALINE, 10 ML: HCPCS | Performed by: COLON & RECTAL SURGERY

## 2023-05-19 PROCEDURE — 36415 COLL VENOUS BLD VENIPUNCTURE: CPT

## 2023-05-19 PROCEDURE — 1100000003 HC PRIVATE W/ TELEMETRY

## 2023-05-19 PROCEDURE — 85027 COMPLETE CBC AUTOMATED: CPT

## 2023-05-19 PROCEDURE — 83735 ASSAY OF MAGNESIUM: CPT

## 2023-05-19 PROCEDURE — 84100 ASSAY OF PHOSPHORUS: CPT

## 2023-05-19 PROCEDURE — 6360000002 HC RX W HCPCS: Performed by: COLON & RECTAL SURGERY

## 2023-05-19 PROCEDURE — 80048 BASIC METABOLIC PNL TOTAL CA: CPT

## 2023-05-19 RX ADMIN — GABAPENTIN 300 MG: 300 CAPSULE ORAL at 13:50

## 2023-05-19 RX ADMIN — ACETAMINOPHEN 1000 MG: 500 TABLET ORAL at 16:48

## 2023-05-19 RX ADMIN — HEPARIN SODIUM 5000 UNITS: 5000 INJECTION INTRAVENOUS; SUBCUTANEOUS at 06:16

## 2023-05-19 RX ADMIN — FAMOTIDINE 20 MG: 10 INJECTION INTRAVENOUS at 20:32

## 2023-05-19 RX ADMIN — ACETAMINOPHEN 1000 MG: 500 TABLET ORAL at 12:18

## 2023-05-19 RX ADMIN — FAMOTIDINE 20 MG: 10 INJECTION INTRAVENOUS at 08:26

## 2023-05-19 RX ADMIN — HEPARIN SODIUM 5000 UNITS: 5000 INJECTION INTRAVENOUS; SUBCUTANEOUS at 22:54

## 2023-05-19 RX ADMIN — METOPROLOL TARTRATE 25 MG: 25 TABLET, FILM COATED ORAL at 20:31

## 2023-05-19 RX ADMIN — ACETAMINOPHEN 1000 MG: 500 TABLET ORAL at 06:16

## 2023-05-19 RX ADMIN — GABAPENTIN 300 MG: 300 CAPSULE ORAL at 20:31

## 2023-05-19 RX ADMIN — HEPARIN SODIUM 5000 UNITS: 5000 INJECTION INTRAVENOUS; SUBCUTANEOUS at 13:50

## 2023-05-19 RX ADMIN — METOPROLOL TARTRATE 25 MG: 25 TABLET, FILM COATED ORAL at 08:26

## 2023-05-19 RX ADMIN — GABAPENTIN 300 MG: 300 CAPSULE ORAL at 08:26

## 2023-05-19 RX ADMIN — ACETAMINOPHEN 1000 MG: 500 TABLET ORAL at 23:00

## 2023-05-19 ASSESSMENT — PAIN SCALES - GENERAL
PAINLEVEL_OUTOF10: 3
PAINLEVEL_OUTOF10: 1
PAINLEVEL_OUTOF10: 0
PAINLEVEL_OUTOF10: 1
PAINLEVEL_OUTOF10: 3
PAINLEVEL_OUTOF10: 0
PAINLEVEL_OUTOF10: 3
PAINLEVEL_OUTOF10: 3

## 2023-05-19 ASSESSMENT — PAIN DESCRIPTION - LOCATION
LOCATION: ABDOMEN

## 2023-05-19 ASSESSMENT — PAIN DESCRIPTION - DESCRIPTORS: DESCRIPTORS: DISCOMFORT

## 2023-05-20 LAB
ANION GAP SERPL CALC-SCNC: 3 MMOL/L (ref 3–18)
BUN SERPL-MCNC: 6 MG/DL (ref 7–18)
BUN/CREAT SERPL: 8 (ref 12–20)
CALCIUM SERPL-MCNC: 8.4 MG/DL (ref 8.5–10.1)
CHLORIDE SERPL-SCNC: 104 MMOL/L (ref 100–111)
CO2 SERPL-SCNC: 32 MMOL/L (ref 21–32)
CREAT SERPL-MCNC: 0.76 MG/DL (ref 0.6–1.3)
ERYTHROCYTE [DISTWIDTH] IN BLOOD BY AUTOMATED COUNT: 13.2 % (ref 11.6–14.5)
GLUCOSE SERPL-MCNC: 110 MG/DL (ref 74–99)
HCT VFR BLD AUTO: 36.6 % (ref 36–48)
HGB BLD-MCNC: 12 G/DL (ref 13–16)
MAGNESIUM SERPL-MCNC: 2.1 MG/DL (ref 1.6–2.6)
MCH RBC QN AUTO: 29.4 PG (ref 24–34)
MCHC RBC AUTO-ENTMCNC: 32.8 G/DL (ref 31–37)
MCV RBC AUTO: 89.7 FL (ref 78–100)
NRBC # BLD: 0 K/UL (ref 0–0.01)
NRBC BLD-RTO: 0 PER 100 WBC
PHOSPHATE SERPL-MCNC: 2.5 MG/DL (ref 2.5–4.9)
PLATELET # BLD AUTO: 185 K/UL (ref 135–420)
PMV BLD AUTO: 9.4 FL (ref 9.2–11.8)
POTASSIUM SERPL-SCNC: 3.2 MMOL/L (ref 3.5–5.5)
RBC # BLD AUTO: 4.08 M/UL (ref 4.35–5.65)
SODIUM SERPL-SCNC: 139 MMOL/L (ref 136–145)
WBC # BLD AUTO: 8 K/UL (ref 4.6–13.2)

## 2023-05-20 PROCEDURE — 36415 COLL VENOUS BLD VENIPUNCTURE: CPT

## 2023-05-20 PROCEDURE — 85027 COMPLETE CBC AUTOMATED: CPT

## 2023-05-20 PROCEDURE — 84100 ASSAY OF PHOSPHORUS: CPT

## 2023-05-20 PROCEDURE — 6360000002 HC RX W HCPCS: Performed by: COLON & RECTAL SURGERY

## 2023-05-20 PROCEDURE — 6370000000 HC RX 637 (ALT 250 FOR IP): Performed by: COLON & RECTAL SURGERY

## 2023-05-20 PROCEDURE — 83735 ASSAY OF MAGNESIUM: CPT

## 2023-05-20 PROCEDURE — A4216 STERILE WATER/SALINE, 10 ML: HCPCS | Performed by: COLON & RECTAL SURGERY

## 2023-05-20 PROCEDURE — 2580000003 HC RX 258: Performed by: COLON & RECTAL SURGERY

## 2023-05-20 PROCEDURE — 2500000003 HC RX 250 WO HCPCS: Performed by: COLON & RECTAL SURGERY

## 2023-05-20 PROCEDURE — 1100000003 HC PRIVATE W/ TELEMETRY

## 2023-05-20 PROCEDURE — 80048 BASIC METABOLIC PNL TOTAL CA: CPT

## 2023-05-20 RX ORDER — POTASSIUM CHLORIDE 7.45 MG/ML
10 INJECTION INTRAVENOUS
Status: DISPENSED | OUTPATIENT
Start: 2023-05-20 | End: 2023-05-20

## 2023-05-20 RX ADMIN — METOPROLOL TARTRATE 25 MG: 25 TABLET, FILM COATED ORAL at 21:46

## 2023-05-20 RX ADMIN — METOPROLOL TARTRATE 25 MG: 25 TABLET, FILM COATED ORAL at 09:40

## 2023-05-20 RX ADMIN — GABAPENTIN 300 MG: 300 CAPSULE ORAL at 14:46

## 2023-05-20 RX ADMIN — POTASSIUM CHLORIDE 10 MEQ: 7.46 INJECTION, SOLUTION INTRAVENOUS at 12:32

## 2023-05-20 RX ADMIN — POTASSIUM CHLORIDE 10 MEQ: 7.46 INJECTION, SOLUTION INTRAVENOUS at 11:06

## 2023-05-20 RX ADMIN — FAMOTIDINE 20 MG: 10 INJECTION INTRAVENOUS at 21:47

## 2023-05-20 RX ADMIN — GABAPENTIN 300 MG: 300 CAPSULE ORAL at 21:47

## 2023-05-20 RX ADMIN — GABAPENTIN 300 MG: 300 CAPSULE ORAL at 09:39

## 2023-05-20 RX ADMIN — HEPARIN SODIUM 5000 UNITS: 5000 INJECTION INTRAVENOUS; SUBCUTANEOUS at 14:46

## 2023-05-20 RX ADMIN — HEPARIN SODIUM 5000 UNITS: 5000 INJECTION INTRAVENOUS; SUBCUTANEOUS at 05:30

## 2023-05-20 RX ADMIN — HEPARIN SODIUM 5000 UNITS: 5000 INJECTION INTRAVENOUS; SUBCUTANEOUS at 21:48

## 2023-05-20 RX ADMIN — FAMOTIDINE 20 MG: 10 INJECTION INTRAVENOUS at 09:40

## 2023-05-20 RX ADMIN — POTASSIUM CHLORIDE 10 MEQ: 7.46 INJECTION, SOLUTION INTRAVENOUS at 09:40

## 2023-05-20 ASSESSMENT — PAIN SCALES - GENERAL
PAINLEVEL_OUTOF10: 0

## 2023-05-21 VITALS
WEIGHT: 164 LBS | HEART RATE: 80 BPM | OXYGEN SATURATION: 99 % | HEIGHT: 69 IN | TEMPERATURE: 98.8 F | DIASTOLIC BLOOD PRESSURE: 84 MMHG | BODY MASS INDEX: 24.29 KG/M2 | SYSTOLIC BLOOD PRESSURE: 160 MMHG | RESPIRATION RATE: 18 BRPM

## 2023-05-21 LAB
ANION GAP SERPL CALC-SCNC: 3 MMOL/L (ref 3–18)
BUN SERPL-MCNC: 5 MG/DL (ref 7–18)
BUN/CREAT SERPL: 6 (ref 12–20)
CALCIUM SERPL-MCNC: 8.9 MG/DL (ref 8.5–10.1)
CHLORIDE SERPL-SCNC: 103 MMOL/L (ref 100–111)
CO2 SERPL-SCNC: 34 MMOL/L (ref 21–32)
CREAT SERPL-MCNC: 0.84 MG/DL (ref 0.6–1.3)
ERYTHROCYTE [DISTWIDTH] IN BLOOD BY AUTOMATED COUNT: 13.3 % (ref 11.6–14.5)
GLUCOSE SERPL-MCNC: 110 MG/DL (ref 74–99)
HCT VFR BLD AUTO: 39.3 % (ref 36–48)
HGB BLD-MCNC: 12.6 G/DL (ref 13–16)
MAGNESIUM SERPL-MCNC: 2.3 MG/DL (ref 1.6–2.6)
MCH RBC QN AUTO: 28.9 PG (ref 24–34)
MCHC RBC AUTO-ENTMCNC: 32.1 G/DL (ref 31–37)
MCV RBC AUTO: 90.1 FL (ref 78–100)
NRBC # BLD: 0 K/UL (ref 0–0.01)
NRBC BLD-RTO: 0 PER 100 WBC
PHOSPHATE SERPL-MCNC: 2.6 MG/DL (ref 2.5–4.9)
PLATELET # BLD AUTO: 229 K/UL (ref 135–420)
PMV BLD AUTO: 9.5 FL (ref 9.2–11.8)
POTASSIUM SERPL-SCNC: 3.6 MMOL/L (ref 3.5–5.5)
RBC # BLD AUTO: 4.36 M/UL (ref 4.35–5.65)
SODIUM SERPL-SCNC: 140 MMOL/L (ref 136–145)
WBC # BLD AUTO: 8.5 K/UL (ref 4.6–13.2)

## 2023-05-21 PROCEDURE — 83735 ASSAY OF MAGNESIUM: CPT

## 2023-05-21 PROCEDURE — 85027 COMPLETE CBC AUTOMATED: CPT

## 2023-05-21 PROCEDURE — 80048 BASIC METABOLIC PNL TOTAL CA: CPT

## 2023-05-21 PROCEDURE — 2580000003 HC RX 258: Performed by: COLON & RECTAL SURGERY

## 2023-05-21 PROCEDURE — A4216 STERILE WATER/SALINE, 10 ML: HCPCS | Performed by: COLON & RECTAL SURGERY

## 2023-05-21 PROCEDURE — 2500000003 HC RX 250 WO HCPCS: Performed by: COLON & RECTAL SURGERY

## 2023-05-21 PROCEDURE — 6360000002 HC RX W HCPCS: Performed by: COLON & RECTAL SURGERY

## 2023-05-21 PROCEDURE — 84100 ASSAY OF PHOSPHORUS: CPT

## 2023-05-21 PROCEDURE — 6370000000 HC RX 637 (ALT 250 FOR IP): Performed by: COLON & RECTAL SURGERY

## 2023-05-21 PROCEDURE — 36415 COLL VENOUS BLD VENIPUNCTURE: CPT

## 2023-05-21 RX ADMIN — METOPROLOL TARTRATE 25 MG: 25 TABLET, FILM COATED ORAL at 09:59

## 2023-05-21 RX ADMIN — FAMOTIDINE 20 MG: 10 INJECTION INTRAVENOUS at 09:59

## 2023-05-21 RX ADMIN — HEPARIN SODIUM 5000 UNITS: 5000 INJECTION INTRAVENOUS; SUBCUTANEOUS at 09:59

## 2023-05-21 ASSESSMENT — PAIN SCALES - GENERAL
PAINLEVEL_OUTOF10: 0
PAINLEVEL_OUTOF10: 0

## 2023-05-22 ENCOUNTER — CARE COORDINATION (OUTPATIENT)
Facility: CLINIC | Age: 68
End: 2023-05-22

## 2023-05-23 ENCOUNTER — CARE COORDINATION (OUTPATIENT)
Facility: CLINIC | Age: 68
End: 2023-05-23

## 2023-05-23 ENCOUNTER — PATIENT MESSAGE (OUTPATIENT)
Facility: CLINIC | Age: 68
End: 2023-05-23

## 2023-05-23 NOTE — CARE COORDINATION
Care Transitions Outreach Attempt    Call within 2 business days of discharge: Yes   Attempted to reach patient for transitions of care follow up. Unable to reach patient. An individual answered the phone call and related that patient was not available. No patient medical information was shared during the phone call. Patient: Fina Mojica Patient : 1955 MRN: [id number]    Last Discharge [address]       Date Complaint Diagnosis Description Type Department Provider    23   Admission (Discharged) Federico York MD              Was this an external facility discharge?  No   [facility]     Noted following upcoming appointments from discharge chart review:   [facility] follow up appointment(s):   Future Appointments   Date Time Provider Arcelia Crocker   2023 11:45 AM Guido Blankenship MD [facility] BS AMB   2023 11:30 AM Hannah Cardenas MD [facility] BS AMB

## 2023-05-23 NOTE — CARE COORDINATION
Care Transitions Follow Up     Care Transitions Nurse ( CTN)  was unable to reach patient via telephone call.      An outreach letter was forwarded to patient via the [employer]

## 2023-05-23 NOTE — CARE COORDINATION
Care Transitions Outreach Attempt    Call within 2 business days of discharge: Yes   Attempted to reach patient for transitions of care follow up. Unable to reach patient. Care Transitions Nurse ( CTN) attempted to contact patient via telephone call. There was no response. A voicemail message was left requesting a non-emergency return telephone call. CTN  contact information provided. No patient medical information was left on the voicemail message.       Patient: Shirin De Jesus Patient : 1955 MRN: [id number]    Last Discharge  Street       Date Complaint Diagnosis Description Type Department Provider    23   Admission (Discharged) Alma Guevara MD          Noted following upcoming appointments from discharge chart review:   [facility] follow up appointment(s):   Future Appointments   Date Time Provider Arcelia Crocker   2023 11:45 AM Alberto Barrios MD [facility] BS AMB   2023 11:30 AM Sally Hernandez MD [facility] BS AMB

## 2023-05-25 ENCOUNTER — TELEPHONE (OUTPATIENT)
Age: 68
End: 2023-05-25

## 2023-05-25 NOTE — TELEPHONE ENCOUNTER
If he continues amlodipine and losartan, he will probably need lesser doses of atenolol chlorthalidone combination.

## 2023-05-25 NOTE — TELEPHONE ENCOUNTER
Patient called was returned and stated his blood pressure went down after he talked to me earlier today. He stated he is going to continue just the metoprolol for right now and will call us back if he have other issues. He is schedule for follow up on June 2.

## 2023-05-25 NOTE — TELEPHONE ENCOUNTER
Patient called and stated he stop taking losartan 50 mg and amlodipine 5 mg because he it was not lowing his blood pressure. He stated he continue to take the metoprolol 25 bid. He stated yesterday he passed out. He stated he would like to know if he can go back on  atenolol-chlorthalidone 50-25mg.  Blood pressure today 172/88 57    176/86 77 152/88 66  Please advise

## 2023-06-02 ENCOUNTER — OFFICE VISIT (OUTPATIENT)
Age: 68
End: 2023-06-02
Payer: MEDICARE

## 2023-06-02 ENCOUNTER — TELEPHONE (OUTPATIENT)
Age: 68
End: 2023-06-02

## 2023-06-02 VITALS
OXYGEN SATURATION: 98 % | DIASTOLIC BLOOD PRESSURE: 80 MMHG | SYSTOLIC BLOOD PRESSURE: 160 MMHG | BODY MASS INDEX: 23.48 KG/M2 | HEART RATE: 74 BPM | WEIGHT: 159 LBS

## 2023-06-02 DIAGNOSIS — I48.0 PAROXYSMAL ATRIAL FIBRILLATION (HCC): Primary | ICD-10-CM

## 2023-06-02 DIAGNOSIS — E78.00 HYPERCHOLESTEREMIA: ICD-10-CM

## 2023-06-02 DIAGNOSIS — Z98.890 STATUS POST LAPAROTOMY: ICD-10-CM

## 2023-06-02 DIAGNOSIS — I10 ESSENTIAL HYPERTENSION: ICD-10-CM

## 2023-06-02 PROCEDURE — 99214 OFFICE O/P EST MOD 30 MIN: CPT | Performed by: INTERNAL MEDICINE

## 2023-06-02 PROCEDURE — 1123F ACP DISCUSS/DSCN MKR DOCD: CPT | Performed by: INTERNAL MEDICINE

## 2023-06-02 PROCEDURE — 3078F DIAST BP <80 MM HG: CPT | Performed by: INTERNAL MEDICINE

## 2023-06-02 PROCEDURE — G8420 CALC BMI NORM PARAMETERS: HCPCS | Performed by: INTERNAL MEDICINE

## 2023-06-02 PROCEDURE — 1036F TOBACCO NON-USER: CPT | Performed by: INTERNAL MEDICINE

## 2023-06-02 PROCEDURE — 1111F DSCHRG MED/CURRENT MED MERGE: CPT | Performed by: INTERNAL MEDICINE

## 2023-06-02 PROCEDURE — 3077F SYST BP >= 140 MM HG: CPT | Performed by: INTERNAL MEDICINE

## 2023-06-02 PROCEDURE — 3017F COLORECTAL CA SCREEN DOC REV: CPT | Performed by: INTERNAL MEDICINE

## 2023-06-02 PROCEDURE — G8427 DOCREV CUR MEDS BY ELIG CLIN: HCPCS | Performed by: INTERNAL MEDICINE

## 2023-06-02 ASSESSMENT — ENCOUNTER SYMPTOMS
ABDOMINAL PAIN: 0
WHEEZING: 0
ABDOMINAL DISTENTION: 0
COUGH: 0
CHEST TIGHTNESS: 0
NAUSEA: 0
APNEA: 0
BACK PAIN: 0
SHORTNESS OF BREATH: 0

## 2023-06-02 NOTE — PROGRESS NOTES
1. Have you been to the ER, urgent care clinic since your last visit? Hospitalized since your last visit? [employer] Colectomy, loop ileostomy reversal 5/16/23 @ [address] [place] [address]      2. Where do you normally have your labs drawn? [facility]    3. Do you need any refills today? NO    4. Which local pharmacy do you use (enter pharmacy)? [facility]    5. Which mail order pharmacy do you use (enter pharmacy)? No     6. Are you here for surgical clearance and if so who will be doing your     procedure/surgery (care team)?     No
Normocephalic and atraumatic. Nose: Nose normal.      Mouth/Throat:      Mouth: Mucous membranes are moist.   Eyes:      Extraocular Movements: Extraocular movements intact. Neck:      Vascular: No carotid bruit. Cardiovascular:      Rate and Rhythm: Normal rate and regular rhythm. Pulses: Normal pulses. Heart sounds: Normal heart sounds. No murmur heard. No friction rub. No gallop. Pulmonary:      Effort: Pulmonary effort is normal. No respiratory distress. Breath sounds: Normal breath sounds. No stridor. No wheezing, rhonchi or rales. Chest:      Chest wall: No tenderness. Abdominal:      General: There is no distension. Musculoskeletal:         General: Normal range of motion. Cervical back: Normal range of motion. Right lower leg: No edema. Left lower leg: No edema. Skin:     General: Skin is warm and dry. Neurological:      General: No focal deficit present. Mental Status: He is alert and oriented to person, place, and time.    Psychiatric:         Mood and Affect: Mood normal.     No Known Allergies    Past Medical History:   Diagnosis Date    A-fib ([facility] [address]     per event monitor report cardiac noet 3/28/23    Anxiety     Benign essential hypertension     CHF (congestive heart failure) ([address]     per cardiac note 3/28/23    DM type 2 (diabetes mellitus, type 2) (HCC)     Hypokalemia     Hyponatremia     Leukocytosis     Paroxysmal atrial flutter (HCC)     Perforation of intestine ([address] 2023    Pulmonary HTN ([address]     mild per echo 2023    SVT (supraventricular tachycardia) ([facility] [address] 2023    per hospital admission note epic       Family History   Problem Relation Age of Onset    No Known Problems Mother     Osteoarthritis Father     Heart Attack Neg Hx     Stroke Neg Hx        Social History     Tobacco Use    Smoking status: Former     Types: Cigarettes     Quit date: 1980     Years since quittin.4    Smokeless tobacco: Never   Vaping Use

## 2023-06-08 ENCOUNTER — OFFICE VISIT (OUTPATIENT)
Age: 68
End: 2023-06-08
Payer: MEDICARE

## 2023-06-08 ENCOUNTER — CLINICAL DOCUMENTATION (OUTPATIENT)
Age: 68
End: 2023-06-08

## 2023-06-08 VITALS
TEMPERATURE: 98 F | WEIGHT: 160 LBS | HEIGHT: 69 IN | SYSTOLIC BLOOD PRESSURE: 190 MMHG | OXYGEN SATURATION: 99 % | HEART RATE: 51 BPM | BODY MASS INDEX: 23.7 KG/M2 | DIASTOLIC BLOOD PRESSURE: 82 MMHG | RESPIRATION RATE: 18 BRPM

## 2023-06-08 DIAGNOSIS — C18.6 MALIGNANT NEOPLASM OF DESCENDING COLON (HCC): Primary | ICD-10-CM

## 2023-06-08 PROCEDURE — 3077F SYST BP >= 140 MM HG: CPT | Performed by: COLON & RECTAL SURGERY

## 2023-06-08 PROCEDURE — G8420 CALC BMI NORM PARAMETERS: HCPCS | Performed by: COLON & RECTAL SURGERY

## 2023-06-08 PROCEDURE — 1123F ACP DISCUSS/DSCN MKR DOCD: CPT | Performed by: COLON & RECTAL SURGERY

## 2023-06-08 PROCEDURE — 1111F DSCHRG MED/CURRENT MED MERGE: CPT | Performed by: COLON & RECTAL SURGERY

## 2023-06-08 PROCEDURE — G8428 CUR MEDS NOT DOCUMENT: HCPCS | Performed by: COLON & RECTAL SURGERY

## 2023-06-08 PROCEDURE — 3017F COLORECTAL CA SCREEN DOC REV: CPT | Performed by: COLON & RECTAL SURGERY

## 2023-06-08 PROCEDURE — 99214 OFFICE O/P EST MOD 30 MIN: CPT | Performed by: COLON & RECTAL SURGERY

## 2023-06-08 PROCEDURE — 1036F TOBACCO NON-USER: CPT | Performed by: COLON & RECTAL SURGERY

## 2023-06-08 PROCEDURE — 3078F DIAST BP <80 MM HG: CPT | Performed by: COLON & RECTAL SURGERY

## 2023-06-08 NOTE — PROGRESS NOTES
[facility] [address]  Breast & Colorectal Oncology Nurse Navigator Encounter    Name: Maricarmen Benítez  Age: 70 y.o.  : 1955  Diagnosis & Date: Descending Colon cancer    Encounter type:  [x]Initial Navigator Encounter  []Patient Initiated  []Navigator Follow-up  []Other:     Narrative:   Patient arrived for an post appointment with Dr. Juan Hutchison. Plan of care reviewed. CT Chest, refer to Dr. Florida Thornton for oncology and follow up in two weeks for wound check. Gift card application this visit and gift card given. Referral faxed and confirmation scanned in chart. Reminded that I am available as needed for any needs/issues/questions that arise and encouraged to call; My contact info provided. All questions answered. Will follow up with Maricarmen Benítez  to discuss needs and plan of care.     Interdisciplinary Team:      Nurse Navigator: Kimani Evans, RN      Kimani Evans BSN, RN  Breast & Colorectal Cancer Nurse Navigator    [facility] [address]  [facility] [address] [address] [place] [address] [address] [place], [address]  Office number [phone]  [email or web address]  Good Help to Those in [facility]

## 2023-06-08 NOTE — PATIENT INSTRUCTIONS
Christa Aguilar has been given the following recommendations today due to him elevated BP reading referred to Alternative/PCP. Patient to discuss his blood pressure medication with his cardiologist and if it needs to be adjusted.

## 2023-06-08 NOTE — PROGRESS NOTES
Subjective: Tolerating diet. Stools becoming more formed. Past medical history and ROS were reviewed and unchanged. Abdomen: Soft, nontender nondistended  Ileostomy wound healing well, touched with silver nitrate    Pathology consistent with stage III cancer    Assessment / Plan    Status post robotic left colectomy, loop ileostomy reversal for stage III ascending colon cancer, May 2023  CT chest to complete staging, this was not a definitive cancer prior to surgery based on biopsy  Discussion with patient regarding referral to oncology for adjuvant therapy  He is somewhat reluctant but is willing to have initial consultation  Follow-up in 2 weeks for wound check  Normalize diet  Colonoscopy prior to surgery was up to the obstructing lesion, he probably has approximately 1 foot of nonvisualized transverse colon and should eventually have a completion colonoscopy but this can wait until after adjuvant therapy    30 minutes was spent in patient care. The diagnoses and plan were discussed with patient. All questions answered. Plan of care agreed to by all concerned.

## 2023-06-19 ENCOUNTER — HOSPITAL ENCOUNTER (OUTPATIENT)
Facility: HOSPITAL | Age: 68
Discharge: HOME | End: 2023-06-22
Attending: COLON & RECTAL SURGERY
Payer: MEDICARE

## 2023-06-19 DIAGNOSIS — C18.6 MALIGNANT NEOPLASM OF DESCENDING COLON (HCC): ICD-10-CM

## 2023-06-19 PROCEDURE — 71250 CT THORAX DX C-: CPT

## 2023-06-20 ENCOUNTER — TRANSCRIBE ORDERS (OUTPATIENT)
Facility: HOSPITAL | Age: 68
End: 2023-06-20

## 2023-06-20 DIAGNOSIS — R91.8 RIGHT LOWER LOBE LUNG MASS: ICD-10-CM

## 2023-06-20 DIAGNOSIS — C18.2 MALIGNANT NEOPLASM OF ASCENDING COLON (HCC): Primary | ICD-10-CM

## 2023-06-22 ENCOUNTER — HOSPITAL ENCOUNTER (OUTPATIENT)
Facility: HOSPITAL | Age: 68
Setting detail: SPECIMEN
Discharge: HOME | End: 2023-06-25

## 2023-06-26 ENCOUNTER — OFFICE VISIT (OUTPATIENT)
Age: 68
End: 2023-06-26
Payer: MEDICARE

## 2023-06-26 VITALS
HEART RATE: 58 BPM | DIASTOLIC BLOOD PRESSURE: 62 MMHG | TEMPERATURE: 98 F | BODY MASS INDEX: 23.7 KG/M2 | HEIGHT: 69 IN | WEIGHT: 160 LBS | RESPIRATION RATE: 18 BRPM | OXYGEN SATURATION: 100 % | SYSTOLIC BLOOD PRESSURE: 148 MMHG

## 2023-06-26 DIAGNOSIS — R91.1 LUNG NODULE: ICD-10-CM

## 2023-06-26 DIAGNOSIS — C18.6 MALIGNANT NEOPLASM OF DESCENDING COLON (HCC): Primary | ICD-10-CM

## 2023-06-26 PROCEDURE — G8420 CALC BMI NORM PARAMETERS: HCPCS | Performed by: COLON & RECTAL SURGERY

## 2023-06-26 PROCEDURE — G8427 DOCREV CUR MEDS BY ELIG CLIN: HCPCS | Performed by: COLON & RECTAL SURGERY

## 2023-06-26 PROCEDURE — 1036F TOBACCO NON-USER: CPT | Performed by: COLON & RECTAL SURGERY

## 2023-06-26 PROCEDURE — 3017F COLORECTAL CA SCREEN DOC REV: CPT | Performed by: COLON & RECTAL SURGERY

## 2023-06-26 PROCEDURE — 3078F DIAST BP <80 MM HG: CPT | Performed by: COLON & RECTAL SURGERY

## 2023-06-26 PROCEDURE — 1123F ACP DISCUSS/DSCN MKR DOCD: CPT | Performed by: COLON & RECTAL SURGERY

## 2023-06-26 PROCEDURE — 99213 OFFICE O/P EST LOW 20 MIN: CPT | Performed by: COLON & RECTAL SURGERY

## 2023-06-26 PROCEDURE — 3077F SYST BP >= 140 MM HG: CPT | Performed by: COLON & RECTAL SURGERY

## 2023-06-26 RX ORDER — POTASSIUM CHLORIDE 1.5 G/1.77G
20 POWDER, FOR SOLUTION ORAL DAILY
COMMUNITY

## 2023-07-05 ENCOUNTER — HOSPITAL ENCOUNTER (OUTPATIENT)
Facility: HOSPITAL | Age: 68
Setting detail: SPECIMEN
Discharge: HOME | End: 2023-07-08

## 2023-07-14 ENCOUNTER — HOSPITAL ENCOUNTER (OUTPATIENT)
Facility: HOSPITAL | Age: 68
Discharge: HOME | End: 2023-07-14
Attending: INTERNAL MEDICINE
Payer: MEDICARE

## 2023-07-14 DIAGNOSIS — R91.8 RIGHT LOWER LOBE LUNG MASS: ICD-10-CM

## 2023-07-14 DIAGNOSIS — C18.2 MALIGNANT NEOPLASM OF ASCENDING COLON (HCC): ICD-10-CM

## 2023-07-14 PROCEDURE — 3430000000 HC RX DIAGNOSTIC RADIOPHARMACEUTICAL: Performed by: INTERNAL MEDICINE

## 2023-07-14 PROCEDURE — 78815 PET IMAGE W/CT SKULL-THIGH: CPT

## 2023-07-14 PROCEDURE — A9552 F18 FDG: HCPCS | Performed by: INTERNAL MEDICINE

## 2023-07-14 RX ORDER — FLUDEOXYGLUCOSE F-18 500 MCI/ML
10.84 INJECTION INTRAVENOUS
Status: COMPLETED | OUTPATIENT
Start: 2023-07-14 | End: 2023-07-14

## 2023-07-14 RX ADMIN — FLUDEOXYGLUCOSE F-18 10.84 MILLICURIE: 500 INJECTION INTRAVENOUS at 08:34

## 2023-07-19 RX ORDER — ATENOLOL AND CHLORTHALIDONE TABLET 50; 25 MG/1; MG/1
1 TABLET ORAL DAILY
Qty: 30 TABLET | OUTPATIENT
Start: 2023-07-19

## 2023-07-19 NOTE — TELEPHONE ENCOUNTER
Patient requesting medication refill states he was taken off the metoprolol by the hospital due to reaction and states the atenolol was prescribed and has no more refills. Also requesting call from nurse    Requesting medications go to [address] [place] [address] on [address]      atenolol-chlorthalidone (TENORETIC) 50-25 MG per tablet

## 2023-07-19 NOTE — TELEPHONE ENCOUNTER
Last Visit: 11/7/22   Next Appointment: 7/20/23  Last written: 7/6/22 90 tablets, 3 refills    Requested Prescriptions     Pending Prescriptions Disp Refills    atenolol-chlorthalidone (TENORETIC) 50-25 MG per tablet 30 tablet      Sig: Take 1 tablet by mouth daily

## 2023-07-20 ENCOUNTER — OFFICE VISIT (OUTPATIENT)
Dept: CARDIOTHORACIC SURGERY | Age: 68
End: 2023-07-20
Payer: MEDICARE

## 2023-07-20 VITALS
HEIGHT: 69 IN | BODY MASS INDEX: 23.4 KG/M2 | DIASTOLIC BLOOD PRESSURE: 78 MMHG | HEART RATE: 58 BPM | WEIGHT: 158 LBS | OXYGEN SATURATION: 100 % | TEMPERATURE: 99.8 F | SYSTOLIC BLOOD PRESSURE: 146 MMHG

## 2023-07-20 DIAGNOSIS — R91.1 RIGHT LOWER LOBE PULMONARY NODULE: Primary | ICD-10-CM

## 2023-07-20 DIAGNOSIS — R91.1 SOLITARY LUNG NODULE: Primary | ICD-10-CM

## 2023-07-20 PROCEDURE — G8420 CALC BMI NORM PARAMETERS: HCPCS | Performed by: THORACIC SURGERY (CARDIOTHORACIC VASCULAR SURGERY)

## 2023-07-20 PROCEDURE — 1123F ACP DISCUSS/DSCN MKR DOCD: CPT | Performed by: THORACIC SURGERY (CARDIOTHORACIC VASCULAR SURGERY)

## 2023-07-20 PROCEDURE — G8427 DOCREV CUR MEDS BY ELIG CLIN: HCPCS | Performed by: THORACIC SURGERY (CARDIOTHORACIC VASCULAR SURGERY)

## 2023-07-20 PROCEDURE — 3078F DIAST BP <80 MM HG: CPT | Performed by: THORACIC SURGERY (CARDIOTHORACIC VASCULAR SURGERY)

## 2023-07-20 PROCEDURE — 3074F SYST BP LT 130 MM HG: CPT | Performed by: THORACIC SURGERY (CARDIOTHORACIC VASCULAR SURGERY)

## 2023-07-20 PROCEDURE — 1036F TOBACCO NON-USER: CPT | Performed by: THORACIC SURGERY (CARDIOTHORACIC VASCULAR SURGERY)

## 2023-07-20 PROCEDURE — 99202 OFFICE O/P NEW SF 15 MIN: CPT | Performed by: THORACIC SURGERY (CARDIOTHORACIC VASCULAR SURGERY)

## 2023-07-20 PROCEDURE — 3017F COLORECTAL CA SCREEN DOC REV: CPT | Performed by: THORACIC SURGERY (CARDIOTHORACIC VASCULAR SURGERY)

## 2023-07-20 ASSESSMENT — ENCOUNTER SYMPTOMS
EYES NEGATIVE: 1
GASTROINTESTINAL NEGATIVE: 1
RESPIRATORY NEGATIVE: 1

## 2023-07-20 NOTE — PROGRESS NOTES
Nelson Rodgers (:  1955) is a 70 y.o. male,New patient, here for evaluation of the following chief complaint(s):  New Patient (RLL Nodule)         ASSESSMENT/PLAN:  Patient has T3N1a colon cancer. Right lower lobe lung nodule 1.6 cm in diameter. Mass has moderate activity on PET scan. No other mediastinal adenopathy is noted. I explained to the patient that the nature of the mass is not clear it may represent solitary colorectal metastasis,primary lung cancer. We will need tissue diagnosis prior to treatment. I explained to him that surgical resection of solitary colorectal metastasis has better survival than chemotherapy or radiation. Patient is hesitant about surgical resection. Will Obtain CT guided Biopsy and  discuss  final plan based on the results          Subjective   SUBJECTIVE/OBJECTIVE:  70years old male with history of T3N1 obstructed Colon cancer. Patient was found to have solitary right lower lobe lung nodule. Patient Had  resection with end ileostomy  followed by ileostomy reversal. Recent PET scan shows moderate activity in the nodule. No mediastinal adenopathy or recurrence at the primary site. Patient feels well, tolerating diet and gained weight. Review of Systems   Constitutional: Negative. HENT: Negative. Eyes: Negative. Respiratory: Negative. Cardiovascular: Negative. Gastrointestinal: Negative. Endocrine: Negative. Genitourinary: Negative. Musculoskeletal: Negative. Neurological: Negative. Hematological: Negative. Psychiatric/Behavioral: Negative. Objective   Physical Exam  Constitutional:       Appearance: Normal appearance. HENT:      Head: Atraumatic. Cardiovascular:      Rate and Rhythm: Normal rate and regular rhythm. Pulmonary:      Breath sounds: Normal breath sounds. Abdominal:      General: Abdomen is flat. Palpations: Abdomen is soft. Musculoskeletal:         General: Normal range of motion.       Cervical

## 2023-07-24 ENCOUNTER — TELEPHONE (OUTPATIENT)
Age: 68
End: 2023-07-24

## 2023-07-25 ENCOUNTER — TELEPHONE (OUTPATIENT)
Dept: CARDIOTHORACIC SURGERY | Age: 68
End: 2023-07-25

## 2023-07-25 NOTE — TELEPHONE ENCOUNTER
I spoke with patient to advise him that I spoke with the Interventional radiologist  in regard to scheduling a ct guided lung biopsy of the RLL nodule. Patient was advised once approval is received from the Interventional Radiologist he will be contacted for an appointment to be scheduled. Patient's questions were answered to his satisfaction about why the biopsy is being ordered and whether it is an outpatient procedure. Patient states he may postpone the biopsy. He states he called and spoke with someone on yesterday and was told the order is good for a year. As of right now an appt will be scheduled.

## 2023-07-25 NOTE — TELEPHONE ENCOUNTER
Returned patient call and informed per NP Antonetta Kussmaul, that he is overdue for follow up appointment and will need to be seen prior to medication being filled. Patient states he is unable to come in due to having various other appointment and procedures. Informed patient if he is seeing cardiology that he can request medication Atenolol from their office. Patient states he plans to no longer see his current cardiologist after his next appointment with them but states he will call their office for medication.

## 2023-07-26 ENCOUNTER — TELEPHONE (OUTPATIENT)
Age: 68
End: 2023-07-26

## 2023-07-27 ENCOUNTER — TELEPHONE (OUTPATIENT)
Facility: HOSPITAL | Age: 68
End: 2023-07-27

## 2023-07-27 RX ORDER — ATENOLOL AND CHLORTHALIDONE TABLET 50; 25 MG/1; MG/1
1 TABLET ORAL DAILY
Qty: 90 TABLET | Refills: 1 | Status: SHIPPED | OUTPATIENT
Start: 2023-07-27

## 2023-09-04 NOTE — HOME HEALTH
Controlled Substance Refill Request for: Norco 5-325mg, si/2-1 tab daily as needed for severe back and leg pain  Last refill: 23  Last clinic visit: 23   Clinic visit frequency required: Q 3 months-due 2023  Next appt: not scheduled at this time  Controlled substance agreement on file: Yes:  Date 23.  Tox screen done 22    [place]  checked, fill history below. Refill routed to Dr Barahona for review. Josephine Hager        Patient has GI MD appointment today

## 2023-09-07 ENCOUNTER — TELEPHONE (OUTPATIENT)
Age: 68
End: 2023-09-07

## 2023-09-07 NOTE — TELEPHONE ENCOUNTER
Get the labs as recommended and talk to PCP for dizziness.   Get a blood pressure chart for next 2 to 3 days

## 2023-09-07 NOTE — TELEPHONE ENCOUNTER
Spoke with patient wife and informed her that Per Dr. Markel Valenzuela the labs as recommended and talk to PCP for dizziness. Get a blood pressure chart for next 2 to 3 days. She stated she understood and will give message to patient.

## 2023-09-07 NOTE — TELEPHONE ENCOUNTER
Patient called to confirmed appointment and patient stated he have to schedule because he been having dizziness. He stated he had CBC done 7/18. He stated he have not had BMP done at this. Please advise.

## 2023-09-21 ENCOUNTER — HOSPITAL ENCOUNTER (OUTPATIENT)
Facility: HOSPITAL | Age: 68
Discharge: HOME | End: 2023-09-21
Payer: MEDICARE

## 2023-09-21 DIAGNOSIS — I10 ESSENTIAL HYPERTENSION: ICD-10-CM

## 2023-09-21 LAB
ANION GAP SERPL CALC-SCNC: 4 MMOL/L (ref 3–18)
BUN SERPL-MCNC: 12 MG/DL (ref 7–18)
BUN/CREAT SERPL: 12 (ref 12–20)
CALCIUM SERPL-MCNC: 9.1 MG/DL (ref 8.5–10.1)
CHLORIDE SERPL-SCNC: 96 MMOL/L (ref 100–111)
CO2 SERPL-SCNC: 35 MMOL/L (ref 21–32)
CREAT SERPL-MCNC: 1.04 MG/DL (ref 0.6–1.3)
GLUCOSE SERPL-MCNC: 170 MG/DL (ref 74–99)
POTASSIUM SERPL-SCNC: 3.5 MMOL/L (ref 3.5–5.5)
SODIUM SERPL-SCNC: 135 MMOL/L (ref 136–145)

## 2023-09-21 PROCEDURE — 80048 BASIC METABOLIC PNL TOTAL CA: CPT

## 2023-09-21 PROCEDURE — 36415 COLL VENOUS BLD VENIPUNCTURE: CPT

## 2023-09-25 ENCOUNTER — TELEPHONE (OUTPATIENT)
Age: 68
End: 2023-09-25

## 2023-09-25 NOTE — TELEPHONE ENCOUNTER
Spoke to patient per Dr. Yue Ayala regarding lab/test. Lab reviewed. Please contact patient and do the following asap  Fax to PCP for increased glucose. Labs faxed to PCP. He voices understanding and acceptance of this advice and will call back if any further questions or concerns.

## 2023-09-25 NOTE — TELEPHONE ENCOUNTER
----- Message from Troy Spaulding MD sent at 9/25/2023  2:39 PM EDT -----  Please contact patient and do the following asap      Fax to PCP for increased glucose

## 2023-09-28 ENCOUNTER — OFFICE VISIT (OUTPATIENT)
Age: 68
End: 2023-09-28
Payer: MEDICARE

## 2023-09-28 VITALS
TEMPERATURE: 98.2 F | HEART RATE: 44 BPM | BODY MASS INDEX: 23.55 KG/M2 | WEIGHT: 159 LBS | SYSTOLIC BLOOD PRESSURE: 180 MMHG | HEIGHT: 69 IN | DIASTOLIC BLOOD PRESSURE: 74 MMHG | RESPIRATION RATE: 18 BRPM

## 2023-09-28 DIAGNOSIS — C18.6 MALIGNANT NEOPLASM OF DESCENDING COLON (HCC): Primary | ICD-10-CM

## 2023-09-28 DIAGNOSIS — R91.1 LUNG NODULE: ICD-10-CM

## 2023-09-28 PROCEDURE — G8428 CUR MEDS NOT DOCUMENT: HCPCS | Performed by: COLON & RECTAL SURGERY

## 2023-09-28 PROCEDURE — 3078F DIAST BP <80 MM HG: CPT | Performed by: COLON & RECTAL SURGERY

## 2023-09-28 PROCEDURE — G8420 CALC BMI NORM PARAMETERS: HCPCS | Performed by: COLON & RECTAL SURGERY

## 2023-09-28 PROCEDURE — 3077F SYST BP >= 140 MM HG: CPT | Performed by: COLON & RECTAL SURGERY

## 2023-09-28 PROCEDURE — 1123F ACP DISCUSS/DSCN MKR DOCD: CPT | Performed by: COLON & RECTAL SURGERY

## 2023-09-28 PROCEDURE — 99214 OFFICE O/P EST MOD 30 MIN: CPT | Performed by: COLON & RECTAL SURGERY

## 2023-09-28 PROCEDURE — 3017F COLORECTAL CA SCREEN DOC REV: CPT | Performed by: COLON & RECTAL SURGERY

## 2023-09-28 PROCEDURE — 1036F TOBACCO NON-USER: CPT | Performed by: COLON & RECTAL SURGERY

## 2023-09-28 NOTE — PROGRESS NOTES
Subjective: He is seen here in follow-up. PET/CT did show activity of the lung nodule. He is hesitant to have chemotherapy or to have biopsy of the lung nodule. He is tolerating diet and moving his bowels every other day. He has some concerns about his wounds. Past medical history and ROS were reviewed and unchanged. Abdomen: Soft, nontender nondistended  No obvious hernia  All wounds healed    Assessment / Plan    Status post robotic left colectomy for descending colon cancer  Possible lung metastasis  Long discussion with the patient  I have recommended he have a lung biopsy  He has already met with the cardiothoracic surgeon  Telephone numbers to schedule IR biopsy as well as telephone number for cardiothoracic surgeon given  He can follow-up here with me should he have wound issues or bowel issues  Eventually will need a colonoscopy, deferred at this point and can be referred back here for this if his disease has stabilized    30 minutes was spent in patient care. The diagnoses and plan were discussed with patient. All questions answered. Plan of care agreed to by all concerned.

## 2023-11-01 ENCOUNTER — OFFICE VISIT (OUTPATIENT)
Age: 68
End: 2023-11-01
Payer: MEDICARE

## 2023-11-01 VITALS
HEIGHT: 69 IN | DIASTOLIC BLOOD PRESSURE: 71 MMHG | SYSTOLIC BLOOD PRESSURE: 144 MMHG | HEART RATE: 50 BPM | BODY MASS INDEX: 23.96 KG/M2 | WEIGHT: 161.8 LBS | OXYGEN SATURATION: 98 %

## 2023-11-01 DIAGNOSIS — I10 ESSENTIAL HYPERTENSION: Primary | ICD-10-CM

## 2023-11-01 DIAGNOSIS — Z98.890 STATUS POST LAPAROTOMY: ICD-10-CM

## 2023-11-01 DIAGNOSIS — E78.1 PURE HYPERTRIGLYCERIDEMIA: ICD-10-CM

## 2023-11-01 DIAGNOSIS — I48.0 PAROXYSMAL ATRIAL FIBRILLATION (HCC): ICD-10-CM

## 2023-11-01 DIAGNOSIS — I49.5 SINUS BRADYCARDIA-TACHYCARDIA SYNDROME (HCC): ICD-10-CM

## 2023-11-01 DIAGNOSIS — I95.1 ORTHOSTATIC HYPOTENSION: ICD-10-CM

## 2023-11-01 PROCEDURE — G8427 DOCREV CUR MEDS BY ELIG CLIN: HCPCS | Performed by: INTERNAL MEDICINE

## 2023-11-01 PROCEDURE — G8420 CALC BMI NORM PARAMETERS: HCPCS | Performed by: INTERNAL MEDICINE

## 2023-11-01 PROCEDURE — 3077F SYST BP >= 140 MM HG: CPT | Performed by: INTERNAL MEDICINE

## 2023-11-01 PROCEDURE — 93000 ELECTROCARDIOGRAM COMPLETE: CPT | Performed by: INTERNAL MEDICINE

## 2023-11-01 PROCEDURE — 3017F COLORECTAL CA SCREEN DOC REV: CPT | Performed by: INTERNAL MEDICINE

## 2023-11-01 PROCEDURE — 99214 OFFICE O/P EST MOD 30 MIN: CPT | Performed by: INTERNAL MEDICINE

## 2023-11-01 PROCEDURE — 1123F ACP DISCUSS/DSCN MKR DOCD: CPT | Performed by: INTERNAL MEDICINE

## 2023-11-01 PROCEDURE — 1036F TOBACCO NON-USER: CPT | Performed by: INTERNAL MEDICINE

## 2023-11-01 PROCEDURE — G8484 FLU IMMUNIZE NO ADMIN: HCPCS | Performed by: INTERNAL MEDICINE

## 2023-11-01 PROCEDURE — 3078F DIAST BP <80 MM HG: CPT | Performed by: INTERNAL MEDICINE

## 2023-11-01 RX ORDER — ATENOLOL 25 MG/1
25 TABLET ORAL DAILY
Qty: 90 TABLET | Refills: 3 | Status: SHIPPED | OUTPATIENT
Start: 2023-11-01

## 2023-11-01 RX ORDER — AMLODIPINE BESYLATE 5 MG/1
5 TABLET ORAL DAILY
Qty: 90 TABLET | Refills: 3
Start: 2023-11-01

## 2023-11-01 ASSESSMENT — ENCOUNTER SYMPTOMS
WHEEZING: 0
RESPIRATORY NEGATIVE: 1
SHORTNESS OF BREATH: 0
EYES NEGATIVE: 1
APNEA: 0
CHEST TIGHTNESS: 0
ABDOMINAL DISTENTION: 0
ABDOMINAL PAIN: 0
COUGH: 0
BACK PAIN: 0
NAUSEA: 0
GASTROINTESTINAL NEGATIVE: 1

## 2023-11-01 NOTE — PROGRESS NOTES
Room 8    1. Have you been to the ER, urgent care clinic since your last visit? Hospitalized since your last visit? No      2. Where do you normally have your labs drawn? [address]      3. Do you need any refills today? No      4. Which local pharmacy do you use? [facility] [address]      5. Which mail order pharmacy do you use? None       6. Are you here for surgical clearance? No,  who will be doing your procedure/surgery (care team)?    N/A
effects    potassium chloride (KLOR-CON) 20 MEQ packet Therapy completed       Follow-up and Dispositions    Return in about 3 months (around 2/1/2024), or if symptoms worsen or fail to improve, for with orthostatics. Return to ER if any significant CP not relieved by s/l NTG, severe SOB, severe palpitations, loss of consciousness    11/1/2023  Plan for medicines : Given significant orthostatic changes with systolic pressure dropping over 30 points, will discontinue diuretics. Given severe bradycardia, reduce atenolol to 25 mg a day as he also has history of SVT  Exercise Plan: Walk 30 to 40 minutes a day. As before IDiet plan: Mediterranean diet guidelines explained check blood sugar at [employer].

## 2023-11-27 ENCOUNTER — TELEPHONE (OUTPATIENT)
Age: 68
End: 2023-11-27

## 2023-11-27 NOTE — TELEPHONE ENCOUNTER
[facility] Call Center    Phone Message    May a detailed message be left on voicemail: yes     Reason for Call: Other: Pt would like to r/s his surgery to Monday if it is available but he has additional questions as well      Action Taken: Other: ortho     Travel Screening: Not Applicable                                                                       Patient will like to know if you will like to adjust BP meds? Since change of BP medications 11/1/23 pt. reports  \"Having to take a second dose 1-2hrs later of Atenolol 25mg to lower BP\"      BP before any meds ranges: 180's /  with HR mid 40's    Per pt. \"today without med BP top number 160, following 1 pill 25mg Atenolol top BP number 185 after 2nd pill laid down for hrs.  /68 HR 53\"

## 2023-11-27 NOTE — TELEPHONE ENCOUNTER
Patient made aware of msg below.      Increase Norvasc to 10 mg a day  BP and heart rate chart for 4 days

## 2023-12-01 ENCOUNTER — TELEPHONE (OUTPATIENT)
Age: 68
End: 2023-12-01

## 2023-12-01 DIAGNOSIS — I10 ESSENTIAL HYPERTENSION: Primary | ICD-10-CM

## 2023-12-01 NOTE — TELEPHONE ENCOUNTER
Requested Prescriptions     Pending Prescriptions Disp Refills    doxazosin (CARDURA) 1 MG tablet 30 tablet 3     Sig: Take 1 tablet by mouth daily

## 2023-12-01 NOTE — TELEPHONE ENCOUNTER
Patient calling with update to BP following medication adjustment.      NORVASC INCREASED 10MG BP CHART 4 DAYS    11/28/23   BP before meds 8am 153/76   HR: 44              4:30pm 136/67 HR: 52    11/29/23  BP before meds 10am 147/72  HR: 41    8:30pm 154/74 HR: 49    11/30/23  BP before meds 7am   147/68 HR: 51       7PM   137/69  HR:51    12/1/23  BP before meds 8am 154/75 HR: 54

## 2023-12-01 NOTE — TELEPHONE ENCOUNTER
Increase Norvasc to 10 mg a day. BP chart next week. If patient is already taking 10 mg a day, add doxazosin 1 mg at bedtime.

## 2023-12-01 NOTE — TELEPHONE ENCOUNTER
This RN contacted the patient at telephone number listed on file. Name and  verified with patient as identifiers. Pt advised he currently takes Norvasc 10 mg daily. Provided instruction per note from Dr. Nadia Wiggins: If patient is already taking 10 mg a day, add doxazosin 1 mg at bedtime. Continue tracking BP for one week. Pt conveyed understanding regarding instructions, provided preferred pharmacy, and advised no other questions.

## 2023-12-04 RX ORDER — DOXAZOSIN MESYLATE 1 MG/1
1 TABLET ORAL DAILY
Qty: 30 TABLET | Refills: 3 | Status: SHIPPED | OUTPATIENT
Start: 2023-12-04

## 2023-12-07 ENCOUNTER — TELEPHONE (OUTPATIENT)
Age: 68
End: 2023-12-07

## 2023-12-07 NOTE — TELEPHONE ENCOUNTER
Received call from pt. Stating \"Not able to take the new medication\"    Medication: 1mg doxazosin  Patient reports medication taken 7pm 12/6/23. While getting up to go the restroom 1am \"felt very dizzy, making it difficult to stand.  Felt like I was going to pass out\"  Patient did not record BP just remember it being low \"90's\"     He feels stable at the time   Unable to take BP at the moment    Please advise

## 2023-12-12 ENCOUNTER — TELEPHONE (OUTPATIENT)
Age: 68
End: 2023-12-12

## 2023-12-12 DIAGNOSIS — I10 ESSENTIAL HYPERTENSION: ICD-10-CM

## 2023-12-12 NOTE — TELEPHONE ENCOUNTER
Patient made aware of Dr. Ethan Barrios below. looks like blood pressure is trending upwards. We can keep him without doxazosin for few more days. Get his blood pressures again next week. I prefer that patient write it on a paper and either bring that or send that with the message rather than you taking the numbers on phone.
1

## 2024-01-03 ENCOUNTER — TELEPHONE (OUTPATIENT)
Age: 69
End: 2024-01-03

## 2024-01-03 NOTE — TELEPHONE ENCOUNTER
Spoke with patient regarding BP Log, relayed message per MAXINE Brambila- NP:    Continue current treatment. Patient voiced unerstanding, would like to be seen in office sooner. Patient scheduled to be seen by Dr. Meredith on 01/25/24.

## 2024-01-24 ENCOUNTER — HOSPITAL ENCOUNTER (OUTPATIENT)
Facility: HOSPITAL | Age: 69
Discharge: HOME | End: 2024-01-27
Payer: MEDICARE

## 2024-01-24 DIAGNOSIS — E78.1 PURE HYPERTRIGLYCERIDEMIA: Primary | ICD-10-CM

## 2024-01-24 DIAGNOSIS — I10 ESSENTIAL HYPERTENSION: ICD-10-CM

## 2024-01-24 LAB
ALBUMIN SERPL-MCNC: 3.7 G/DL (ref 3.4–5)
ALBUMIN/GLOB SERPL: 1.1 (ref 0.8–1.7)
ALP SERPL-CCNC: 53 U/L (ref 45–117)
ALT SERPL-CCNC: 13 U/L (ref 16–61)
ANION GAP SERPL CALC-SCNC: 2 MMOL/L (ref 3–18)
AST SERPL-CCNC: 8 U/L (ref 10–38)
BILIRUB DIRECT SERPL-MCNC: 0.3 MG/DL (ref 0–0.2)
BILIRUB SERPL-MCNC: 1.3 MG/DL (ref 0.2–1)
BUN SERPL-MCNC: 10 MG/DL (ref 7–18)
BUN/CREAT SERPL: 9 (ref 12–20)
CALCIUM SERPL-MCNC: 9.8 MG/DL (ref 8.5–10.1)
CHLORIDE SERPL-SCNC: 104 MMOL/L (ref 100–111)
CHOLEST SERPL-MCNC: 98 MG/DL
CO2 SERPL-SCNC: 33 MMOL/L (ref 21–32)
CREAT SERPL-MCNC: 1.08 MG/DL (ref 0.6–1.3)
GLOBULIN SER CALC-MCNC: 3.3 G/DL (ref 2–4)
GLUCOSE SERPL-MCNC: 137 MG/DL (ref 74–99)
HDLC SERPL-MCNC: 39 MG/DL (ref 40–60)
HDLC SERPL: 2.5 (ref 0–5)
LDLC SERPL CALC-MCNC: 38.6 MG/DL (ref 0–100)
LIPID PANEL: ABNORMAL
POTASSIUM SERPL-SCNC: 4.2 MMOL/L (ref 3.5–5.5)
PROT SERPL-MCNC: 7 G/DL (ref 6.4–8.2)
SODIUM SERPL-SCNC: 139 MMOL/L (ref 136–145)
TRIGL SERPL-MCNC: 102 MG/DL
VLDLC SERPL CALC-MCNC: 20.4 MG/DL

## 2024-01-24 PROCEDURE — 36415 COLL VENOUS BLD VENIPUNCTURE: CPT

## 2024-01-24 PROCEDURE — 80061 LIPID PANEL: CPT

## 2024-01-24 PROCEDURE — 80048 BASIC METABOLIC PNL TOTAL CA: CPT

## 2024-01-24 PROCEDURE — 80076 HEPATIC FUNCTION PANEL: CPT

## 2024-01-25 ENCOUNTER — OFFICE VISIT (OUTPATIENT)
Age: 69
End: 2024-01-25
Payer: MEDICARE

## 2024-01-25 VITALS
HEART RATE: 56 BPM | WEIGHT: 165.2 LBS | BODY MASS INDEX: 24.47 KG/M2 | OXYGEN SATURATION: 98 % | HEIGHT: 69 IN | DIASTOLIC BLOOD PRESSURE: 94 MMHG | SYSTOLIC BLOOD PRESSURE: 155 MMHG

## 2024-01-25 DIAGNOSIS — I48.0 PAROXYSMAL ATRIAL FIBRILLATION (HCC): Primary | ICD-10-CM

## 2024-01-25 DIAGNOSIS — I10 ESSENTIAL HYPERTENSION: ICD-10-CM

## 2024-01-25 DIAGNOSIS — E78.00 HYPERCHOLESTEREMIA: ICD-10-CM

## 2024-01-25 PROCEDURE — 3077F SYST BP >= 140 MM HG: CPT | Performed by: INTERNAL MEDICINE

## 2024-01-25 PROCEDURE — G8427 DOCREV CUR MEDS BY ELIG CLIN: HCPCS | Performed by: INTERNAL MEDICINE

## 2024-01-25 PROCEDURE — 3080F DIAST BP >= 90 MM HG: CPT | Performed by: INTERNAL MEDICINE

## 2024-01-25 PROCEDURE — 1123F ACP DISCUSS/DSCN MKR DOCD: CPT | Performed by: INTERNAL MEDICINE

## 2024-01-25 PROCEDURE — 99214 OFFICE O/P EST MOD 30 MIN: CPT | Performed by: INTERNAL MEDICINE

## 2024-01-25 PROCEDURE — 1036F TOBACCO NON-USER: CPT | Performed by: INTERNAL MEDICINE

## 2024-01-25 PROCEDURE — G8420 CALC BMI NORM PARAMETERS: HCPCS | Performed by: INTERNAL MEDICINE

## 2024-01-25 PROCEDURE — 3017F COLORECTAL CA SCREEN DOC REV: CPT | Performed by: INTERNAL MEDICINE

## 2024-01-25 PROCEDURE — G8484 FLU IMMUNIZE NO ADMIN: HCPCS | Performed by: INTERNAL MEDICINE

## 2024-01-25 RX ORDER — LISINOPRIL 5 MG/1
5 TABLET ORAL DAILY
Qty: 90 TABLET | Refills: 1 | Status: SHIPPED | OUTPATIENT
Start: 2024-01-25

## 2024-01-25 ASSESSMENT — ENCOUNTER SYMPTOMS
ABDOMINAL PAIN: 0
NAUSEA: 0
GASTROINTESTINAL NEGATIVE: 1
WHEEZING: 0
APNEA: 0
BACK PAIN: 0
EYES NEGATIVE: 1
COUGH: 0
ABDOMINAL DISTENTION: 0
SHORTNESS OF BREATH: 0
CHEST TIGHTNESS: 0

## 2024-01-25 NOTE — PATIENT INSTRUCTIONS
The medication list included in this document is our record of what you are currently taking, including any changes that were made at today's visit.  If you find any differences when compared to your medications at home, or have any questions that were not answered at your visit, please contact the office.    After the recommended changes have been made in blood pressure medicines, patient advised to keep BP/HR(pulse rate) chart twice daily and bring us results in next 4 to 5 days. Patient may send the results via \"My Chart\" if desired.  Please rest for 5-10 minutes before checking blood pressure.  Sit on a comfortable chair without crossing the legs and put your arm on a table.  We recommend that you use an upper arm cuff.  Check the blood pressure 3 times each time you check the blood pressure and record the lowest reading.  If you check the blood pressure in both arms, use the higher reading.

## 2024-01-25 NOTE — PROGRESS NOTES
Room 6    Medications verbally reviewed.    1. Have you been to the ER, urgent care clinic since your last visit?  Hospitalized since your last visit? No    2.  Where do you normally have your labs drawn?  [facility]      3. Do you need any refills today? No      4. Which local pharmacy do you use?   [facility] [place]       5. Which mail order pharmacy do you use?   None       6. Are you here for surgical clearance? No, who will be doing your procedure/surgery (care team)?   N/A  
hypertension  -     lisinopril (PRINIVIL;ZESTRIL) 5 MG tablet; Take 1 tablet by mouth daily  -     Basic Metabolic Panel; Future          See patient instructions also for other medical advice given    Medications Discontinued During This Encounter   Medication Reason    doxazosin (CARDURA) 1 MG tablet Side effects       Follow-up and Dispositions    Return in about 6 months (around 7/25/2024), or if symptoms worsen or fail to improve, for post test/procedure, BP log x 3-5 days post med changes, with EKG.           Return to ER if any significant CP not relieved by s/l NTG, severe SOB, severe palpitations, loss of consciousness    1/25/2024  Plan for medicines : Blood pressure is still elevated.  Add lisinopril and watch for side effects.  Follow-up electrolytes and renal function as discussed with patient.  Staying in the regular rhythm and lipids are excellent.  Exercise Plan: Try to walk 30 to 40 minutes a day.  Diet plan: Mediterranean diet guidelines reinforced.

## 2024-01-26 PROBLEM — I11.0 HYPERTENSIVE HEART DISEASE WITH HEART FAILURE (HCC): Status: ACTIVE | Noted: 2024-01-26

## 2024-01-26 PROBLEM — Z43.2 ENCOUNTER FOR ATTENTION TO ILEOSTOMY (HCC): Status: ACTIVE | Noted: 2024-01-26

## 2024-01-26 PROBLEM — C18.6 MALIGNANT NEOPLASM OF DESCENDING COLON (HCC): Status: ACTIVE | Noted: 2024-01-26

## 2024-01-29 ENCOUNTER — TELEPHONE (OUTPATIENT)
Age: 69
End: 2024-01-29

## 2024-01-29 DIAGNOSIS — I95.1 ORTHOSTATIC HYPOTENSION: ICD-10-CM

## 2024-01-29 DIAGNOSIS — I10 ESSENTIAL HYPERTENSION: ICD-10-CM

## 2024-01-29 RX ORDER — AMLODIPINE BESYLATE 10 MG/1
10 TABLET ORAL DAILY
Qty: 90 TABLET | Refills: 1 | Status: SHIPPED | OUTPATIENT
Start: 2024-01-29

## 2024-01-29 RX ORDER — VALSARTAN 80 MG/1
80 TABLET ORAL DAILY
Qty: 30 TABLET | Refills: 5 | Status: SHIPPED | OUTPATIENT
Start: 2024-01-29 | End: 2024-02-16

## 2024-01-29 NOTE — TELEPHONE ENCOUNTER
Patient states can not tolerate lisinopril. He gets more SOB while taking. Also wants to know if he is not take does he still need to have a BMP done?

## 2024-01-29 NOTE — TELEPHONE ENCOUNTER
Discontinue lisinopril.  Start valsartan 80 mg a day  Same blood test can be done 5 to 6 days after starting valsartan

## 2024-02-01 ENCOUNTER — TELEPHONE (OUTPATIENT)
Age: 69
End: 2024-02-01

## 2024-02-01 NOTE — TELEPHONE ENCOUNTER
Attempted call to patient for scheduling of due appt or to update provider, no answer lvm to contact office

## 2024-02-09 ENCOUNTER — NURSE ONLY (OUTPATIENT)
Age: 69
End: 2024-02-09
Payer: MEDICARE

## 2024-02-09 ENCOUNTER — HOSPITAL ENCOUNTER (OUTPATIENT)
Facility: HOSPITAL | Age: 69
Setting detail: SPECIMEN
End: 2024-02-09
Payer: MEDICARE

## 2024-02-09 DIAGNOSIS — E11.9 TYPE 2 DIABETES MELLITUS WITHOUT COMPLICATION, WITHOUT LONG-TERM CURRENT USE OF INSULIN (HCC): ICD-10-CM

## 2024-02-09 DIAGNOSIS — E11.9 TYPE 2 DIABETES MELLITUS WITHOUT COMPLICATION, WITHOUT LONG-TERM CURRENT USE OF INSULIN (HCC): Primary | ICD-10-CM

## 2024-02-09 LAB
EST. AVERAGE GLUCOSE BLD GHB EST-MCNC: 128 MG/DL
HBA1C MFR BLD: 6.1 % (ref 4.2–5.6)

## 2024-02-09 PROCEDURE — 36415 COLL VENOUS BLD VENIPUNCTURE: CPT

## 2024-02-09 PROCEDURE — 83036 HEMOGLOBIN GLYCOSYLATED A1C: CPT

## 2024-02-16 ENCOUNTER — OFFICE VISIT (OUTPATIENT)
Age: 69
End: 2024-02-16
Payer: MEDICARE

## 2024-02-16 VITALS
RESPIRATION RATE: 16 BRPM | SYSTOLIC BLOOD PRESSURE: 150 MMHG | WEIGHT: 167.6 LBS | DIASTOLIC BLOOD PRESSURE: 86 MMHG | HEART RATE: 88 BPM | OXYGEN SATURATION: 98 % | TEMPERATURE: 98.3 F | BODY MASS INDEX: 24.82 KG/M2 | HEIGHT: 69 IN

## 2024-02-16 DIAGNOSIS — F41.9 ANXIETY: ICD-10-CM

## 2024-02-16 DIAGNOSIS — Z43.2 ENCOUNTER FOR ATTENTION TO ILEOSTOMY (HCC): ICD-10-CM

## 2024-02-16 DIAGNOSIS — D68.69 SECONDARY HYPERCOAGULABLE STATE (HCC): ICD-10-CM

## 2024-02-16 DIAGNOSIS — Z00.00 MEDICARE ANNUAL WELLNESS VISIT, SUBSEQUENT: Primary | ICD-10-CM

## 2024-02-16 DIAGNOSIS — E11.9 TYPE 2 DIABETES MELLITUS WITHOUT COMPLICATION, WITHOUT LONG-TERM CURRENT USE OF INSULIN (HCC): ICD-10-CM

## 2024-02-16 DIAGNOSIS — E43 SEVERE PROTEIN-CALORIE MALNUTRITION (HCC): ICD-10-CM

## 2024-02-16 DIAGNOSIS — C18.6 MALIGNANT NEOPLASM OF DESCENDING COLON (HCC): ICD-10-CM

## 2024-02-16 DIAGNOSIS — I11.0 HYPERTENSIVE HEART DISEASE WITH HEART FAILURE (HCC): ICD-10-CM

## 2024-02-16 PROCEDURE — 3079F DIAST BP 80-89 MM HG: CPT | Performed by: NURSE PRACTITIONER

## 2024-02-16 PROCEDURE — G0439 PPPS, SUBSEQ VISIT: HCPCS | Performed by: NURSE PRACTITIONER

## 2024-02-16 PROCEDURE — G8427 DOCREV CUR MEDS BY ELIG CLIN: HCPCS | Performed by: NURSE PRACTITIONER

## 2024-02-16 PROCEDURE — G8420 CALC BMI NORM PARAMETERS: HCPCS | Performed by: NURSE PRACTITIONER

## 2024-02-16 PROCEDURE — 3017F COLORECTAL CA SCREEN DOC REV: CPT | Performed by: NURSE PRACTITIONER

## 2024-02-16 PROCEDURE — 99214 OFFICE O/P EST MOD 30 MIN: CPT | Performed by: NURSE PRACTITIONER

## 2024-02-16 PROCEDURE — 3044F HG A1C LEVEL LT 7.0%: CPT | Performed by: NURSE PRACTITIONER

## 2024-02-16 PROCEDURE — 1036F TOBACCO NON-USER: CPT | Performed by: NURSE PRACTITIONER

## 2024-02-16 PROCEDURE — 1123F ACP DISCUSS/DSCN MKR DOCD: CPT | Performed by: NURSE PRACTITIONER

## 2024-02-16 PROCEDURE — 3077F SYST BP >= 140 MM HG: CPT | Performed by: NURSE PRACTITIONER

## 2024-02-16 PROCEDURE — 2022F DILAT RTA XM EVC RTNOPTHY: CPT | Performed by: NURSE PRACTITIONER

## 2024-02-16 PROCEDURE — G8484 FLU IMMUNIZE NO ADMIN: HCPCS | Performed by: NURSE PRACTITIONER

## 2024-02-16 RX ORDER — HYDROXYZINE HYDROCHLORIDE 25 MG/1
25 TABLET, FILM COATED ORAL EVERY 6 HOURS PRN
Qty: 120 TABLET | Refills: 1 | Status: SHIPPED | OUTPATIENT
Start: 2024-02-16 | End: 2024-03-17

## 2024-02-16 NOTE — PATIENT INSTRUCTIONS
high blood pressure, and high cholesterol. If you think you may have a problem with alcohol or drug use, talk to your doctor.   Medicines    Take your medicines exactly as prescribed. Call your doctor if you think you are having a problem with your medicine.     If your doctor recommends aspirin, take the amount directed each day. Make sure you take aspirin and not another kind of pain reliever, such as acetaminophen (Tylenol).   When should you call for help?   Call 911 if you have symptoms of a heart attack. These may include:    Chest pain or pressure, or a strange feeling in the chest.     Sweating.     Shortness of breath.     Pain, pressure, or a strange feeling in the back, neck, jaw, or upper belly or in one or both shoulders or arms.     Lightheadedness or sudden weakness.     A fast or irregular heartbeat.   After you call 911, the  may tell you to chew 1 adult-strength or 2 to 4 low-dose aspirin. Wait for an ambulance. Do not try to drive yourself.  Watch closely for changes in your health, and be sure to contact your doctor if you have any problems.  Where can you learn more?  Go to https://www.[employer].net/patientEd and enter F075 to learn more about \"A Healthy Heart: Care Instructions.\"  Current as of: June 25, 2023               Content Version: 13.9  © 2006-2023 [employer].   Care instructions adapted under license by [employer]. If you have questions about a medical condition or this instruction, always ask your healthcare professional. [employer] disclaims any warranty or liability for your use of this information.      Personalized Preventive Plan for Last Millard - 2/16/2024  Medicare offers a range of preventive health benefits. Some of the tests and screenings are paid in full while other may be subject to a deductible, co-insurance, and/or copay.    Some of these benefits include a comprehensive review of your medical history including lifestyle,

## 2024-02-16 NOTE — PROGRESS NOTES
Medicare Annual Wellness Visit    Last Millard is here for Hypertension, Cholesterol Problem, Diabetes, and Medicare AWV    Assessment & Plan   Medicare annual wellness visit, subsequent  Encounter for attention to ileostomy (HCC)  Severe protein-calorie malnutrition (HCC)  Hypertensive heart disease with heart failure (HCC)  Assessment & Plan:   Monitored by specialist- no acute findings meriting change in the plan  Malignant neoplasm of descending colon (HCC)  Assessment & Plan:   Monitored by specialist- no acute findings meriting change in the plan  Secondary hypercoagulable state (HCC)  Type 2 diabetes mellitus without complication, without long-term current use of insulin ([facility])  -      DIABETES FOOT EXAM  -      DIABETES EDUCATION    Recommendations for Preventive Services Due: see orders and patient instructions/AVS.  Recommended screening schedule for the next 5-10 years is provided to the patient in written form: see Patient Instructions/AVS.     Return in 6 months (on 8/16/2024) for HTN, HLD, DM, In Office (ONLY), Fasting Labs 1 Wk Prior.     Subjective   The following acute and/or chronic problems were also addressed today:  See notes    Patient's complete Health Risk Assessment and screening values have been reviewed and are found in Flowsheets. The following problems were reviewed today and where indicated follow up appointments were made and/or referrals ordered.    Positive Risk Factor Screenings with Interventions:    Fall Risk:  Do you feel unsteady or are you worried about falling? : (!) yes  2 or more falls in past year?: no  Fall with injury in past year?: no     Interventions:    Reviewed medications, home hazards, visual acuity, and co-morbidities that can increase risk for falls  See AVS for additional education material            General HRA Questions:  Select all that apply: (!) New or Increased Fatigue, Loneliness, Stress, Anger    Fatigue Interventions:  See AVS for additional

## 2024-02-26 NOTE — PROGRESS NOTES
HISTORY OF PRESENT ILLNESS  Last Millard is a 69 y.o. male.    PMH Atrial fibrillation, Coronary risk equivalent DM, PAF, Pulmonary HTN,  ----  CARDIAC STUDIES  ----  Nuclear stress 2023    Stress Function: Left ventricular function post-stress is normal. Post-stress ejection fraction is 60%.    Perfusion Comments: LV perfusion is abnormal.    ECG: Resting ECG demonstrates sinus bradycardia.    ECG: The ECG was negative for ischemia.    Stress Test: A Sameer protocol stress test was performed. Overall, the patient's exercise capacity was average for their age. Hemodynamics are adequate for diagnosis. Blood pressure demonstrated a hypertensive response and heart rate demonstrated a normal response to stress. The patient's heart rate recovery was normal.    SSS=5, SRS=5, SDS=0  Medium Risk Stress Test  Medium size partially fixed and partially reversible defect present at the septum from midwall to base of mild intensity may represent prior infarct with jerrell-infarct ischemia versus soft tissue attenuation  TID 0.99, ejection fraction 60%  SSS = 5, SRS = 5, SDS = 0  ----  2d echo 2023    Left Ventricle: Normal left ventricular systolic function with a visually estimated EF of 55 - 60%. Left ventricle size is normal. Normal wall thickness. Normal wall motion. Abnormal diastolic function.    Tricuspid Valve: The estimated RVSP is 39 mmHg.    Pulmonary Arteries: Mild pulmonary hypertension present.  ----    Have you had Fatigue?  Yes    How lon Months   How bad: Mild     2.   Have you had have you had Chest Pain? No      3.   Have you had Dyspnea (SOB)? No      4.   Have you had Orthopnea? No       5.   Have you had PND? No      6.   Have you had leg swelling? No         7.    Have you had any weight gain? Yes   How lon Months How much: 10 lbs     8.    Have you had any palpitations? Yes   How long: 3 Months  How frequent: Periodically     9.    Have you had any syncope? Unsteadiness      How lon

## 2024-02-28 ENCOUNTER — TELEPHONE (OUTPATIENT)
Dept: CARDIOTHORACIC SURGERY | Age: 69
End: 2024-02-28

## 2024-02-28 ENCOUNTER — OFFICE VISIT (OUTPATIENT)
Age: 69
End: 2024-02-28
Payer: MEDICARE

## 2024-02-28 VITALS
HEART RATE: 77 BPM | BODY MASS INDEX: 25.27 KG/M2 | SYSTOLIC BLOOD PRESSURE: 160 MMHG | WEIGHT: 170.6 LBS | HEIGHT: 69 IN | DIASTOLIC BLOOD PRESSURE: 108 MMHG | OXYGEN SATURATION: 98 %

## 2024-02-28 DIAGNOSIS — I48.0 PAF (PAROXYSMAL ATRIAL FIBRILLATION) (HCC): ICD-10-CM

## 2024-02-28 DIAGNOSIS — I10 HYPERTENSION, UNSPECIFIED TYPE: Primary | ICD-10-CM

## 2024-02-28 DIAGNOSIS — R94.39 ABNORMAL STRESS TEST: ICD-10-CM

## 2024-02-28 DIAGNOSIS — I10 ESSENTIAL HYPERTENSION: ICD-10-CM

## 2024-02-28 PROCEDURE — G8484 FLU IMMUNIZE NO ADMIN: HCPCS | Performed by: INTERNAL MEDICINE

## 2024-02-28 PROCEDURE — 3017F COLORECTAL CA SCREEN DOC REV: CPT | Performed by: INTERNAL MEDICINE

## 2024-02-28 PROCEDURE — 1036F TOBACCO NON-USER: CPT | Performed by: INTERNAL MEDICINE

## 2024-02-28 PROCEDURE — G8428 CUR MEDS NOT DOCUMENT: HCPCS | Performed by: INTERNAL MEDICINE

## 2024-02-28 PROCEDURE — 99214 OFFICE O/P EST MOD 30 MIN: CPT | Performed by: INTERNAL MEDICINE

## 2024-02-28 PROCEDURE — 1123F ACP DISCUSS/DSCN MKR DOCD: CPT | Performed by: INTERNAL MEDICINE

## 2024-02-28 PROCEDURE — G8419 CALC BMI OUT NRM PARAM NOF/U: HCPCS | Performed by: INTERNAL MEDICINE

## 2024-02-28 PROCEDURE — 3080F DIAST BP >= 90 MM HG: CPT | Performed by: INTERNAL MEDICINE

## 2024-02-28 PROCEDURE — 3074F SYST BP LT 130 MM HG: CPT | Performed by: INTERNAL MEDICINE

## 2024-02-28 RX ORDER — CHLORTHALIDONE 25 MG/1
25 TABLET ORAL DAILY
Qty: 30 TABLET | Refills: 3 | Status: SHIPPED | OUTPATIENT
Start: 2024-02-28

## 2024-02-28 RX ORDER — ATENOLOL 25 MG/1
37.5 TABLET ORAL DAILY
Qty: 90 TABLET | Refills: 3 | Status: SHIPPED | OUTPATIENT
Start: 2024-02-28

## 2024-02-28 ASSESSMENT — ENCOUNTER SYMPTOMS
WHEEZING: 0
APNEA: 0
SHORTNESS OF BREATH: 0
COUGH: 0
CONSTIPATION: 0
NAUSEA: 0
CHEST TIGHTNESS: 0
DIARRHEA: 0
COLOR CHANGE: 0
ABDOMINAL PAIN: 0
BLOOD IN STOOL: 0

## 2024-02-28 NOTE — PATIENT INSTRUCTIONS
Learning About the Mediterranean Diet  What is the Mediterranean diet?     The Mediterranean diet is a style of eating rather than a diet plan. It features foods eaten in [place], [place], southern [place], and other countries along the [place] Sea. It emphasizes eating foods like fish, fruits, vegetables, beans, high-fiber breads and whole grains, nuts, and olive oil. This style of eating includes limited red meat, cheese, and sweets.  Why choose the Mediterranean diet?  A Mediterranean-style diet may improve heart health. It contains more fat than other heart-healthy diets. But the fats are mainly from nuts, unsaturated oils (such as fish oils and olive oil), and certain nut or seed oils (such as canola, soybean, or flaxseed oil). These fats may help protect the heart and blood vessels.  How can you get started on the Mediterranean diet?  Here are some things you can do to switch to a more Mediterranean way of eating.  What to eat  Eat a variety of fruits and vegetables each day, such as grapes, blueberries, tomatoes, broccoli, peppers, figs, olives, spinach, eggplant, beans, lentils, and chickpeas.  Eat a variety of whole-grain foods each day, such as oats, brown rice, and whole wheat bread, pasta, and couscous.  Eat fish at least 2 times a week. Try tuna, salmon, mackerel, lake trout, herring, or sardines.  Eat moderate amounts of low-fat dairy products, such as milk, cheese, or yogurt.  Eat moderate amounts of poultry and eggs.  Choose healthy (unsaturated) fats, such as nuts, olive oil, and certain nut or seed oils like canola, soybean, and flaxseed.  Limit unhealthy (saturated) fats, such as butter, palm oil, and coconut oil. And limit fats found in animal products, such as meat and dairy products made with whole milk. Try to eat red meat only a few times a month in very small amounts.  Limit sweets and desserts to only a few times a week. This includes sugar-sweetened drinks like soda.  The

## 2024-02-28 NOTE — TELEPHONE ENCOUNTER
Sergio spw pt on 2/28/24 after his apt with Dr. Daly to see if he wanted to move forward with the Ct needle Biopsy, which he stated \"no, he doesn't want to move forward with it at all.\" Psr also informed the pt if he wanted to change his mind the order is still in the system until July 2024. Pt understood and said thank you.

## 2024-02-28 NOTE — PROGRESS NOTES
Have you had Fatigue?  Yes    How lon Months   How bad: Mild    2.   Have you had have you had Chest Pain? No     3.   Have you had Dyspnea (SOB)? No     4.   Have you had Orthopnea? No      5.   Have you had PND? No     6.   Have you had leg swelling? No     7.    Have you had any weight gain? Yes   How lon Months How much: 10 lbs    8.    Have you had any palpitations? Yes   How long: 3 Months  How frequent: Periodically     9.    Have you had any syncope? Unsteadiness     How lon Months How frequent: Daily How bad:Mild    10. Do you have any wounds on legs? No

## 2024-08-09 DIAGNOSIS — E78.00 HYPERCHOLESTEREMIA: ICD-10-CM

## 2024-08-09 DIAGNOSIS — I10 ESSENTIAL (PRIMARY) HYPERTENSION: Primary | ICD-10-CM

## 2024-08-09 DIAGNOSIS — E11.9 TYPE 2 DIABETES MELLITUS WITHOUT COMPLICATION, WITHOUT LONG-TERM CURRENT USE OF INSULIN (HCC): ICD-10-CM

## 2024-08-12 ENCOUNTER — HOSPITAL ENCOUNTER (OUTPATIENT)
Facility: HOSPITAL | Age: 69
Setting detail: SPECIMEN
Discharge: HOME | End: 2024-08-15
Payer: MEDICARE

## 2024-08-12 DIAGNOSIS — E11.9 TYPE 2 DIABETES MELLITUS WITHOUT COMPLICATION, WITHOUT LONG-TERM CURRENT USE OF INSULIN (HCC): ICD-10-CM

## 2024-08-12 DIAGNOSIS — I10 ESSENTIAL (PRIMARY) HYPERTENSION: ICD-10-CM

## 2024-08-12 LAB
CREAT UR-MCNC: 104 MG/DL (ref 30–125)
MICROALBUMIN UR-MCNC: 18.1 MG/DL (ref 0–3)
MICROALBUMIN/CREAT UR-RTO: 174 MG/G (ref 0–30)

## 2024-08-12 PROCEDURE — 82570 ASSAY OF URINE CREATININE: CPT

## 2024-08-12 PROCEDURE — 82043 UR ALBUMIN QUANTITATIVE: CPT

## 2024-08-13 LAB
ALBUMIN SERPL-MCNC: 4.4 G/DL (ref 3.9–4.9)
ALP SERPL-CCNC: 59 IU/L (ref 44–121)
ALT SERPL-CCNC: 12 IU/L (ref 0–44)
AST SERPL-CCNC: 11 IU/L (ref 0–40)
BILIRUB SERPL-MCNC: 1.3 MG/DL (ref 0–1.2)
BUN SERPL-MCNC: 8 MG/DL (ref 8–27)
BUN/CREAT SERPL: 7 (ref 10–24)
CALCIUM SERPL-MCNC: 9.6 MG/DL (ref 8.6–10.2)
CHLORIDE SERPL-SCNC: 102 MMOL/L (ref 96–106)
CHOLEST SERPL-MCNC: 101 MG/DL (ref 100–199)
CO2 SERPL-SCNC: 27 MMOL/L (ref 20–29)
CREAT SERPL-MCNC: 1.19 MG/DL (ref 0.76–1.27)
EGFRCR SERPLBLD CKD-EPI 2021: 66 ML/MIN/1.73
GLOBULIN SER CALC-MCNC: 2.7 G/DL (ref 1.5–4.5)
GLUCOSE SERPL-MCNC: 131 MG/DL (ref 70–99)
HBA1C MFR BLD: 6.4 % (ref 4.8–5.6)
HDLC SERPL-MCNC: 39 MG/DL
LDLC SERPL CALC-MCNC: 44 MG/DL (ref 0–99)
POTASSIUM SERPL-SCNC: 4.6 MMOL/L (ref 3.5–5.2)
PROT SERPL-MCNC: 7.1 G/DL (ref 6–8.5)
SODIUM SERPL-SCNC: 143 MMOL/L (ref 134–144)
SPECIMEN STATUS REPORT: NORMAL
TRIGL SERPL-MCNC: 96 MG/DL (ref 0–149)
VLDLC SERPL CALC-MCNC: 18 MG/DL (ref 5–40)

## 2024-08-15 ENCOUNTER — OFFICE VISIT (OUTPATIENT)
Facility: CLINIC | Age: 69
End: 2024-08-15
Payer: MEDICARE

## 2024-08-15 VITALS
BODY MASS INDEX: 25.21 KG/M2 | WEIGHT: 170.2 LBS | HEART RATE: 74 BPM | HEIGHT: 69 IN | OXYGEN SATURATION: 97 % | RESPIRATION RATE: 16 BRPM | SYSTOLIC BLOOD PRESSURE: 142 MMHG | DIASTOLIC BLOOD PRESSURE: 86 MMHG

## 2024-08-15 DIAGNOSIS — F41.9 ANXIETY: ICD-10-CM

## 2024-08-15 DIAGNOSIS — E11.9 DIET-CONTROLLED DIABETES MELLITUS (HCC): ICD-10-CM

## 2024-08-15 DIAGNOSIS — E78.00 HYPERCHOLESTEREMIA: ICD-10-CM

## 2024-08-15 DIAGNOSIS — I10 ESSENTIAL (PRIMARY) HYPERTENSION: Primary | ICD-10-CM

## 2024-08-15 PROCEDURE — 3077F SYST BP >= 140 MM HG: CPT | Performed by: NURSE PRACTITIONER

## 2024-08-15 PROCEDURE — 1123F ACP DISCUSS/DSCN MKR DOCD: CPT | Performed by: NURSE PRACTITIONER

## 2024-08-15 PROCEDURE — 1036F TOBACCO NON-USER: CPT | Performed by: NURSE PRACTITIONER

## 2024-08-15 PROCEDURE — 3079F DIAST BP 80-89 MM HG: CPT | Performed by: NURSE PRACTITIONER

## 2024-08-15 PROCEDURE — G8427 DOCREV CUR MEDS BY ELIG CLIN: HCPCS | Performed by: NURSE PRACTITIONER

## 2024-08-15 PROCEDURE — G8419 CALC BMI OUT NRM PARAM NOF/U: HCPCS | Performed by: NURSE PRACTITIONER

## 2024-08-15 PROCEDURE — 3017F COLORECTAL CA SCREEN DOC REV: CPT | Performed by: NURSE PRACTITIONER

## 2024-08-15 PROCEDURE — 2022F DILAT RTA XM EVC RTNOPTHY: CPT | Performed by: NURSE PRACTITIONER

## 2024-08-15 PROCEDURE — 3044F HG A1C LEVEL LT 7.0%: CPT | Performed by: NURSE PRACTITIONER

## 2024-08-15 PROCEDURE — 99214 OFFICE O/P EST MOD 30 MIN: CPT | Performed by: NURSE PRACTITIONER

## 2024-08-15 RX ORDER — ATORVASTATIN CALCIUM 10 MG/1
10 TABLET, FILM COATED ORAL DAILY
Qty: 90 TABLET | Refills: 1 | Status: SHIPPED | OUTPATIENT
Start: 2024-08-15

## 2024-08-15 RX ORDER — HYDROXYZINE HYDROCHLORIDE 25 MG/1
25 TABLET, FILM COATED ORAL EVERY 6 HOURS PRN
Qty: 120 TABLET | Refills: 1 | Status: SHIPPED | OUTPATIENT
Start: 2024-08-15

## 2024-08-15 RX ORDER — AMLODIPINE BESYLATE 10 MG/1
10 TABLET ORAL DAILY
Qty: 90 TABLET | Refills: 1 | Status: SHIPPED | OUTPATIENT
Start: 2024-08-15

## 2024-08-15 RX ORDER — CHLORTHALIDONE 25 MG/1
25 TABLET ORAL DAILY
Qty: 90 TABLET | Refills: 1 | Status: SHIPPED | OUTPATIENT
Start: 2024-08-15

## 2024-08-15 NOTE — PROGRESS NOTES
Office Note    Assessment & Plan:   diabetes needs further observation, needs improvement, and needs to follow diet more regularly, hypertension borderline controlled, needs further observation, needs improvement, and needs to follow diet more regularly, hyperlipidemia needs further observation and needs to follow diet more regularly.    Diabetic issues reviewed with him: diabetic diet discussed in detail, written exchange diet given, low cholesterol diet, weight control and daily exercise discussed, home glucose monitoring emphasized, all medications, side effects and compliance discussed carefully, diabetic Sick Day rules reviewed, handout given, foot care discussed and Podiatry visits discussed, annual eye examinations at Ophthalmology discussed, glycohemoglobin and other lab monitoring discussed, long term diabetic complications discussed, and labs immediately prior to next visit.   Hypertension issues reviewed with him: Continue current therapy    1. Essential (primary) hypertension  -     chlorthalidone (HYGROTON) 25 MG tablet; Take 1 tablet by mouth daily, Disp-90 tablet, R-1Normal  -     amLODIPine (NORVASC) 10 MG tablet; Take 1 tablet by mouth daily, Disp-90 tablet, R-1Normal  2. Hypercholesteremia  -     atorvastatin (LIPITOR) 10 MG tablet; Take 1 tablet by mouth daily, Disp-90 tablet, R-1Normal  3. Diet-controlled diabetes mellitus (HCC)  -     Hemoglobin A1C; Future  4. Anxiety  -     hydrOXYzine HCl (ATARAX) 25 MG tablet; Take 1 tablet by mouth every 6 hours as needed for Anxiety, Disp-120 tablet, R-1Normal     Follow-up and Dispositions    Return in about 6 months (around 2/15/2025) for AWV, HTN, HLD, DM, In Office (ONLY), A1C 1 Wk prior, NS Visit in 2 Wks for BP Check.       Subjective:   Last Millard is a 69 y.o. White (non-) male who was seen in clinic today (8/26/2024) for evaluation of Hypertension, Cholesterol Problem, and Diabetes  .  Hypertension/HLD:    Diet and Lifestyle: generally

## 2024-08-15 NOTE — PROGRESS NOTES
1. \"Have you been to the ER, urgent care clinic since your last visit?  Hospitalized since your last visit?\" No    2. \"Have you seen or consulted any other health care providers outside of the [facility] since your last visit?\" No     3. For patients aged 45-75: Has the patient had a colonoscopy / FIT/ Cologuard? Yes - no Care Gap present    Pt declines diabetic Eye exam- states he is not worried about it

## 2024-09-04 ENCOUNTER — NURSE ONLY (OUTPATIENT)
Facility: CLINIC | Age: 69
End: 2024-09-04

## 2024-09-04 NOTE — PROGRESS NOTES
Last Millard is being seen today for a Blood Pressure Recheck.     Last Millard was seen 8/15/2024 and his Blood Pressure was:   BP Readings from Last 1 Encounters:   08/15/24 (!) 142/86     Pt states he took blood pressure medications today around 0830. Blood pressure today is 184/105. Patient states his blood pressure may be elevated today due to white coat syndrome and his car broke down this morning. Repeated blood pressure after 10 minutes and reading is 142/84. Repeated again after 10 minutes and reading is 138/84. Patient states when he wakes up in the morning his blood pressure is low and he has dizziness but when he gets up after laying down for a while the blood pressure is back up and he feels better. Patient states this has been happening just some days. SIMONA Pearson was notified and patient was informed to write BP log and follow up in 2 weeks for an appointment. Patient voiced understanding.       Jennifer Gomez MA

## 2024-09-05 VITALS — HEART RATE: 77 BPM | DIASTOLIC BLOOD PRESSURE: 84 MMHG | SYSTOLIC BLOOD PRESSURE: 142 MMHG

## 2024-09-18 ENCOUNTER — NURSE ONLY (OUTPATIENT)
Facility: CLINIC | Age: 69
End: 2024-09-18

## 2024-09-18 VITALS — SYSTOLIC BLOOD PRESSURE: 138 MMHG | DIASTOLIC BLOOD PRESSURE: 88 MMHG

## 2025-01-24 DIAGNOSIS — I10 ESSENTIAL HYPERTENSION: ICD-10-CM

## 2025-01-24 RX ORDER — ATENOLOL 25 MG/1
37.5 TABLET ORAL DAILY
Qty: 135 TABLET | Refills: 3 | OUTPATIENT
Start: 2025-01-24

## 2025-02-12 LAB — HBA1C MFR BLD: 7.2 % (ref 4.8–5.6)

## 2025-02-18 ENCOUNTER — OFFICE VISIT (OUTPATIENT)
Facility: CLINIC | Age: 70
End: 2025-02-18

## 2025-02-18 VITALS
BODY MASS INDEX: 23.79 KG/M2 | DIASTOLIC BLOOD PRESSURE: 84 MMHG | HEART RATE: 67 BPM | WEIGHT: 160.6 LBS | RESPIRATION RATE: 16 BRPM | SYSTOLIC BLOOD PRESSURE: 128 MMHG | OXYGEN SATURATION: 96 % | HEIGHT: 69 IN

## 2025-02-18 DIAGNOSIS — L20.84 INTRINSIC ECZEMA: ICD-10-CM

## 2025-02-18 DIAGNOSIS — I10 ESSENTIAL (PRIMARY) HYPERTENSION: ICD-10-CM

## 2025-02-18 DIAGNOSIS — Z00.00 MEDICARE ANNUAL WELLNESS VISIT, SUBSEQUENT: Primary | ICD-10-CM

## 2025-02-18 DIAGNOSIS — C18.6 MALIGNANT NEOPLASM OF DESCENDING COLON (HCC): ICD-10-CM

## 2025-02-18 DIAGNOSIS — L21.9 SEBORRHEIC DERMATITIS: ICD-10-CM

## 2025-02-18 DIAGNOSIS — F41.9 ANXIETY: ICD-10-CM

## 2025-02-18 DIAGNOSIS — I48.0 PAF (PAROXYSMAL ATRIAL FIBRILLATION) (HCC): ICD-10-CM

## 2025-02-18 DIAGNOSIS — E11.9 TYPE 2 DIABETES MELLITUS WITHOUT COMPLICATION, WITHOUT LONG-TERM CURRENT USE OF INSULIN (HCC): ICD-10-CM

## 2025-02-18 DIAGNOSIS — I11.0 HYPERTENSIVE HEART DISEASE WITH HEART FAILURE (HCC): ICD-10-CM

## 2025-02-18 DIAGNOSIS — Z43.2 ENCOUNTER FOR ATTENTION TO ILEOSTOMY (HCC): ICD-10-CM

## 2025-02-18 DIAGNOSIS — E78.00 HYPERCHOLESTEREMIA: ICD-10-CM

## 2025-02-18 RX ORDER — ATORVASTATIN CALCIUM 10 MG/1
10 TABLET, FILM COATED ORAL DAILY
Qty: 90 TABLET | Refills: 1 | Status: SHIPPED | OUTPATIENT
Start: 2025-02-18

## 2025-02-18 RX ORDER — AMLODIPINE BESYLATE 10 MG/1
10 TABLET ORAL DAILY
Qty: 90 TABLET | Refills: 1 | Status: SHIPPED | OUTPATIENT
Start: 2025-02-18

## 2025-02-18 RX ORDER — KETOCONAZOLE 20 MG/ML
SHAMPOO, SUSPENSION TOPICAL
Qty: 1 EACH | Refills: 5 | Status: SHIPPED | OUTPATIENT
Start: 2025-02-18

## 2025-02-18 RX ORDER — HYDROXYZINE HYDROCHLORIDE 25 MG/1
25 TABLET, FILM COATED ORAL EVERY 6 HOURS PRN
Qty: 120 TABLET | Refills: 1 | Status: SHIPPED | OUTPATIENT
Start: 2025-02-18

## 2025-02-18 RX ORDER — CHLORTHALIDONE 25 MG/1
25 TABLET ORAL DAILY
Qty: 90 TABLET | Refills: 1 | Status: SHIPPED | OUTPATIENT
Start: 2025-02-18

## 2025-02-18 ASSESSMENT — PATIENT HEALTH QUESTIONNAIRE - PHQ9
1. LITTLE INTEREST OR PLEASURE IN DOING THINGS: NOT AT ALL
SUM OF ALL RESPONSES TO PHQ QUESTIONS 1-9: 0
2. FEELING DOWN, DEPRESSED OR HOPELESS: NOT AT ALL
SUM OF ALL RESPONSES TO PHQ QUESTIONS 1-9: 0
SUM OF ALL RESPONSES TO PHQ9 QUESTIONS 1 & 2: 0

## 2025-02-18 ASSESSMENT — LIFESTYLE VARIABLES
HOW MANY STANDARD DRINKS CONTAINING ALCOHOL DO YOU HAVE ON A TYPICAL DAY: PATIENT DOES NOT DRINK
HOW OFTEN DO YOU HAVE A DRINK CONTAINING ALCOHOL: NEVER

## 2025-02-18 NOTE — PATIENT INSTRUCTIONS
dental care in your area.  Using a toothbrush  Older adults with arthritis sometimes have trouble brushing their teeth because they can't easily hold the toothbrush. Their hands and fingers may be stiff, painful, or weak. If this is the case, you can:  Offer an electric toothbrush.  Enlarge the handle of a non-electric toothbrush by wrapping a sponge, an elastic bandage, or adhesive tape around it.  Push the toothbrush handle through a ball made of rubber or soft foam.  Make the handle longer and thicker by taping Popsicle sticks or tongue depressors to it.  You may also be able to buy special toothbrushes, toothpaste dispensers, and floss holders.  Your doctor may recommend a soft-bristle toothbrush if the person you care for bleeds easily. Bleeding can happen because of a health problem or from certain medicines.  A toothpaste for sensitive teeth may help if the person you care for has sensitive teeth.  How do you brush and floss someone's teeth?  If the person you are caring for has a hard time cleaning their teeth on their own, you may need to brush and floss their teeth for them. It may be easiest to have the person sit and face away from you, and to sit or stand behind them. That way you can steady their head against your arm as you reach around to floss and brush their teeth. Choose a place that has good lighting and is comfortable for both of you.  Before you begin, gather your supplies. You will need gloves, floss, a toothbrush, and a container to hold water if you are not near a sink. Wash and dry your hands well and put on gloves. Start by flossing:  Gently work a piece of floss between each of the teeth toward the gums. A plastic flossing tool may make this easier, and they are available at [facility].  Curve the floss around each tooth into a U-shape and gently slide it under the gum line.  Move the floss firmly up and down several times to scrape off the plaque.  After you've finished flossing, 
Statement Selected

## 2025-02-18 NOTE — PROGRESS NOTES
Medicare Annual Wellness Visit    Last Millard is here for Medicare AWV, Hypertension, Cholesterol Problem, and Diabetes    Assessment & Plan   Medicare annual wellness visit, subsequent  Essential (primary) hypertension  -     Comprehensive Metabolic Panel; Future  Hypercholesteremia  -     Lipid Panel; Future  Type 2 diabetes mellitus without complication, without long-term current use of insulin (HCC)  -     Hemoglobin A1C; Future  -     Albumin/Creatinine Ratio, Urine; Future  Encounter for attention to ileostomy (HCC)  Assessment & Plan:   Monitored by specialist- no acute findings meriting change in the plan  Malignant neoplasm of descending colon (HCC)  Assessment & Plan:   Monitored by specialist- no acute findings meriting change in the plan  Hypertensive heart disease with heart failure ([facility])  Assessment & Plan:   Monitored by specialist- no acute findings meriting change in the plan  PAF (paroxysmal atrial fibrillation) ([facility])  Assessment & Plan:   Monitored by specialist- no acute findings meriting change in the plan       Return in about 6 months (around 8/18/2025) for HTN, HLD, DM, In Office (ONLY), Fasting Labs 1 Wk Prior.     Subjective   The following acute and/or chronic problems were also addressed today:  See Notes    Patient's complete Health Risk Assessment and screening values have been reviewed and are found in Flowsheets. The following problems were reviewed today and where indicated follow up appointments were made and/or referrals ordered.    Positive Risk Factor Screenings with Interventions:                 Dentist Screen:  Have you seen the dentist within the past year?: (!) No    Intervention:  Advised to schedule with their dentist     Vision Screen:  Do you have difficulty driving, watching TV, or doing any of your daily activities because of your eyesight?: No  Have you had an eye exam within the past year?: (!) No  Interventions:   Patient encouraged to make appointment with

## 2025-02-18 NOTE — PROGRESS NOTES
Office Note    Assessment & Plan:   diabetes needs further observation and needs to follow diet more regularly, hypertension needs further observation and needs to follow diet more regularly, hyperlipidemia control uncertain, needs further observation, and needs to follow diet more regularly    Diabetic issues reviewed with him: diabetic diet discussed in detail, written exchange diet given, low cholesterol diet, weight control and daily exercise discussed, home glucose monitoring emphasized, all medications, side effects and compliance discussed carefully, diabetic Sick Day rules reviewed, handout given, foot care discussed and Podiatry visits discussed, annual eye examinations at Ophthalmology discussed, glycohemoglobin and other lab monitoring discussed, long term diabetic complications discussed, and labs immediately prior to next visit.   Hypertension issues reviewed with him: Continue current therapy    1. Medicare annual wellness visit, subsequent  2. Essential (primary) hypertension  -     Comprehensive Metabolic Panel; Future  -     amLODIPine (NORVASC) 10 MG tablet; Take 1 tablet by mouth daily, Disp-90 tablet, R-1Normal  -     chlorthalidone (HYGROTON) 25 MG tablet; Take 1 tablet by mouth daily, Disp-90 tablet, R-1Normal  3. Hypercholesteremia  -     Lipid Panel; Future  -     atorvastatin (LIPITOR) 10 MG tablet; Take 1 tablet by mouth daily, Disp-90 tablet, R-1Normal  4. Type 2 diabetes mellitus without complication, without long-term current use of insulin (HCC)  -     Hemoglobin A1C; Future  -     Albumin/Creatinine Ratio, Urine; Future  -      DIABETES FOOT EXAM  -      DIABETES EDUCATION  5. Encounter for attention to ileostomy (HCC)  Assessment & Plan:   Monitored by specialist- no acute findings meriting change in the plan  6. Malignant neoplasm of descending colon (HCC)  Assessment & Plan:   Monitored by specialist- no acute findings meriting change in the plan  7. Hypertensive heart disease

## 2025-04-01 DIAGNOSIS — I10 ESSENTIAL HYPERTENSION: ICD-10-CM

## 2025-04-01 RX ORDER — ATENOLOL 25 MG/1
37.5 TABLET ORAL DAILY
Qty: 90 TABLET | Refills: 3 | OUTPATIENT
Start: 2025-04-01

## 2025-08-11 ENCOUNTER — LAB (OUTPATIENT)
Facility: CLINIC | Age: 70
End: 2025-08-11

## 2025-08-11 DIAGNOSIS — E78.00 HYPERCHOLESTEREMIA: ICD-10-CM

## 2025-08-11 DIAGNOSIS — I10 ESSENTIAL (PRIMARY) HYPERTENSION: ICD-10-CM

## 2025-08-11 DIAGNOSIS — E11.9 TYPE 2 DIABETES MELLITUS WITHOUT COMPLICATION, WITHOUT LONG-TERM CURRENT USE OF INSULIN (HCC): ICD-10-CM

## 2025-08-13 LAB
HBA1C MFR BLD: 7.2 % (ref 4.8–5.6)
SPECIMEN STATUS REPORT: NORMAL

## 2025-08-14 LAB
ALBUMIN SERPL-MCNC: 3.8 G/DL (ref 3.8–4.8)
ALBUMIN/CREAT UR: 20 MG/G CREAT (ref 0–29)
ALP SERPL-CCNC: 81 IU/L (ref 44–121)
ALT SERPL-CCNC: 6 IU/L (ref 0–44)
AST SERPL-CCNC: 9 IU/L (ref 0–40)
BILIRUB SERPL-MCNC: 0.5 MG/DL (ref 0–1.2)
BUN SERPL-MCNC: 7 MG/DL (ref 8–27)
BUN/CREAT SERPL: 6 (ref 10–24)
CALCIUM SERPL-MCNC: 10.1 MG/DL (ref 8.6–10.2)
CHLORIDE SERPL-SCNC: 96 MMOL/L (ref 96–106)
CHOLEST SERPL-MCNC: 85 MG/DL (ref 100–199)
CO2 SERPL-SCNC: 27 MMOL/L (ref 20–29)
CREAT SERPL-MCNC: 1.11 MG/DL (ref 0.76–1.27)
CREAT UR-MCNC: 211.2 MG/DL
EGFRCR SERPLBLD CKD-EPI 2021: 71 ML/MIN/1.73
GLOBULIN SER CALC-MCNC: 3.4 G/DL (ref 1.5–4.5)
GLUCOSE SERPL-MCNC: 145 MG/DL (ref 70–99)
HDLC SERPL-MCNC: 33 MG/DL
LDLC SERPL CALC-MCNC: 36 MG/DL (ref 0–99)
MICROALBUMIN UR-MCNC: 42.4 UG/ML
POTASSIUM SERPL-SCNC: 4.2 MMOL/L (ref 3.5–5.2)
PROT SERPL-MCNC: 7.2 G/DL (ref 6–8.5)
SODIUM SERPL-SCNC: 139 MMOL/L (ref 134–144)
TRIGL SERPL-MCNC: 78 MG/DL (ref 0–149)
VLDLC SERPL CALC-MCNC: 16 MG/DL (ref 5–40)

## 2025-08-22 ENCOUNTER — OFFICE VISIT (OUTPATIENT)
Facility: CLINIC | Age: 70
End: 2025-08-22
Payer: MEDICARE

## 2025-08-22 VITALS
HEIGHT: 69 IN | HEART RATE: 78 BPM | RESPIRATION RATE: 16 BRPM | BODY MASS INDEX: 21.33 KG/M2 | WEIGHT: 144 LBS | DIASTOLIC BLOOD PRESSURE: 89 MMHG | SYSTOLIC BLOOD PRESSURE: 128 MMHG | OXYGEN SATURATION: 95 %

## 2025-08-22 DIAGNOSIS — F41.9 ANXIETY: ICD-10-CM

## 2025-08-22 DIAGNOSIS — K59.00 CONSTIPATION, UNSPECIFIED CONSTIPATION TYPE: ICD-10-CM

## 2025-08-22 DIAGNOSIS — E11.9 TYPE 2 DIABETES MELLITUS WITHOUT COMPLICATION, WITHOUT LONG-TERM CURRENT USE OF INSULIN (HCC): ICD-10-CM

## 2025-08-22 DIAGNOSIS — E78.00 HYPERCHOLESTEREMIA: ICD-10-CM

## 2025-08-22 DIAGNOSIS — I10 ESSENTIAL (PRIMARY) HYPERTENSION: Primary | ICD-10-CM

## 2025-08-22 PROCEDURE — 99214 OFFICE O/P EST MOD 30 MIN: CPT | Performed by: NURSE PRACTITIONER

## 2025-08-22 PROCEDURE — 1159F MED LIST DOCD IN RCRD: CPT | Performed by: NURSE PRACTITIONER

## 2025-08-22 PROCEDURE — 3017F COLORECTAL CA SCREEN DOC REV: CPT | Performed by: NURSE PRACTITIONER

## 2025-08-22 PROCEDURE — 1123F ACP DISCUSS/DSCN MKR DOCD: CPT | Performed by: NURSE PRACTITIONER

## 2025-08-22 PROCEDURE — 3079F DIAST BP 80-89 MM HG: CPT | Performed by: NURSE PRACTITIONER

## 2025-08-22 PROCEDURE — 1160F RVW MEDS BY RX/DR IN RCRD: CPT | Performed by: NURSE PRACTITIONER

## 2025-08-22 PROCEDURE — 3051F HG A1C>EQUAL 7.0%<8.0%: CPT | Performed by: NURSE PRACTITIONER

## 2025-08-22 PROCEDURE — 2022F DILAT RTA XM EVC RTNOPTHY: CPT | Performed by: NURSE PRACTITIONER

## 2025-08-22 PROCEDURE — 1036F TOBACCO NON-USER: CPT | Performed by: NURSE PRACTITIONER

## 2025-08-22 PROCEDURE — 1126F AMNT PAIN NOTED NONE PRSNT: CPT | Performed by: NURSE PRACTITIONER

## 2025-08-22 PROCEDURE — G8420 CALC BMI NORM PARAMETERS: HCPCS | Performed by: NURSE PRACTITIONER

## 2025-08-22 PROCEDURE — G8427 DOCREV CUR MEDS BY ELIG CLIN: HCPCS | Performed by: NURSE PRACTITIONER

## 2025-08-22 PROCEDURE — 3074F SYST BP LT 130 MM HG: CPT | Performed by: NURSE PRACTITIONER

## 2025-08-22 RX ORDER — ATORVASTATIN CALCIUM 10 MG/1
10 TABLET, FILM COATED ORAL DAILY
Qty: 90 TABLET | Refills: 1 | Status: SHIPPED | OUTPATIENT
Start: 2025-08-22

## 2025-08-22 RX ORDER — SENNA AND DOCUSATE SODIUM 50; 8.6 MG/1; MG/1
1 TABLET, FILM COATED ORAL DAILY
Qty: 100 TABLET | Refills: 1 | Status: SHIPPED | OUTPATIENT
Start: 2025-08-22

## 2025-08-22 RX ORDER — CHLORTHALIDONE 25 MG/1
25 TABLET ORAL DAILY
Qty: 90 TABLET | Refills: 1 | Status: SHIPPED | OUTPATIENT
Start: 2025-08-22

## 2025-08-22 RX ORDER — AMLODIPINE BESYLATE 10 MG/1
10 TABLET ORAL DAILY
Qty: 90 TABLET | Refills: 1 | Status: SHIPPED | OUTPATIENT
Start: 2025-08-22

## 2025-08-22 RX ORDER — HYDROXYZINE HYDROCHLORIDE 25 MG/1
25 TABLET, FILM COATED ORAL EVERY 6 HOURS PRN
Qty: 120 TABLET | Refills: 1 | Status: SHIPPED | OUTPATIENT
Start: 2025-08-22

## 2025-08-25 PROBLEM — K59.00 CONSTIPATION: Status: ACTIVE | Noted: 2025-08-25

## (undated) DEVICE — LAPAROSCOPIC TROCAR SLEEVE/SINGLE USE: Brand: KII® OPTICAL ACCESS SYSTEM

## (undated) DEVICE — ELECTRODE PT RET AD L9FT HI MOIST COND ADH HYDRGEL CORDED

## (undated) DEVICE — DEVICE SECUREMENT 1/32IN POLYETH FOAM F ANCHR URIN CATH

## (undated) DEVICE — SEAL

## (undated) DEVICE — Device

## (undated) DEVICE — 3M™ STERI-DRAPE™ INSTRUMENT POUCH 1018L: Brand: STERI-DRAPE™

## (undated) DEVICE — SOLUTION IRRIG 1000ML H2O STRL BLT

## (undated) DEVICE — GAUZE,SPONGE,4"X4",16PLY,STRL,LF,10/TRAY: Brand: MEDLINE

## (undated) DEVICE — YANKAUER,SMOOTH HANDLE,HIGH CAPACITY: Brand: MEDLINE INDUSTRIES, INC.

## (undated) DEVICE — PACK PROCEDURE SURG MAJ W/ BASIN LF

## (undated) DEVICE — GLOVE SURG SZ 65 L12IN FNGR THK79MIL GRN LTX FREE

## (undated) DEVICE — SUTURE VCRL SZ 3-0 L18IN ABSRB UD STD AND SHT LEN DBL ARMED J644H

## (undated) DEVICE — DRESSING,GAUZE,XEROFORM,CURAD,1"X8",ST: Brand: CURAD

## (undated) DEVICE — REDUCER: Brand: ENDOWRIST

## (undated) DEVICE — TRAY,URINE METER,100% SILICONE,16FR10ML: Brand: MEDLINE

## (undated) DEVICE — KIT,ANTI FOG,W/SPONGE & FLUID,SOFT PACK: Brand: MEDLINE

## (undated) DEVICE — TAPE,CLOTH/SILK,CURAD,3"X10YD,LF,40/CS: Brand: CURAD

## (undated) DEVICE — GLOVE ORTHO 7 1/2   MSG9475

## (undated) DEVICE — SUTURE VCRL SZ 3-0 L27IN ABSRB VLT L26MM SH 1/2 CIR J316H

## (undated) DEVICE — BANDAGE,GAUZE,BULKEE LITE,3"X4.1YD,STRL: Brand: MEDLINE

## (undated) DEVICE — SUTURE NONABSORBABLE MONOFILAMENT 3-0 PS-1 18 IN BLK ETHILON 1663H

## (undated) DEVICE — DRAPE TOWEL: Brand: CONVERTORS

## (undated) DEVICE — KIT OST L12IN FLNG DIA45MM ST TRNSPAR TWO PC MOLD TECHNOLOGY

## (undated) DEVICE — ARM DRAPE

## (undated) DEVICE — STAPLER INT L55MM CUT LN L53MM STPL LN L57MM BLU B FRM 8

## (undated) DEVICE — STAPLER 60: Brand: SUREFORM

## (undated) DEVICE — MEDI-VAC NON-CONDUCTIVE SUCTION TUBING: Brand: CARDINAL HEALTH

## (undated) DEVICE — SUTURE PDS II SZ 1 L36IN ABSRB VLT CTXB L48MM 1/2 CIR BLNT ZB371

## (undated) DEVICE — SUTURE V-LOC 90 SZ 3-0 L6IN ABSRB VLT V-20 L26MM 1/2 CIR VLOCM0604

## (undated) DEVICE — ELECTRO LUBE IS A SINGLE PATIENT USE DEVICE THAT IS INTENDED TO BE USED ON ELECTROSURGICAL ELECTRODES TO REDUCE STICKING.: Brand: KEY SURGICAL ELECTRO LUBE

## (undated) DEVICE — SYRINGE MED 30ML STD CLR PLAS LUERLOCK TIP N CTRL DISP

## (undated) DEVICE — COLUMN DRAPE

## (undated) DEVICE — CATHETER SCLERO L240CM NDL 25GA L4MM SHTH DIA2.3MM CNTRST

## (undated) DEVICE — SUTURE VCRL SZ 3-0 L18IN ABSRB UD L26MM SH 1/2 CIR J864D

## (undated) DEVICE — TROCAR: Brand: KII® OPTICAL ACCESS SYSTEM

## (undated) DEVICE — TABLE COVER: Brand: CONVERTORS

## (undated) DEVICE — SUTURE MCRYL SZ 4-0 L27IN ABSRB UD L24MM PS-1 3/8 CIR PRIM Y935H

## (undated) DEVICE — DRESSING FOAM DISK DIA1IN H 7MM HYDRPHLC CHG IMPREG IN SL

## (undated) DEVICE — STAPLER INT L75MM CUT LN L73MM STPL LN L77MM BLU B FRM 8

## (undated) DEVICE — STAPLER INT L60MM REG TISS BLU B FRM 8 FIRING 2 ROW AUTO

## (undated) DEVICE — SUTURE PERMAHAND SZ 3-0 L18IN NONABSORBABLE BLK L26MM SH C013D

## (undated) DEVICE — SYRINGE MED 50ML LUERSLIP TIP

## (undated) DEVICE — SUTURE VCRL SZ 3-0 L27IN ABSRB UD L26MM SH 1/2 CIR J416H

## (undated) DEVICE — GARMENT,MEDLINE,DVT,INT,CALF,MED, GEN2: Brand: MEDLINE

## (undated) DEVICE — SUTURE PDS II SZ 1 L96IN ABSRB VLT TP-1 L65MM 1/2 CIR Z880G

## (undated) DEVICE — KIT CLN UP BON SECOURS MARYV

## (undated) DEVICE — SHEET, T, LAPAROTOMY, STERILE: Brand: MEDLINE

## (undated) DEVICE — CATHETERIZATION KIT AD 7 FRX16 CM CV ARROWG+ARD

## (undated) DEVICE — INTENDED FOR TISSUE SEPARATION, AND OTHER PROCEDURES THAT REQUIRE A SHARP SURGICAL BLADE TO PUNCTURE OR CUT.: Brand: BARD-PARKER ®  SAFETY SCALPED

## (undated) DEVICE — THREE-QUARTER SHEET: Brand: CONVERTORS

## (undated) DEVICE — GOWN PLASTIC FILM THMBHKS UNIV BLUE: Brand: CARDINAL HEALTH

## (undated) DEVICE — STAPLER 60 RELOAD BLUE: Brand: SUREFORM

## (undated) DEVICE — UNDERPAD INCONT W23XL36IN STD BLU POLYPR BK FLUF SFT

## (undated) DEVICE — SURGICAL PROCEDURE PACK TISS 3X5 IN ABSORBABLE SEPRAFILM

## (undated) DEVICE — NEEDLE SPNL 20GA L3.5IN YEL HUB S STL REG WALL FIT STYL W/

## (undated) DEVICE — SOLUTION ANTIFOG VIS SYS CLEARIFY LAPSCP

## (undated) DEVICE — DRAPE,REIN 53X77,STERILE: Brand: MEDLINE

## (undated) DEVICE — 3M™ MEDIPORE™ H SOFT CLOTH SURGICAL TAPE 2864, 4 INCH X 10 YARD (10CM X 9,14M), 12 ROLLS/CASE: Brand: 3M™ MEDIPORE™

## (undated) DEVICE — DRAIN SURG W10MMXL20CM SIL FULL PERF HUBLESS FLAT RADPQ

## (undated) DEVICE — SUTURE MCRYL SZ 4-0 L18IN ABSRB UD L19MM PS-2 3/8 CIR PRIM Y496G

## (undated) DEVICE — FORCEPS LAP DIA5MM BCOCK FEN TIP ROT NONARTICULATING LOK

## (undated) DEVICE — SUCTION IRRIGATOR: Brand: ENDOWRIST

## (undated) DEVICE — SEALER ENDOSCP NANO COAT OPN DIV CRV L JAW LIGASURE IMPACT

## (undated) DEVICE — JELLY LUBRICATING 2.7GM H2O SOL GREASELESS E-Z

## (undated) DEVICE — RELOAD STPL L55MM OPN H3.8MM CLS H1.5MM WIRE DIA0.2MM REG

## (undated) DEVICE — SUT PROL 0 30IN CTX BLU --

## (undated) DEVICE — PAD,ABDOMINAL,8"X10",ST,LF: Brand: MEDLINE

## (undated) DEVICE — INSULATED BLADE ELECTRODE: Brand: EDGE

## (undated) DEVICE — GAUZE,PACKING STRIP,PLAIN,1/4"X5YD,STRL: Brand: CURAD

## (undated) DEVICE — Device: Brand: SPOT EX ENDOSCOPIC TATTOO

## (undated) DEVICE — BLADELESS OBTURATOR: Brand: WECK VISTA

## (undated) DEVICE — RELOAD STPL L75MM OPN H3.8MM CLS 1.5MM WIRE DIA0.2MM REG

## (undated) DEVICE — 40580 - THE PINK PAD - ADVANCED TRENDELENBURG POSITIONING KIT: Brand: 40580 - THE PINK PAD - ADVANCED TRENDELENBURG POSITIONING KIT

## (undated) DEVICE — SUTURE VCRL SZ 0 L18IN ABSRB UD POLYGLACTIN 910 BRAID TIE J912G

## (undated) DEVICE — FORCEPS BX 240CM 2.4MM L NDL RAD JAW 4 M00513334

## (undated) DEVICE — BLADE ELECTRODE: Brand: EDGE

## (undated) DEVICE — SOLUTION IV 1000ML 0.9% SOD CHL

## (undated) DEVICE — GLOVE SURG SZ 8 L11.77IN FNGR THK9.8MIL STRW LTX POLYMER

## (undated) DEVICE — DRAINBAG,ANTI-REFLUX TOWER,L/F,2000ML,LL: Brand: MEDLINE

## (undated) DEVICE — INTENDED FOR TISSUE SEPARATION, AND OTHER PROCEDURES THAT REQUIRE A SHARP SURGICAL BLADE TO PUNCTURE OR CUT.: Brand: BARD-PARKER SAFETY BLADES SIZE 10, STERILE

## (undated) DEVICE — BANDAGE COMPR W6INXL5YD WHT BGE POLY COT M E WRP WV HK AND

## (undated) DEVICE — BLADE ASSEMB CLP HAIR FINE --

## (undated) DEVICE — APPLICATOR MEDICATED 26 CC SOLUTION HI LT ORNG CHLORAPREP

## (undated) DEVICE — STERILE POLYISOPRENE POWDER-FREE SURGICAL GLOVES: Brand: PROTEXIS

## (undated) DEVICE — SUTURE VCRL SZ 0 L27IN ABSRB UD L36MM CT-1 1/2 CIR J260H

## (undated) DEVICE — TIP COVER ACCESSORY

## (undated) DEVICE — STAPLER INT L75MM H1.5X1.8X2MM STD TI 6 ROW LIN CUT

## (undated) DEVICE — 2, DISPOSABLE SUCTION/IRRIGATOR WITH DISPOSABLE TIP: Brand: STRYKEFLOW

## (undated) DEVICE — GLOVE SURG SZ 6 CRM LTX FREE POLYISOPRENE POLYMER BEAD ANTI

## (undated) DEVICE — SUTURE VCRL SZ 3-0 L36IN ABSRB UD L36MM CT-1 1/2 CIR J944H

## (undated) DEVICE — SUTURE PROL SZ 1 L30IN NONABSORBABLE BLU CTX L48MM 1/2 CIR 8455H

## (undated) DEVICE — ENDOSCOPY PUMP TUBING/ CAP SET: Brand: ERBE

## (undated) DEVICE — SMOKE EVACUATION PENCIL: Brand: VALLEYLAB

## (undated) DEVICE — MAYO STAND COVER: Brand: CONVERTORS

## (undated) DEVICE — BLANKET WRM AD W50XL85.8IN PACU FULL BODY FORC AIR

## (undated) DEVICE — TROCAR: Brand: KII® SLEEVE

## (undated) DEVICE — COLOSTOMY/ILEOSTOMY KIT,FLEXWEAR: Brand: NEW IMAGE